# Patient Record
Sex: FEMALE | Race: WHITE | HISPANIC OR LATINO | Employment: OTHER | URBAN - METROPOLITAN AREA
[De-identification: names, ages, dates, MRNs, and addresses within clinical notes are randomized per-mention and may not be internally consistent; named-entity substitution may affect disease eponyms.]

---

## 2017-07-06 ENCOUNTER — ALLSCRIPTS OFFICE VISIT (OUTPATIENT)
Dept: OTHER | Facility: OTHER | Age: 57
End: 2017-07-06

## 2017-07-06 DIAGNOSIS — F41.9 ANXIETY DISORDER: ICD-10-CM

## 2017-07-06 DIAGNOSIS — J45.909 UNCOMPLICATED ASTHMA: ICD-10-CM

## 2017-07-06 DIAGNOSIS — R73.03 PREDIABETES: ICD-10-CM

## 2017-07-06 DIAGNOSIS — F32.9 MAJOR DEPRESSIVE DISORDER, SINGLE EPISODE: ICD-10-CM
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
## 2017-07-06 DIAGNOSIS — E55.9 VITAMIN D DEFICIENCY: ICD-10-CM

## 2017-07-06 DIAGNOSIS — I10 ESSENTIAL (PRIMARY) HYPERTENSION: ICD-10-CM

## 2017-07-31 ENCOUNTER — TRANSCRIBE ORDERS (OUTPATIENT)
Dept: LAB | Facility: CLINIC | Age: 57
End: 2017-07-31

## 2017-07-31 ENCOUNTER — APPOINTMENT (OUTPATIENT)
Dept: LAB | Facility: CLINIC | Age: 57
End: 2017-07-31
Payer: COMMERCIAL

## 2017-07-31 ENCOUNTER — GENERIC CONVERSION - ENCOUNTER (OUTPATIENT)
Dept: OTHER | Facility: OTHER | Age: 57
End: 2017-07-31

## 2017-07-31 DIAGNOSIS — J45.909 UNCOMPLICATED ASTHMA: ICD-10-CM

## 2017-07-31 DIAGNOSIS — F32.9 MAJOR DEPRESSIVE DISORDER, SINGLE EPISODE: ICD-10-CM

## 2017-07-31 DIAGNOSIS — I10 ESSENTIAL (PRIMARY) HYPERTENSION: ICD-10-CM

## 2017-07-31 DIAGNOSIS — R73.03 PREDIABETES: ICD-10-CM

## 2017-07-31 DIAGNOSIS — E55.9 VITAMIN D DEFICIENCY: ICD-10-CM

## 2017-07-31 DIAGNOSIS — F41.9 ANXIETY DISORDER: ICD-10-CM
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
## 2017-07-31 LAB
25(OH)D3 SERPL-MCNC: 34.1 NG/ML (ref 30–100)
ALBUMIN SERPL BCP-MCNC: 3.9 G/DL (ref 3.5–5)
ALP SERPL-CCNC: 84 U/L (ref 46–116)
ALT SERPL W P-5'-P-CCNC: 34 U/L (ref 12–78)
ANION GAP SERPL CALCULATED.3IONS-SCNC: 7 MMOL/L (ref 4–13)
AST SERPL W P-5'-P-CCNC: 27 U/L (ref 5–45)
BASOPHILS # BLD AUTO: 0.03 THOUSANDS/ΜL (ref 0–0.1)
BASOPHILS NFR BLD AUTO: 0 % (ref 0–1)
BILIRUB SERPL-MCNC: 0.71 MG/DL (ref 0.2–1)
BUN SERPL-MCNC: 16 MG/DL (ref 5–25)
CALCIUM SERPL-MCNC: 9.4 MG/DL (ref 8.3–10.1)
CHLORIDE SERPL-SCNC: 102 MMOL/L (ref 100–108)
CHOLEST SERPL-MCNC: 180 MG/DL (ref 50–200)
CO2 SERPL-SCNC: 31 MMOL/L (ref 21–32)
CREAT SERPL-MCNC: 0.77 MG/DL (ref 0.6–1.3)
EOSINOPHIL # BLD AUTO: 0.21 THOUSAND/ΜL (ref 0–0.61)
EOSINOPHIL NFR BLD AUTO: 3 % (ref 0–6)
ERYTHROCYTE [DISTWIDTH] IN BLOOD BY AUTOMATED COUNT: 14.1 % (ref 11.6–15.1)
EST. AVERAGE GLUCOSE BLD GHB EST-MCNC: 126 MG/DL
GFR SERPL CREATININE-BSD FRML MDRD: 85 ML/MIN/1.73SQ M
GLUCOSE P FAST SERPL-MCNC: 98 MG/DL (ref 65–99)
HBA1C MFR BLD: 6 % (ref 4.2–6.3)
HCT VFR BLD AUTO: 36.4 % (ref 34.8–46.1)
HDLC SERPL-MCNC: 56 MG/DL (ref 40–60)
HGB BLD-MCNC: 12 G/DL (ref 11.5–15.4)
LDLC SERPL CALC-MCNC: 94 MG/DL (ref 0–100)
LYMPHOCYTES # BLD AUTO: 2.75 THOUSANDS/ΜL (ref 0.6–4.47)
LYMPHOCYTES NFR BLD AUTO: 35 % (ref 14–44)
MCH RBC QN AUTO: 29.6 PG (ref 26.8–34.3)
MCHC RBC AUTO-ENTMCNC: 33 G/DL (ref 31.4–37.4)
MCV RBC AUTO: 90 FL (ref 82–98)
MONOCYTES # BLD AUTO: 0.37 THOUSAND/ΜL (ref 0.17–1.22)
MONOCYTES NFR BLD AUTO: 5 % (ref 4–12)
NEUTROPHILS # BLD AUTO: 4.5 THOUSANDS/ΜL (ref 1.85–7.62)
NEUTS SEG NFR BLD AUTO: 57 % (ref 43–75)
NRBC BLD AUTO-RTO: 0 /100 WBCS
PLATELET # BLD AUTO: 302 THOUSANDS/UL (ref 149–390)
PMV BLD AUTO: 10.9 FL (ref 8.9–12.7)
POTASSIUM SERPL-SCNC: 3.8 MMOL/L (ref 3.5–5.3)
PROT SERPL-MCNC: 8.3 G/DL (ref 6.4–8.2)
RBC # BLD AUTO: 4.05 MILLION/UL (ref 3.81–5.12)
SODIUM SERPL-SCNC: 140 MMOL/L (ref 136–145)
T4 FREE SERPL-MCNC: 1.2 NG/DL (ref 0.76–1.46)
TRIGL SERPL-MCNC: 149 MG/DL
TSH SERPL DL<=0.05 MIU/L-ACNC: 1.8 UIU/ML (ref 0.36–3.74)
WBC # BLD AUTO: 7.88 THOUSAND/UL (ref 4.31–10.16)

## 2017-07-31 PROCEDURE — 84443 ASSAY THYROID STIM HORMONE: CPT

## 2017-07-31 PROCEDURE — 86618 LYME DISEASE ANTIBODY: CPT

## 2017-07-31 PROCEDURE — 36415 COLL VENOUS BLD VENIPUNCTURE: CPT

## 2017-07-31 PROCEDURE — 83036 HEMOGLOBIN GLYCOSYLATED A1C: CPT

## 2017-07-31 PROCEDURE — 80053 COMPREHEN METABOLIC PANEL: CPT

## 2017-07-31 PROCEDURE — 84439 ASSAY OF FREE THYROXINE: CPT

## 2017-07-31 PROCEDURE — 85025 COMPLETE CBC W/AUTO DIFF WBC: CPT

## 2017-07-31 PROCEDURE — 80061 LIPID PANEL: CPT

## 2017-07-31 PROCEDURE — 82306 VITAMIN D 25 HYDROXY: CPT

## 2017-08-02 LAB
B BURGDOR IGG SER IA-ACNC: 0.16
B BURGDOR IGM SER IA-ACNC: 0.31

## 2017-08-11 ENCOUNTER — GENERIC CONVERSION - ENCOUNTER (OUTPATIENT)
Dept: OTHER | Facility: OTHER | Age: 57
End: 2017-08-11

## 2017-09-22 ENCOUNTER — GENERIC CONVERSION - ENCOUNTER (OUTPATIENT)
Dept: OTHER | Facility: OTHER | Age: 57
End: 2017-09-22

## 2017-10-04 ENCOUNTER — GENERIC CONVERSION - ENCOUNTER (OUTPATIENT)
Dept: OTHER | Facility: OTHER | Age: 57
End: 2017-10-04

## 2018-01-12 NOTE — RESULT NOTES
Verified Results  (1) LYME ANTIBODY PROFILE W/REFLEX TO WESTERN BLOT 28Orq3980 10:13[facility] Order Number: [id number]     Test Name Result Flag Reference   LYME IGG 0 16  0 00-0 79   NEGATIVE(0 00-0 79)-Absence of detectable Borrelia IgG Antibodies  A negative result does not exclude the possibility of Borrelia infection  If early Lyme disease is suspected,a second sample should be collected & tested 4 weeks after initial testing  LYME IGM 0 31  0 00-0 79   NEGATIVE (0 00-0 79)-Absence of detectable Borrelia IgM antibodies  A negative result does not exclude the possibility of Borrelia infection  If early lyme disease is suspected, a second sample should be collected & tested 4 weeks after initial testing

## 2018-01-12 NOTE — MISCELLANEOUS
Chief Complaint  Chief Complaint Free Text Note Form: JAMIE denied  Pt is following up with her surgeon and physical therapists  Active Problems    1  Acute upper respiratory infection (465 9) (J06 9)   2  Anemia (285 9) (D64 9)   3  Anxiety (300 00) (F41 9)   4  Asthma (493 90) (J45 909)   5  Benign essential hypertension (401 1) (I10)   6  Depression (311) (F32 9)   7  Esophageal reflux (530 81) (K21 9)   8  Knee pain, right (719 46) (M25 561)   9  Left ankle pain (719 47) (M25 572)   10  Need for prophylactic vaccination and inoculation against influenza (V04 81) (Z23)   11  Need for Tdap vaccination (V06 1) (Z23)   12  Osteoarthritis of left ankle (715 97) (M19 072)   13  Prediabetes (790 29) (R73 09)   14  Preoperative clearance (V72 84) (Z01 818)   15  Restless legs syndrome (333 94) (G25 81)   16  Thrombophlebitis of superficial veins of upper extremities (451 82) (I80 8)   17  Vitamin D deficiency (268 9) (E55 9)    Past Medical History    1  History of Encounter for screening mammogram for malignant neoplasm of breast   (V76 12) (Z12 31)   2  History of Eustachian tube dysfunction (381 81) (H69 80)   3  History of fatigue (V13 89) (Z87 898)   4  History of hemorrhoids (V13 89) (Z87 19)   5  History of Joint pain, knee (719 46) (M25 569)   6  History of Otitis media of left ear (382 9) (H66 92)   7  History of Pain in joint of right shoulder region (719 41) (M25 511)    Surgical History    1  History of Ankle Surgery    Family History    1  Family history of Diabetes Mellitus (V18 0)   2  Family history of Hypertension (V17 49)   3  Family history of Stroke Syndrome (V17 1)    4  Family history of Cirrhosis    5  Family history of Cirrhosis   6  Family history of Diabetes Mellitus (V18 0)   7  Family history of Hypertension (V17 49)   8  Family history of Stroke Syndrome (V17 1)    Social History    · Being A Social Drinker   · Denied: History of Drug Use   · Never A Smoker    Current Meds   1  Escitalopram Oxalate 20 MG Oral Tablet; Take 1 tablet daily; Therapy: 71YPE1597 to (Evaluate:17Mar2017)  Requested for: 22Mar2016; Last   Rx:22Mar2016 Ordered   2  Hydrochlorothiazide 25 MG Oral Tablet; Take 1 tablet daily; Therapy: 14GMW1130 to (Evaluate:17Mar2017)  Requested for: 22Mar2016; Last   Rx:22Mar2016 Ordered   3  Omeprazole 20 MG Oral Capsule Delayed Release; TAKE 1 CAPSULE DAILY; Therapy: 17VFN5646 to ([place])  Requested for: 08Apr2015; Last   Rx:08Apr2015 Ordered   4  Proventil  (90 Base) MCG/ACT Inhalation Aerosol Solution; INHALE 2 PUFFS   EVERY 4-6 HOURS AS NEEDED; Therapy: 31AYR2639 to (Evaluate:21Apr2016)  Requested for: 61IXA9929; Last   Rx:22Mar2016 Ordered   5  Venlafaxine HCl ER 37 5 MG Oral Capsule Extended Release 24 Hour; TAKE 1   CAPSULE ONCE DAILY WITH FOOD; Therapy: 61XSC5042 to (Last Rx:22Mar2016)  Requested for: 22Mar2016 Ordered   6  Venlafaxine HCl ER 75 MG Oral Capsule Extended Release 24 Hour; TAKE ONE (1)   CAPSULE DAILY; Therapy: 01FAR3915 to (Evaluate:18Sep2016)  Requested for: 22Mar2016; Last   Rx:22Mar2016 Ordered    Allergies    1  No Known Drug Allergies    Health Management  History of Encounter for screening mammogram for malignant neoplasm of breast   Digital Bilateral Screening Mammogram With CAD; every 1 year; Last 69HMP6901; Next Due:  08Nne7466; Overdue    Signatures   Electronically signed by : Arnulfo Stapleton DO;  Apr 5 2016  1:02PM EST                       (Author)

## 2018-01-12 NOTE — RESULT NOTES
Verified Results  (1) COMPREHENSIVE METABOLIC PANEL [id number] 15:99IR [facility]    Order Number: [id number]     Test Name Result Flag Reference   SODIUM 140 mmol/L  136-145   POTASSIUM 3 8 mmol/L  3 5-5 3   CHLORIDE 102 mmol/L  100-108   CARBON DIOXIDE 31 mmol/L  21-32   ANION GAP (CALC) 7 mmol/L  4-13   BLOOD UREA NITROGEN 16 mg/dL  5-25   CREATININE 0 77 mg/dL  0 60-1 30   Standardized to IDMS reference method   CALCIUM 9 4 mg/dL  8 3-10 1   BILI, TOTAL 0 71 mg/dL  0 20-1 00   ALK PHOSPHATAS 84 U/L     ALT (SGPT) 34 U/L  12-78   AST(SGOT) 27 U/L  5-45   ALBUMIN 3 9 g/dL  3 5-5 0   TOTAL PROTEIN 8 3 g/dL H 6 4-8 2   eGFR 85 ml/min/1 73sq m     National Kidney Disease Education Program recommendations are as follows:  GFR calculation is accurate only with a steady state creatinine  Chronic Kidney disease less than 60 ml/min/1 73 sq  meters  Kidney failure less than 15 ml/min/1 73 sq  meters  GLUCOSE FASTING 98 mg/dL  65-99     (1) CBC/PLT/DIFF [id number] 10:13AM [facility]    Order Number: [id number]     Test Name Result Flag Reference   WBC COUNT 7 88 Thousand/uL  4 31-10 16   RBC COUNT 4 05 Million/uL  3 81-5 12   HEMOGLOBIN 12 0 g/dL  11 5-15 4   HEMATOCRIT 36 4 %  34 8-46  1   MCV 90 fL  82-98   MCH 29 6 pg  26 8-34 3   MCHC 33 0 g/dL  31 4-37 4   RDW 14 1 %  11 6-15 1   MPV 10 9 fL  8 9-12 7   PLATELET COUNT 688 Thousands/uL  149-390   nRBC AUTOMATED 0 /100 WBCs     NEUTROPHILS RELATIVE PERCENT 57 %  43-75   LYMPHOCYTES RELATIVE PERCENT 35 %  14-44   MONOCYTES RELATIVE PERCENT 5 %  4-12   EOSINOPHILS RELATIVE PERCENT 3 %  0-6   BASOPHILS RELATIVE PERCENT 0 %  0-1   NEUTROPHILS ABSOLUTE COUNT 4 50 Thousands/? ??L  1 85-7 62   LYMPHOCYTES ABSOLUTE COUNT 2 75 Thousands/? ??L  0 60-4 47   MONOCYTES ABSOLUTE COUNT 0 37 Thousand/? ??L  0 17-1 22   EOSINOPHILS ABSOLUTE COUNT 0 21 Thousand/? ??L  0 00-0 61   BASOPHILS ABSOLUTE COUNT 0 03 Thousands/? ??L  0 00-0 10     (1) HEMOGLOBIN A1C 21Irc4471 10:13AM [facility] [place]   TW Order Number: [id number]     Test Name Result Flag Reference   HEMOGLOBIN A1C 6 0 %  4 2-6 3   EST  AVG  GLUCOSE 126 mg/dl       (1) LIPID PANEL FASTING W DIRECT LDL REFLEX [id number] 10:13[facility] Order Number: [id number]     Test Name Result Flag Reference   CHOLESTEROL 180 mg/dL     LDL CHOLESTEROL CALCULATED 94 mg/dL  0-100   Triglyceride:         Normal              <150 mg/dl       Borderline High    150-199 mg/dl       High               200-499 mg/dl       Very High          >499 mg/dl  Cholesterol:         Desirable        <200 mg/dl      Borderline High  200-239 mg/dl      High             >239 mg/dl  HDL Cholesterol:        High    >59 mg/dL      Low     <41 mg/dL  LDL Cholesterol:        Optimal          <100 mg/dl        Near Optimal     100-129 mg/dl        Above Optimal          Borderline High   130-159 mg/dl          High              160-189 mg/dl          Very High        >189 mg/dl  LDL CALCULATED:    This screening LDL is a calculated result  It does not have the accuracy of the Direct Measured LDL in the monitoring of patients with hyperlipidemia and/or statin therapy  Direct Measure LDL (SJA858) must be ordered separately in these patients  TRIGLYCERIDES 149 mg/dL  <=150   Specimen collection should occur prior to N-Acetylcysteine or Metamizole administration due to the potential for falsely depressed results  HDL,DIRECT 56 mg/dL  40-60   Specimen collection should occur prior to Metamizole administration due to the potential for falsely depressed results       (1) T4, FREE 35Eri3976 10:13AM [facility] [place]   TW Order Number: [id number]     Test Name Result Flag Reference   T4,FREE 1 20 ng/dL  0 76-1 46     (1) TSH 25GXY0752 10:13[facility] Order Number: [id number]     Test Name Result Flag Reference   TSH 1 800 uIU/mL  0 358-3 740   Patients undergoing fluorescein dye angiography may retain small amounts of fluorescein in the body for 48-72 hours post procedure  Samples containing fluorescein can produce falsely depressed TSH values  If the patient had this procedure,a specimen should be resubmitted post fluorescein clearance  The recommended reference ranges for TSH during pregnancy are as follows:  First trimester 0 1 to 2 5 uIU/mL  Second trimester  0 2 to 3 0 uIU/mL  Third trimester 0 3 to 3 0 uIU/m     (1) VITAMIN D 25-HYDROXY 14Kar8667 10:13AM [facility] Order Number: [id number]     Test Name Result Flag Reference   VIT D 25-HYDROX 34 1 ng/mL  30 0-100 0   This assay is a certified procedure of the CDC Vitamin D Standardization Certification Program (VDSCP)     Deficiency <20ng/ml   Insufficiency 20-30ng/ml   Sufficient  ng/ml     *Patients undergoing fluorescein dye angiography may retain small amounts of fluorescein in the body for 48-72 hours post procedure  Samples containing fluorescein can produce falsely elevated Vitamin D values  If the patient had this procedure, a specimen should be resubmitted post fluorescein clearance

## 2018-01-14 VITALS
SYSTOLIC BLOOD PRESSURE: 136 MMHG | DIASTOLIC BLOOD PRESSURE: 80 MMHG | HEIGHT: 59 IN | BODY MASS INDEX: 37.2 KG/M2 | WEIGHT: 184.5 LBS

## 2018-01-14 NOTE — PROGRESS NOTES
Assessment    1  Encounter for preventive health examination (V70 0) (Z00 00)   2  Anxiety (300 00) (F41 9)   3  Asthma (493 90) (J45 909)   4  Benign essential hypertension (401 1) (I10)   5  Vitamin D deficiency (268 9) (E55 9)   6  Pre-diabetes (790 29) (R73 03)   7  Tick bite (919 4,E906 4) (W57 XXXA)   8  Depression (311) (F32 9)    Plan  Anxiety    · Escitalopram Oxalate 20 MG Oral Tablet; Take 1 tablet daily  Anxiety, Asthma, Benign essential hypertension, Depression, Pre-diabetes, Tick bite,  Vitamin D deficiency    · (1) CBC/PLT/DIFF; Status:Active; Requested for:76Czt6385;    · (1) COMPREHENSIVE METABOLIC PANEL; Status:Active; Requested for:71Mdy0206;    · (1) HEMOGLOBIN A1C; Status:Active; Requested SFT:21TQU2192;    · (1) LIPID PANEL FASTING W DIRECT LDL REFLEX; Status:Active; Requested  for:45Ayq8384;    · (1) LYME ANTIBODY PROFILE W/REFLEX TO WESTERN BLOT; Status:Active; Requested for:63Nnu1324;    · (1) T4, FREE; Status:Active; Requested for:93Fpa1464;    · (1) TSH; Status:Active; Requested for:50Wpu6260;    · (1) VITAMIN D 25-HYDROXY; Status:Active; Requested PLN:76AUZ1531; Anxiety, Benign essential hypertension, Depression, Health Maintenance, Pre-diabetes,  Tick bite, Vitamin D deficiency    · Follow-up PRN Evaluation and Treatment  Follow-up  Status: Complete  Done:  59BQS0818 02:54PM  Benign essential hypertension    · HydroCHLOROthiazide 25 MG Oral Tablet; Take 1 tablet daily    Discussion/Summary  health maintenance visit Currently, she eats a poor diet and has an inadequate exercise regimen  the risks and benefits of cervical cancer screening were discussed Breast cancer screening: the risks and benefits of breast cancer screening were discussed  Colorectal cancer screening: the risks and benefits of colorectal cancer screening were discussed  Screening lab work includes hemoglobin, glucose, lipid profile, thyroid function testing and 25-hydroxyvitamin D   She was advised to be evaluated by an ophthalmologist and a dentist  Advice and education were given regarding nutrition, aerobic exercise, weight bearing exercise, weight loss, vitamin D supplements, reproductive health, cardiovascular risk reduction, sunscreen use, self skin examination and seat belt use  Patient discussion: discussed with the patient  Check labs and refilled meds for anxiety/depression and BP med  F-up with PCP in [facility]  F-up with GYN for mammogram and GYN routine care  Call if any problems  Sunscreen recommended  Check for lyme's disease after recent tick bites on arms b/l 1 month ago  Pt  is moving to [place] next month to retire  Rec  f-up with new PCP in [facility] for routine medical care and anxiety and depression and HTN f-up  Rec  also monitoring Vitamin D and also prediabetes and rec  she use sunscreen while in sun when living in [place] also  The patient was counseled regarding  Possible side effects of new medications were reviewed with the patient/guardian today  The treatment plan was reviewed with the patient/guardian  The patient/guardian understands and agrees with the treatment plan      Chief Complaint  Annual PE  ksd,cma      History of Present Illness  HPI: Here for General PE and sees GYN Dr Neo Villagomez  Takes Lexapro 20 mg generic and is doing ok with this  Takes HCTZ  Has been losing hair also and it has occurred over last 6 months  Asthma stable  Has not had to use inhaler  Trying to lose weight by walking dog  Moving to [facility]  Pt  states bit by tick in both arms  Pt  removed both ticks  Pt  would like to be checked for lyme's disease  No cp or sob, or ha  Review of Systems    Constitutional: No fever, no chills, feels well, no tiredness, no recent weight gain or weight loss  Eyes: No complaints of eye pain, no red eyes, no eyesight problems, no discharge, no dry eyes, no itching of eyes     ENT: no complaints of earache, no loss of hearing, no nose bleeds, no nasal discharge, no sore throat, no hoarseness  Cardiovascular: No complaints of slow heart rate, no fast heart rate, no chest pain, no palpitations, no leg claudication, no lower extremity edema  Respiratory: as noted in HPI  Gastrointestinal: No complaints of abdominal pain, no constipation, no nausea or vomiting, no diarrhea, no bloody stools  Genitourinary: No complaints of dysuria, no incontinence, no pelvic pain, no dysmenorrhea, no vaginal discharge or bleeding  Musculoskeletal: No complaints of arthralgias, no myalgias, no joint swelling or stiffness, no limb pain or swelling  Integumentary: as noted in HPI  Neurological: No complaints of headache, no confusion, no convulsions, no numbness, no dizziness or fainting, no tingling, no limb weakness, no difficulty walking  Psychiatric: as noted in HPI  Endocrine: No complaints of proptosis, no hot flashes, no muscle weakness, no deepening of the voice, no feelings of weakness  Hematologic/Lymphatic: No complaints of swollen glands, no swollen glands in the neck, does not bleed easily, does not bruise easily  Active Problems    1  Acute upper respiratory infection (465 9) (J06 9)   2  Anemia (285 9) (D64 9)   3  Anxiety (300 00) (F41 9)   4  Asthma (493 90) (J45 909)   5  Benign essential hypertension (401 1) (I10)   6  Cyst, arachnoid (348 0) (G93 0)   7  Depression (311) (F32 9)   8  Disruption of external operation (surgical) wound, not elsewhere classified, initial   encounter (998 32) (T81 31XA)   9  Esophageal reflux (530 81) (K21 9)   10  Headache, chronic daily (784 0) (R51)   11  Knee pain, right (719 46) (M25 561)   12  Left ankle pain (719 47) (M25 572)   13  Migraine (346 90) (G43 909)   14  Need for prophylactic vaccination and inoculation against influenza (V04 81) (Z23)   15  Need for Tdap vaccination (V06 1) (Z23)   16  Osteoarthritis of left ankle (715 97) (M19 072)   17  Pre-diabetes (790 29) (R73 03)   18   Preoperative clearance (V72 84) (Z01 818)   19  Restless legs syndrome (333 94) (G25 81)   20  Screening for colon cancer (V76 51) (Z12 11)   21  Thrombophlebitis of superficial veins of upper extremities (451 82) (I80 8)   22  Vitamin D deficiency (268 9) (E55 9)    Past Medical History    · History of Encounter for screening mammogram for malignant neoplasm of breast  (V76 12) (Z12 31)   · History of Eustachian tube dysfunction (381 81) (H69 80)   · History of fatigue (V13 89) (X52 660)   · History of hemorrhoids (V13 89) (Z87 19)   · History of Joint pain, knee (719 46) (M25 569)   · History of Otitis media of left ear (382 9) (H66 92)   · History of Pain in joint of right shoulder region (719 41) (M25 511)    Surgical History    · History of Ankle Surgery    Family History  Mother    · Family history of Diabetes Mellitus (V18 0)   · Family history of Hypertension (V17 49)   · Family history of Stroke Syndrome (V17 1)  Brother    · Family history of Cirrhosis  Family History    · Family history of Cirrhosis   · Family history of Diabetes Mellitus (V18 0)   · Family history of Hypertension (V17 49)   · Family history of Stroke Syndrome (V17 1)    Social History    · Being A Social Drinker   · Denied: History of Drug Use   · Never A Smoker    Current Meds   1  Aspirin 81 MG CAPS; take 1 capsule daily; Therapy: (Recorded:84Ezp4786) to Recorded   2  Escitalopram Oxalate 20 MG Oral Tablet; Take 1 tablet daily; Therapy: 50WPX0525 to (Evaluate:17Mar2017)  Requested for: 22Mar2016; Last   Rx:22Mar2016 Ordered   3  HydroCHLOROthiazide 25 MG Oral Tablet; Take 1 tablet daily; Therapy: 58KRV2753 to (Evaluate:17Mar2017)  Requested for: 94NPY2616; Last   Rx:22Mar2016 Ordered   4  Proventil  (90 Base) MCG/ACT Inhalation Aerosol Solution; INHALE 2 PUFFS   EVERY 4-6 HOURS AS NEEDED; Therapy: 57RFI7715 to (Evaluate:21Apr2016)  Requested for: 85HLT9137; Last   Rx:22Mar2016 Ordered   5   Tylenol 325 MG Oral Tablet; TAKE 1 TO 2 TABLETS EVERY 6 HOURS AS NEEDED; Therapy: (Recorded:63Wfj5428) to Recorded    Allergies    1  No Known Drug Allergies    Vitals   Recorded: 34WGS4565 99:71VW   Systolic 553   Diastolic 80   Height 4 ft 11 in   Weight 184 lb 8 oz   BMI Calculated 37 26   BSA Calculated 1 78     Physical Exam    Constitutional   General appearance: Abnormal   obesity  Eyes   Conjunctiva and lids: No swelling, erythema or discharge  Pupils and irises: Equal, round, reactive to light  Ophthalmoscopic examination: Normal fundi and optic discs  Ears, Nose, Mouth, and Throat   External inspection of ears and nose: Normal     Otoscopic examination: Tympanic membranes translucent with normal light reflex  Canals patent without erythema  Hearing: Normal     Nasal mucosa, septum, and turbinates: Normal without edema or erythema  Lips, teeth, and gums: Normal, good dentition  Oropharynx: Normal with no erythema, edema, exudate or lesions  Neck   Neck: Supple, symmetric, trachea midline, no masses  Thyroid: Normal, no thyromegaly  Pulmonary   Respiratory effort: No increased work of breathing or signs of respiratory distress  Percussion of chest: Normal     Palpation of chest: Normal     Auscultation of lungs: Clear to auscultation  Cardiovascular   Palpation of heart: Normal PMI, no thrills  Auscultation of heart: Normal rate and rhythm, normal S1 and S2, no murmurs  Carotid pulses: 2+ bilaterally  Abdominal aorta: Normal     Femoral pulses: 2+ bilaterally  Pedal pulses: 2+ bilaterally  Examination of extremities for edema and/or varicosities: Normal     Abdomen   Abdomen: Non-tender, no masses  Liver and spleen: No hepatomegaly or splenomegaly  Examination for hernias: No hernia appreciated  Lymphatic   Palpation of lymph nodes in neck: No lymphadenopathy  Palpation of lymph nodes in axillae: No lymphadenopathy  Palpation of lymph nodes in groin: No lymphadenopathy      Palpation of lymph nodes in other areas: No lymphadenopathy  Musculoskeletal   Gait and station: Normal     Digits and nails: Normal without clubbing or cyanosis  Joints, bones, and muscles: Normal     Range of motion: Normal     Stability: Normal     Muscle strength/tone: Normal     Skin   Skin and subcutaneous tissue: Normal without rashes or lesions  Palpation of skin and subcutaneous tissue: Normal turgor  Neurologic   Cranial nerves: Cranial nerves II-XII intact  Reflexes: 2+ and symmetric  Sensation: No sensory loss  Psychiatric   Judgment and insight: Normal     Orientation to person, place, and time: Normal     Recent and remote memory: Intact  Mood and affect: Normal        Health Management  History of Encounter for screening mammogram for malignant neoplasm of breast   Digital Bilateral Screening Mammogram With CAD; every 1 year; Last 52LHO1316; Next  Due: 08Blz4386;  Overdue    Signatures   Electronically signed by : Joseph Jaffe DO; Jul 6 2017  2:56PM EST                       (Author)

## 2018-01-23 NOTE — MISCELLANEOUS
Assessment   1  Migraine (346 90) (G43 909)1   2  Anxiety (300 00) (F41 9)1   3  Depression (311) (F32 9)1   4  Esophageal reflux (530 81) (K21 9)1   5  Asthma (493 90) (J45 909)1   6  Cyst, arachnoid (348 0) (G93 0)1      1 Amended By: Marilin Ma; Apr 28 2016 2:49 PM EST    Plan  Anxiety, Asthma, Cyst, arachnoid, Depression, Esophageal reflux, Migraine    · Follow-up PRN Evaluation and Treatment  Follow-up  Status: Complete  Done:  28Apr2016 02:49PM1   Ordered; For: Anxiety, Asthma, Cyst, arachnoid, Depression, Esophageal reflux, Migraine;    Ordered By: Marilin Ma  Performed:   Due: [id number]      1 Amended By: Marilin Ma; Apr 28 2016 2:49 PM EST    Discussion/Summary  Discussion Summary:   JAMIE today for migraine headache and and right frontal arachnoid cyst found on Brain MRI  Take meds as directed for anxiety and depression  Asthma and gerd stable  Rec  f-up with Podiatry for removal of cast in 2 weeks and also f-up with Neurology for migraine headache and right frontal arachnoid cyst  Take Fioricet prn headaches  Call if any problems  1    Counseling Documentation With Imm: The  patient1  was counseled regarding1   Medication SE Review and Pt Understands Tx: Possible side effects of new medications were reviewed with the patient/guardian today1  The treatment plan was reviewed with the patient/guardian  The patient/guardian understands and agrees with the treatment plan1        1 Amended By: Marilin Ma; Apr 28 2016 2:50 PM EST    Chief Complaint  Chief Complaint Free Text Note Form: PT is here today for a JAMIE  States she was in the hospital for margines and numbness in her hands  1  Has Fioricet for headaches  2    Chief Complaint Chronic Condition [facility]: Patient is here today for follow up of chronic conditions described in HPI  2        1 Amended By: Kahlil Caraballo; Apr 28 2016 2:17 PM EST   2 Amended By: Marilin Ma;  Apr 28 2016 2:31 PM EST    History of Present Illness  TCM Communication [facility]: The patient is being contacted for follow-up after hospitalization  She was hospitalized at [facility]  The dates of hospitalization: 04/24/2016, date of admission: 04/24/2016, date of discharge: 04/26/2016  Diagnosis: migraine  She was discharged to home  Symptoms: headache and nausea  Communication performed and completed by   HPI: Here for JAMIE for migraines and right sided paresthesias  Pt  was discharged  to home with directions to f-up in Neurology office Dr Zana Ellis  Pt  did call Neurology and is awaiting a phone call from them  Pt  had left foot surgery by Dr sIela Patterson 1 month ago and has 14 more days until she removes the left lower extremity cast  Hx of anxiety and depression which is stable and asthma which is also stable  Vernida Reason is stable also  Pt  Is here with her   It was found on the MRI brain that she has a right frontal arachnoid cyst  Pt  feels better but still complaints headaches  She was given Fioricet which she takes on occasion  1        1 Amended By: Ayala Persaud; Apr 28 2016 2:33 PM EST    Review of Systems  Complete-Female:   Constitutional:1  No fever, no chills, feels well, no tiredness, no recent weight gain or weight loss1   Eyes:1  No complaints of eye pain, no red eyes, no eyesight problems, no discharge, no dry eyes, no itching of eyes1   ENT:1  no complaints of earache, no loss of hearing, no nose bleeds, no nasal discharge, no sore throat, no hoarseness1   Cardiovascular:1  No complaints of slow heart rate, no fast heart rate, no chest pain, no palpitations, no leg claudication, no lower extremity edema1   Respiratory:1  No complaints of shortness of breath, no wheezing, no cough, no SOB on exertion, no orthopnea, no PND1   Gastrointestinal:1  No complaints of abdominal pain, no constipation, no nausea or vomiting, no diarrhea, no bloody stools1      Genitourinary:1  No complaints of dysuria, no incontinence, no pelvic pain, no dysmenorrhea, no vaginal discharge or bleeding1   Musculoskeletal:1  as noted in [address]   Integumentary:1  No complaints of skin rash or lesions, no itching, no skin wounds, no breast pain or lump1   Neurological:1  as noted in [address]   Psychiatric:1  Not suicidal, no sleep disturbance, no anxiety or depression, no change in personality, no emotional problems1   Endocrine:1  No complaints of proptosis, no hot flashes, no muscle weakness, no deepening of the voice, no feelings of weakness1   Hematologic/Lymphatic:1  No complaints of swollen glands, no swollen glands in the neck, does not bleed easily, does not bruise easily1         1 Amended By: Kulwant Saldivar; Apr 28 2016 2:35 PM EST    Active Problems   1  Acute upper respiratory infection (465 9) (J06 9)  2  Anemia (285 9) (D64 9)  3  Anxiety (300 00) (F41 9)  4  Asthma (493 90) (J45 909)  5  Benign essential hypertension (401 1) (I10)  6  Depression (311) (F32 9)  7  Esophageal reflux (530 81) (K21 9)  8  Knee pain, right (719 46) (M25 561)  9  Left ankle pain (719 47) (M25 572)  10  Need for prophylactic vaccination and inoculation against influenza (V04 81) (Z23)  11  Need for Tdap vaccination (V06 1) (Z23)  12  Osteoarthritis of left ankle (715 97) (M19 072)  13  Prediabetes (790 29) (R73 09)  14  Preoperative clearance (V72 84) (Z01 818)  15  Restless legs syndrome (333 94) (G25 81)  16  Thrombophlebitis of superficial veins of upper extremities (451 82) (I80 8)  17  Vitamin D deficiency (268 9) (E55 9)    Past Medical History   1  History of Encounter for screening mammogram for malignant neoplasm of breast   (V76 12) (Z12 31)  2  History of Eustachian tube dysfunction (381 81) (H69 80)  3  History of fatigue (V13 89) (Z87 898)  4  History of hemorrhoids (V13 89) (Z87 19)  5  History of Joint pain, knee (719 46) (M25 569)  6  History of Otitis media of left ear (382 9) (W31 83)  7   History of Pain in joint of right shoulder region (407 04) (V94 302)    Surgical History   1  History of Ankle Surgery    Family History  Mother   1  Family history of Diabetes Mellitus (V18 0)  2  Family history of Hypertension (V17 49)  3  Family history of Stroke Syndrome (V17 1)  Brother   4  Family history of Cirrhosis  Family History   5  Family history of Cirrhosis  6  Family history of Diabetes Mellitus (V18 0)  7  Family history of Hypertension (V17 49)  8  Family history of Stroke Syndrome (V17 1)    Social History    · Being A Social Drinker   · Denied: History of Drug Use   · Never A Smoker    Current Meds  1  Escitalopram Oxalate 20 MG Oral Tablet; Take 1 tablet daily; Therapy: 66GRK6070 to (Evaluate:17Mar2017)  Requested for: 22Mar2016; Last   Rx:22Mar2016 Ordered  2  Hydrochlorothiazide 25 MG Oral Tablet; Take 1 tablet daily; Therapy: 27QLQ6969 to (Evaluate:17Mar2017)  Requested for: 22Mar2016; Last   Rx:22Mar2016 Ordered  3  Omeprazole 20 MG Oral Capsule Delayed Release; TAKE 1 CAPSULE DAILY; Therapy: 45VQS7122 to (60 124 37 75)  Requested for: 08Apr2015; Last   Rx:08Apr2015 Ordered  4  Proventil  (90 Base) MCG/ACT Inhalation Aerosol Solution; INHALE 2 PUFFS   EVERY 4-6 HOURS AS NEEDED; Therapy: 96IRR1112 to (Evaluate:21Apr2016)  Requested for: 60FJV1390; Last   Rx:22Mar2016 Ordered  5  Venlafaxine HCl ER 37 5 MG Oral Capsule Extended Release 24 Hour; TAKE 1   CAPSULE ONCE DAILY WITH FOOD; Therapy: 14LIB7254 to (Last Rx:22Mar2016)  Requested for: 22Mar2016 Ordered  6  Venlafaxine HCl ER 75 MG Oral Capsule Extended Release 24 Hour; TAKE ONE (1)   CAPSULE DAILY; Therapy: 75LRS2382 to (Evaluate:18Srm5973)  Requested for: 22Mar2016; Last   Rx:22Mar2016 Ordered    Allergies   1  No Known Drug Allergies    Vitals  Signs [Data Includes: Current Encounter]   Recorded: 68TVH1895 78:07YW    Systolic: 425 1    Diastolic: 70 1    Weight: 175 lb  1      1 Amended By: Kulwant Saldivar;  Apr 28 2016 2:29 PM EST    Physical Exam    Constitutional1    General appearance: No acute distress, well appearing and well nourished  1    Eyes1    Conjunctiva and lids: No swelling, erythema or discharge  1    Pupils and irises: Equal, round and reactive to light  1    Ears, Nose, Mouth, and Throat1    External inspection of ears and nose: Normal 1    Otoscopic examination: Tympanic membranes translucent with normal light reflex  Canals patent without erythema  1    Nasal mucosa, septum, and turbinates: Normal without edema or erythema  1    Oropharynx: Normal with no erythema, edema, exudate or lesions  1    Pulmonary1    Respiratory effort: No increased work of breathing or signs of respiratory distress  1    Auscultation of lungs: Clear to auscultation  1    Cardiovascular1    Palpation of heart: Normal PMI, no thrills  1    Auscultation of heart: Normal rate and rhythm, normal S1 and S2, without murmurs  1    Examination of extremities for edema and/or varicosities: Normal 1    Carotid pulses: Normal 1    Abdomen1    Abdomen: Non-tender, no masses  1    Liver and spleen: No hepatomegaly or splenomegaly  1    Lymphatic1    Palpation of lymph nodes in neck: No lymphadenopathy  1    Musculoskeletal1    Gait and station: Normal 1    Digits and nails: Normal without clubbing or cyanosis  1    Inspection/palpation of joints, bones, and muscles: Abnormal  1  Left foot cast1   Skin1    Skin and subcutaneous tissue: Normal without rashes or lesions  1    Neurologic1    Cranial nerves: Cranial nerves 2-12 intact  1    Reflexes: 2+ and symmetric  1    Sensation: No sensory loss  1    Psychiatric1    Orientation to person, place, and time: Normal 1    Mood and affect: Normal 1          1 Amended By: Carlos Camejo; Apr 28 2016 2:48 PM EST    Health Management  History of Encounter for screening mammogram for malignant neoplasm of breast   Digital Bilateral Screening Mammogram With CAD; every 1 year; Last 70CVS7350; Next Due:  28Naw2038;  Overdue    Signatures Electronically signed by : Yamilka Garrison DO; Apr 27 2016 11:47AM EST                       (Author)    Electronically signed by : Yamilka Garrison DO; Apr 28 2016  2:51PM EST                       (Author)    Electronically signed by : Yamilka Garrison DO; Apr 28 2016  2:52PM EST                       (Author)    Electronically signed by : Yamilka Garrison DO;  Apr 28 2016  3:26PM EST                       (Author)

## 2018-01-30 DIAGNOSIS — R73.03 PREDIABETES: ICD-10-CM

## 2018-02-04 ENCOUNTER — TELEPHONE (OUTPATIENT)
Dept: FAMILY MEDICINE CLINIC | Facility: CLINIC | Age: 58
End: 2018-02-04

## 2018-06-14 DIAGNOSIS — F32.A ANXIETY AND DEPRESSION: ICD-10-CM

## 2018-06-14 DIAGNOSIS — F41.9 ANXIETY AND DEPRESSION: ICD-10-CM

## 2018-06-14 DIAGNOSIS — I10 ESSENTIAL HYPERTENSION: Primary | ICD-10-CM

## 2018-06-14 DIAGNOSIS — I10 ESSENTIAL HYPERTENSION: ICD-10-CM

## 2018-06-14 RX ORDER — ESCITALOPRAM OXALATE 20 MG/1
TABLET ORAL
Qty: 90 TABLET | Refills: 3 | Status: SHIPPED | OUTPATIENT
Start: 2018-06-14 | End: 2018-06-14 | Stop reason: SDUPTHER

## 2018-06-14 RX ORDER — HYDROCHLOROTHIAZIDE 25 MG/1
25 TABLET ORAL DAILY
Qty: 90 TABLET | Refills: 0 | Status: SHIPPED | OUTPATIENT
Start: 2018-06-14

## 2018-06-14 RX ORDER — ESCITALOPRAM OXALATE 20 MG/1
20 TABLET ORAL DAILY
Qty: 90 TABLET | Refills: 0 | Status: SHIPPED | OUTPATIENT
Start: 2018-06-14

## 2018-06-14 RX ORDER — HYDROCHLOROTHIAZIDE 25 MG/1
TABLET ORAL
Qty: 90 TABLET | Refills: 3 | Status: SHIPPED | OUTPATIENT
Start: 2018-06-14 | End: 2018-06-14 | Stop reason: SDUPTHER

## 2018-06-14 NOTE — TELEPHONE ENCOUNTER
Notify patient needs office visit for HTN and f-up to escitalopram med, not seen recently and needs office visit, just refilled medication

## 2018-06-14 NOTE — TELEPHONE ENCOUNTER
Pt states she lives in [place] now  she is seeing a her new PCP on 09/12/18, wants to know if you could still fill meds until then

## 2018-06-21 RX ORDER — OMEPRAZOLE 20 MG/1
CAPSULE, DELAYED RELEASE ORAL
Qty: 90 CAPSULE | Refills: 3 | OUTPATIENT
Start: 2018-06-21

## 2019-01-23 DIAGNOSIS — Z12.39 SCREENING FOR BREAST CANCER: Primary | ICD-10-CM

## 2022-02-11 LAB — NONINV COLON CA DNA+OCC BLD SCRN STL QL: NEGATIVE

## 2023-02-01 ENCOUNTER — TELEPHONE (OUTPATIENT)
Dept: PRIMARY CARE CLINIC | Facility: CLINIC | Age: 63
End: 2023-02-01
Payer: COMMERCIAL

## 2023-02-01 ENCOUNTER — OFFICE VISIT (OUTPATIENT)
Dept: PRIMARY CARE CLINIC | Facility: CLINIC | Age: 63
End: 2023-02-01
Payer: COMMERCIAL

## 2023-02-01 ENCOUNTER — PATIENT OUTREACH (OUTPATIENT)
Dept: ADMINISTRATIVE | Facility: HOSPITAL | Age: 63
End: 2023-02-01
Payer: COMMERCIAL

## 2023-02-01 VITALS
SYSTOLIC BLOOD PRESSURE: 132 MMHG | OXYGEN SATURATION: 96 % | HEART RATE: 86 BPM | WEIGHT: 190.94 LBS | DIASTOLIC BLOOD PRESSURE: 76 MMHG

## 2023-02-01 DIAGNOSIS — E78.5 HYPERLIPIDEMIA, UNSPECIFIED HYPERLIPIDEMIA TYPE: ICD-10-CM

## 2023-02-01 DIAGNOSIS — I10 BENIGN ESSENTIAL HYPERTENSION: ICD-10-CM

## 2023-02-01 DIAGNOSIS — Z78.0 MENOPAUSE: ICD-10-CM

## 2023-02-01 DIAGNOSIS — M06.9 RHEUMATOID ARTHRITIS, INVOLVING UNSPECIFIED SITE, UNSPECIFIED WHETHER RHEUMATOID FACTOR PRESENT: ICD-10-CM

## 2023-02-01 DIAGNOSIS — S62.102S CLOSED FRACTURE OF LEFT WRIST, SEQUELA: ICD-10-CM

## 2023-02-01 DIAGNOSIS — Z00.00 WELLNESS EXAMINATION: Primary | ICD-10-CM

## 2023-02-01 DIAGNOSIS — Z11.59 ENCOUNTER FOR HEPATITIS C SCREENING TEST FOR LOW RISK PATIENT: ICD-10-CM

## 2023-02-01 DIAGNOSIS — K21.9 GASTROESOPHAGEAL REFLUX DISEASE, UNSPECIFIED WHETHER ESOPHAGITIS PRESENT: ICD-10-CM

## 2023-02-01 DIAGNOSIS — Z12.31 ENCOUNTER FOR SCREENING MAMMOGRAM FOR MALIGNANT NEOPLASM OF BREAST: ICD-10-CM

## 2023-02-01 PROCEDURE — 99999 PR PBB SHADOW E&M-NEW PATIENT-LVL IV: CPT | Mod: PBBFAC,,, | Performed by: INTERNAL MEDICINE

## 2023-02-01 PROCEDURE — 3075F PR MOST RECENT SYSTOLIC BLOOD PRESS GE 130-139MM HG: ICD-10-PCS | Mod: CPTII,S$GLB,, | Performed by: INTERNAL MEDICINE

## 2023-02-01 PROCEDURE — 99386 PREV VISIT NEW AGE 40-64: CPT | Mod: S$GLB,,, | Performed by: INTERNAL MEDICINE

## 2023-02-01 PROCEDURE — 99999 PR PBB SHADOW E&M-NEW PATIENT-LVL IV: ICD-10-PCS | Mod: PBBFAC,,, | Performed by: INTERNAL MEDICINE

## 2023-02-01 PROCEDURE — 1159F MED LIST DOCD IN RCRD: CPT | Mod: CPTII,S$GLB,, | Performed by: INTERNAL MEDICINE

## 2023-02-01 PROCEDURE — 99386 PR PREVENTIVE VISIT,NEW,40-64: ICD-10-PCS | Mod: S$GLB,,, | Performed by: INTERNAL MEDICINE

## 2023-02-01 PROCEDURE — 1159F PR MEDICATION LIST DOCUMENTED IN MEDICAL RECORD: ICD-10-PCS | Mod: CPTII,S$GLB,, | Performed by: INTERNAL MEDICINE

## 2023-02-01 PROCEDURE — 3075F SYST BP GE 130 - 139MM HG: CPT | Mod: CPTII,S$GLB,, | Performed by: INTERNAL MEDICINE

## 2023-02-01 PROCEDURE — 3078F DIAST BP <80 MM HG: CPT | Mod: CPTII,S$GLB,, | Performed by: INTERNAL MEDICINE

## 2023-02-01 PROCEDURE — 3078F PR MOST RECENT DIASTOLIC BLOOD PRESSURE < 80 MM HG: ICD-10-PCS | Mod: CPTII,S$GLB,, | Performed by: INTERNAL MEDICINE

## 2023-02-01 RX ORDER — ACETAMINOPHEN 500 MG
5000 TABLET ORAL WEEKLY
COMMUNITY

## 2023-02-01 RX ORDER — HYDROCHLOROTHIAZIDE 25 MG/1
25 TABLET ORAL DAILY
COMMUNITY
End: 2023-04-24 | Stop reason: SDUPTHER

## 2023-02-01 RX ORDER — OMEPRAZOLE 20 MG/1
20 CAPSULE, DELAYED RELEASE ORAL DAILY
COMMUNITY
End: 2023-06-07

## 2023-02-01 RX ORDER — BISACODYL 5 MG/1
1 TABLET, COATED ORAL DAILY
COMMUNITY

## 2023-02-01 RX ORDER — METHOTREXATE 2.5 MG/1
20 TABLET ORAL
COMMUNITY
End: 2023-02-09

## 2023-02-01 RX ORDER — DICLOFENAC SODIUM 100 MG/1
TABLET, EXTENDED RELEASE ORAL
COMMUNITY

## 2023-02-01 RX ORDER — FOLIC ACID 1 MG/1
1 TABLET ORAL DAILY
COMMUNITY
Start: 2021-01-12 | End: 2023-02-09 | Stop reason: SDUPTHER

## 2023-02-01 RX ORDER — ATORVASTATIN CALCIUM 10 MG/1
10 TABLET, FILM COATED ORAL DAILY
COMMUNITY
Start: 2021-03-12 | End: 2023-04-24 | Stop reason: SDUPTHER

## 2023-02-01 RX ORDER — ESCITALOPRAM OXALATE 20 MG/1
20 TABLET ORAL DAILY
COMMUNITY
End: 2023-04-24 | Stop reason: SDUPTHER

## 2023-02-01 RX ORDER — PREDNISONE 5 MG/1
5 TABLET ORAL DAILY
COMMUNITY
End: 2023-02-09

## 2023-02-01 NOTE — PROGRESS NOTES
[facility] Note    Chief Complaint      Chief Complaint   Patient presents with    Establish Care     History of Present Illness      Aysha Randolph is a 62 y.o. female who presents today for chief complaint annual wellness and est care . Patient is new to me.    PCP: Rosa Jarrett MD  Patient comes to appointment alone .     HPI   She feels well today  RA dx 2020 was on orencia? Unknown? She is on mtx 20 mg once a week and prednisone 5 daily she thinks,Has est care appt with rheum next week   HLD on lipitor and fishoil   HTN on hctz 25, she salas snot check at home, at goal today   Mood on lexapro for CARMEN since 9.11, she is feeling ok   She takes a ppi, not seeing GI   She is orginially from [place] and recently moved from [place]  She is retired   Hx of HPV she has had abnl pap and colpo - Dr Katlyn [facility] in [facility] [phone]  S/p wrist fx repair l hand last year,   Monika Roldan PCP in [place] [phone]  Complains of neuropathy in her left index finger for the last 2-3 months, is worsening,seh did pt post op   Active Problem List with Overview Notes    Diagnosis Date Noted    Hyperlipidemia 02/01/2023    GERD (gastroesophageal reflux disease) 02/01/2023    Rheumatoid arthritis 02/01/2023    Benign essential hypertension 02/01/2023     Cologuard 1.22  Mmg due  Pap  as above will get records   Dexa: she is due ryley she says   Sister has RA,   Health Maintenance   Topic Date Due    Hepatitis C Screening  Never done    Lipid Panel  Never done    Mammogram  Never done    TETANUS VACCINE  10/02/2025       Past Medical History:   Diagnosis Date    Arthritis     HPV in female     Hypertension        History reviewed. No pertinent surgical history.    family history is not on file.    Social History     Tobacco Use    Smoking status: Never    Smokeless tobacco: Never       Review of Systems   Constitutional:  Negative for chills and fever.   HENT:  Negative for congestion and sore throat.     Eyes:  Negative for blurred vision and discharge.   Respiratory:  Negative for cough and shortness of breath.    Cardiovascular:  Negative for chest pain and palpitations.   Gastrointestinal:  Negative for constipation, diarrhea, nausea and vomiting.   Genitourinary:  Negative for dysuria and hematuria.   Musculoskeletal:  Positive for joint pain. Negative for falls and myalgias.   Skin:  Negative for itching and rash.   Neurological:  Positive for sensory change. Negative for dizziness and headaches.      Outpatient Encounter Medications as of 2/1/2023   Medication Sig Dispense Refill    atorvastatin (LIPITOR) 10 MG tablet Take 10 mg by mouth once daily.      cholecalciferol, vitamin D3, 125 mcg (5,000 unit) Tab Take 5,000 Units by mouth once a week.      diclofenac sodium 100 mg 24 hr tablet diclofenac  mg tablet,extended release 24 hr   TAKE 1 TABLET(S) EVERY DAY BY ORAL ROUTE WITH MEALS FOR 30 DAYS.      EScitalopram oxalate (LEXAPRO) 20 MG tablet Take 20 mg by mouth once daily.      folic acid (FOLVITE) 1 MG tablet Take 1 mg by mouth once daily.      hydroCHLOROthiazide (HYDRODIURIL) 25 MG tablet Take 25 mg by mouth once daily.      methotrexate 2.5 MG Tab Take 20 mg by mouth every 7 days.      multivitamin-minerals-lutein (MULTIVITAMIN 50 PLUS) Tab Take 1 tablet by mouth once daily.      omega 3-dha-epa-fish oil 120-180-500 mg Cap       omeprazole (PRILOSEC) 20 MG capsule Take 20 mg by mouth once daily.      predniSONE (DELTASONE) 5 MG tablet Take 5 mg by mouth once daily.      [DISCONTINUED] methotrexate 2.5 mg/mL Soln        No facility-administered encounter medications on file as of 2/1/2023.        Review of patient's allergies indicates:  No Known Allergies        Physical Exam      Vital Signs  Pulse: 86  SpO2: 96 %  BP: 132/76  BP Location: Left arm  Patient Position: Sitting  Height and Weight  Weight: 86.6 kg (190 lb 14.7 oz)]    Physical Exam  Vitals reviewed.   Constitutional:        Appearance: She is well-developed.   HENT:      Head: Normocephalic and atraumatic.      Right Ear: External ear normal.      Left Ear: External ear normal.   Eyes:      General:         Right eye: No discharge.         Left eye: No discharge.   Neck:      Thyroid: No thyromegaly.   Cardiovascular:      Rate and Rhythm: Normal rate and regular rhythm.      Heart sounds: Normal heart sounds. No murmur heard.  Pulmonary:      Effort: Pulmonary effort is normal. No respiratory distress.      Breath sounds: Normal breath sounds.   Abdominal:      General: Bowel sounds are normal. There is no distension.      Palpations: Abdomen is soft.      Tenderness: There is no abdominal tenderness.   Musculoskeletal:         General: No deformity. Normal range of motion.      Cervical back: Normal range of motion.   Skin:     General: Skin is warm and dry.      Findings: No rash.   Neurological:      Mental Status: She is alert and oriented to person, place, and time.   Psychiatric:         Behavior: Behavior normal.        Laboratory:  CBC:  No results for input(s): WBC, RBC, HGB, HCT, PLT, MCV, MCH, MCHC in the last 2160 hours.  CMP:  No results for input(s): GLU, CALCIUM, ALBUMIN, PROT, NA, K, CO2, CL, BUN, ALKPHOS, ALT, AST, BILITOT in the last 2160 hours.    Invalid input(s): CREATININ  URINALYSIS:  No results for input(s): COLORU, CLARITYU, SPECGRAV, PHUR, PROTEINUA, GLUCOSEU, BILIRUBINCON, BLOODU, WBCU, RBCU, BACTERIA, MUCUS, NITRITE, LEUKOCYTESUR, UROBILINOGEN, HYALINECASTS in the last 2160 hours.   LIPIDS:  No results for input(s): TSH, HDL, CHOL, TRIG, LDLCALC, CHOLHDL, NONHDLCHOL, TOTALCHOLEST in the last 2160 hours.  TSH:  No results for input(s): TSH in the last 2160 hours.  A1C:  No results for input(s): HGBA1C in the last 2160 hours.    Radiology:      Assessment/Plan     Aysha Randolph is a 62 y.o.female with:    1. Wellness examination    2. Hyperlipidemia, unspecified hyperlipidemia type  Assessment & Plan:  Due  for labs     Orders:  -     Lipid Panel; Future; Expected date: 02/01/2023    3. Gastroesophageal reflux disease, unspecified whether esophagitis present  Assessment & Plan:  Continue ppi       4. Rheumatoid arthritis, involving unspecified site, unspecified whether rheumatoid factor present  Assessment & Plan:  To rheum as shceduled       5. Benign essential hypertension  Assessment & Plan:  At goal continue to monitoring     Orders:  -     Comprehensive Metabolic Panel; Future; Expected date: 02/01/2023  -     CBC Without Differential; Future; Expected date: 02/01/2023    6. Encounter for hepatitis C screening test for low risk patient  -     Hepatitis C Antibody; Future; Expected date: 02/01/2023    7. Encounter for screening mammogram for malignant neoplasm of breast  -     Mammo Digital Screening Bilat w/ Joao; Future; Expected date: 02/01/2023    8. Menopause  -     DXA Bone Density Spine And Hip; Future; Expected date: 02/01/2023    9. Closed fracture of left wrist, sequela  -     Ambulatory referral/consult to Hand Surgery; Future; Expected date: 02/08/2023         Use of the [facility] Patient Portal discussed and encouraged during today's visit  -Continue current medications and maintain follow up with specialists.  Return to clinic in 6 months .  Future Appointments   Date Time Provider Department Center   2/9/2023  8:00 AM Brigido Ruggiero MD [facility] RHEUM [address]   2/10/2023  8:00 AM LAB[facility] [place]   2/15/2023 11:00 AM [facility] M[facility] [place]   2/15/2023 11:30 AM [facility] DEXA1 [facility] BONE [place]   2/20/2023  9:30 AM Micheline Kiran MD [facility] OBGYN [place]   2/22/2023  1:00 PM Meliza Oneal PA-C [facility] ORTHO [facility]       Rosa Jarrett MD  2/6/2023 2:51 PM    Primary Care Internal Medicine

## 2023-02-01 NOTE — TELEPHONE ENCOUNTER
----- Message from Jamel Arambula sent at 2/1/2023  2:02 PM CST -----  Contact: Pt [phone]  The patient was not aware of the new location but, will be there in about 10 mins.    Thank you

## 2023-02-01 NOTE — PROGRESS NOTES
Health Maintenance Due   Topic Date Due    Hepatitis C Screening  Never done    Cervical Cancer Screening  Never done    Lipid Panel  Never done    COVID-19 Vaccine (1) Never done    HIV Screening  Never done    Mammogram  Never done    Shingles Vaccine (1 of 2) Never done    Influenza Vaccine (1) 09/01/2022     Chart review done.  updated. Immunizations reviewed & updated. Care Everywhere updated.  Cologuard from 2/11/22 found in Care Everywhere, copied into .

## 2023-02-09 ENCOUNTER — HOSPITAL ENCOUNTER (OUTPATIENT)
Dept: RADIOLOGY | Facility: HOSPITAL | Age: 63
Discharge: HOME | End: 2023-02-09
Attending: STUDENT IN AN ORGANIZED HEALTH CARE EDUCATION/TRAINING PROGRAM
Payer: COMMERCIAL

## 2023-02-09 ENCOUNTER — OFFICE VISIT (OUTPATIENT)
Dept: RHEUMATOLOGY | Facility: CLINIC | Age: 63
End: 2023-02-09
Payer: COMMERCIAL

## 2023-02-09 VITALS — WEIGHT: 183.44 LBS | DIASTOLIC BLOOD PRESSURE: 91 MMHG | HEART RATE: 84 BPM | SYSTOLIC BLOOD PRESSURE: 163 MMHG

## 2023-02-09 DIAGNOSIS — M05.79 RHEUMATOID ARTHRITIS INVOLVING MULTIPLE SITES WITH POSITIVE RHEUMATOID FACTOR: ICD-10-CM

## 2023-02-09 DIAGNOSIS — Z79.52 LONG TERM SYSTEMIC STEROID USER: ICD-10-CM

## 2023-02-09 DIAGNOSIS — R20.2 PARESTHESIA: ICD-10-CM

## 2023-02-09 DIAGNOSIS — M05.79 RHEUMATOID ARTHRITIS INVOLVING MULTIPLE SITES WITH POSITIVE RHEUMATOID FACTOR: Primary | ICD-10-CM

## 2023-02-09 PROCEDURE — 3080F DIAST BP >= 90 MM HG: CPT | Mod: CPTII,S$GLB,, | Performed by: STUDENT IN AN ORGANIZED HEALTH CARE EDUCATION/TRAINING PROGRAM

## 2023-02-09 PROCEDURE — 71046 XR CHEST PA AND LATERAL: ICD-10-PCS | Mod: 26,,, | Performed by: RADIOLOGY

## 2023-02-09 PROCEDURE — 99999 PR PBB SHADOW E&M-EST. PATIENT-LVL V: CPT | Mod: PBBFAC,,, | Performed by: STUDENT IN AN ORGANIZED HEALTH CARE EDUCATION/TRAINING PROGRAM

## 2023-02-09 PROCEDURE — 71046 X-RAY EXAM CHEST 2 VIEWS: CPT | Mod: 26,,, | Performed by: RADIOLOGY

## 2023-02-09 PROCEDURE — 1160F PR REVIEW ALL MEDS BY PRESCRIBER/CLIN PHARMACIST DOCUMENTED: ICD-10-PCS | Mod: CPTII,S$GLB,, | Performed by: STUDENT IN AN ORGANIZED HEALTH CARE EDUCATION/TRAINING PROGRAM

## 2023-02-09 PROCEDURE — 73630 XR FOOT COMPLETE 3 VIEW BILATERAL: ICD-10-PCS | Mod: 26,,, | Performed by: RADIOLOGY

## 2023-02-09 PROCEDURE — 99204 PR OFFICE/OUTPT VISIT, NEW, LEVL IV, 45-59 MIN: ICD-10-PCS | Mod: S$GLB,,, | Performed by: STUDENT IN AN ORGANIZED HEALTH CARE EDUCATION/TRAINING PROGRAM

## 2023-02-09 PROCEDURE — 1159F MED LIST DOCD IN RCRD: CPT | Mod: CPTII,S$GLB,, | Performed by: STUDENT IN AN ORGANIZED HEALTH CARE EDUCATION/TRAINING PROGRAM

## 2023-02-09 PROCEDURE — 73630 X-RAY EXAM OF FOOT: CPT | Mod: 26,,, | Performed by: RADIOLOGY

## 2023-02-09 PROCEDURE — 73110 X-RAY EXAM OF WRIST: CPT | Mod: TC,50

## 2023-02-09 PROCEDURE — 73130 XR HAND COMPLETE 3 VIEWS BILATERAL: ICD-10-PCS | Mod: 26,,, | Performed by: RADIOLOGY

## 2023-02-09 PROCEDURE — 73110 X-RAY EXAM OF WRIST: CPT | Mod: 26,,, | Performed by: RADIOLOGY

## 2023-02-09 PROCEDURE — 3080F PR MOST RECENT DIASTOLIC BLOOD PRESSURE >= 90 MM HG: ICD-10-PCS | Mod: CPTII,S$GLB,, | Performed by: STUDENT IN AN ORGANIZED HEALTH CARE EDUCATION/TRAINING PROGRAM

## 2023-02-09 PROCEDURE — 73630 X-RAY EXAM OF FOOT: CPT | Mod: TC,50

## 2023-02-09 PROCEDURE — 3077F SYST BP >= 140 MM HG: CPT | Mod: CPTII,S$GLB,, | Performed by: STUDENT IN AN ORGANIZED HEALTH CARE EDUCATION/TRAINING PROGRAM

## 2023-02-09 PROCEDURE — 99999 PR PBB SHADOW E&M-EST. PATIENT-LVL V: ICD-10-PCS | Mod: PBBFAC,,, | Performed by: STUDENT IN AN ORGANIZED HEALTH CARE EDUCATION/TRAINING PROGRAM

## 2023-02-09 PROCEDURE — 1159F PR MEDICATION LIST DOCUMENTED IN MEDICAL RECORD: ICD-10-PCS | Mod: CPTII,S$GLB,, | Performed by: STUDENT IN AN ORGANIZED HEALTH CARE EDUCATION/TRAINING PROGRAM

## 2023-02-09 PROCEDURE — 73130 X-RAY EXAM OF HAND: CPT | Mod: TC,50

## 2023-02-09 PROCEDURE — 73110 XR WRIST COMPLETE 3 VIEWS BILATERAL: ICD-10-PCS | Mod: 26,,, | Performed by: RADIOLOGY

## 2023-02-09 PROCEDURE — 73130 X-RAY EXAM OF HAND: CPT | Mod: 26,,, | Performed by: RADIOLOGY

## 2023-02-09 PROCEDURE — 71046 X-RAY EXAM CHEST 2 VIEWS: CPT | Mod: TC

## 2023-02-09 PROCEDURE — 99204 OFFICE O/P NEW MOD 45 MIN: CPT | Mod: S$GLB,,, | Performed by: STUDENT IN AN ORGANIZED HEALTH CARE EDUCATION/TRAINING PROGRAM

## 2023-02-09 PROCEDURE — 1160F RVW MEDS BY RX/DR IN RCRD: CPT | Mod: CPTII,S$GLB,, | Performed by: STUDENT IN AN ORGANIZED HEALTH CARE EDUCATION/TRAINING PROGRAM

## 2023-02-09 PROCEDURE — 3077F PR MOST RECENT SYSTOLIC BLOOD PRESSURE >= 140 MM HG: ICD-10-PCS | Mod: CPTII,S$GLB,, | Performed by: STUDENT IN AN ORGANIZED HEALTH CARE EDUCATION/TRAINING PROGRAM

## 2023-02-09 RX ORDER — FOLIC ACID 1 MG/1
1 TABLET ORAL DAILY
Qty: 90 TABLET | Refills: 3 | Status: SHIPPED | OUTPATIENT
Start: 2023-02-09 | End: 2023-04-24 | Stop reason: SDUPTHER

## 2023-02-09 RX ORDER — ALBUTEROL SULFATE 1.25 MG/3ML
SOLUTION RESPIRATORY (INHALATION)
COMMUNITY
End: 2024-01-02 | Stop reason: SDUPTHER

## 2023-02-09 RX ORDER — ABATACEPT 125 MG/ML
INJECTION, SOLUTION SUBCUTANEOUS
COMMUNITY
Start: 2022-07-13 | End: 2023-02-09

## 2023-02-09 RX ORDER — ALBUTEROL SULFATE 90 UG/1
AEROSOL, METERED RESPIRATORY (INHALATION)
COMMUNITY

## 2023-02-09 RX ORDER — PREDNISONE 1 MG/1
TABLET ORAL
Qty: 300 TABLET | Refills: 0 | Status: SHIPPED | OUTPATIENT
Start: 2023-02-09 | End: 2023-06-01

## 2023-02-09 NOTE — PROGRESS NOTES
Subjective:       Patient ID: Aysha Randolph is a 62 y.o. female.    Chief Complaint: RA    HPI  Patient is a 63 yo F with HTN, HLD, GERD, anxiety, and RA, who presents to establish care for RA who has just moved from [place]. RA was diagnosed in 11/2020. She initially had pain and swelling in the hands, knees, shoulders, along with stiffness. She started with MTX 6 tabs weekly and prednisone 10 mg daily. Then Humira was added. However after 5 months the Humira wasn't helping. She started Orencia infusions April or May of 2022. She endorses some improvement with Orencia but was still having flare ups. Her last Orencia infusion was in October 2022 and then she moved from [place] to [place]. She is now on MTX 8 tabs weekly for several months and folic acid 1 mg daily. She has tapered prednisone to 5 mg daily and has been on prednisone in some dose since her diagnosis.     She broke her wrist 10/2021 and had surgery. She also broke her elbow but it did not require surgery. She had left ankle arthrocentesis in 1997 and ankle replacement in 2016 for OA.    Today she complains of pain and swelling in her hands. She gets morning stiffness for about 1 hour but also gets stiffness throughout the day. She has a burning sensation over the tops of her hands. She has pain in the knees, elbows, shoulders, clavicles, upper chest. For 4-5 months, she has had numbness of the plantar surface of the left 2nd digit.    She endorses dyspnea with exertion and with anxiety. She gets substernal chest pressure which she associates with anxiety and not exertion. Endorses occasional nausea; no vomiting, diarrhea, constipation, hematochezia, melena. No urinary symptoms.    Social Hx: never smoker, rare alcohol use, no drug use. 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    HENT:  Negative for sore throat.    Eyes:  Negative for photophobia and visual disturbance.   Respiratory:  Negative for cough, shortness of breath and wheezing.    Cardiovascular:  Negative for chest pain and leg swelling.   Gastrointestinal:  Negative for abdominal pain, diarrhea and vomiting.   Genitourinary:  Negative for dysuria and frequency.   Musculoskeletal:  Positive for arthralgias, joint swelling and neck stiffness. Negative for back pain.   Skin:  Negative for rash.   Neurological:  Negative for dizziness and headaches.   Psychiatric/Behavioral:  Negative for behavioral problems and hallucinations.        Objective:   BP (!) 163/91   Pulse 84   Wt 83.2 kg (183 lb 6.8 oz)      Physical Exam   Constitutional: She is oriented to person, place, and time. No distress.   HENT:   Head: Normocephalic and atraumatic.   Eyes: Pupils are equal, round, and reactive to light.   Cardiovascular: Normal rate and regular rhythm.   No murmur heard.  Pulmonary/Chest: Effort normal and breath sounds normal. No respiratory distress. She has no wheezes.   Abdominal: Soft. Bowel sounds are normal. There is no abdominal tenderness.   Musculoskeletal:         General: No deformity. Normal range of motion.      Cervical back: Normal range of motion.      Comments: +tender and swollen joints (see homunculus). Positive Phalen sign bilaterally.   Neurological: She is alert and oriented to person, place, and time.   Skin: Skin is warm and dry.   Psychiatric: Affect and judgment normal.           2/9/2023   Tender (HIGGINBOTHAM-28) 23 / 28    Swollen (HIGGINBOTHAM-28) 1 / 28    Provider Global 30 mm   Patient Global 55 mm   ESR --   CRP --   HIGGINBOTHAM-28 (ESR) --   HIGGINBOTHAM-28 (CRP) --   CDAI Score 32.5         Assessment:       1. Rheumatoid arthritis involving multiple sites with positive rheumatoid factor    2. Paresthesia    3. Long term systemic steroid user            Plan:       Problem List Items Addressed This Visit          Immunology/Multi System     Rheumatoid arthritis - Primary    Relevant Medications    methotrexate, PF, 25 mg/mL Syrg    folic acid (FOLVITE) 1 MG tablet    predniSONE (DELTASONE) 1 MG tablet    Other Relevant Orders    CBC Auto Differential    Comprehensive Metabolic Panel    C-Reactive Protein    Sedimentation rate    Cyclic Citrullinated Peptide Antibody, IgG    Rheumatoid Factor    HIV 1/2 Ag/Ab (4th Gen)    Hepatitis B surface antigen    HBcAB    Hepatitis B surface antibody    Hepatitis C antibody    Hepatitis A antibody, IgG    Strongyloides IgG Antibodies    Quantiferon Gold TB    RPR    Varicella zoster antibody, IgG    Ambulatory referral/consult to Infectious Disease    X-Ray Hand Complete Bilateral    X-Ray Foot Complete Bilateral    X-Ray Chest PA And Lateral    X-Ray Wrist Complete Bilateral     Other Visit Diagnoses       Paresthesia        Relevant Orders    EMG W/ ULTRASOUND AND NERVE CONDUCTION TEST 2 Extremities    Long term systemic steroid user              Patient is a 63 yo F with HTN, HLD, GERD, anxiety, and RA, who presents to establish care for RA who has just moved from [place]. RA diagnosed 11/2020.    Meds failed:  Humira - lack of efficacy  Orencia - lack of efficacy    11/2020 labs:  CCP >250  RF 86    ESR 65  Vit D 47  Hep B sAg, Hep B cAb, hep A, Hep C Ab neg    CDAI today: 32.5 (high activity)      Plan:  Labs today: CBC, CMP, ESR, CRP, pre-DMARD  XR hands, feet, chest  EMG bilateral upper extremities  Change to subcutaneous MTX 25 mg weekly  Continue folic acid  Taper prednisone: 4 mg daily for a month, then 3 mg daily for a month, then 2 mg daily for a month, then 1 mg daily for a month, then stop.  Will see how she does with this in 3 months and consider adding biologic at that time  RTC 3 months with standing labs      Brigido Ruggiero MD  Rheumatology Fellow  PGY-5

## 2023-02-09 NOTE — PROGRESS NOTES
62 year - HTN,HLD,GERD,anxiety and rheumatoid arthritis  Family h/o RA- sister and grand niece   Family h/o CAD    Nov 2020- RA- pain and swelling in the hands,knees,shoulders  RF 86  CCP> 250  ESR 65  Nml vit d  Neg hep panel    Initial treatment  :    Mtx 6 tabs weekly  Prednisone 10 mg daily  Humira -didn't respond    On orencia since April 2022- she did infusions till October 2022  Now she has moved from [place] to [facility]    Now on mtx 8 tabs weekly since sep 2022  Prednisone 5 mg daily  Folic acid 1 mg daily    Symptoms :    Pain,swelling,stiffness- hands,knees,elbows,shoulders  Widespread pain  Paresthesias -dorsal hands and ? Left 2nd finger    H/o trauma -left wrist and elbow-surgery  Left ankle arthrocentesis/replacement- OA     Anxiety  ISLAS  Chest pressure  Nausea    CDAI :   WSPI :  SSS :   B/l phalen's positive      A/p        CBC,CMP,ESR,CRP,Predmard panel  Arthritis survey  Hands and feet xrays    Change oral mtx to Injectable mtx 1 ml weekly,folic acid    CXR    Prednisone 4 mg daily for a month  3 mg daily for a month  2 mg daily for a month   1 mg daily for a month and stop    Dexa -scheduled  Vit d     B/l UE EMG-Eventually    Sleep study    Physical therapy    Weight loss    Continue lexapro  Anxiety-psychiatry    /91  The ASCVD Risk score (Scott JANG, et al., 2019) failed to calculate for the following reasons:    Cannot find a previous HDL lab    Cannot find a previous total cholesterol lab    Vaccinations  Fast track     Duplicate order

## 2023-02-09 NOTE — PATIENT INSTRUCTIONS
Get labs tomorrow  X-rays today: hands, feet, wrist, chest  EMG nerve conduction study bilateral upper extremities  Change to subcutaneous MTX 25 mg weekly  Continue folic acid 1 tablet daily  Taper prednisone: 4 mg daily for a month, then 3 mg daily for a month, then 2 mg daily for a month, then 1 mg daily for a month, then stop.  Get labs in 3 months  Follow up in 3 months

## 2023-02-10 ENCOUNTER — SPECIALTY PHARMACY (OUTPATIENT)
Dept: PHARMACY | Facility: CLINIC | Age: 63
End: 2023-02-10
Payer: COMMERCIAL

## 2023-02-10 ENCOUNTER — LAB VISIT (OUTPATIENT)
Dept: LAB | Facility: HOSPITAL | Age: 63
End: 2023-02-10
Attending: INTERNAL MEDICINE
Payer: COMMERCIAL

## 2023-02-10 DIAGNOSIS — E78.5 HYPERLIPIDEMIA, UNSPECIFIED HYPERLIPIDEMIA TYPE: ICD-10-CM

## 2023-02-10 DIAGNOSIS — M05.79 RHEUMATOID ARTHRITIS INVOLVING MULTIPLE SITES WITH POSITIVE RHEUMATOID FACTOR: ICD-10-CM

## 2023-02-10 DIAGNOSIS — M05.79 RHEUMATOID ARTHRITIS INVOLVING MULTIPLE SITES WITH POSITIVE RHEUMATOID FACTOR: Primary | ICD-10-CM

## 2023-02-10 DIAGNOSIS — I10 BENIGN ESSENTIAL HYPERTENSION: ICD-10-CM

## 2023-02-10 DIAGNOSIS — Z11.59 ENCOUNTER FOR HEPATITIS C SCREENING TEST FOR LOW RISK PATIENT: ICD-10-CM

## 2023-02-10 LAB
ALBUMIN SERPL BCP-MCNC: 3.7 G/DL (ref 3.5–5.2)
ALBUMIN SERPL BCP-MCNC: 3.7 G/DL (ref 3.5–5.2)
ALP SERPL-CCNC: 88 U/L (ref 55–135)
ALP SERPL-CCNC: 88 U/L (ref 55–135)
ALT SERPL W/O P-5'-P-CCNC: 28 U/L (ref 10–44)
ALT SERPL W/O P-5'-P-CCNC: 28 U/L (ref 10–44)
ANION GAP SERPL CALC-SCNC: 13 MMOL/L (ref 8–16)
ANION GAP SERPL CALC-SCNC: 13 MMOL/L (ref 8–16)
AST SERPL-CCNC: 25 U/L (ref 10–40)
AST SERPL-CCNC: 25 U/L (ref 10–40)
BASOPHILS # BLD AUTO: 0.05 K/UL (ref 0–0.2)
BASOPHILS NFR BLD: 0.5 % (ref 0–1.9)
BILIRUB SERPL-MCNC: 0.8 MG/DL (ref 0.1–1)
BILIRUB SERPL-MCNC: 0.8 MG/DL (ref 0.1–1)
BUN SERPL-MCNC: 15 MG/DL (ref 8–23)
BUN SERPL-MCNC: 15 MG/DL (ref 8–23)
CALCIUM SERPL-MCNC: 10.1 MG/DL (ref 8.7–10.5)
CALCIUM SERPL-MCNC: 10.1 MG/DL (ref 8.7–10.5)
CCP AB SER IA-ACNC: 73.6 U/ML
CHLORIDE SERPL-SCNC: 104 MMOL/L (ref 95–110)
CHLORIDE SERPL-SCNC: 104 MMOL/L (ref 95–110)
CHOLEST SERPL-MCNC: 165 MG/DL (ref 120–199)
CHOLEST/HDLC SERPL: 2.9 {RATIO} (ref 2–5)
CO2 SERPL-SCNC: 25 MMOL/L (ref 23–29)
CO2 SERPL-SCNC: 25 MMOL/L (ref 23–29)
CREAT SERPL-MCNC: 0.7 MG/DL (ref 0.5–1.4)
CREAT SERPL-MCNC: 0.7 MG/DL (ref 0.5–1.4)
CRP SERPL-MCNC: 21.8 MG/L (ref 0–8.2)
DIFFERENTIAL METHOD: ABNORMAL
EOSINOPHIL # BLD AUTO: 0.2 K/UL (ref 0–0.5)
EOSINOPHIL NFR BLD: 1.4 % (ref 0–8)
ERYTHROCYTE [DISTWIDTH] IN BLOOD BY AUTOMATED COUNT: 15.4 % (ref 11.5–14.5)
ERYTHROCYTE [DISTWIDTH] IN BLOOD BY AUTOMATED COUNT: 15.4 % (ref 11.5–14.5)
ERYTHROCYTE [SEDIMENTATION RATE] IN BLOOD BY PHOTOMETRIC METHOD: 52 MM/HR (ref 0–36)
EST. GFR  (NO RACE VARIABLE): >60 ML/MIN/1.73 M^2
EST. GFR  (NO RACE VARIABLE): >60 ML/MIN/1.73 M^2
GLUCOSE SERPL-MCNC: 101 MG/DL (ref 70–110)
GLUCOSE SERPL-MCNC: 101 MG/DL (ref 70–110)
HAV IGG SER QL IA: NORMAL
HBV CORE AB SERPL QL IA: NORMAL
HBV SURFACE AB SER-ACNC: <3 MIU/ML
HBV SURFACE AB SER-ACNC: NORMAL M[IU]/ML
HBV SURFACE AG SERPL QL IA: NORMAL
HCT VFR BLD AUTO: 36.8 % (ref 37–48.5)
HCT VFR BLD AUTO: 36.8 % (ref 37–48.5)
HCV AB SERPL QL IA: NORMAL
HCV AB SERPL QL IA: NORMAL
HDLC SERPL-MCNC: 56 MG/DL (ref 40–75)
HDLC SERPL: 33.9 % (ref 20–50)
HGB BLD-MCNC: 11.3 G/DL (ref 12–16)
HGB BLD-MCNC: 11.3 G/DL (ref 12–16)
HIV 1+2 AB+HIV1 P24 AG SERPL QL IA: NORMAL
IMM GRANULOCYTES # BLD AUTO: 0.04 K/UL (ref 0–0.04)
IMM GRANULOCYTES NFR BLD AUTO: 0.4 % (ref 0–0.5)
LDLC SERPL CALC-MCNC: 87.8 MG/DL (ref 63–159)
LYMPHOCYTES # BLD AUTO: 3 K/UL (ref 1–4.8)
LYMPHOCYTES NFR BLD: 29 % (ref 18–48)
MCH RBC QN AUTO: 30.2 PG (ref 27–31)
MCH RBC QN AUTO: 30.2 PG (ref 27–31)
MCHC RBC AUTO-ENTMCNC: 30.7 G/DL (ref 32–36)
MCHC RBC AUTO-ENTMCNC: 30.7 G/DL (ref 32–36)
MCV RBC AUTO: 98 FL (ref 82–98)
MCV RBC AUTO: 98 FL (ref 82–98)
MONOCYTES # BLD AUTO: 0.4 K/UL (ref 0.3–1)
MONOCYTES NFR BLD: 4.1 % (ref 4–15)
NEUTROPHILS # BLD AUTO: 6.7 K/UL (ref 1.8–7.7)
NEUTROPHILS NFR BLD: 64.6 % (ref 38–73)
NONHDLC SERPL-MCNC: 109 MG/DL
NRBC BLD-RTO: 0 /100 WBC
PLATELET # BLD AUTO: 315 K/UL (ref 150–450)
PLATELET # BLD AUTO: 315 K/UL (ref 150–450)
PMV BLD AUTO: 11.4 FL (ref 9.2–12.9)
PMV BLD AUTO: 11.4 FL (ref 9.2–12.9)
POTASSIUM SERPL-SCNC: 4 MMOL/L (ref 3.5–5.1)
POTASSIUM SERPL-SCNC: 4 MMOL/L (ref 3.5–5.1)
PROT SERPL-MCNC: 7.4 G/DL (ref 6–8.4)
PROT SERPL-MCNC: 7.4 G/DL (ref 6–8.4)
RBC # BLD AUTO: 3.74 M/UL (ref 4–5.4)
RBC # BLD AUTO: 3.74 M/UL (ref 4–5.4)
RHEUMATOID FACT SERPL-ACNC: 33 IU/ML (ref 0–15)
SODIUM SERPL-SCNC: 142 MMOL/L (ref 136–145)
SODIUM SERPL-SCNC: 142 MMOL/L (ref 136–145)
TRIGL SERPL-MCNC: 106 MG/DL (ref 30–150)
WBC # BLD AUTO: 10.41 K/UL (ref 3.9–12.7)
WBC # BLD AUTO: 10.41 K/UL (ref 3.9–12.7)

## 2023-02-10 PROCEDURE — 86787 VARICELLA-ZOSTER ANTIBODY: CPT | Performed by: STUDENT IN AN ORGANIZED HEALTH CARE EDUCATION/TRAINING PROGRAM

## 2023-02-10 PROCEDURE — 86140 C-REACTIVE PROTEIN: CPT | Performed by: STUDENT IN AN ORGANIZED HEALTH CARE EDUCATION/TRAINING PROGRAM

## 2023-02-10 PROCEDURE — 87340 HEPATITIS B SURFACE AG IA: CPT | Performed by: STUDENT IN AN ORGANIZED HEALTH CARE EDUCATION/TRAINING PROGRAM

## 2023-02-10 PROCEDURE — 86706 HEP B SURFACE ANTIBODY: CPT | Mod: 91 | Performed by: STUDENT IN AN ORGANIZED HEALTH CARE EDUCATION/TRAINING PROGRAM

## 2023-02-10 PROCEDURE — 87389 HIV-1 AG W/HIV-1&-2 AB AG IA: CPT | Performed by: STUDENT IN AN ORGANIZED HEALTH CARE EDUCATION/TRAINING PROGRAM

## 2023-02-10 PROCEDURE — 86592 SYPHILIS TEST NON-TREP QUAL: CPT | Performed by: STUDENT IN AN ORGANIZED HEALTH CARE EDUCATION/TRAINING PROGRAM

## 2023-02-10 PROCEDURE — 86431 RHEUMATOID FACTOR QUANT: CPT | Performed by: STUDENT IN AN ORGANIZED HEALTH CARE EDUCATION/TRAINING PROGRAM

## 2023-02-10 PROCEDURE — 80061 LIPID PANEL: CPT | Performed by: INTERNAL MEDICINE

## 2023-02-10 PROCEDURE — 86704 HEP B CORE ANTIBODY TOTAL: CPT | Performed by: STUDENT IN AN ORGANIZED HEALTH CARE EDUCATION/TRAINING PROGRAM

## 2023-02-10 PROCEDURE — 80053 COMPREHEN METABOLIC PANEL: CPT | Performed by: INTERNAL MEDICINE

## 2023-02-10 PROCEDURE — 86480 TB TEST CELL IMMUN MEASURE: CPT | Performed by: STUDENT IN AN ORGANIZED HEALTH CARE EDUCATION/TRAINING PROGRAM

## 2023-02-10 PROCEDURE — 86200 CCP ANTIBODY: CPT | Performed by: STUDENT IN AN ORGANIZED HEALTH CARE EDUCATION/TRAINING PROGRAM

## 2023-02-10 PROCEDURE — 86682 HELMINTH ANTIBODY: CPT | Performed by: STUDENT IN AN ORGANIZED HEALTH CARE EDUCATION/TRAINING PROGRAM

## 2023-02-10 PROCEDURE — 85652 RBC SED RATE AUTOMATED: CPT | Performed by: STUDENT IN AN ORGANIZED HEALTH CARE EDUCATION/TRAINING PROGRAM

## 2023-02-10 PROCEDURE — 85025 COMPLETE CBC W/AUTO DIFF WBC: CPT | Performed by: STUDENT IN AN ORGANIZED HEALTH CARE EDUCATION/TRAINING PROGRAM

## 2023-02-10 PROCEDURE — 86790 VIRUS ANTIBODY NOS: CPT | Performed by: STUDENT IN AN ORGANIZED HEALTH CARE EDUCATION/TRAINING PROGRAM

## 2023-02-10 PROCEDURE — 86803 HEPATITIS C AB TEST: CPT | Performed by: INTERNAL MEDICINE

## 2023-02-10 NOTE — TELEPHONE ENCOUNTER
Wilson, this is Kera Deluna with [facility].  We are working on your prescription that your doctor has sent us. We will be working with your insurance to get this approved for you. We will be calling you along the way with updates on your medication.  If you have any questions, you can reach us at [phone].    Welcome call outcome: Patient/caregiver reached    New order received for MTX 25 mg/mL syringes. [facility] cannot get in stock. Copay is $328.58. Pt said she is comfortable with the MTX vials and would be able to draw up. She currently fills all her prescriptions with [facility] and would like it sent there if they are able to fill it. Will request new rx for the vials, once receive a new rx, will confirm with [facility] that she can fill with them.

## 2023-02-11 LAB
GAMMA INTERFERON BACKGROUND BLD IA-ACNC: 0.18 IU/ML
M TB IFN-G CD4+ BCKGRND COR BLD-ACNC: -0.08 IU/ML
MITOGEN IGNF BCKGRD COR BLD-ACNC: 9.82 IU/ML
RPR SER QL: NORMAL
TB GOLD PLUS: NEGATIVE
TB2 - NIL: -0.06 IU/ML

## 2023-02-13 LAB
STRONGYLOIDES ANTIBODY IGG: NEGATIVE
VARICELLA INTERPRETATION: POSITIVE
VARICELLA ZOSTER IGG: 480.9 AU/ML

## 2023-02-14 ENCOUNTER — SPECIALTY PHARMACY (OUTPATIENT)
Dept: PHARMACY | Facility: CLINIC | Age: 63
End: 2023-02-14
Payer: COMMERCIAL

## 2023-02-14 DIAGNOSIS — M05.79 RHEUMATOID ARTHRITIS INVOLVING MULTIPLE SITES WITH POSITIVE RHEUMATOID FACTOR: Primary | ICD-10-CM

## 2023-02-14 RX ORDER — METHOTREXATE 25 MG/ML
INJECTION, SOLUTION INTRA-ARTERIAL; INTRAMUSCULAR; INTRAVENOUS
Qty: 5 ML | Refills: 11 | Status: SHIPPED | OUTPATIENT
Start: 2023-02-14 | End: 2023-06-01

## 2023-02-14 NOTE — TELEPHONE ENCOUNTER
Wilson, this is Kera Deluna with [facility].  We are working on your prescription that your doctor has sent us. We will be working with your insurance to get this approved for you. We will be calling you along the way with updates on your medication.  If you have any questions, you can reach us at [phone].     Welcome call outcome: Patient/caregiver reached     New order received for MTX 25 mg/mL syringes. [facility] cannot get in stock. Copay is $328.58. Pt said she is comfortable with the MTX vials and would be able to draw up. She currently fills all her prescriptions with [facility] and would like it sent there if they are able to fill it. Will request new rx for the vials, once receive a new rx, will confirm with [facility] that she can fill with them.

## 2023-02-14 NOTE — TELEPHONE ENCOUNTER
New rx received for MTX vials. [employer] card $17.28. Pt wants to fills with [facility]. Outgoing call to WILBER to confirm eligibility. Was able to get prescription information, Test claim pays $8.58 and the pt will be able to get from [facility].     Outgoing call to pt to inform her and routed rx to them. Pt verbalized understanding and had no questions. Will close out of [facility] services at this time.

## 2023-02-15 ENCOUNTER — HOSPITAL ENCOUNTER (OUTPATIENT)
Dept: RADIOLOGY | Facility: HOSPITAL | Age: 63
Discharge: HOME | End: 2023-02-15
Attending: INTERNAL MEDICINE
Payer: COMMERCIAL

## 2023-02-15 VITALS — WEIGHT: 183 LBS

## 2023-02-15 DIAGNOSIS — Z12.31 ENCOUNTER FOR SCREENING MAMMOGRAM FOR MALIGNANT NEOPLASM OF BREAST: ICD-10-CM

## 2023-02-15 DIAGNOSIS — Z78.0 MENOPAUSE: ICD-10-CM

## 2023-02-15 PROCEDURE — 77080 DXA BONE DENSITY AXIAL: CPT | Mod: 26,,, | Performed by: INTERNAL MEDICINE

## 2023-02-15 PROCEDURE — 77063 BREAST TOMOSYNTHESIS BI: CPT | Mod: 26,,, | Performed by: RADIOLOGY

## 2023-02-15 PROCEDURE — 77067 MAMMO DIGITAL SCREENING BILAT WITH TOMO: ICD-10-PCS | Mod: 26,,, | Performed by: RADIOLOGY

## 2023-02-15 PROCEDURE — 77067 SCR MAMMO BI INCL CAD: CPT | Mod: TC

## 2023-02-15 PROCEDURE — 77080 DXA BONE DENSITY AXIAL: CPT | Mod: TC

## 2023-02-15 PROCEDURE — 77067 SCR MAMMO BI INCL CAD: CPT | Mod: 26,,, | Performed by: RADIOLOGY

## 2023-02-15 PROCEDURE — 77063 MAMMO DIGITAL SCREENING BILAT WITH TOMO: ICD-10-PCS | Mod: 26,,, | Performed by: RADIOLOGY

## 2023-02-15 PROCEDURE — 77080 DEXA BONE DENSITY SPINE HIP: ICD-10-PCS | Mod: 26,,, | Performed by: INTERNAL MEDICINE

## 2023-02-20 ENCOUNTER — OFFICE VISIT (OUTPATIENT)
Dept: OBSTETRICS AND GYNECOLOGY | Facility: CLINIC | Age: 63
End: 2023-02-20
Payer: COMMERCIAL

## 2023-02-20 VITALS
DIASTOLIC BLOOD PRESSURE: 70 MMHG | HEIGHT: 59 IN | WEIGHT: 188.5 LBS | BODY MASS INDEX: 38 KG/M2 | SYSTOLIC BLOOD PRESSURE: 130 MMHG

## 2023-02-20 DIAGNOSIS — S62.102A CLOSED FRACTURE OF LEFT WRIST, INITIAL ENCOUNTER: Primary | ICD-10-CM

## 2023-02-20 DIAGNOSIS — Z01.419 WELL FEMALE EXAM WITH ROUTINE GYNECOLOGICAL EXAM: Primary | ICD-10-CM

## 2023-02-20 PROCEDURE — 99386 PR PREVENTIVE VISIT,NEW,40-64: ICD-10-PCS | Mod: S$GLB,,, | Performed by: OBSTETRICS & GYNECOLOGY

## 2023-02-20 PROCEDURE — 3078F DIAST BP <80 MM HG: CPT | Mod: CPTII,S$GLB,, | Performed by: OBSTETRICS & GYNECOLOGY

## 2023-02-20 PROCEDURE — 3075F PR MOST RECENT SYSTOLIC BLOOD PRESS GE 130-139MM HG: ICD-10-PCS | Mod: CPTII,S$GLB,, | Performed by: OBSTETRICS & GYNECOLOGY

## 2023-02-20 PROCEDURE — 3008F PR BODY MASS INDEX (BMI) DOCUMENTED: ICD-10-PCS | Mod: CPTII,S$GLB,, | Performed by: OBSTETRICS & GYNECOLOGY

## 2023-02-20 PROCEDURE — 99386 PREV VISIT NEW AGE 40-64: CPT | Mod: S$GLB,,, | Performed by: OBSTETRICS & GYNECOLOGY

## 2023-02-20 PROCEDURE — 1159F MED LIST DOCD IN RCRD: CPT | Mod: CPTII,S$GLB,, | Performed by: OBSTETRICS & GYNECOLOGY

## 2023-02-20 PROCEDURE — 99999 PR PBB SHADOW E&M-EST. PATIENT-LVL IV: ICD-10-PCS | Mod: PBBFAC,,, | Performed by: OBSTETRICS & GYNECOLOGY

## 2023-02-20 PROCEDURE — 3008F BODY MASS INDEX DOCD: CPT | Mod: CPTII,S$GLB,, | Performed by: OBSTETRICS & GYNECOLOGY

## 2023-02-20 PROCEDURE — 99999 PR PBB SHADOW E&M-EST. PATIENT-LVL IV: CPT | Mod: PBBFAC,,, | Performed by: OBSTETRICS & GYNECOLOGY

## 2023-02-20 PROCEDURE — 3075F SYST BP GE 130 - 139MM HG: CPT | Mod: CPTII,S$GLB,, | Performed by: OBSTETRICS & GYNECOLOGY

## 2023-02-20 PROCEDURE — 88175 CYTOPATH C/V AUTO FLUID REDO: CPT | Performed by: OBSTETRICS & GYNECOLOGY

## 2023-02-20 PROCEDURE — 1159F PR MEDICATION LIST DOCUMENTED IN MEDICAL RECORD: ICD-10-PCS | Mod: CPTII,S$GLB,, | Performed by: OBSTETRICS & GYNECOLOGY

## 2023-02-20 PROCEDURE — 1160F RVW MEDS BY RX/DR IN RCRD: CPT | Mod: CPTII,S$GLB,, | Performed by: OBSTETRICS & GYNECOLOGY

## 2023-02-20 PROCEDURE — 1160F PR REVIEW ALL MEDS BY PRESCRIBER/CLIN PHARMACIST DOCUMENTED: ICD-10-PCS | Mod: CPTII,S$GLB,, | Performed by: OBSTETRICS & GYNECOLOGY

## 2023-02-20 PROCEDURE — 3078F PR MOST RECENT DIASTOLIC BLOOD PRESSURE < 80 MM HG: ICD-10-PCS | Mod: CPTII,S$GLB,, | Performed by: OBSTETRICS & GYNECOLOGY

## 2023-02-20 NOTE — PROGRESS NOTES
SUBJECTIVE:   62 y.o. female   for routine gyn exam. No LMP recorded. Patient is postmenopausal..  She has no unusual complaints. No PMB. No HRT         Past Medical History:   Diagnosis Date    Abnormal Pap smear of cervix     Arthritis     Hypertension      Past Surgical History:   Procedure Laterality Date    ANKLE SURGERY Left     WRIST FRACTURE SURGERY Left      Social History     Socioeconomic History    Marital status:    Tobacco Use    Smoking status: Never    Smokeless tobacco: Never   Substance and Sexual Activity    Alcohol use: Yes     Comment: Rare    Drug use: Never    Sexual activity: Yes     Partners: Male     Birth control/protection: None     Family History   Problem Relation Age of Onset    Cervical cancer Maternal Cousin     Breast cancer Neg Hx     Colon cancer Neg Hx      OB History    Para Term  AB Living   1 1 1     1   SAB IAB Ectopic Multiple Live Births           1      # Outcome Date GA Lbr Gabo/2nd Weight Sex Delivery Anes PTL Lv   1 Term                  Current Outpatient Medications   Medication Sig Dispense Refill    albuterol (ACCUNEB) 1.25 mg/3 mL Nebu       albuterol (PROVENTIL/VENTOLIN HFA) 90 mcg/actuation inhaler Proventil HFA 90 mcg/actuation aerosol inhaler      aspirin (ASPIR-81 ORAL)       atorvastatin (LIPITOR) 10 MG tablet Take 10 mg by mouth once daily.      cholecalciferol, vitamin D3, 125 mcg (5,000 unit) Tab Take 5,000 Units by mouth once a week.      diclofenac sodium 100 mg 24 hr tablet diclofenac  mg tablet,extended release 24 hr   TAKE 1 TABLET(S) EVERY DAY BY ORAL ROUTE WITH MEALS FOR 30 DAYS.      EScitalopram oxalate (LEXAPRO) 20 MG tablet Take 20 mg by mouth once daily.      folic acid (FOLVITE) 1 MG tablet Take 1 tablet (1 mg total) by mouth once daily. 90 tablet 3    hydroCHLOROthiazide (HYDRODIURIL) 25 MG tablet Take 25 mg by mouth once daily.      methotrexate 25 mg/mL injection 25 mg/ml weekly 5 mL 11     "multivitamin-minerals-lutein (MULTIVITAMIN 50 PLUS) Tab Take 1 tablet by mouth once daily.      omega 3-dha-epa-fish oil 120-180-500 mg Cap       omeprazole (PRILOSEC) 20 MG capsule Take 20 mg by mouth once daily.      predniSONE (DELTASONE) 1 MG tablet Take 4 tablets (4 mg total) by mouth once daily for 30 days, THEN 3 tablets (3 mg total) once daily for 30 days, THEN 2 tablets (2 mg total) once daily for 30 days, THEN 1 tablet (1 mg total) once daily. 300 tablet 0     No current facility-administered medications for this visit.     Allergies: Diclofenac     ROS:  Constitutional: no weight loss, weight gain, fever, fatigue  Eyes:  No vision changes, glasses/contacts  ENT/Mouth: No ulcers, sinus problems, ears ringing, headache  Cardiovascular: No inability to lie flat, chest pain, exercise intolerance, swelling, heart palpitations  Respiratory: No wheezing, coughing blood, shortness of breath, or cough  Gastrointestinal: No diarrhea, bloody stool, nausea/vomiting, constipation, gas, hemorrhoids  Genitourinary: No blood in urine, painful urination, urgency of urination, frequency of urination, incomplete emptying, incontinence, abnormal bleeding, painful periods, heavy periods, vaginal discharge, vaginal odor, painful intercourse, sexual problems, bleeding after intercourse.  Musculoskeletal: No muscle weakness  Skin/Breast: No painful breasts, nipple discharge, masses, rash, ulcers  Neurological: No passing out, seizures, numbness, headache  Endocrine: No diabetes, hypothyroid, hyperthyroid, hot flashes, hair loss, abnormal hair growth, acne  Psychiatric: No depression, crying  Hematologic: No bruises, bleeding, swollen lymph nodes, anemia.      OBJECTIVE:   The patient appears well, alert, oriented x 3, in no distress.  /70 (BP Location: Left arm, Patient Position: Sitting, BP Method: Large (Manual))   Ht 4' 11" (1.499 m)   Wt 85.5 kg (188 lb 7.9 oz)   BMI 38.07 kg/m²   NECK: no thyromegaly, trachea " midline  SKIN: no acne, striae, hirsutism  CHEST: CTAB  CV: RRR  BREAST EXAM: breasts appear normal, no suspicious masses, no skin or nipple changes or axillary nodes  ABDOMEN: no hernias, masses, or hepatosplenomegaly  GENITALIA: normal external genitalia, no erythema, no discharge  URETHRA: normal urethra, normal urethral meatus  VAGINA: Normal  CERVIX: no lesions or cervical motion tenderness  UTERUS: normal size, contour, position, consistency, mobility, non-tender  ADNEXA: no mass, fullness, tenderness      ASSESSMENT:   1. Well female exam with routine gynecological exam  Liquid-Based Pap Smear, Screening          PLAN:   Orders Placed This Encounter    Liquid-Based Pap Smear, Screening     Discussed healthy lifestyle including regular exercise, healthy eating, etc.  Return to clinic in 1 year

## 2023-02-22 ENCOUNTER — OFFICE VISIT (OUTPATIENT)
Dept: ORTHOPEDICS | Facility: CLINIC | Age: 63
End: 2023-02-22
Payer: COMMERCIAL

## 2023-02-22 ENCOUNTER — HOSPITAL ENCOUNTER (OUTPATIENT)
Dept: RADIOLOGY | Facility: HOSPITAL | Age: 63
Discharge: HOME | End: 2023-02-22
Attending: PHYSICIAN ASSISTANT
Payer: COMMERCIAL

## 2023-02-22 VITALS — WEIGHT: 188 LBS | HEIGHT: 59 IN | BODY MASS INDEX: 37.9 KG/M2

## 2023-02-22 DIAGNOSIS — S62.102A CLOSED FRACTURE OF LEFT WRIST, INITIAL ENCOUNTER: ICD-10-CM

## 2023-02-22 DIAGNOSIS — S62.102S CLOSED FRACTURE OF LEFT WRIST, SEQUELA: ICD-10-CM

## 2023-02-22 DIAGNOSIS — G56.02 CARPAL TUNNEL SYNDROME ON LEFT: Primary | ICD-10-CM

## 2023-02-22 PROCEDURE — 3008F BODY MASS INDEX DOCD: CPT | Mod: CPTII,S$GLB,, | Performed by: PHYSICIAN ASSISTANT

## 2023-02-22 PROCEDURE — 1159F PR MEDICATION LIST DOCUMENTED IN MEDICAL RECORD: ICD-10-PCS | Mod: CPTII,S$GLB,, | Performed by: PHYSICIAN ASSISTANT

## 2023-02-22 PROCEDURE — 1160F RVW MEDS BY RX/DR IN RCRD: CPT | Mod: CPTII,S$GLB,, | Performed by: PHYSICIAN ASSISTANT

## 2023-02-22 PROCEDURE — 99999 PR PBB SHADOW E&M-EST. PATIENT-LVL IV: CPT | Mod: PBBFAC,,, | Performed by: PHYSICIAN ASSISTANT

## 2023-02-22 PROCEDURE — 73110 X-RAY EXAM OF WRIST: CPT | Mod: TC,PN,LT

## 2023-02-22 PROCEDURE — 1159F MED LIST DOCD IN RCRD: CPT | Mod: CPTII,S$GLB,, | Performed by: PHYSICIAN ASSISTANT

## 2023-02-22 PROCEDURE — 99203 OFFICE O/P NEW LOW 30 MIN: CPT | Mod: S$GLB,,, | Performed by: PHYSICIAN ASSISTANT

## 2023-02-22 PROCEDURE — 73110 XR WRIST COMPLETE 3 VIEWS LEFT: ICD-10-PCS | Mod: 26,LT,, | Performed by: RADIOLOGY

## 2023-02-22 PROCEDURE — 73110 X-RAY EXAM OF WRIST: CPT | Mod: 26,LT,, | Performed by: RADIOLOGY

## 2023-02-22 PROCEDURE — 3008F PR BODY MASS INDEX (BMI) DOCUMENTED: ICD-10-PCS | Mod: CPTII,S$GLB,, | Performed by: PHYSICIAN ASSISTANT

## 2023-02-22 PROCEDURE — 99203 PR OFFICE/OUTPT VISIT, NEW, LEVL III, 30-44 MIN: ICD-10-PCS | Mod: S$GLB,,, | Performed by: PHYSICIAN ASSISTANT

## 2023-02-22 PROCEDURE — 99999 PR PBB SHADOW E&M-EST. PATIENT-LVL IV: ICD-10-PCS | Mod: PBBFAC,,, | Performed by: PHYSICIAN ASSISTANT

## 2023-02-22 PROCEDURE — 1160F PR REVIEW ALL MEDS BY PRESCRIBER/CLIN PHARMACIST DOCUMENTED: ICD-10-PCS | Mod: CPTII,S$GLB,, | Performed by: PHYSICIAN ASSISTANT

## 2023-02-22 NOTE — PROGRESS NOTES
SUBJECTIVE:     Chief Complaint & History of Present Illness:  Aysha Randolph is a 62-year-old female with past medical history which includes rheumatoid arthritis and previous ORIF to L distal radius fracture. She presents today with complaints of constant in ongoing numbness to index digit and thumb of the left hand.  This began shortly after previous ORIF.  She denies any trauma since her previous procedure.  Patient denies pain.  She does endorse digit and hand weakness secondary to ongoing RA.  The numbness does not radiate proximally.  She otherwise denies additional concerns or complaints today.  Patient has not tried bracing, medications or other treatment.      Review of patient's allergies indicates:   Allergen Reactions    Diclofenac Nausea Only and Nausea And Vomiting         Current Outpatient Medications   Medication Sig Dispense Refill    albuterol (ACCUNEB) 1.25 mg/3 mL Nebu       albuterol (PROVENTIL/VENTOLIN HFA) 90 mcg/actuation inhaler Proventil HFA 90 mcg/actuation aerosol inhaler      aspirin (ASPIR-81 ORAL)       atorvastatin (LIPITOR) 10 MG tablet Take 10 mg by mouth once daily.      cholecalciferol, vitamin D3, 125 mcg (5,000 unit) Tab Take 5,000 Units by mouth once a week.      diclofenac sodium 100 mg 24 hr tablet diclofenac  mg tablet,extended release 24 hr   TAKE 1 TABLET(S) EVERY DAY BY ORAL ROUTE WITH MEALS FOR 30 DAYS.      EScitalopram oxalate (LEXAPRO) 20 MG tablet Take 20 mg by mouth once daily.      folic acid (FOLVITE) 1 MG tablet Take 1 tablet (1 mg total) by mouth once daily. 90 tablet 3    hydroCHLOROthiazide (HYDRODIURIL) 25 MG tablet Take 25 mg by mouth once daily.      methotrexate 25 mg/mL injection 25 mg/ml weekly 5 mL 11    multivitamin-minerals-lutein (MULTIVITAMIN 50 PLUS) Tab Take 1 tablet by mouth once daily.      omega 3-dha-epa-fish oil 120-180-500 mg Cap       omeprazole (PRILOSEC) 20 MG capsule Take 20 mg by mouth once daily.      predniSONE (DELTASONE)  "1 MG tablet Take 4 tablets (4 mg total) by mouth once daily for 30 days, THEN 3 tablets (3 mg total) once daily for 30 days, THEN 2 tablets (2 mg total) once daily for 30 days, THEN 1 tablet (1 mg total) once daily. 300 tablet 0     No current facility-administered medications for this visit.       Past Medical History:   Diagnosis Date    Abnormal Pap smear of cervix     Arthritis     Hypertension        Past Surgical History:   Procedure Laterality Date    ANKLE SURGERY Left     WRIST FRACTURE SURGERY Left        Vital Signs (Most Recent)  There were no vitals filed for this visit.    Review of Systems:  Review of Systems   Constitutional:  Negative for chills, fever and weight loss.   HENT:  Negative for congestion, ear discharge and ear pain.    Eyes:  Negative for blurred vision, double vision and pain.   Respiratory:  Negative for cough, sputum production and shortness of breath.    Cardiovascular:  Negative for chest pain, palpitations and leg swelling.   Gastrointestinal:  Negative for abdominal pain, nausea and vomiting.   Genitourinary:  Negative for dysuria.   Musculoskeletal:  Positive for joint pain and myalgias. Negative for falls.   Skin:  Negative for itching and rash.   Neurological:  Negative for dizziness, tingling, sensory change and weakness.   Endo/Heme/Allergies:  Does not bruise/bleed easily.        OBJECTIVE:     PHYSICAL EXAM:  Height: 4' 11" (149.9 cm) Weight: 85.3 kg (188 lb)  Ortho/SPM Exam  General:  Alert and oriented x3, no acute distress, sitting comfortably in the exam room  Abdomen soft and nondistended  Breathing unlabored  Mood and affect appropriate      LEFT UE: Skin intact with visible healed surgical scars to L forearm and wrist. Swelling negative. TTP negative to bony prominences and soft tissues throughout. ROM complete 2 flexion/extension/radial deviation/ulnar deviation.  Patient able to perform for . Sensation decreased to light touch to index and thumb pad and to " dorsal aspect of digits.  Otherwise SILT throughout. Tinels positive. Phalen's positive. Pulses palpable. Cap refill brisk.      RADIOGRAPHS:  3v XR L wrist obtained in the orthopedic clinic today, and independently reviewed, shows:  Diffuse osteopenia and degenerative changes noted.  No acute fracture or dislocation.     ASSESSMENT/PLAN:     1. Carpal tunnel syndrome on left  EMG W/ ULTRASOUND AND NERVE CONDUCTION TEST 1 Extremity      2. Closed fracture of left wrist, sequela  Ambulatory referral/consult to Hand Surgery      The patient's traumatic history to the wrist as well as physical exam findings are indicative of probable carpal tunnel/median nerve compression.   Discussion:  The anatomy and pathophysiology of carpal tunnel was discuss the bedside.  We additionally reviewed both conservative and surgical treatment options.  We will begin with nighttime bracing and diagnostic EMG study.  DME: Gel wrist brace provided today  Activity:  Instructed patient on nighttime wrist bracing  Imaging:  ordered EMG/NCV to LUE to evaluate for carpal tunnel   Follow Up: after EMG to discuss the results    Consider surgical carpal tunnel release should symptoms fail to improve with conservative treatment measures

## 2023-02-23 ENCOUNTER — TELEPHONE (OUTPATIENT)
Dept: PRIMARY CARE CLINIC | Facility: CLINIC | Age: 63
End: 2023-02-23
Payer: COMMERCIAL

## 2023-02-23 NOTE — TELEPHONE ENCOUNTER
----- Message from Haleigh Aguilera sent at 2/23/2023  4:33 PM CST -----  Contact: [phone] - pt  Requesting an RX refill or new RX. refill  Is this a refill or new RX: refill  RX name and strength (copy/paste from chart):  NEEDLE AND SYRINGE for methotrexate 25 mg/mL injection  Is this a 30 day or 90 day RX:  30  Pharmacy name and phone # (copy/paste from chart):    [facility] - [address] [address]  Phone: [phone] Fax: [phone]

## 2023-02-24 ENCOUNTER — PATIENT MESSAGE (OUTPATIENT)
Dept: OBSTETRICS AND GYNECOLOGY | Facility: CLINIC | Age: 63
End: 2023-02-24
Payer: COMMERCIAL

## 2023-02-24 ENCOUNTER — PATIENT MESSAGE (OUTPATIENT)
Dept: RHEUMATOLOGY | Facility: CLINIC | Age: 63
End: 2023-02-24
Payer: COMMERCIAL

## 2023-02-27 ENCOUNTER — TELEPHONE (OUTPATIENT)
Dept: RHEUMATOLOGY | Facility: CLINIC | Age: 63
End: 2023-02-27
Payer: COMMERCIAL

## 2023-02-27 ENCOUNTER — PATIENT MESSAGE (OUTPATIENT)
Dept: OBSTETRICS AND GYNECOLOGY | Facility: CLINIC | Age: 63
End: 2023-02-27
Payer: COMMERCIAL

## 2023-02-28 LAB
CLINICAL INFO: ABNORMAL
CYTO CVX: ABNORMAL
CYTOLOGIST CVX/VAG CYTO: ABNORMAL
CYTOLOGIST CVX/VAG CYTO: ABNORMAL
CYTOLOGY CMNT CVX/VAG CYTO-IMP: ABNORMAL
CYTOLOGY PAP THIN PREP EXPLANATION: ABNORMAL
DATE OF PREVIOUS PAP: ABNORMAL
DATE PREVIOUS BX: NO
GEN CATEG CVX/VAG CYTO-IMP: ABNORMAL
LMP START DATE: ABNORMAL
MICROORGANISM CVX/VAG CYTO: ABNORMAL
PATHOLOGIST CVX/VAG CYTO: ABNORMAL
SERVICE CMNT-IMP: ABNORMAL
SPECIMEN SOURCE CVX/VAG CYTO: ABNORMAL
STAT OF ADQ CVX/VAG CYTO-IMP: ABNORMAL

## 2023-03-01 ENCOUNTER — PATIENT MESSAGE (OUTPATIENT)
Dept: RHEUMATOLOGY | Facility: CLINIC | Age: 63
End: 2023-03-01
Payer: COMMERCIAL

## 2023-03-12 ENCOUNTER — PATIENT MESSAGE (OUTPATIENT)
Dept: OBSTETRICS AND GYNECOLOGY | Facility: CLINIC | Age: 63
End: 2023-03-12
Payer: COMMERCIAL

## 2023-04-03 ENCOUNTER — PROCEDURE VISIT (OUTPATIENT)
Dept: OBSTETRICS AND GYNECOLOGY | Facility: CLINIC | Age: 63
End: 2023-04-03
Payer: COMMERCIAL

## 2023-04-03 VITALS
SYSTOLIC BLOOD PRESSURE: 130 MMHG | WEIGHT: 208.75 LBS | BODY MASS INDEX: 42.08 KG/M2 | HEIGHT: 59 IN | DIASTOLIC BLOOD PRESSURE: 80 MMHG

## 2023-04-03 DIAGNOSIS — R87.612 LGSIL ON PAP SMEAR OF CERVIX: Primary | ICD-10-CM

## 2023-04-03 DIAGNOSIS — Z01.812 PRE-PROCEDURE LAB EXAM: ICD-10-CM

## 2023-04-03 PROCEDURE — 88342 IMHCHEM/IMCYTCHM 1ST ANTB: CPT | Performed by: PATHOLOGY

## 2023-04-03 PROCEDURE — 88305 TISSUE EXAM BY PATHOLOGIST: ICD-10-PCS | Mod: 26,,, | Performed by: PATHOLOGY

## 2023-04-03 PROCEDURE — 88305 TISSUE EXAM BY PATHOLOGIST: CPT | Mod: 59 | Performed by: PATHOLOGY

## 2023-04-03 PROCEDURE — 88342 IMHCHEM/IMCYTCHM 1ST ANTB: CPT | Mod: 26,,, | Performed by: PATHOLOGY

## 2023-04-03 PROCEDURE — 88305 TISSUE EXAM BY PATHOLOGIST: CPT | Mod: 26,,, | Performed by: PATHOLOGY

## 2023-04-03 PROCEDURE — 88342 CHG IMMUNOCYTOCHEMISTRY: ICD-10-PCS | Mod: 26,,, | Performed by: PATHOLOGY

## 2023-04-03 PROCEDURE — 57454 BX/CURETT OF CERVIX W/SCOPE: CPT | Mod: S$GLB,,, | Performed by: OBSTETRICS & GYNECOLOGY

## 2023-04-03 PROCEDURE — 57454 PR COLPOSC,CERVIX W/ADJ VAG,W/BX & CURRETAG: ICD-10-PCS | Mod: S$GLB,,, | Performed by: OBSTETRICS & GYNECOLOGY

## 2023-04-03 NOTE — PROCEDURES
Colposcopy    Date/Time: 4/3/2023 11:15 AM  Performed by: Micheline Kiran MD  Authorized by: Micheline Kiran MD     Assistants?: Yes      Colposcopy Site:  Cervix and Vagina  Position:  Supine  Acrowhite Lesion? Yes    Atypical Vessels: No    Transformation Zone Adequate?: Yes    Biopsy?: Yes         Location:  Vagina ((Right Vaginal Side Wall))  ECC Performed?: Yes    LEEP Performed?: No     Patient tolerated the procedure well with no immediate complications.   Post-operative instructions were provided for the patient.   Patient was discharged and will follow up if any complications occur

## 2023-04-05 ENCOUNTER — LAB VISIT (OUTPATIENT)
Dept: LAB | Facility: HOSPITAL | Age: 63
End: 2023-04-05
Attending: INTERNAL MEDICINE
Payer: COMMERCIAL

## 2023-04-05 ENCOUNTER — OFFICE VISIT (OUTPATIENT)
Dept: PRIMARY CARE CLINIC | Facility: CLINIC | Age: 63
End: 2023-04-05
Payer: COMMERCIAL

## 2023-04-05 VITALS
HEIGHT: 59 IN | BODY MASS INDEX: 37.56 KG/M2 | DIASTOLIC BLOOD PRESSURE: 76 MMHG | SYSTOLIC BLOOD PRESSURE: 126 MMHG | WEIGHT: 186.31 LBS | OXYGEN SATURATION: 96 % | HEART RATE: 105 BPM

## 2023-04-05 DIAGNOSIS — Z13.6 ENCOUNTER FOR SCREENING FOR CARDIOVASCULAR DISORDERS: ICD-10-CM

## 2023-04-05 DIAGNOSIS — R01.1 MURMUR: ICD-10-CM

## 2023-04-05 DIAGNOSIS — R10.13 EPIGASTRIC PAIN: ICD-10-CM

## 2023-04-05 DIAGNOSIS — R06.00 DYSPNEA, UNSPECIFIED TYPE: ICD-10-CM

## 2023-04-05 DIAGNOSIS — R07.89 CHEST PRESSURE: Primary | ICD-10-CM

## 2023-04-05 LAB
ALBUMIN SERPL BCP-MCNC: 3.9 G/DL (ref 3.5–5.2)
ALP SERPL-CCNC: 95 U/L (ref 55–135)
ALT SERPL W/O P-5'-P-CCNC: 42 U/L (ref 10–44)
ANION GAP SERPL CALC-SCNC: 13 MMOL/L (ref 8–16)
AST SERPL-CCNC: 43 U/L (ref 10–40)
BILIRUB SERPL-MCNC: 0.8 MG/DL (ref 0.1–1)
BUN SERPL-MCNC: 17 MG/DL (ref 8–23)
CALCIUM SERPL-MCNC: 10.8 MG/DL (ref 8.7–10.5)
CHLORIDE SERPL-SCNC: 98 MMOL/L (ref 95–110)
CO2 SERPL-SCNC: 31 MMOL/L (ref 23–29)
CREAT SERPL-MCNC: 0.9 MG/DL (ref 0.5–1.4)
EST. GFR  (NO RACE VARIABLE): >60 ML/MIN/1.73 M^2
GLUCOSE SERPL-MCNC: 116 MG/DL (ref 70–110)
LIPASE SERPL-CCNC: 26 U/L (ref 4–60)
POTASSIUM SERPL-SCNC: 3.8 MMOL/L (ref 3.5–5.1)
PROT SERPL-MCNC: 8.1 G/DL (ref 6–8.4)
SODIUM SERPL-SCNC: 142 MMOL/L (ref 136–145)

## 2023-04-05 PROCEDURE — 3074F SYST BP LT 130 MM HG: CPT | Mod: CPTII,S$GLB,, | Performed by: INTERNAL MEDICINE

## 2023-04-05 PROCEDURE — 80053 COMPREHEN METABOLIC PANEL: CPT | Performed by: INTERNAL MEDICINE

## 2023-04-05 PROCEDURE — 3078F PR MOST RECENT DIASTOLIC BLOOD PRESSURE < 80 MM HG: ICD-10-PCS | Mod: CPTII,S$GLB,, | Performed by: INTERNAL MEDICINE

## 2023-04-05 PROCEDURE — 3078F DIAST BP <80 MM HG: CPT | Mod: CPTII,S$GLB,, | Performed by: INTERNAL MEDICINE

## 2023-04-05 PROCEDURE — 93005 EKG 12-LEAD: ICD-10-PCS | Mod: S$GLB,,, | Performed by: INTERNAL MEDICINE

## 2023-04-05 PROCEDURE — 93010 EKG 12-LEAD: ICD-10-PCS | Mod: S$GLB,,, | Performed by: INTERNAL MEDICINE

## 2023-04-05 PROCEDURE — 99214 OFFICE O/P EST MOD 30 MIN: CPT | Mod: S$GLB,,, | Performed by: INTERNAL MEDICINE

## 2023-04-05 PROCEDURE — 3074F PR MOST RECENT SYSTOLIC BLOOD PRESSURE < 130 MM HG: ICD-10-PCS | Mod: CPTII,S$GLB,, | Performed by: INTERNAL MEDICINE

## 2023-04-05 PROCEDURE — 36415 COLL VENOUS BLD VENIPUNCTURE: CPT | Performed by: INTERNAL MEDICINE

## 2023-04-05 PROCEDURE — 3008F BODY MASS INDEX DOCD: CPT | Mod: CPTII,S$GLB,, | Performed by: INTERNAL MEDICINE

## 2023-04-05 PROCEDURE — 93010 ELECTROCARDIOGRAM REPORT: CPT | Mod: S$GLB,,, | Performed by: INTERNAL MEDICINE

## 2023-04-05 PROCEDURE — 3008F PR BODY MASS INDEX (BMI) DOCUMENTED: ICD-10-PCS | Mod: CPTII,S$GLB,, | Performed by: INTERNAL MEDICINE

## 2023-04-05 PROCEDURE — 99214 PR OFFICE/OUTPT VISIT, EST, LEVL IV, 30-39 MIN: ICD-10-PCS | Mod: S$GLB,,, | Performed by: INTERNAL MEDICINE

## 2023-04-05 PROCEDURE — 83690 ASSAY OF LIPASE: CPT | Performed by: INTERNAL MEDICINE

## 2023-04-05 PROCEDURE — 99999 PR PBB SHADOW E&M-EST. PATIENT-LVL V: CPT | Mod: PBBFAC,,, | Performed by: INTERNAL MEDICINE

## 2023-04-05 PROCEDURE — 93005 ELECTROCARDIOGRAM TRACING: CPT | Mod: S$GLB,,, | Performed by: INTERNAL MEDICINE

## 2023-04-05 PROCEDURE — 99999 PR PBB SHADOW E&M-EST. PATIENT-LVL V: ICD-10-PCS | Mod: PBBFAC,,, | Performed by: INTERNAL MEDICINE

## 2023-04-05 RX ORDER — PANTOPRAZOLE SODIUM 40 MG/1
40 TABLET, DELAYED RELEASE ORAL DAILY
Qty: 30 TABLET | Refills: 11 | Status: SHIPPED | OUTPATIENT
Start: 2023-04-05 | End: 2023-04-25 | Stop reason: SDUPTHER

## 2023-04-05 RX ORDER — DICYCLOMINE HYDROCHLORIDE 10 MG/1
10 CAPSULE ORAL
Qty: 120 CAPSULE | Refills: 0 | Status: SHIPPED | OUTPATIENT
Start: 2023-04-05 | End: 2023-05-05

## 2023-04-05 NOTE — PROGRESS NOTES
[facility] Note    Chief Complaint      Chief Complaint   Patient presents with    Abdominal Pain    Nausea    chest discomfort     History of Present Illness      Aysha Randolph is a 62 y.o. female who presents today for chief complaint chest pain .   HPI   Epigastric and umbilical pain and pressure, daily, nausea as well, no emesis, appetite is normal, having narrow BM, no change in color, no dysuria.   Also chest pressure, feels like she cant take a deep breath   She has RA  This has been intermittent for sveral months has become daily, this occurs at rest and is not worsened by activity   Labs with lipids in Feb unremarkable   Unsure if she has ever had a stress test     Discussed dexa and she is amenable to fosamax we will start when work up is complete   Active Problem List with Overview Notes    Diagnosis Date Noted    Cervical dysplasia 05/18/2023    Vaginal dysplasia 05/18/2023    Hyperlipidemia 02/01/2023    GERD (gastroesophageal reflux disease) 02/01/2023    Rheumatoid arthritis 02/01/2023    Benign essential hypertension 02/01/2023       Health Maintenance   Topic Date Due    Mammogram  02/15/2024    DEXA Scan  02/15/2025    TETANUS VACCINE  10/02/2025    Lipid Panel  02/10/2028    Hepatitis C Screening  Completed       Past Medical History:   Diagnosis Date    Abnormal Pap smear of cervix 2017    Arthritis     Hypertension     Mild intermittent asthma, uncomplicated     Rheumatoid arthritis, unspecified 12/17/2020       Past Surgical History:   Procedure Laterality Date    ANKLE SURGERY Left     COLONOSCOPY N/A 5/31/2023    Procedure: COLONOSCOPY;  Surgeon: Robbie Ace MD;  Location: [facility];  Service: Endoscopy;  Laterality: N/A;    ESOPHAGOGASTRODUODENOSCOPY N/A 5/31/2023    Procedure: EGD (ESOPHAGOGASTRODUODENOSCOPY);  Surgeon: Robbei Ace MD;  Location: [facility];  Service: Endoscopy;  Laterality: N/A;    WRIST FRACTURE SURGERY Left        family history includes  Cervical cancer in her maternal cousin.    Social History     Tobacco Use    Smoking status: Never    Smokeless tobacco: Never   Substance Use Topics    Alcohol use: Yes     Comment: Rare    Drug use: Never       Review of Systems   Constitutional:  Negative for chills, fever, malaise/fatigue and weight loss.   Respiratory:  Positive for shortness of breath. Negative for cough, sputum production and wheezing.    Cardiovascular:  Positive for chest pain. Negative for palpitations, orthopnea and leg swelling.   Gastrointestinal:  Negative for constipation, diarrhea, nausea and vomiting.   Genitourinary:  Negative for dysuria, frequency, hematuria and urgency.      Outpatient Encounter Medications as of 4/5/2023   Medication Sig Dispense Refill    albuterol (ACCUNEB) 1.25 mg/3 mL Nebu       albuterol (PROVENTIL/VENTOLIN HFA) 90 mcg/actuation inhaler Proventil HFA 90 mcg/actuation aerosol inhaler      aspirin (ASPIR-81 ORAL)       cholecalciferol, vitamin D3, 125 mcg (5,000 unit) Tab Take 5,000 Units by mouth once a week.      diclofenac sodium 100 mg 24 hr tablet diclofenac  mg tablet,extended release 24 hr   TAKE 1 TABLET(S) EVERY DAY BY ORAL ROUTE WITH MEALS FOR 30 DAYS.      multivitamin-minerals-lutein (MULTIVITAMIN 50 PLUS) Tab Take 1 tablet by mouth once daily.      omega 3-dha-epa-fish oil 120-180-500 mg Cap       omeprazole (PRILOSEC) 20 MG capsule Take 20 mg by mouth once daily.      [DISCONTINUED] atorvastatin (LIPITOR) 10 MG tablet Take 10 mg by mouth once daily.      [DISCONTINUED] EScitalopram oxalate (LEXAPRO) 20 MG tablet Take 20 mg by mouth once daily.      [DISCONTINUED] folic acid (FOLVITE) 1 MG tablet Take 1 tablet (1 mg total) by mouth once daily. 90 tablet 3    [DISCONTINUED] hydroCHLOROthiazide (HYDRODIURIL) 25 MG tablet Take 25 mg by mouth once daily.      [DISCONTINUED] methotrexate 25 mg/mL injection 25 mg/ml weekly 5 mL 11    [DISCONTINUED] predniSONE (DELTASONE) 1 MG tablet Take 4  "tablets (4 mg total) by mouth once daily for 30 days, THEN 3 tablets (3 mg total) once daily for 30 days, THEN 2 tablets (2 mg total) once daily for 30 days, THEN 1 tablet (1 mg total) once daily. 300 tablet 0    [] dicyclomine (BENTYL) 10 MG capsule Take 1 capsule (10 mg total) by mouth 4 (four) times daily before meals and nightly. 120 capsule 0    [DISCONTINUED] pantoprazole (PROTONIX) 40 MG tablet Take 1 tablet (40 mg total) by mouth once daily. 30 tablet 11     No facility-administered encounter medications on file as of 2023.        Review of patient's allergies indicates:  No Known Allergies          Physical Exam      Vital Signs  Pulse: 105  SpO2: 96 %  BP: 126/76  BP Location: Left arm  Patient Position: Sitting  Height and Weight  Height: 4' 11" (149.9 cm)  Weight: 84.5 kg (186 lb 4.6 oz)  BSA (Calculated - sq m): 1.88 sq meters  BMI (Calculated): 37.6  Weight in (lb) to have BMI = 25: 123.5]    Physical Exam  Vitals reviewed.   Constitutional:       General: She is not in acute distress.     Appearance: She is well-developed. She is not diaphoretic.   HENT:      Head: Normocephalic and atraumatic.      Right Ear: External ear normal.      Left Ear: External ear normal.      Nose: Nose normal.   Eyes:      General:         Right eye: No discharge.         Left eye: No discharge.      Conjunctiva/sclera: Conjunctivae normal.   Cardiovascular:      Rate and Rhythm: Normal rate and regular rhythm.      Heart sounds: Normal heart sounds.   Pulmonary:      Effort: Pulmonary effort is normal. No respiratory distress.      Breath sounds: Normal breath sounds.   Musculoskeletal:         General: Normal range of motion.      Cervical back: Normal range of motion.   Skin:     Coloration: Skin is not pale.      Findings: No rash.   Neurological:      Mental Status: She is alert and oriented to person, place, and time.   Psychiatric:         Behavior: Behavior normal.         Thought Content: Thought " content normal.        Laboratory:  CBC:  Recent Labs   Lab Result Units 05/25/23  0859   WBC K/uL 10.91   RBC M/uL 3.80*   Hemoglobin g/dL 11.3*   Hematocrit % 37.1   Platelets K/uL 358   MCV fL 98   MCH pg 29.7   MCHC g/dL 30.5*     CMP:  Recent Labs   Lab Result Units 04/05/23  1134 05/25/23  0859   Glucose mg/dL 116* 138*   Calcium mg/dL 10.8* 9.9   Albumin g/dL 3.9 3.9   Total Protein g/dL 8.1 7.9   Sodium mmol/L 142 141   Potassium mmol/L 3.8 3.7   CO2 mmol/L 31* 31*   Chloride mmol/L 98 100   BUN mg/dL 17 20   Alkaline Phosphatase U/L 95 93   ALT U/L 42 21   AST U/L 43* 19   Total Bilirubin mg/dL 0.8 0.5     URINALYSIS:  No results for input(s): COLORU, CLARITYU, SPECGRAV, PHUR, PROTEINUA, GLUCOSEU, BILIRUBINCON, BLOODU, WBCU, RBCU, BACTERIA, MUCUS, NITRITE, LEUKOCYTESUR, UROBILINOGEN, HYALINECASTS in the last 2160 hours.   LIPIDS:  No results for input(s): TSH, HDL, CHOL, TRIG, LDLCALC, CHOLHDL, NONHDLCHOL, TOTALCHOLEST in the last 2160 hours.    TSH:  No results for input(s): TSH in the last 2160 hours.  A1C:  No results for input(s): HGBA1C in the last 2160 hours.    Radiology:      Assessment/Plan     Aysha Randolph is a 62 y.o.female with:    1. Chest pressure  -     IN OFFICE EKG 12-LEAD (to [facility])    2. Epigastric pain  -     US Abdomen Complete; Future; Expected date: 04/05/2023  -     LIPASE; Future; Expected date: 04/05/2023  -     Comprehensive Metabolic Panel; Future; Expected date: 04/05/2023  -     Discontinue: pantoprazole (PROTONIX) 40 MG tablet; Take 1 tablet (40 mg total) by mouth once daily.  Dispense: 30 tablet; Refill: 11  -     dicyclomine (BENTYL) 10 MG capsule; Take 1 capsule (10 mg total) by mouth 4 (four) times daily before meals and nightly.  Dispense: 120 capsule; Refill: 0  -     Ambulatory referral/consult to Gastroenterology; Future; Expected date: 04/12/2023  -     H. pylori antigen, stool; Future; Expected date: 04/05/2023    3. Encounter for screening for cardiovascular  disorders  -     NM Myocardial Perfusion Spect Multi Pharmacologic; Future; Expected date: 04/05/2023    4. Dyspnea, unspecified type  -     Cancel: Nuclear Stress Test; Future  -     Nuclear Stress - Cardiology Interpreted; Future    5. Murmur  -     Echo; Future         Use of the [facility] Patient Portal discussed and encouraged during today's visit  -Continue current medications and maintain follow up with specialists.  Return to clinic in .  Future Appointments   Date Time Provider Department Center   6/7/2023 11:30 AM Rosa Jarrett MD [facility], [place]   9/1/2023 10:15 AM LAB, [facility] [place]       Rosa Jarrett MD  6/5/2023 10:20 AM    [facility] Internal [facility]

## 2023-04-06 ENCOUNTER — HOSPITAL ENCOUNTER (OUTPATIENT)
Dept: RADIOLOGY | Facility: HOSPITAL | Age: 63
Discharge: HOME | End: 2023-04-06
Attending: INTERNAL MEDICINE
Payer: COMMERCIAL

## 2023-04-06 ENCOUNTER — HOSPITAL ENCOUNTER (OUTPATIENT)
Dept: CARDIOLOGY | Facility: HOSPITAL | Age: 63
Discharge: HOME | End: 2023-04-06
Attending: INTERNAL MEDICINE
Payer: COMMERCIAL

## 2023-04-06 VITALS
DIASTOLIC BLOOD PRESSURE: 76 MMHG | BODY MASS INDEX: 37.5 KG/M2 | HEART RATE: 83 BPM | HEIGHT: 59 IN | SYSTOLIC BLOOD PRESSURE: 118 MMHG | WEIGHT: 186 LBS

## 2023-04-06 DIAGNOSIS — E78.00 PURE HYPERCHOLESTEROLEMIA: ICD-10-CM

## 2023-04-06 DIAGNOSIS — I10 BENIGN ESSENTIAL HYPERTENSION: ICD-10-CM

## 2023-04-06 DIAGNOSIS — R01.1 MURMUR: ICD-10-CM

## 2023-04-06 DIAGNOSIS — R10.13 EPIGASTRIC PAIN: ICD-10-CM

## 2023-04-06 DIAGNOSIS — R06.00 DYSPNEA, UNSPECIFIED TYPE: Primary | ICD-10-CM

## 2023-04-06 LAB
ASCENDING AORTA: 2.83 CM
AV INDEX (PROSTH): 0.52
AV MEAN GRADIENT: 14 MMHG
AV PEAK GRADIENT: 26 MMHG
AV VALVE AREA: 1.53 CM2
AV VELOCITY RATIO: 0.49
BSA FOR ECHO PROCEDURE: 1.87 M2
CV ECHO LV RWT: 0.31 CM
DOP CALC AO PEAK VEL: 2.53 M/S
DOP CALC AO VTI: 47.87 CM
DOP CALC LVOT AREA: 2.9 CM2
DOP CALC LVOT DIAMETER: 1.93 CM
DOP CALC LVOT PEAK VEL: 1.23 M/S
DOP CALC LVOT STROKE VOLUME: 73.04 CM3
DOP CALCLVOT PEAK VEL VTI: 24.98 CM
E WAVE DECELERATION TIME: 202.93 MSEC
E/A RATIO: 0.72
E/E' RATIO: 9.29 M/S
ECHO LV POSTERIOR WALL: 0.77 CM (ref 0.6–1.1)
EJECTION FRACTION: 65 %
FRACTIONAL SHORTENING: 41 % (ref 28–44)
INTERVENTRICULAR SEPTUM: 0.78 CM (ref 0.6–1.1)
LA MAJOR: 4.68 CM
LA MINOR: 3.84 CM
LA WIDTH: 3.31 CM
LEFT ATRIUM SIZE: 3.75 CM
LEFT ATRIUM VOLUME INDEX MOD: 22 ML/M2
LEFT ATRIUM VOLUME INDEX: 24.9 ML/M2
LEFT ATRIUM VOLUME MOD: 39.38 CM3
LEFT ATRIUM VOLUME: 44.51 CM3
LEFT INTERNAL DIMENSION IN SYSTOLE: 2.92 CM (ref 2.1–4)
LEFT VENTRICLE DIASTOLIC VOLUME INDEX: 63.26 ML/M2
LEFT VENTRICLE DIASTOLIC VOLUME: 113.23 ML
LEFT VENTRICLE MASS INDEX: 71 G/M2
LEFT VENTRICLE SYSTOLIC VOLUME INDEX: 18.4 ML/M2
LEFT VENTRICLE SYSTOLIC VOLUME: 32.89 ML
LEFT VENTRICULAR INTERNAL DIMENSION IN DIASTOLE: 4.91 CM (ref 3.5–6)
LEFT VENTRICULAR MASS: 126.41 G
LV LATERAL E/E' RATIO: 8.13 M/S
LV SEPTAL E/E' RATIO: 10.83 M/S
MV A" WAVE DURATION": 9.42 MSEC
MV PEAK A VEL: 0.9 M/S
MV PEAK E VEL: 0.65 M/S
PULM VEIN S/D RATIO: 1
PV PEAK D VEL: 0.49 M/S
PV PEAK S VEL: 0.49 M/S
RA MAJOR: 4.1 CM
RA PRESSURE: 3 MMHG
RA WIDTH: 2.85 CM
RIGHT VENTRICULAR END-DIASTOLIC DIMENSION: 2.54 CM
SINUS: 2.69 CM
STJ: 2.77 CM
TDI LATERAL: 0.08 M/S
TDI SEPTAL: 0.06 M/S
TDI: 0.07 M/S
TRICUSPID ANNULAR PLANE SYSTOLIC EXCURSION: 1.96 CM

## 2023-04-06 PROCEDURE — 76700 US EXAM ABDOM COMPLETE: CPT | Mod: 26,,, | Performed by: STUDENT IN AN ORGANIZED HEALTH CARE EDUCATION/TRAINING PROGRAM

## 2023-04-06 PROCEDURE — 76700 US EXAM ABDOM COMPLETE: CPT | Mod: TC

## 2023-04-06 PROCEDURE — 93306 TTE W/DOPPLER COMPLETE: CPT

## 2023-04-06 PROCEDURE — 93306 TTE W/DOPPLER COMPLETE: CPT | Mod: 26,,, | Performed by: INTERNAL MEDICINE

## 2023-04-06 PROCEDURE — 93306 ECHO (CUPID ONLY): ICD-10-PCS | Mod: 26,,, | Performed by: INTERNAL MEDICINE

## 2023-04-06 PROCEDURE — 76700 US ABDOMEN COMPLETE: ICD-10-PCS | Mod: 26,,, | Performed by: STUDENT IN AN ORGANIZED HEALTH CARE EDUCATION/TRAINING PROGRAM

## 2023-04-11 ENCOUNTER — PATIENT MESSAGE (OUTPATIENT)
Dept: PRIMARY CARE CLINIC | Facility: CLINIC | Age: 63
End: 2023-04-11
Payer: COMMERCIAL

## 2023-04-12 ENCOUNTER — OFFICE VISIT (OUTPATIENT)
Dept: GASTROENTEROLOGY | Facility: CLINIC | Age: 63
End: 2023-04-12
Payer: COMMERCIAL

## 2023-04-12 ENCOUNTER — OFFICE VISIT (OUTPATIENT)
Dept: ORTHOPEDICS | Facility: CLINIC | Age: 63
End: 2023-04-12
Payer: COMMERCIAL

## 2023-04-12 ENCOUNTER — PATIENT MESSAGE (OUTPATIENT)
Dept: PRIMARY CARE CLINIC | Facility: CLINIC | Age: 63
End: 2023-04-12
Payer: COMMERCIAL

## 2023-04-12 VITALS — BODY MASS INDEX: 37.91 KG/M2 | WEIGHT: 188.06 LBS | HEIGHT: 59 IN

## 2023-04-12 VITALS — WEIGHT: 188.06 LBS | BODY MASS INDEX: 37.91 KG/M2 | HEIGHT: 59 IN

## 2023-04-12 DIAGNOSIS — Z12.11 SCREENING FOR COLON CANCER: Primary | ICD-10-CM

## 2023-04-12 DIAGNOSIS — G56.02 CARPAL TUNNEL SYNDROME ON LEFT: Primary | ICD-10-CM

## 2023-04-12 DIAGNOSIS — R10.13 EPIGASTRIC PAIN: ICD-10-CM

## 2023-04-12 PROCEDURE — 99999 PR PBB SHADOW E&M-EST. PATIENT-LVL IV: ICD-10-PCS | Mod: PBBFAC,,, | Performed by: NURSE PRACTITIONER

## 2023-04-12 PROCEDURE — 20526 CARPAL TUNNEL: ICD-10-PCS | Mod: LT,S$GLB,, | Performed by: PHYSICIAN ASSISTANT

## 2023-04-12 PROCEDURE — 1159F MED LIST DOCD IN RCRD: CPT | Mod: CPTII,S$GLB,, | Performed by: PHYSICIAN ASSISTANT

## 2023-04-12 PROCEDURE — 99204 PR OFFICE/OUTPT VISIT, NEW, LEVL IV, 45-59 MIN: ICD-10-PCS | Mod: S$GLB,,, | Performed by: NURSE PRACTITIONER

## 2023-04-12 PROCEDURE — 99213 PR OFFICE/OUTPT VISIT, EST, LEVL III, 20-29 MIN: ICD-10-PCS | Mod: 25,S$GLB,, | Performed by: PHYSICIAN ASSISTANT

## 2023-04-12 PROCEDURE — 1160F PR REVIEW ALL MEDS BY PRESCRIBER/CLIN PHARMACIST DOCUMENTED: ICD-10-PCS | Mod: CPTII,S$GLB,, | Performed by: PHYSICIAN ASSISTANT

## 2023-04-12 PROCEDURE — 99999 PR PBB SHADOW E&M-EST. PATIENT-LVL IV: CPT | Mod: PBBFAC,,, | Performed by: PHYSICIAN ASSISTANT

## 2023-04-12 PROCEDURE — 3008F BODY MASS INDEX DOCD: CPT | Mod: CPTII,S$GLB,, | Performed by: NURSE PRACTITIONER

## 2023-04-12 PROCEDURE — 3008F PR BODY MASS INDEX (BMI) DOCUMENTED: ICD-10-PCS | Mod: CPTII,S$GLB,, | Performed by: NURSE PRACTITIONER

## 2023-04-12 PROCEDURE — 3008F BODY MASS INDEX DOCD: CPT | Mod: CPTII,S$GLB,, | Performed by: PHYSICIAN ASSISTANT

## 2023-04-12 PROCEDURE — 99999 PR PBB SHADOW E&M-EST. PATIENT-LVL IV: ICD-10-PCS | Mod: PBBFAC,,, | Performed by: PHYSICIAN ASSISTANT

## 2023-04-12 PROCEDURE — 1160F RVW MEDS BY RX/DR IN RCRD: CPT | Mod: CPTII,S$GLB,, | Performed by: PHYSICIAN ASSISTANT

## 2023-04-12 PROCEDURE — 1159F PR MEDICATION LIST DOCUMENTED IN MEDICAL RECORD: ICD-10-PCS | Mod: CPTII,S$GLB,, | Performed by: PHYSICIAN ASSISTANT

## 2023-04-12 PROCEDURE — 20526 THER INJECTION CARP TUNNEL: CPT | Mod: LT,S$GLB,, | Performed by: PHYSICIAN ASSISTANT

## 2023-04-12 PROCEDURE — 99204 OFFICE O/P NEW MOD 45 MIN: CPT | Mod: S$GLB,,, | Performed by: NURSE PRACTITIONER

## 2023-04-12 PROCEDURE — 99213 OFFICE O/P EST LOW 20 MIN: CPT | Mod: 25,S$GLB,, | Performed by: PHYSICIAN ASSISTANT

## 2023-04-12 PROCEDURE — 3008F PR BODY MASS INDEX (BMI) DOCUMENTED: ICD-10-PCS | Mod: CPTII,S$GLB,, | Performed by: PHYSICIAN ASSISTANT

## 2023-04-12 PROCEDURE — 99999 PR PBB SHADOW E&M-EST. PATIENT-LVL IV: CPT | Mod: PBBFAC,,, | Performed by: NURSE PRACTITIONER

## 2023-04-12 RX ORDER — TRIAMCINOLONE ACETONIDE 40 MG/ML
20 INJECTION, SUSPENSION INTRA-ARTICULAR; INTRAMUSCULAR
Status: DISCONTINUED | OUTPATIENT
Start: 2023-04-12 | End: 2023-04-12 | Stop reason: HOSPADM

## 2023-04-12 RX ORDER — SODIUM, POTASSIUM,MAG SULFATES 17.5-3.13G
1 SOLUTION, RECONSTITUTED, ORAL ORAL DAILY
Qty: 1 KIT | Refills: 0 | Status: SHIPPED | OUTPATIENT
Start: 2023-04-12 | End: 2023-06-07

## 2023-04-12 RX ADMIN — TRIAMCINOLONE ACETONIDE 20 MG: 40 INJECTION, SUSPENSION INTRA-ARTICULAR; INTRAMUSCULAR at 02:04

## 2023-04-12 NOTE — PROGRESS NOTES
SUBJECTIVE:     Chief Complaint & History of Present Illness:  Aysha Randolph is a 62-year-old female with past medical history which includes rheumatoid arthritis and previous ORIF to L distal radius fracture.     She returns today for re-evaluation of constant ongoing numbness to index digit and thumb of the left hand.  This began shortly after previous ORIF.  She notes a great deal of improvement following nighttime bracing.  Patient recently underwent EMG, which showed no evidence of nerve compression.    Review of patient's allergies indicates:  No Known Allergies        Current Outpatient Medications   Medication Sig Dispense Refill    aspirin (ASPIR-81 ORAL)       atorvastatin (LIPITOR) 10 MG tablet Take 10 mg by mouth once daily.      cholecalciferol, vitamin D3, 125 mcg (5,000 unit) Tab Take 5,000 Units by mouth once a week.      diclofenac sodium 100 mg 24 hr tablet diclofenac  mg tablet,extended release 24 hr   TAKE 1 TABLET(S) EVERY DAY BY ORAL ROUTE WITH MEALS FOR 30 DAYS.      dicyclomine (BENTYL) 10 MG capsule Take 1 capsule (10 mg total) by mouth 4 (four) times daily before meals and nightly. 120 capsule 0    EScitalopram oxalate (LEXAPRO) 20 MG tablet Take 20 mg by mouth once daily.      folic acid (FOLVITE) 1 MG tablet Take 1 tablet (1 mg total) by mouth once daily. 90 tablet 3    hydroCHLOROthiazide (HYDRODIURIL) 25 MG tablet Take 25 mg by mouth once daily.      methotrexate 25 mg/mL injection 25 mg/ml weekly 5 mL 11    multivitamin-minerals-lutein (MULTIVITAMIN 50 PLUS) Tab Take 1 tablet by mouth once daily.      omega 3-dha-epa-fish oil 120-180-500 mg Cap       omeprazole (PRILOSEC) 20 MG capsule Take 20 mg by mouth once daily.      pantoprazole (PROTONIX) 40 MG tablet Take 1 tablet (40 mg total) by mouth once daily. 30 tablet 11    predniSONE (DELTASONE) 1 MG tablet Take 4 tablets (4 mg total) by mouth once daily for 30 days, THEN 3 tablets (3 mg total) once daily for 30 days, THEN 2  "tablets (2 mg total) once daily for 30 days, THEN 1 tablet (1 mg total) once daily. 300 tablet 0    albuterol (ACCUNEB) 1.25 mg/3 mL Nebu       albuterol (PROVENTIL/VENTOLIN HFA) 90 mcg/actuation inhaler Proventil HFA 90 mcg/actuation aerosol inhaler      sodium,potassium,mag sulfates (SUPREP BOWEL PREP KIT) 17.5-3.13-1.6 gram SolR Take 177 mLs by mouth once daily. (Patient not taking: Reported on 4/12/2023) 1 kit 0     No current facility-administered medications for this visit.       Past Medical History:   Diagnosis Date    Abnormal Pap smear of cervix 2017    Arthritis     Hypertension     Mild intermittent asthma, uncomplicated     Rheumatoid arthritis, unspecified 12/17/2020       Past Surgical History:   Procedure Laterality Date    ANKLE SURGERY Left     WRIST FRACTURE SURGERY Left        Vital Signs (Most Recent)  There were no vitals filed for this visit.    Review of Systems:  Review of Systems   Constitutional:  Negative for chills, fever and weight loss.   HENT:  Negative for congestion, ear discharge and ear pain.    Eyes:  Negative for blurred vision, double vision and pain.   Respiratory:  Negative for cough, sputum production and shortness of breath.    Cardiovascular:  Negative for chest pain, palpitations and leg swelling.   Gastrointestinal:  Negative for abdominal pain, nausea and vomiting.   Genitourinary:  Negative for dysuria.   Musculoskeletal:  Positive for joint pain and myalgias. Negative for falls.   Skin:  Negative for itching and rash.   Neurological:  Negative for dizziness, tingling, sensory change and weakness.   Endo/Heme/Allergies:  Does not bruise/bleed easily.        OBJECTIVE:     PHYSICAL EXAM:  Height: 4' 11" (149.9 cm) Weight: 85.3 kg (188 lb 0.8 oz)  Ortho/SPM Exam  General:  Alert and oriented x3, no acute distress, sitting comfortably in the exam room  Abdomen soft and nondistended  Breathing unlabored  Mood and affect appropriate    LEFT UE: Skin intact with visible " healed surgical scars to L forearm and wrist. Swelling negative. TTP negative to bony prominences and soft tissues throughout. ROM complete 2 flexion/extension/radial deviation/ulnar deviation.  Patient able to perform for .  Sensation decreased to light touch to index and thumb pad and to dorsal aspect of digits.  Otherwise SILT throughout. Tinels positive. Phalen's positive. Pulses palpable. Cap refill brisk.      EMG/NCV 4-4-2023 negative for significant neuropathy, plexopathy, or cervical radiculopathy associated with neural changes at this time    ASSESSMENT/PLAN:     1. Carpal tunnel syndrome on left          Recent EMG was negative for obvious neuropathy.  However, the patient is seeing a good deal of relief from nighttime wrist bracing.    Consent provided for diagnostic carpal tunnel injection which was performed today    Continue nighttime bracing with gel wrist brace    Follow-up: w/ Dr. Salgado to discuss further treatment options

## 2023-04-12 NOTE — PROGRESS NOTES
GASTROENTEROLOGY CLINIC NOTE    Chief Complaint: The primary encounter diagnosis was Screening for colon cancer. A diagnosis of Epigastric pain was also pertinent to this visit.  Referring provider/PCP: Rosa Jarrett MD    Aysha Randolph is a 62 y.o. female who is a new patient to me with a PMH that's significant for GERD and rheumatoid arthritis. She is here today to establish care for epigastric pain and colon cancer screening.   Over last 6-8 weeks symptoms have been progressively worsening.   C/o Abdominal pain and pressure, increased flatulence , and decreased appetite  Experiencing early satiety; feels as if food is not digesting and sitting in lower chest  Recent abdominal ultrasound unrevealing    Abdominal Pain  How Lon-8 weeks progressively worsening  Frequency: daily   Location: Epigastric and LUQ  Quality: Stabbing/burning   Onset: sudden  Duration: minutes  Radiate: no  Aggravated or Improved with bowel movements/eating/movement: occasionally eating worsens pain; no change in pain with bowel movements or movement  Nausea/Vomiting/Weight Loss: nausea; no vomiting or unexplained weight loss  Heartburn/Regurgitation/Water Brash: indigestion; increased salivation  Bowel Movements: daily; softer stool recently alternating between [place] Type 4 and 5, no nocturnal symptoms  Melena/Blood:no    Treatments: dicyclomine (helping), Protonix 40mg     NSAIDs: ASA 81mg  Caffeine:coffee  ETOH Use: rare  GLP-1 No  Fiber Supplement: no  Anticoagulation or Antiplatelet: No    History of H.pylori: No  H.pylori Treatment:   Prior Upper Endoscopy: No  Prior Colonoscopy: 10 years no abnormal findings per patient report  Family h/o Colon Cancer: No  Family h/o Crohn's Disease or Ulcerative Colitis: No  Family h/o Celiac Sprue: No  Abdominal Surgeries: No    Review of Systems   Constitutional:  Negative for weight loss.   HENT:  Negative for sore throat.    Eyes:  Negative for blurred vision.   Respiratory:   Negative for cough.    Cardiovascular:  Negative for chest pain.   Gastrointestinal:  Positive for abdominal pain (mid to upper abdomen), constipation, diarrhea and nausea. Negative for blood in stool, heartburn, melena and vomiting.   Genitourinary:  Negative for dysuria.   Musculoskeletal:  Negative for myalgias.   Skin:  Negative for rash.   Neurological:  Negative for headaches.   Endo/Heme/Allergies:  Negative for environmental allergies.   Psychiatric/Behavioral:  Negative for suicidal ideas. The patient is not nervous/anxious.      Past Medical History: has a past medical history of Abnormal Pap smear of cervix, Arthritis, Hypertension, Mild intermittent asthma, uncomplicated, and Rheumatoid arthritis, unspecified.    Past Surgical History: has a past surgical history that includes Ankle surgery (Left) and Wrist fracture surgery (Left).    Family History:family history includes Cervical cancer in her maternal cousin.    Allergies: Review of patient's allergies indicates:  No Known Allergies    Social History: reports that she has never smoked. She has never used smokeless tobacco. She reports current alcohol use. She reports that she does not use drugs.    Home medications:   Current Outpatient Medications on File Prior to Visit   Medication Sig Dispense Refill    albuterol (ACCUNEB) 1.25 mg/3 mL Nebu       albuterol (PROVENTIL/VENTOLIN HFA) 90 mcg/actuation inhaler Proventil HFA 90 mcg/actuation aerosol inhaler      aspirin (ASPIR-81 ORAL)       atorvastatin (LIPITOR) 10 MG tablet Take 10 mg by mouth once daily.      cholecalciferol, vitamin D3, 125 mcg (5,000 unit) Tab Take 5,000 Units by mouth once a week.      diclofenac sodium 100 mg 24 hr tablet diclofenac  mg tablet,extended release 24 hr   TAKE 1 TABLET(S) EVERY DAY BY ORAL ROUTE WITH MEALS FOR 30 DAYS.      dicyclomine (BENTYL) 10 MG capsule Take 1 capsule (10 mg total) by mouth 4 (four) times daily before meals and nightly. 120 capsule 0     "EScitalopram oxalate (LEXAPRO) 20 MG tablet Take 20 mg by mouth once daily.      folic acid (FOLVITE) 1 MG tablet Take 1 tablet (1 mg total) by mouth once daily. 90 tablet 3    hydroCHLOROthiazide (HYDRODIURIL) 25 MG tablet Take 25 mg by mouth once daily.      methotrexate 25 mg/mL injection 25 mg/ml weekly 5 mL 11    multivitamin-minerals-lutein (MULTIVITAMIN 50 PLUS) Tab Take 1 tablet by mouth once daily.      omega 3-dha-epa-fish oil 120-180-500 mg Cap       omeprazole (PRILOSEC) 20 MG capsule Take 20 mg by mouth once daily.      pantoprazole (PROTONIX) 40 MG tablet Take 1 tablet (40 mg total) by mouth once daily. 30 tablet 11    predniSONE (DELTASONE) 1 MG tablet Take 4 tablets (4 mg total) by mouth once daily for 30 days, THEN 3 tablets (3 mg total) once daily for 30 days, THEN 2 tablets (2 mg total) once daily for 30 days, THEN 1 tablet (1 mg total) once daily. 300 tablet 0     No current facility-administered medications on file prior to visit.       Vital signs:  Ht 4' 11" (1.499 m)   Wt 85.3 kg (188 lb 0.8 oz)   BMI 37.98 kg/m²     Physical Exam  Vitals reviewed.   Constitutional:       General: She is not in acute distress.     Appearance: Normal appearance. She is not ill-appearing.   HENT:      Head: Normocephalic.   Cardiovascular:      Rate and Rhythm: Normal rate and regular rhythm.      Heart sounds: Normal heart sounds. No murmur heard.  Pulmonary:      Effort: Pulmonary effort is normal. No respiratory distress.      Breath sounds: Normal breath sounds.   Chest:      Chest wall: No tenderness.   Abdominal:      General: Bowel sounds are normal. There is no distension.      Palpations: Abdomen is soft.      Tenderness: There is generalized abdominal tenderness. Negative signs include Hughes's sign.      Hernia: No hernia is present.   Skin:     General: Skin is warm.   Neurological:      Mental Status: She is alert and oriented to person, place, and time.   Psychiatric:         Mood and Affect: " Mood normal.         Behavior: Behavior normal.       Routine labs:  Lab Results   Component Value Date    WBC 10.41 02/10/2023    WBC 10.41 02/10/2023    HGB 11.3 (L) 02/10/2023    HGB 11.3 (L) 02/10/2023    HCT 36.8 (L) 02/10/2023    HCT 36.8 (L) 02/10/2023    MCV 98 02/10/2023    MCV 98 02/10/2023     02/10/2023     02/10/2023     No results found for: INR  No results found for: IRON, FERRITIN, TIBC, FESATURATED  Lab Results   Component Value Date     04/05/2023    K 3.8 04/05/2023    CL 98 04/05/2023    CO2 31 (H) 04/05/2023    BUN 17 04/05/2023    CREATININE 0.9 04/05/2023     Lab Results   Component Value Date    ALBUMIN 3.9 04/05/2023    ALT 42 04/05/2023    AST 43 (H) 04/05/2023    ALKPHOS 95 04/05/2023    BILITOT 0.8 04/05/2023     No results found for: GLUCOSE  No results found for: TSH  Lab Results   Component Value Date    CALCIUM 10.8 (H) 04/05/2023       Imaging:  US Abdomen Complete  Narrative: EXAMINATION:  US ABDOMEN COMPLETE    CLINICAL HISTORY:  Epigastric pain    TECHNIQUE:  Complete abdominal ultrasound (including pancreas, aorta, liver, gallbladder, common bile duct, IVC, kidneys, and spleen) was performed.    COMPARISON:  None    FINDINGS:  Pancreas: The visualized portions of pancreas appear normal.    Aorta: No aneurysm.    Liver: 15.1 cm, normal in size. Diffusely increased parenchymal echogenicity suggestive of steatosis.  HRI measures 1.78.  No focal lesions.    Gallbladder: Contracted noting patient was not fasted for exam, limiting evaluation.  No definite calculi, wall thickening, or pericholecystic fluid.  Negative sonographic Hughes's sign.    Biliary system: 5 mm common bile duct.  No intrahepatic ductal dilatation.    Inferior vena cava: Normal in appearance.    Right kidney: 10.3 cm. No hydronephrosis.    Left kidney: 10.0 cm. No hydronephrosis.    Spleen: 8.0 x 2.4 cm.  Normal in size with homogeneous echotexture.    Miscellaneous: No ascites.  Impression:  Hepatic steatosis.    Electronically signed by resident: Gene Hennessy  Date:    04/06/2023  Time:    14:36    Electronically signed by: Francis Smith  Date:    04/06/2023  Time:    14:45       I have reviewed prior labs, imaging, and notes.      Assessment:  1. Screening for colon cancer    2. Epigastric pain        Plan:  Orders Placed This Encounter    sodium,potassium,mag sulfates (SUPREP BOWEL PREP KIT) 17.5-3.13-1.6 gram SolR    Case Request Endoscopy: COLONOSCOPY, EGD (ESOPHAGOGASTRODUODENOSCOPY)     EGD to further evaluate abdominal pain.   Colonoscopy for colon cancer screening. Suprep  Continue pantoprazole (Protonix) once a day. Take 30 minutes before first meal of the day.   Stop omeprazole (prilosec).   Continue dicyclomine as needed      Plan of care discussed with patient who is in agreement and verbalized understanding.     I have explained the planned procedures to the patient.The risks, benefits and alternatives of the procedure were also explained in detail. Patient verbalized understanding, all questions were answered. The patient agrees to proceed as planned    Follow Up: As Needed Pending Workup          Kera Ornelas, MARY,FNP-BC  [facility]    (Portions of this note were dictated using voice recognition software and may contain dictation related errors in spelling/grammar/syntax not found on text review)

## 2023-04-12 NOTE — PATIENT INSTRUCTIONS
Continue pantoprazole (Protonix) once a day. Take 30 minutes before first meal of the day.     Stop omeprazole (prilosec).     Continue dicyclomine as needed for pain/pressure.     Upper endoscopy and colonoscopy.       SUPREP Instructions    [facility] [address]  Clinic Office [phone]  Endoscopy Lab [phone]    You are scheduled for a Colonoscopy with Dr. Ace  on 5/31/23 at [facility] in [place].    Check in at the Hospital -1st floor, Information desk.   Call [phone] to reschedule.    An adult friend/family member must come with you to drive you home.  You cannot drive, take a taxi, Uber/Lyft or bus to leave the Endoscopy Center alone.  If you do not have someone to drive you home, your test will be cancelled.     Please follow the directions of your doctor if you take any pills that thin your blood. If you take these meds: Aggrenox, Brilinta, Effient, Eliquis, Lovenox, Plavix, Pletal, Pradaxa, Ticilid, Xarelto or Coumadin, let the doctor's office know.    DON'T: On the morning of the test do not take insulin or pills for diabetes.     DO: On the morning of the test, do take any pills for blood pressure, heart, anti-rejection and or seizures with a small sip of water. Bring any inhalers with you.    To have a good prep, you must follow these instructions - please do not use the directions from the pharmacy.    The doctor will send a prescription for the SUPREP.      The Day Before the test:    You can only drink CLEAR LIQUIDS the whole day before your test.  You can't eat any food for the whole day.    You CAN have:  Water, Coffee or decaf coffee (no milk or cream)  Tea  Soft drinks - regular and sugar free  Jello (green or yellow)  Apple Juice, white grape juice, white cranberry juice  Gatorade, Power Aid, Crystal Light, Jc Aid  Lemonade and Limeade  Bouillon, clear soup  Snowball, popsicles  YOU CAN'T DRINK ANYTHING RED, PURPLE ORANGE OR BLUE   YOU CAN'T  DRINK ALCOHOL  ONLY DRINK WHAT IS ON THE LIST      At 5 pm the night before your test:    Pour the 1st bottle of SUPREP into the cup provided in the box. Add water to the line on the cup and mix well.  Drink the whole cup and then drink 2 more full cups of water over 1 hour.  This can be easier to drink if it is cold. You can mix it 20 minutes ahead of time and place in the refrigerator before you drink it.  You must drink it within 30-45 minutes of mixing it.  Do NOT pour the drink over ice.  You can drink it with a straw.    The Day of the test - We will call you 2 days before your test to tell you what time to get there.    5 hours before you come to the hospital (this may be in the middle of the night)  Pour the 2nd bottle of SUPREP into the cup provided in the box. Add water to the line on the cup and mix well.  Drink the whole cup and then drink 2 more full cups of water over 1 hour.  It might be easier to drink if it is cold. You can mix it 20 minutes ahead of time and place in the refrigerator before you drink it.  You must drink it within 30-45 minutes of mixing it.  Do NOT pour the drink over ice.  You can drink it with a straw.    YOU CAN'T EAT OR DRINK ANYTHING ELSE ONCE YOU FINISH THE PREP    Leave all valuables and jewelry at home. You will be at the hospital for 2-4 hours.    Call the Endoscopy department at [phone] with any questions about your procedure.

## 2023-04-12 NOTE — PROCEDURES
Carpal Tunnel    Date/Time: 4/12/2023 2:15 PM  Performed by: Meliza Oneal PA-C  Authorized by: Meliza Oneal PA-C     Consent Done?:  Yes (Verbal)  Indications:  Diagnostic evaluation  Site marked: the procedure site was marked    Timeout: prior to procedure the correct patient, procedure, and site was verified    Prep: patient was prepped and draped in usual sterile fashion      Location:  Wrist  Site:  L carpal tunnel  Ultrasonic Guidance for Needle Placement?: No    Needle size:  25 G  Approach:  Volar  Medications:  20 mg triamcinolone acetonide 40 mg/mL  Patient tolerance:  Patient tolerated the procedure well with no immediate complications    PROCEDURE:  I have explained the risks, benefits, and alternatives of the procedure in detail.  The patient voices understanding and all questions have been answered.  The patient agrees to proceed as planned. So after I performed a sterile prep of the skin in the normal fashion the left carpal tunnel is injected from the volar approach using a 25 gauge needle with a combination of 0.5 cc 1% plain xylocaine and 20 mg of Kenolog. The patient is cautioned and immediate relief of pain is secondary to the local anesthetic and will be temporary.  After the anesthetic wears off there may be a increase in pain that may last for a few hours or a few days and they should use ice to help alleviate this flair up of pain. Patient tolerated the procedure well.

## 2023-04-13 ENCOUNTER — TELEPHONE (OUTPATIENT)
Dept: OBSTETRICS AND GYNECOLOGY | Facility: CLINIC | Age: 63
End: 2023-04-13
Payer: COMMERCIAL

## 2023-04-13 DIAGNOSIS — N87.0 CERVICAL DYSPLASIA, MILD: ICD-10-CM

## 2023-04-13 DIAGNOSIS — N89.0 VAIN I (VAGINAL INTRAEPITHELIAL NEOPLASIA GRADE I): Primary | ICD-10-CM

## 2023-04-13 LAB
FINAL PATHOLOGIC DIAGNOSIS: NORMAL
GROSS: NORMAL
Lab: NORMAL

## 2023-04-17 ENCOUNTER — TELEPHONE (OUTPATIENT)
Dept: GYNECOLOGIC ONCOLOGY | Facility: CLINIC | Age: 63
End: 2023-04-17
Payer: COMMERCIAL

## 2023-04-17 NOTE — TELEPHONE ENCOUNTER
Spoke with our patient about her insurance, schedule appointment she voiced understanding of the date, time and location. All questions answered appointment mail. Provider Scheduling Coord.  Gynecologic Oncology MA/PAR /Preceptor Gilbert Ngo

## 2023-04-20 ENCOUNTER — TELEPHONE (OUTPATIENT)
Dept: CARDIOLOGY | Facility: HOSPITAL | Age: 63
End: 2023-04-20
Payer: COMMERCIAL

## 2023-04-21 ENCOUNTER — PATIENT MESSAGE (OUTPATIENT)
Dept: PRIMARY CARE CLINIC | Facility: CLINIC | Age: 63
End: 2023-04-21
Payer: COMMERCIAL

## 2023-04-21 DIAGNOSIS — R76.8 HELICOBACTER PYLORI ANTIBODY POSITIVE: Primary | ICD-10-CM

## 2023-04-21 DIAGNOSIS — Z13.6 SCREENING FOR ISCHEMIC HEART DISEASE: Primary | ICD-10-CM

## 2023-04-21 RX ORDER — CLARITHROMYCIN 500 MG/1
500 TABLET, FILM COATED ORAL EVERY 12 HOURS
Qty: 28 TABLET | Refills: 0 | Status: SHIPPED | OUTPATIENT
Start: 2023-04-21 | End: 2023-04-24 | Stop reason: SDUPTHER

## 2023-04-21 RX ORDER — AMOXICILLIN 500 MG/1
1000 TABLET, FILM COATED ORAL EVERY 12 HOURS
Qty: 56 TABLET | Refills: 0 | Status: SHIPPED | OUTPATIENT
Start: 2023-04-21 | End: 2023-04-24 | Stop reason: SDUPTHER

## 2023-04-24 ENCOUNTER — PATIENT MESSAGE (OUTPATIENT)
Dept: PRIMARY CARE CLINIC | Facility: CLINIC | Age: 63
End: 2023-04-24
Payer: COMMERCIAL

## 2023-04-24 DIAGNOSIS — M05.79 RHEUMATOID ARTHRITIS INVOLVING MULTIPLE SITES WITH POSITIVE RHEUMATOID FACTOR: ICD-10-CM

## 2023-04-24 DIAGNOSIS — R10.13 EPIGASTRIC PAIN: ICD-10-CM

## 2023-04-24 RX ORDER — PREDNISONE 1 MG/1
TABLET ORAL
Qty: 300 TABLET | Refills: 0 | Status: CANCELLED | OUTPATIENT
Start: 2023-04-24 | End: 2023-08-22

## 2023-04-24 RX ORDER — AMOXICILLIN 500 MG/1
1000 TABLET, FILM COATED ORAL EVERY 12 HOURS
Qty: 56 TABLET | Refills: 0 | Status: SHIPPED | OUTPATIENT
Start: 2023-04-24 | End: 2023-05-08

## 2023-04-24 RX ORDER — CLARITHROMYCIN 500 MG/1
500 TABLET, FILM COATED ORAL EVERY 12 HOURS
Qty: 28 TABLET | Refills: 0 | Status: SHIPPED | OUTPATIENT
Start: 2023-04-24 | End: 2023-05-08

## 2023-04-25 ENCOUNTER — PATIENT MESSAGE (OUTPATIENT)
Dept: PRIMARY CARE CLINIC | Facility: CLINIC | Age: 63
End: 2023-04-25
Payer: COMMERCIAL

## 2023-04-25 DIAGNOSIS — M05.79 RHEUMATOID ARTHRITIS INVOLVING MULTIPLE SITES WITH POSITIVE RHEUMATOID FACTOR: ICD-10-CM

## 2023-04-25 RX ORDER — ATORVASTATIN CALCIUM 10 MG/1
10 TABLET, FILM COATED ORAL DAILY
Qty: 30 TABLET | Refills: 0 | Status: SHIPPED | OUTPATIENT
Start: 2023-04-25 | End: 2023-11-22 | Stop reason: SDUPTHER

## 2023-04-25 RX ORDER — PANTOPRAZOLE SODIUM 40 MG/1
40 TABLET, DELAYED RELEASE ORAL 2 TIMES DAILY
Qty: 60 TABLET | Refills: 0 | Status: SHIPPED | OUTPATIENT
Start: 2023-04-25 | End: 2023-06-07

## 2023-04-25 RX ORDER — HYDROCHLOROTHIAZIDE 25 MG/1
25 TABLET ORAL DAILY
Qty: 30 TABLET | Refills: 0 | Status: SHIPPED | OUTPATIENT
Start: 2023-04-25 | End: 2023-11-22 | Stop reason: SDUPTHER

## 2023-04-25 RX ORDER — FOLIC ACID 1 MG/1
1 TABLET ORAL DAILY
Qty: 30 TABLET | Refills: 0 | Status: SHIPPED | OUTPATIENT
Start: 2023-04-25 | End: 2023-09-20 | Stop reason: SDUPTHER

## 2023-04-25 RX ORDER — ESCITALOPRAM OXALATE 20 MG/1
20 TABLET ORAL DAILY
Qty: 30 TABLET | Refills: 0 | Status: SHIPPED | OUTPATIENT
Start: 2023-04-25 | End: 2023-11-22 | Stop reason: SDUPTHER

## 2023-04-26 RX ORDER — PREDNISONE 1 MG/1
3 TABLET ORAL DAILY
Qty: 180 TABLET | Refills: 0 | Status: SHIPPED | OUTPATIENT
Start: 2023-04-26 | End: 2023-06-01

## 2023-04-29 ENCOUNTER — PATIENT MESSAGE (OUTPATIENT)
Dept: PRIMARY CARE CLINIC | Facility: CLINIC | Age: 63
End: 2023-04-29
Payer: COMMERCIAL

## 2023-05-15 ENCOUNTER — TELEPHONE (OUTPATIENT)
Dept: CARDIOLOGY | Facility: HOSPITAL | Age: 63
End: 2023-05-15
Payer: COMMERCIAL

## 2023-05-17 ENCOUNTER — HOSPITAL ENCOUNTER (OUTPATIENT)
Dept: CARDIOLOGY | Facility: HOSPITAL | Age: 63
Discharge: HOME | End: 2023-05-17
Attending: INTERNAL MEDICINE
Payer: COMMERCIAL

## 2023-05-17 VITALS — SYSTOLIC BLOOD PRESSURE: 179 MMHG | DIASTOLIC BLOOD PRESSURE: 99 MMHG | HEART RATE: 68 BPM

## 2023-05-17 DIAGNOSIS — I10 BENIGN ESSENTIAL HYPERTENSION: ICD-10-CM

## 2023-05-17 DIAGNOSIS — R06.00 DYSPNEA, UNSPECIFIED TYPE: ICD-10-CM

## 2023-05-17 DIAGNOSIS — E78.00 PURE HYPERCHOLESTEROLEMIA: ICD-10-CM

## 2023-05-17 LAB
CV PHARM DOSE: 0.4 MG
CV STRESS BASE HR: 68 BPM
DIASTOLIC BLOOD PRESSURE: 99 MMHG
EJECTION FRACTION- HIGH: 59 %
END DIASTOLIC INDEX-HIGH: 155 ML/M2
END DIASTOLIC INDEX-LOW: 91 ML/M2
END SYSTOLIC INDEX-HIGH: 78 ML/M2
END SYSTOLIC INDEX-LOW: 40 ML/M2
NUC REST DIASTOLIC VOLUME INDEX: 72
NUC REST EJECTION FRACTION: 63
NUC REST SYSTOLIC VOLUME INDEX: 26
NUC STRESS DIASTOLIC VOLUME INDEX: 68
NUC STRESS EJECTION FRACTION: 66 %
NUC STRESS SYSTOLIC VOLUME INDEX: 23
OHS CV CPX 1 MINUTE RECOVERY HEART RATE: 102 BPM
OHS CV CPX 85 PERCENT MAX PREDICTED HEART RATE MALE: 127
OHS CV CPX MAX PREDICTED HEART RATE: 150
OHS CV CPX PATIENT IS FEMALE: 1
OHS CV CPX PATIENT IS MALE: 0
OHS CV CPX PEAK DIASTOLIC BLOOD PRESSURE: 76 MMHG
OHS CV CPX PEAK HEAR RATE: 85 BPM
OHS CV CPX PEAK RATE PRESSURE PRODUCT: NORMAL
OHS CV CPX PEAK SYSTOLIC BLOOD PRESSURE: 197 MMHG
OHS CV CPX PERCENT MAX PREDICTED HEART RATE ACHIEVED: 57
OHS CV CPX RATE PRESSURE PRODUCT PRESENTING: NORMAL
RETIRED EF AND QEF - SEE NOTES: 47 %
SYSTOLIC BLOOD PRESSURE: 179 MMHG

## 2023-05-17 PROCEDURE — A9502 TC99M TETROFOSMIN: HCPCS

## 2023-05-17 PROCEDURE — 93018 CV STRESS TEST I&R ONLY: CPT | Mod: ,,, | Performed by: INTERNAL MEDICINE

## 2023-05-17 PROCEDURE — 63600175 PHARM REV CODE 636 W HCPCS: Performed by: INTERNAL MEDICINE

## 2023-05-17 PROCEDURE — 93016 CV STRESS TEST SUPVJ ONLY: CPT | Mod: ,,, | Performed by: INTERNAL MEDICINE

## 2023-05-17 PROCEDURE — 93018 NUCLEAR STRESS - CARDIOLOGY INTERPRETED (CUPID ONLY): ICD-10-PCS | Mod: ,,, | Performed by: INTERNAL MEDICINE

## 2023-05-17 PROCEDURE — 78452 HT MUSCLE IMAGE SPECT MULT: CPT | Mod: 26,,, | Performed by: INTERNAL MEDICINE

## 2023-05-17 PROCEDURE — 93016 NUCLEAR STRESS - CARDIOLOGY INTERPRETED (CUPID ONLY): ICD-10-PCS | Mod: ,,, | Performed by: INTERNAL MEDICINE

## 2023-05-17 PROCEDURE — 78452 NUCLEAR STRESS - CARDIOLOGY INTERPRETED (CUPID ONLY): ICD-10-PCS | Mod: 26,,, | Performed by: INTERNAL MEDICINE

## 2023-05-17 RX ORDER — REGADENOSON 0.08 MG/ML
0.4 INJECTION, SOLUTION INTRAVENOUS
Status: COMPLETED | OUTPATIENT
Start: 2023-05-17 | End: 2023-05-17

## 2023-05-17 RX ADMIN — REGADENOSON 0.4 MG: 0.08 INJECTION, SOLUTION INTRAVENOUS at 08:05

## 2023-05-18 ENCOUNTER — OFFICE VISIT (OUTPATIENT)
Dept: GYNECOLOGIC ONCOLOGY | Facility: CLINIC | Age: 63
End: 2023-05-18
Payer: COMMERCIAL

## 2023-05-18 VITALS
SYSTOLIC BLOOD PRESSURE: 134 MMHG | BODY MASS INDEX: 37.16 KG/M2 | DIASTOLIC BLOOD PRESSURE: 70 MMHG | WEIGHT: 184.31 LBS | HEIGHT: 59 IN | HEART RATE: 75 BPM

## 2023-05-18 DIAGNOSIS — N89.0 VAIN I (VAGINAL INTRAEPITHELIAL NEOPLASIA GRADE I): ICD-10-CM

## 2023-05-18 DIAGNOSIS — N89.3 VAGINAL DYSPLASIA: ICD-10-CM

## 2023-05-18 DIAGNOSIS — N87.0 CERVICAL DYSPLASIA, MILD: ICD-10-CM

## 2023-05-18 DIAGNOSIS — N87.9 CERVICAL DYSPLASIA: Primary | ICD-10-CM

## 2023-05-18 PROCEDURE — 99999 PR PBB SHADOW E&M-EST. PATIENT-LVL IV: CPT | Mod: PBBFAC,,, | Performed by: STUDENT IN AN ORGANIZED HEALTH CARE EDUCATION/TRAINING PROGRAM

## 2023-05-18 PROCEDURE — 99204 PR OFFICE/OUTPT VISIT, NEW, LEVL IV, 45-59 MIN: ICD-10-PCS | Mod: S$GLB,,, | Performed by: STUDENT IN AN ORGANIZED HEALTH CARE EDUCATION/TRAINING PROGRAM

## 2023-05-18 PROCEDURE — 3008F PR BODY MASS INDEX (BMI) DOCUMENTED: ICD-10-PCS | Mod: CPTII,S$GLB,, | Performed by: STUDENT IN AN ORGANIZED HEALTH CARE EDUCATION/TRAINING PROGRAM

## 2023-05-18 PROCEDURE — 1159F MED LIST DOCD IN RCRD: CPT | Mod: CPTII,S$GLB,, | Performed by: STUDENT IN AN ORGANIZED HEALTH CARE EDUCATION/TRAINING PROGRAM

## 2023-05-18 PROCEDURE — 3008F BODY MASS INDEX DOCD: CPT | Mod: CPTII,S$GLB,, | Performed by: STUDENT IN AN ORGANIZED HEALTH CARE EDUCATION/TRAINING PROGRAM

## 2023-05-18 PROCEDURE — 99204 OFFICE O/P NEW MOD 45 MIN: CPT | Mod: S$GLB,,, | Performed by: STUDENT IN AN ORGANIZED HEALTH CARE EDUCATION/TRAINING PROGRAM

## 2023-05-18 PROCEDURE — 1159F PR MEDICATION LIST DOCUMENTED IN MEDICAL RECORD: ICD-10-PCS | Mod: CPTII,S$GLB,, | Performed by: STUDENT IN AN ORGANIZED HEALTH CARE EDUCATION/TRAINING PROGRAM

## 2023-05-18 PROCEDURE — 3075F SYST BP GE 130 - 139MM HG: CPT | Mod: CPTII,S$GLB,, | Performed by: STUDENT IN AN ORGANIZED HEALTH CARE EDUCATION/TRAINING PROGRAM

## 2023-05-18 PROCEDURE — 99999 PR PBB SHADOW E&M-EST. PATIENT-LVL IV: ICD-10-PCS | Mod: PBBFAC,,, | Performed by: STUDENT IN AN ORGANIZED HEALTH CARE EDUCATION/TRAINING PROGRAM

## 2023-05-18 PROCEDURE — 3078F DIAST BP <80 MM HG: CPT | Mod: CPTII,S$GLB,, | Performed by: STUDENT IN AN ORGANIZED HEALTH CARE EDUCATION/TRAINING PROGRAM

## 2023-05-18 PROCEDURE — 3075F PR MOST RECENT SYSTOLIC BLOOD PRESS GE 130-139MM HG: ICD-10-PCS | Mod: CPTII,S$GLB,, | Performed by: STUDENT IN AN ORGANIZED HEALTH CARE EDUCATION/TRAINING PROGRAM

## 2023-05-18 PROCEDURE — 3078F PR MOST RECENT DIASTOLIC BLOOD PRESSURE < 80 MM HG: ICD-10-PCS | Mod: CPTII,S$GLB,, | Performed by: STUDENT IN AN ORGANIZED HEALTH CARE EDUCATION/TRAINING PROGRAM

## 2023-05-18 NOTE — PROGRESS NOTES
Referring Provider:  Micheline Kiran MD  [address] [facility] [address]  [place] [address]   Subjective:      Patient ID: Aysha Randolph is a 62 y.o. female.    Chief Complaint: Advice Only (New patient )    Problem List Items Addressed This Visit          Renal/    Cervical dysplasia - Primary    Vaginal dysplasia     Other Visit Diagnoses       VAIN I (vaginal intraepithelial neoplasia grade I)        Cervical dysplasia, mild               HPI Reports recent abnormal pap test and colpo. Here to discuss next steps.  Review of Systems   Constitutional:  Negative for chills, fatigue and fever.   Respiratory:  Negative for cough and shortness of breath.    Cardiovascular:  Negative for chest pain.   Gastrointestinal:  Negative for abdominal distention, abdominal pain, constipation and diarrhea.   Genitourinary:  Negative for dysuria, pelvic pain and vaginal bleeding.   Musculoskeletal:  Negative for back pain.   Psychiatric/Behavioral:  Negative for dysphoric mood. The patient is not nervous/anxious.    Past Medical History:   Diagnosis Date    Abnormal Pap smear of cervix 2017    Arthritis     Hypertension     Mild intermittent asthma, uncomplicated     Rheumatoid arthritis, unspecified 12/17/2020      Past Surgical History:   Procedure Laterality Date    ANKLE SURGERY Left     WRIST FRACTURE SURGERY Left       Family History   Problem Relation Age of Onset    Cervical cancer Maternal Cousin     Breast cancer Neg Hx     Colon cancer Neg Hx       Social History     Socioeconomic History    Marital status:         Objective:      Vitals:    05/18/23 1304   BP: 134/70   Pulse: 75      Physical Exam  Constitutional:       General: She is not in acute distress.  HENT:      Head: Normocephalic.   Eyes:      Extraocular Movements: Extraocular movements intact.      Conjunctiva/sclera: Conjunctivae normal.   Cardiovascular:      Rate and Rhythm: Normal rate.      Pulses: Normal pulses.   Pulmonary:       Effort: Pulmonary effort is normal. No respiratory distress.      Breath sounds: No wheezing.   Abdominal:      General: There is no distension.      Tenderness: There is no abdominal tenderness. There is no guarding or rebound.   Genitourinary:     Comments: External genitalia normal. Vagina normal. Cervix with no visible lesions. Uterus mobile.   Musculoskeletal:         General: No deformity.   Neurological:      Mental Status: She is alert and oriented to person, place, and time.   Psychiatric:         Mood and Affect: Mood normal.         Behavior: Behavior normal.         Thought Content: Thought content normal.       Lab Results   Component Value Date    WBC 10.41 02/10/2023    WBC 10.41 02/10/2023    HGB 11.3 (L) 02/10/2023    HGB 11.3 (L) 02/10/2023    HCT 36.8 (L) 02/10/2023    HCT 36.8 (L) 02/10/2023    MCV 98 02/10/2023    MCV 98 02/10/2023     02/10/2023     02/10/2023        Assessment:       Cervical dysplasia    Vaginal dysplasia    VAIN I (vaginal intraepithelial neoplasia grade I)  -     Ambulatory referral/consult to Gynecologic Oncology    Cervical dysplasia, mild  -     Ambulatory referral/consult to Gynecologic Oncology         Plan:       CIN1: preceded by LSIL pap. Recommend cotesting in 1 year per ASCCP guidelines.     VAIN1: Recommend clinical exam and cotesting annually for 2 years. Most incidences of VAIN1 are a result of an infection with nononcogenic subtypes of HPV or in the setting of postmenopausal women, may associated with atrophic changes and respond to vaginal estrogen.    As part of the medical decision making process I reviewed the referring provides notes, relevant labs, imaging reports.         Aayush Pang MD

## 2023-05-18 NOTE — Clinical Note
Micheline, thanks for sending Aysha to see me. She is very sweet. Thankfully she just needs a repeat Pap and HPV testing in 1 year. And VAIN 1 has an extraordinarily low chance for progression to malignancy, so treatment isn't indicated.  Please feel free to reach out anytime with questions or referrals, and I will always work to make sure I get patients seen as quickly as possible.  Thanks, Aayush Pang Cell: [phone]

## 2023-05-25 ENCOUNTER — LAB VISIT (OUTPATIENT)
Dept: LAB | Facility: HOSPITAL | Age: 63
End: 2023-05-25
Attending: STUDENT IN AN ORGANIZED HEALTH CARE EDUCATION/TRAINING PROGRAM
Payer: COMMERCIAL

## 2023-05-25 DIAGNOSIS — M05.79 RHEUMATOID ARTHRITIS INVOLVING MULTIPLE SITES WITH POSITIVE RHEUMATOID FACTOR: ICD-10-CM

## 2023-05-25 LAB
ALBUMIN SERPL BCP-MCNC: 3.9 G/DL (ref 3.5–5.2)
ALP SERPL-CCNC: 93 U/L (ref 55–135)
ALT SERPL W/O P-5'-P-CCNC: 21 U/L (ref 10–44)
ANION GAP SERPL CALC-SCNC: 10 MMOL/L (ref 8–16)
AST SERPL-CCNC: 19 U/L (ref 10–40)
BASOPHILS # BLD AUTO: 0.06 K/UL (ref 0–0.2)
BASOPHILS NFR BLD: 0.5 % (ref 0–1.9)
BILIRUB SERPL-MCNC: 0.5 MG/DL (ref 0.1–1)
BUN SERPL-MCNC: 20 MG/DL (ref 8–23)
CALCIUM SERPL-MCNC: 9.9 MG/DL (ref 8.7–10.5)
CHLORIDE SERPL-SCNC: 100 MMOL/L (ref 95–110)
CO2 SERPL-SCNC: 31 MMOL/L (ref 23–29)
CREAT SERPL-MCNC: 1 MG/DL (ref 0.5–1.4)
CRP SERPL-MCNC: 16.6 MG/L (ref 0–8.2)
DIFFERENTIAL METHOD: ABNORMAL
EOSINOPHIL # BLD AUTO: 0.2 K/UL (ref 0–0.5)
EOSINOPHIL NFR BLD: 1.9 % (ref 0–8)
ERYTHROCYTE [DISTWIDTH] IN BLOOD BY AUTOMATED COUNT: 15.9 % (ref 11.5–14.5)
ERYTHROCYTE [SEDIMENTATION RATE] IN BLOOD BY PHOTOMETRIC METHOD: 74 MM/HR (ref 0–36)
EST. GFR  (NO RACE VARIABLE): >60 ML/MIN/1.73 M^2
GLUCOSE SERPL-MCNC: 138 MG/DL (ref 70–110)
HCT VFR BLD AUTO: 37.1 % (ref 37–48.5)
HGB BLD-MCNC: 11.3 G/DL (ref 12–16)
IMM GRANULOCYTES # BLD AUTO: 0.06 K/UL (ref 0–0.04)
IMM GRANULOCYTES NFR BLD AUTO: 0.5 % (ref 0–0.5)
LYMPHOCYTES # BLD AUTO: 3.4 K/UL (ref 1–4.8)
LYMPHOCYTES NFR BLD: 30.9 % (ref 18–48)
MCH RBC QN AUTO: 29.7 PG (ref 27–31)
MCHC RBC AUTO-ENTMCNC: 30.5 G/DL (ref 32–36)
MCV RBC AUTO: 98 FL (ref 82–98)
MONOCYTES # BLD AUTO: 0.6 K/UL (ref 0.3–1)
MONOCYTES NFR BLD: 5.5 % (ref 4–15)
NEUTROPHILS # BLD AUTO: 6.6 K/UL (ref 1.8–7.7)
NEUTROPHILS NFR BLD: 60.7 % (ref 38–73)
NRBC BLD-RTO: 0 /100 WBC
PLATELET # BLD AUTO: 358 K/UL (ref 150–450)
PMV BLD AUTO: 11.2 FL (ref 9.2–12.9)
POTASSIUM SERPL-SCNC: 3.7 MMOL/L (ref 3.5–5.1)
PROT SERPL-MCNC: 7.9 G/DL (ref 6–8.4)
RBC # BLD AUTO: 3.8 M/UL (ref 4–5.4)
SODIUM SERPL-SCNC: 141 MMOL/L (ref 136–145)
WBC # BLD AUTO: 10.91 K/UL (ref 3.9–12.7)

## 2023-05-25 PROCEDURE — 86140 C-REACTIVE PROTEIN: CPT | Performed by: STUDENT IN AN ORGANIZED HEALTH CARE EDUCATION/TRAINING PROGRAM

## 2023-05-25 PROCEDURE — 36415 COLL VENOUS BLD VENIPUNCTURE: CPT | Mod: PO | Performed by: STUDENT IN AN ORGANIZED HEALTH CARE EDUCATION/TRAINING PROGRAM

## 2023-05-25 PROCEDURE — 80053 COMPREHEN METABOLIC PANEL: CPT | Performed by: STUDENT IN AN ORGANIZED HEALTH CARE EDUCATION/TRAINING PROGRAM

## 2023-05-25 PROCEDURE — 85652 RBC SED RATE AUTOMATED: CPT | Performed by: STUDENT IN AN ORGANIZED HEALTH CARE EDUCATION/TRAINING PROGRAM

## 2023-05-25 PROCEDURE — 85025 COMPLETE CBC W/AUTO DIFF WBC: CPT | Performed by: STUDENT IN AN ORGANIZED HEALTH CARE EDUCATION/TRAINING PROGRAM

## 2023-05-26 ENCOUNTER — TELEPHONE (OUTPATIENT)
Dept: ENDOSCOPY | Facility: HOSPITAL | Age: 63
End: 2023-05-26
Payer: COMMERCIAL

## 2023-05-26 NOTE — TELEPHONE ENCOUNTER
Left message instructing patient to call dept @ [phone] between 8am-3pm.    Arrival time to be given @ 1130  EGD/Colon (Suprep)  (Message sent via [facility] portal)

## 2023-05-31 ENCOUNTER — ANESTHESIA (OUTPATIENT)
Dept: ENDOSCOPY | Facility: HOSPITAL | Age: 63
End: 2023-05-31
Payer: COMMERCIAL

## 2023-05-31 ENCOUNTER — HOSPITAL ENCOUNTER (OUTPATIENT)
Facility: HOSPITAL | Age: 63
Discharge: HOME | End: 2023-05-31
Attending: INTERNAL MEDICINE | Admitting: INTERNAL MEDICINE
Payer: COMMERCIAL

## 2023-05-31 ENCOUNTER — ANESTHESIA EVENT (OUTPATIENT)
Dept: ENDOSCOPY | Facility: HOSPITAL | Age: 63
End: 2023-05-31
Payer: COMMERCIAL

## 2023-05-31 VITALS
OXYGEN SATURATION: 99 % | HEIGHT: 59 IN | HEART RATE: 78 BPM | TEMPERATURE: 98 F | RESPIRATION RATE: 17 BRPM | WEIGHT: 182 LBS | DIASTOLIC BLOOD PRESSURE: 72 MMHG | BODY MASS INDEX: 36.69 KG/M2 | SYSTOLIC BLOOD PRESSURE: 136 MMHG

## 2023-05-31 DIAGNOSIS — K21.9 GERD (GASTROESOPHAGEAL REFLUX DISEASE): ICD-10-CM

## 2023-05-31 PROCEDURE — 88305 TISSUE EXAM BY PATHOLOGIST: ICD-10-PCS | Mod: 26,,, | Performed by: PATHOLOGY

## 2023-05-31 PROCEDURE — 43239 PR EGD, FLEX, W/BIOPSY, SGL/MULTI: ICD-10-PCS | Mod: 51,,, | Performed by: INTERNAL MEDICINE

## 2023-05-31 PROCEDURE — 37000008 HC ANESTHESIA 1ST 15 MINUTES: Performed by: INTERNAL MEDICINE

## 2023-05-31 PROCEDURE — D9220A PRA ANESTHESIA: ICD-10-PCS | Mod: 33,CRNA,, | Performed by: NURSE ANESTHETIST, CERTIFIED REGISTERED

## 2023-05-31 PROCEDURE — 88342 CHG IMMUNOCYTOCHEMISTRY: ICD-10-PCS | Mod: 26,,, | Performed by: PATHOLOGY

## 2023-05-31 PROCEDURE — 27201012 HC FORCEPS, HOT/COLD, DISP: Performed by: INTERNAL MEDICINE

## 2023-05-31 PROCEDURE — 43239 EGD BIOPSY SINGLE/MULTIPLE: CPT | Performed by: INTERNAL MEDICINE

## 2023-05-31 PROCEDURE — 37000009 HC ANESTHESIA EA ADD 15 MINS: Performed by: INTERNAL MEDICINE

## 2023-05-31 PROCEDURE — D9220A PRA ANESTHESIA: ICD-10-PCS | Mod: 33,ANES,, | Performed by: ANESTHESIOLOGY

## 2023-05-31 PROCEDURE — 88342 IMHCHEM/IMCYTCHM 1ST ANTB: CPT | Mod: 26,,, | Performed by: PATHOLOGY

## 2023-05-31 PROCEDURE — 25000003 PHARM REV CODE 250: Performed by: INTERNAL MEDICINE

## 2023-05-31 PROCEDURE — 45380 COLONOSCOPY AND BIOPSY: CPT | Mod: 33,,, | Performed by: INTERNAL MEDICINE

## 2023-05-31 PROCEDURE — 43239 EGD BIOPSY SINGLE/MULTIPLE: CPT | Mod: 51,,, | Performed by: INTERNAL MEDICINE

## 2023-05-31 PROCEDURE — 25000003 PHARM REV CODE 250: Performed by: NURSE ANESTHETIST, CERTIFIED REGISTERED

## 2023-05-31 PROCEDURE — D9220A PRA ANESTHESIA: Mod: 33,CRNA,, | Performed by: NURSE ANESTHETIST, CERTIFIED REGISTERED

## 2023-05-31 PROCEDURE — 88305 TISSUE EXAM BY PATHOLOGIST: CPT | Mod: 26,,, | Performed by: PATHOLOGY

## 2023-05-31 PROCEDURE — D9220A PRA ANESTHESIA: Mod: 33,ANES,, | Performed by: ANESTHESIOLOGY

## 2023-05-31 PROCEDURE — 88342 IMHCHEM/IMCYTCHM 1ST ANTB: CPT | Performed by: PATHOLOGY

## 2023-05-31 PROCEDURE — 88305 TISSUE EXAM BY PATHOLOGIST: CPT | Mod: 59 | Performed by: PATHOLOGY

## 2023-05-31 PROCEDURE — 63600175 PHARM REV CODE 636 W HCPCS: Performed by: NURSE ANESTHETIST, CERTIFIED REGISTERED

## 2023-05-31 PROCEDURE — 45380 PR COLONOSCOPY,BIOPSY: ICD-10-PCS | Mod: 33,,, | Performed by: INTERNAL MEDICINE

## 2023-05-31 PROCEDURE — 27201089 HC SNARE, DISP (ANY): Performed by: INTERNAL MEDICINE

## 2023-05-31 PROCEDURE — 45380 COLONOSCOPY AND BIOPSY: CPT | Mod: PT | Performed by: INTERNAL MEDICINE

## 2023-05-31 RX ORDER — SODIUM CHLORIDE 0.9 % (FLUSH) 0.9 %
10 SYRINGE (ML) INJECTION
Status: DISCONTINUED | OUTPATIENT
Start: 2023-05-31 | End: 2023-05-31 | Stop reason: HOSPADM

## 2023-05-31 RX ORDER — PROPOFOL 10 MG/ML
VIAL (ML) INTRAVENOUS CONTINUOUS PRN
Status: DISCONTINUED | OUTPATIENT
Start: 2023-05-31 | End: 2023-05-31

## 2023-05-31 RX ORDER — LIDOCAINE HYDROCHLORIDE 20 MG/ML
INJECTION, SOLUTION EPIDURAL; INFILTRATION; INTRACAUDAL; PERINEURAL
Status: DISCONTINUED | OUTPATIENT
Start: 2023-05-31 | End: 2023-05-31

## 2023-05-31 RX ORDER — SODIUM CHLORIDE 9 MG/ML
INJECTION, SOLUTION INTRAVENOUS CONTINUOUS
Status: DISCONTINUED | OUTPATIENT
Start: 2023-05-31 | End: 2023-05-31 | Stop reason: HOSPADM

## 2023-05-31 RX ORDER — PROPOFOL 10 MG/ML
VIAL (ML) INTRAVENOUS
Status: DISCONTINUED | OUTPATIENT
Start: 2023-05-31 | End: 2023-05-31

## 2023-05-31 RX ADMIN — PROPOFOL 100 MG: 10 INJECTION, EMULSION INTRAVENOUS at 01:05

## 2023-05-31 RX ADMIN — LIDOCAINE HYDROCHLORIDE 70 MG: 20 INJECTION, SOLUTION EPIDURAL; INFILTRATION; INTRACAUDAL; PERINEURAL at 01:05

## 2023-05-31 RX ADMIN — SODIUM CHLORIDE: 0.9 INJECTION, SOLUTION INTRAVENOUS at 12:05

## 2023-05-31 RX ADMIN — SODIUM CHLORIDE: 0.9 INJECTION, SOLUTION INTRAVENOUS at 01:05

## 2023-05-31 RX ADMIN — PROPOFOL 150 MCG/KG/MIN: 10 INJECTION, EMULSION INTRAVENOUS at 01:05

## 2023-05-31 NOTE — PROVATION PATIENT INSTRUCTIONS
Discharge Summary/Instructions after an Endoscopic Procedure  Patient Name: Aysha Randolph  Patient MRN: [id number]  Patient YOB: 1960  Wednesday, May 31, 2023  Robbie Ace MD  Dear patient,  As a result of recent federal legislation (The Federal Cures Act), you may   receive lab or pathology results from your procedure in your [facility]   account before your physician is able to contact you. Your physician or   their representative will relay the results to you with their   recommendations at their soonest availability.  Thank you,  Your health is very important to us during the Covid Crisis. Following your   procedure today, you will receive a daily text for 2 weeks asking about   signs or symptoms of Covid 19.  Please respond to this text when you   receive it so we can follow up and keep you as safe as possible.   RESTRICTIONS:  During your procedure today, you received medications for sedation.  These   medications may affect your judgment, balance and coordination.  Therefore,   for 24 hours, you have the following restrictions:   - DO NOT drive a car, operate machinery, make legal/financial decisions,   sign important papers or drink alcohol.    ACTIVITY:  Today: no heavy lifting, straining or running due to procedural   sedation/anesthesia.  The following day: return to full activity including work.  DIET:  Eat and drink normally unless instructed otherwise.     TREATMENT FOR COMMON SIDE EFFECTS:  - Mild abdominal pain, nausea, belching, bloating or excessive gas:  rest,   eat lightly and use a heating pad.  - Sore Throat: treat with throat lozenges and/or gargle with warm salt   water.  - Because air was used during the procedure, expelling large amounts of air   from your rectum or belching is normal.  - If a bowel prep was taken, you may not have a bowel movement for 1-3 days.    This is normal.  SYMPTOMS TO WATCH FOR AND REPORT TO YOUR PHYSICIAN:  1. Abdominal pain or bloating, other  than gas cramps.  2. Chest pain.  3. Back pain.  4. Signs of infection such as: chills or fever occurring within 24 hours   after the procedure.  5. Rectal bleeding, which would show as bright red, maroon, or black stools.   (A tablespoon of blood from the rectum is not serious, especially if   hemorrhoids are present.)  6. Vomiting.  7. Weakness or dizziness.  GO DIRECTLY TO THE NEAREST EMERGENCY ROOM IF YOU HAVE ANY OF THE FOLLOWING:      Difficulty breathing              Chills and/or fever over 101 F   Persistent vomiting and/or vomiting blood   Severe abdominal pain   Severe chest pain   Black, tarry stools   Bleeding- more than one tablespoon   Any other symptom or condition that you feel may need urgent attention  Your doctor recommends these additional instructions:  If any biopsies were taken, your doctors clinic will contact you in 1 to 2   weeks with any results.  - Discharge patient to home.   - Patient has a contact number available for emergencies.  The signs and   symptoms of potential delayed complications were discussed with the   patient.  Return to normal activities tomorrow.  Written discharge   instructions were provided to the patient.   - Resume previous diet.   - Continue present medications.   - Await pathology results.   - Perform a colonoscopy today.  For questions, problems or results please call your physician - Robbie Ace MD.  EMERGENCY PHONE NUMBER: [phone],  LAB RESULTS: [phone]  IF A COMPLICATION OR EMERGENCY SITUATION ARISES AND YOU ARE UNABLE TO REACH   YOUR PHYSICIAN - GO DIRECTLY TO THE EMERGENCY ROOM.  Robbie Ace MD  5/31/2023 1:54:13 PM  This report has been verified and signed electronically.  Dear patient,  As a result of recent federal legislation (The Federal Cures Act), you may   receive lab or pathology results from your procedure in your [facility]   account before your physician is able to contact you. Your physician or   their representative  will relay the results to you with their   recommendations at their soonest availability.  Thank you,  PROVATION

## 2023-05-31 NOTE — ANESTHESIA POSTPROCEDURE EVALUATION
Anesthesia Post Evaluation    Patient: Aysha Randolph    Procedure(s) Performed: Procedure(s) (LRB):  COLONOSCOPY (N/A)  EGD (ESOPHAGOGASTRODUODENOSCOPY) (N/A)    Final Anesthesia Type: general      Patient location during evaluation: GI PACU  Patient participation: Yes- Able to Participate  Level of consciousness: awake and alert  Post-procedure vital signs: reviewed and stable  Pain management: adequate  Airway patency: patent    PONV status at discharge: No PONV  Anesthetic complications: no      Cardiovascular status: blood pressure returned to baseline  Respiratory status: unassisted  Hydration status: euvolemic            Vitals Value Taken Time   /72 05/31/23 1436   Temp 36.7 05/31/23 1453   Pulse 78 05/31/23 1436   Resp 17 05/31/23 1436   SpO2 99 % 05/31/23 1436         Event Time   Out of Recovery 14:37:40         Pain/Mercedes Score: Mercedes Score: 10 (5/31/2023  2:37 PM)

## 2023-05-31 NOTE — H&P
Short Stay Endoscopy History and Physical    PCP - Rosa Jarrett MD    Procedure - Colonoscopy  + EGD  ASA - per anesthesia  Mallampati - per anesthesia  History of Anesthesia problems - no  Family history Anesthesia problems - no   Plan of anesthesia - General    HPI:  This is a 62 y.o. female here for evaluation of : asymptomatic screening exam  Egd for gerd , hx Hpylori      ROS:  Constitutional: No fevers, chills, No weight loss  CV: No chest pain  Pulm: No cough, No shortness of breath  GI: see HPI  Derm: No rash    Medical History:  has a past medical history of Abnormal Pap smear of cervix (2017), Arthritis, Hypertension, Mild intermittent asthma, uncomplicated, and Rheumatoid arthritis, unspecified (12/17/2020).    Surgical History:  has a past surgical history that includes Ankle surgery (Left) and Wrist fracture surgery (Left).    Family History: family history includes Cervical cancer in her maternal cousin.. Otherwise no colon cancer, inflammatory bowel disease, or GI malignancies.    Social History:  reports that she has never smoked. She has never used smokeless tobacco. She reports current alcohol use. She reports that she does not use drugs.    Review of patient's allergies indicates:  No Known Allergies    Medications:   Medications Prior to Admission   Medication Sig Dispense Refill Last Dose    aspirin (ASPIR-81 ORAL)    5/30/2023    atorvastatin (LIPITOR) 10 MG tablet Take 1 tablet (10 mg total) by mouth once daily. 30 tablet 0 5/30/2023    cholecalciferol, vitamin D3, 125 mcg (5,000 unit) Tab Take 5,000 Units by mouth once a week.   5/30/2023    EScitalopram oxalate (LEXAPRO) 20 MG tablet Take 1 tablet (20 mg total) by mouth once daily. 30 tablet 0 5/30/2023    folic acid (FOLVITE) 1 MG tablet Take 1 tablet (1 mg total) by mouth once daily. 30 tablet 0 5/30/2023    hydroCHLOROthiazide (HYDRODIURIL) 25 MG tablet Take 1 tablet (25 mg total) by mouth once daily. 30 tablet 0 5/30/2023     methotrexate 25 mg/mL injection 25 mg/ml weekly 5 mL 11 Past Week    multivitamin-minerals-lutein (MULTIVITAMIN 50 PLUS) Tab Take 1 tablet by mouth once daily.   5/30/2023    predniSONE (DELTASONE) 1 MG tablet Take 4 tablets (4 mg total) by mouth once daily for 30 days, THEN 3 tablets (3 mg total) once daily for 30 days, THEN 2 tablets (2 mg total) once daily for 30 days, THEN 1 tablet (1 mg total) once daily. 300 tablet 0 Past Week    albuterol (ACCUNEB) 1.25 mg/3 mL Nebu        albuterol (PROVENTIL/VENTOLIN HFA) 90 mcg/actuation inhaler Proventil HFA 90 mcg/actuation aerosol inhaler       diclofenac sodium 100 mg 24 hr tablet diclofenac  mg tablet,extended release 24 hr   TAKE 1 TABLET(S) EVERY DAY BY ORAL ROUTE WITH MEALS FOR 30 DAYS.   Unknown    omega 3-dha-epa-fish oil 120-180-500 mg Cap    More than a month    omeprazole (PRILOSEC) 20 MG capsule Take 20 mg by mouth once daily.   Unknown    pantoprazole (PROTONIX) 40 MG tablet Take 1 tablet (40 mg total) by mouth 2 (two) times daily. 60 tablet 0 Unknown    predniSONE (DELTASONE) 1 MG tablet Take 3 tablets (3 mg total) by mouth once daily. 180 tablet 0     sodium,potassium,mag sulfates (SUPREP BOWEL PREP KIT) 17.5-3.13-1.6 gram SolR Take 177 mLs by mouth once daily. 1 kit 0          Physical Exam:    Vital Signs:   Vitals:    05/31/23 1212   BP: 136/70   Pulse: 87   Resp: 18   Temp: 98.2 °F (36.8 °C)       General Appearance: Well appearing in no acute distress  Eyes:    No scleral icterus  ENT: Neck supple, Lips, mucosa, and tongue normal; teeth and gums normal  Abdomen: Soft, non tender, non distended with positive bowel sounds. No hepatosplenomegaly, ascites, or mass.  Extremities: 2+ pulses, no clubbing, cyanosis or edema  Skin: No rash      Labs:  Lab Results   Component Value Date    WBC 10.91 05/25/2023    HGB 11.3 (L) 05/25/2023    HCT 37.1 05/25/2023     05/25/2023    CHOL 165 02/10/2023    TRIG 106 02/10/2023    HDL 56 02/10/2023    ALT  21 05/25/2023    AST 19 05/25/2023     05/25/2023    K 3.7 05/25/2023     05/25/2023    CREATININE 1.0 05/25/2023    BUN 20 05/25/2023    CO2 31 (H) 05/25/2023       I have explained the risks and benefits of endoscopy procedures to the patient including but not limited to bleeding, perforation, infection, and death.  The patient was asked if they understand and allowed to ask any further questions to their satisfaction.    Robbie Ace MD

## 2023-05-31 NOTE — TRANSFER OF CARE
"Anesthesia Transfer of Care Note    Patient: Aysha Randolph    Procedure(s) Performed: Procedure(s) (LRB):  COLONOSCOPY (N/A)  EGD (ESOPHAGOGASTRODUODENOSCOPY) (N/A)    Patient location: GI    Anesthesia Type: general    Transport from OR: Transported from OR on room air with adequate spontaneous ventilation    Post pain: adequate analgesia    Post assessment: no apparent anesthetic complications    Post vital signs: stable    Level of consciousness: awake    Nausea/Vomiting: no nausea/vomiting    Complications: none    Transfer of care protocol was followed      Last vitals:   Visit Vitals  /70 (BP Location: Left arm, Patient Position: Lying)   Pulse 87   Temp 36.8 °C (98.2 °F) (Temporal)   Resp 18   Ht 4' 11" (1.499 m)   Wt 82.6 kg (182 lb)   SpO2 97%   Breastfeeding No   BMI 36.76 kg/m²     "

## 2023-05-31 NOTE — ANESTHESIA PREPROCEDURE EVALUATION
05/31/2023  Aysha Randolph is a 62 y.o., female.      Pre-op Assessment    I have reviewed the Patient Summary Reports.     I have reviewed the Nursing Notes.    I have reviewed the Medications.     Review of Systems  Anesthesia Hx:  Denies Family Hx of Anesthesia complications.   Denies Personal Hx of Anesthesia complications.        Patient Active Problem List   Diagnosis    Hyperlipidemia    GERD (gastroesophageal reflux disease)    Rheumatoid arthritis    Benign essential hypertension    Cervical dysplasia    Vaginal dysplasia       Past Medical History:   Diagnosis Date    Abnormal Pap smear of cervix 2017    Arthritis     Hypertension     Mild intermittent asthma, uncomplicated     Rheumatoid arthritis, unspecified 12/17/2020       ECHO: Results for orders placed during the hospital encounter of 04/06/23    Echo    Interpretation Summary  · The left ventricle is normal in size with normal systolic function.  · The estimated ejection fraction is 65%.  · Normal left ventricular diastolic function.  · Normal right ventricular size with normal right ventricular systolic function.  · There is mild aortic valve stenosis with a PV of 2.53 m/s.  · Normal central venous pressure (3 mmHg).      There is no height or weight on file to calculate BMI.    Tobacco Use: Low Risk     Smoking Tobacco Use: Never    Smokeless Tobacco Use: Never    Passive Exposure: Not on file       Social History     Substance and Sexual Activity   Drug Use Never        Alcohol Use: Not on file       Review of patient's allergies indicates:  No Known Allergies      Airway:  No value filed.     Physical Exam    Airway:  Mouth Opening: Normal  TM Distance: Normal          Anesthesia Plan  Type of Anesthesia, risks & benefits discussed:    Anesthesia Type: Gen Natural Airway  Intra-op Monitoring Plan: Standard ASA  I reviewed the H&P, I examined the patient, and there are no changes in the patient's condition. Patient confirms she is here today for coronary artery bypass grafting with Dr. Roy. Patient is here today with boyfriend, all questions answered to their satisfaction.  Patient notes last plavix was 5 days ago.     Physical Exam:  Resp: CTA b/l  Cv: Systolic murmur  Extremities: 2+ DP pulse right leg, 1+ left DP  Abdomen: Soft nontender to palpation   Monitors  Post Op Pain Control Plan: multimodal analgesia  Induction:  IV  Informed Consent: Informed consent signed with the Patient and all parties understand the risks and agree with anesthesia plan.  All questions answered.   ASA Score: 3  Day of Surgery Review of History & Physical: H&P Update referred to the surgeon/provider.    Ready For Surgery From Anesthesia Perspective.     .

## 2023-05-31 NOTE — PROVATION PATIENT INSTRUCTIONS
Discharge Summary/Instructions after an Endoscopic Procedure  Patient Name: Aysha Randolph  Patient MRN: [id number]  Patient YOB: 1960  Wednesday, May 31, 2023  Robbie Ace MD  Dear patient,  As a result of recent federal legislation (The Federal Cures Act), you may   receive lab or pathology results from your procedure in your [facility]   account before your physician is able to contact you. Your physician or   their representative will relay the results to you with their   recommendations at their soonest availability.  Thank you,  Your health is very important to us during the Covid Crisis. Following your   procedure today, you will receive a daily text for 2 weeks asking about   signs or symptoms of Covid 19.  Please respond to this text when you   receive it so we can follow up and keep you as safe as possible.   RESTRICTIONS:  During your procedure today, you received medications for sedation.  These   medications may affect your judgment, balance and coordination.  Therefore,   for 24 hours, you have the following restrictions:   - DO NOT drive a car, operate machinery, make legal/financial decisions,   sign important papers or drink alcohol.    ACTIVITY:  Today: no heavy lifting, straining or running due to procedural   sedation/anesthesia.  The following day: return to full activity including work.  DIET:  Eat and drink normally unless instructed otherwise.     TREATMENT FOR COMMON SIDE EFFECTS:  - Mild abdominal pain, nausea, belching, bloating or excessive gas:  rest,   eat lightly and use a heating pad.  - Sore Throat: treat with throat lozenges and/or gargle with warm salt   water.  - Because air was used during the procedure, expelling large amounts of air   from your rectum or belching is normal.  - If a bowel prep was taken, you may not have a bowel movement for 1-3 days.    This is normal.  SYMPTOMS TO WATCH FOR AND REPORT TO YOUR PHYSICIAN:  1. Abdominal pain or bloating, other  than gas cramps.  2. Chest pain.  3. Back pain.  4. Signs of infection such as: chills or fever occurring within 24 hours   after the procedure.  5. Rectal bleeding, which would show as bright red, maroon, or black stools.   (A tablespoon of blood from the rectum is not serious, especially if   hemorrhoids are present.)  6. Vomiting.  7. Weakness or dizziness.  GO DIRECTLY TO THE NEAREST EMERGENCY ROOM IF YOU HAVE ANY OF THE FOLLOWING:      Difficulty breathing              Chills and/or fever over 101 F   Persistent vomiting and/or vomiting blood   Severe abdominal pain   Severe chest pain   Black, tarry stools   Bleeding- more than one tablespoon   Any other symptom or condition that you feel may need urgent attention  Your doctor recommends these additional instructions:  If any biopsies were taken, your doctors clinic will contact you in 1 to 2   weeks with any results.  - Discharge patient to home.   - Patient has a contact number available for emergencies.  The signs and   symptoms of potential delayed complications were discussed with the   patient.  Return to normal activities tomorrow.  Written discharge   instructions were provided to the patient.   - Resume previous diet.   - Continue present medications.   - Await pathology results.   - Repeat colonoscopy in 7 years for surveillance.  For questions, problems or results please call your physician - Robbie Ace MD.  EMERGENCY PHONE NUMBER: [phone],  LAB RESULTS: [phone]  IF A COMPLICATION OR EMERGENCY SITUATION ARISES AND YOU ARE UNABLE TO REACH   YOUR PHYSICIAN - GO DIRECTLY TO THE EMERGENCY ROOM.  Robbie Ace MD  5/31/2023 2:06:26 PM  This report has been verified and signed electronically.  Dear patient,  As a result of recent federal legislation (The Federal Cures Act), you may   receive lab or pathology results from your procedure in your [facility]   account before your physician is able to contact you. Your physician or    their representative will relay the results to you with their   recommendations at their soonest availability.  Thank you,  PROVATION

## 2023-06-01 ENCOUNTER — OFFICE VISIT (OUTPATIENT)
Dept: RHEUMATOLOGY | Facility: CLINIC | Age: 63
End: 2023-06-01
Payer: COMMERCIAL

## 2023-06-01 VITALS
HEIGHT: 59 IN | DIASTOLIC BLOOD PRESSURE: 74 MMHG | HEART RATE: 92 BPM | WEIGHT: 187.38 LBS | SYSTOLIC BLOOD PRESSURE: 116 MMHG | BODY MASS INDEX: 37.77 KG/M2

## 2023-06-01 DIAGNOSIS — M05.79 RHEUMATOID ARTHRITIS INVOLVING MULTIPLE SITES WITH POSITIVE RHEUMATOID FACTOR: Primary | ICD-10-CM

## 2023-06-01 PROCEDURE — 3008F BODY MASS INDEX DOCD: CPT | Mod: CPTII,S$GLB,, | Performed by: STUDENT IN AN ORGANIZED HEALTH CARE EDUCATION/TRAINING PROGRAM

## 2023-06-01 PROCEDURE — 3078F PR MOST RECENT DIASTOLIC BLOOD PRESSURE < 80 MM HG: ICD-10-PCS | Mod: CPTII,S$GLB,, | Performed by: STUDENT IN AN ORGANIZED HEALTH CARE EDUCATION/TRAINING PROGRAM

## 2023-06-01 PROCEDURE — 1160F RVW MEDS BY RX/DR IN RCRD: CPT | Mod: CPTII,S$GLB,, | Performed by: STUDENT IN AN ORGANIZED HEALTH CARE EDUCATION/TRAINING PROGRAM

## 2023-06-01 PROCEDURE — 99999 PR PBB SHADOW E&M-EST. PATIENT-LVL V: ICD-10-PCS | Mod: PBBFAC,,, | Performed by: STUDENT IN AN ORGANIZED HEALTH CARE EDUCATION/TRAINING PROGRAM

## 2023-06-01 PROCEDURE — 99214 PR OFFICE/OUTPT VISIT, EST, LEVL IV, 30-39 MIN: ICD-10-PCS | Mod: S$GLB,,, | Performed by: STUDENT IN AN ORGANIZED HEALTH CARE EDUCATION/TRAINING PROGRAM

## 2023-06-01 PROCEDURE — 99999 PR PBB SHADOW E&M-EST. PATIENT-LVL V: CPT | Mod: PBBFAC,,, | Performed by: STUDENT IN AN ORGANIZED HEALTH CARE EDUCATION/TRAINING PROGRAM

## 2023-06-01 PROCEDURE — 3074F SYST BP LT 130 MM HG: CPT | Mod: CPTII,S$GLB,, | Performed by: STUDENT IN AN ORGANIZED HEALTH CARE EDUCATION/TRAINING PROGRAM

## 2023-06-01 PROCEDURE — 3008F PR BODY MASS INDEX (BMI) DOCUMENTED: ICD-10-PCS | Mod: CPTII,S$GLB,, | Performed by: STUDENT IN AN ORGANIZED HEALTH CARE EDUCATION/TRAINING PROGRAM

## 2023-06-01 PROCEDURE — 3074F PR MOST RECENT SYSTOLIC BLOOD PRESSURE < 130 MM HG: ICD-10-PCS | Mod: CPTII,S$GLB,, | Performed by: STUDENT IN AN ORGANIZED HEALTH CARE EDUCATION/TRAINING PROGRAM

## 2023-06-01 PROCEDURE — 1160F PR REVIEW ALL MEDS BY PRESCRIBER/CLIN PHARMACIST DOCUMENTED: ICD-10-PCS | Mod: CPTII,S$GLB,, | Performed by: STUDENT IN AN ORGANIZED HEALTH CARE EDUCATION/TRAINING PROGRAM

## 2023-06-01 PROCEDURE — 1159F MED LIST DOCD IN RCRD: CPT | Mod: CPTII,S$GLB,, | Performed by: STUDENT IN AN ORGANIZED HEALTH CARE EDUCATION/TRAINING PROGRAM

## 2023-06-01 PROCEDURE — 99214 OFFICE O/P EST MOD 30 MIN: CPT | Mod: S$GLB,,, | Performed by: STUDENT IN AN ORGANIZED HEALTH CARE EDUCATION/TRAINING PROGRAM

## 2023-06-01 PROCEDURE — 3078F DIAST BP <80 MM HG: CPT | Mod: CPTII,S$GLB,, | Performed by: STUDENT IN AN ORGANIZED HEALTH CARE EDUCATION/TRAINING PROGRAM

## 2023-06-01 PROCEDURE — 1159F PR MEDICATION LIST DOCUMENTED IN MEDICAL RECORD: ICD-10-PCS | Mod: CPTII,S$GLB,, | Performed by: STUDENT IN AN ORGANIZED HEALTH CARE EDUCATION/TRAINING PROGRAM

## 2023-06-01 RX ORDER — METHOTREXATE 2.5 MG/1
25 TABLET ORAL
Qty: 40 TABLET | Refills: 3 | Status: SHIPPED | OUTPATIENT
Start: 2023-06-01 | End: 2023-09-20 | Stop reason: SDUPTHER

## 2023-06-01 ASSESSMENT — ROUTINE ASSESSMENT OF PATIENT INDEX DATA (RAPID3)
PAIN SCORE: 5.5
MDHAQ FUNCTION SCORE: 0.8
PATIENT GLOBAL ASSESSMENT SCORE: 5.5
TOTAL RAPID3 SCORE: 4.55
AM STIFFNESS SCORE: 1, YES
FATIGUE SCORE: 4.5
PSYCHOLOGICAL DISTRESS SCORE: 1.1
WHEN YOU AWAKENED IN THE MORNING OVER THE LAST WEEK, PLEASE INDICATE THE AMOUNT OF TIME IT TAKES UNTIL YOU ARE AS LIMBER AS YOU WILL BE FOR THE DAY: 1

## 2023-06-01 NOTE — PATIENT INSTRUCTIONS
Start Actemra injections once a week. Medication was sent to [facility] who will contact you when it's ready.  Continue methotrexate 10 tablets (25 mg) weekly  Continue folic acid 1 tablet daily  Continue prednisone taper (1 mg daily for another 1-2 weeks as previously planned)  When RA is controlled, we will address the carpal tunnel if it is still present. For now, use bilateral carpal tunnel splints.  Follow up 3 months with standing labs

## 2023-06-01 NOTE — PROGRESS NOTES
Subjective:       Patient ID: Aysha Randolph is a 62 y.o. female.    Chief Complaint: RA    HPI  Interval Hx:  She took MTX 8 tablets since I last saw her in 2/2023 until she got MTX late March. She took MTX SC 25 mg weekly late March/early April and she took about 4-6 doses (1-1.5 months) until there was a MTX shortage. Then she had to take 10 tablets weekly. She continued folic acid 1 mg daily throughout. She was doing prednisone taper and she is on 1 mg daily and will finish in 1-2 weeks. She has had to take Tylenol a couple of times. Today she complains of ankle pain alternating left and right, sometimes so painful she can't stand on them. She gets knee pain and swelling R>L. She gets pain in the right or left hip alternating (lateral portion and groin). She gets shoulder pain, wrist pain, MCP pain. She had left carpal tunnel injection 4/12/23. She complains that the numbness in the left 2nd finger did not improve.    Since last visit, she was having epigastric pain and she had EGD and was diagnosed with H. Pylori - completed antibiotic treatment.        Initial Hx:  Patient is a 61 yo F with HTN, HLD, GERD, anxiety, and RA, who presents to [facility] care for RA who has just moved from [place]. RA was diagnosed in 11/2020. She initially had pain and swelling in the hands, knees, shoulders, along with stiffness. She started with MTX 6 tabs weekly and prednisone 10 mg daily. Then Humira was added. However after 5 months the Humira wasn't helping. She started Orencia infusions April or May of 2022. She endorses some improvement with Orencia but was still having flare ups. Her last Orencia infusion was in October 2022 and then she moved from [place] to [place]. She is now on MTX 8 tabs weekly for several months and folic acid 1 mg daily. She has tapered prednisone to 5 mg daily and has been on prednisone in some dose since her diagnosis.     She broke her wrist 10/2021 and had surgery. She also broke her elbow but it did  "not require surgery. She had left ankle arthrocentesis in 1997 and ankle replacement in 2016 for OA.    Today she complains of pain and swelling in her hands. She gets morning stiffness for about 1 hour but also gets stiffness throughout the day. She has a burning sensation over the tops of her hands. She has pain in the knees, elbows, shoulders, clavicles, upper chest. For 4-5 months, she has had numbness of the plantar surface of the left 2nd digit.    She endorses dyspnea with exertion and with anxiety. She gets substernal chest pressure which she associates with anxiety and not exertion. Endorses occasional nausea; no vomiting, diarrhea, constipation, hematochezia, melena. No urinary symptoms.    Social Hx: never smoker, rare alcohol use, no drug use. Retired from [employer] ([employer] job)  Family Hx: sister has RA, another sister's granddaughter has RA    Review of Systems   Constitutional:  Negative for fever and unexpected weight change.   HENT:  Negative for mouth sores and trouble swallowing.    Eyes:  Negative for redness.   Respiratory:  Negative for cough and shortness of breath.    Cardiovascular:  Negative for chest pain.   Gastrointestinal:  Negative for constipation and diarrhea.   Genitourinary:  Negative for dysuria and genital sores.   Skin:  Negative for rash.   Neurological:  Negative for headaches.   Hematological:  Does not bruise/bleed easily.       Objective:   /74   Pulse 92   Ht 4' 11" (1.499 m)   Wt 85 kg (187 lb 6.3 oz)   BMI 37.85 kg/m²      Physical Exam   Constitutional: She is oriented to person, place, and time. No distress.   HENT:   Head: Normocephalic and atraumatic.   Eyes: Pupils are equal, round, and reactive to light.   Cardiovascular: Normal rate and regular rhythm.   No murmur heard.  Pulmonary/Chest: Effort normal and breath sounds normal. No respiratory distress. She has no wheezes.   Abdominal: Soft. Bowel sounds are normal. There is no " abdominal tenderness.   Musculoskeletal:         General: No deformity. Normal range of motion.      Cervical back: Normal range of motion.      Comments: +tender and swollen joints (see homunculus).   Neurological: She is alert and oriented to person, place, and time.   Skin: Skin is warm and dry.   Psychiatric: Affect and judgment normal.           2/9/2023 6/1/2023   Tender (HIGGINBOTHAM-28) 23 / 28 27 / 28    Swollen (HIGGINBOTHAM-28) 1 / 28 5 / 28    Provider Global 30 mm 60 mm   Patient Global 55 mm 55 mm   ESR 52 mm/hr 74 mm/hr   CRP 21.8 mg/L 16.6 mg/L   HIGGINBOTHAM-28 (ESR) 6.5 (High disease activity) 7.32 (High disease activity)   HIGGINBOTHAM-28 (CRP) 5.82 (High disease activity) 6.3 (High disease activity)   CDAI Score 32.5  43.5         Assessment:       1. Rheumatoid arthritis involving multiple sites with positive rheumatoid factor              Plan:       Problem List Items Addressed This Visit          Immunology/Multi System    Rheumatoid arthritis - Primary    Relevant Medications    methotrexate 2.5 MG Tab    tocilizumab 162 mg/0.9 mL PnIj       Patient is a 63 yo F with HTN, HLD, GERD, anxiety, and RA, who presents for follow up for RA. She had previously moved from [place]. RA diagnosed 11/2020.    Meds failed:  Humira - lack of efficacy  Orencia infusions - lack of efficacy    11/2020 labs:  CCP >250  RF 86    ESR 65  Vit D 47  Hep B sAg, Hep B cAb, hep A, Hep C Ab neg      PAP smear 4/2023:  1. ENDOCERVICAL CURETTAGE SHOWS SMALL DETACHED FRAGMENTS OF SQUAMOUS   EPITHELIUM WITH FOCAL MILD DYSPLASIA AND KOILOCYTOTIC ATYPIA (CIN1).   2. RIGHT VAGINAL WALL BIOPSY SHOWS A FRAGMENT OF SQUAMOUS EPITHELIUM WITH   MILD DYSPLASIA AND KOILOCYTOTIC ATYPIA (VAIN 1) . IMMUNOSTAIN FOR P16 SHOWS   PATCHY POSITIVITY BUT NO FULL-THICKNESS EPITHELIAL STAINING.  THE CONTROL   STAINS APPROPRIATELY.     EGD 5/2023  Normal    +H. Pylori stool Ag    Colonoscopy 5/2023  2 polyps - pending pathology    DXA 2/2023: osteoporosis  Will follow up with PCP to  start treatment.    The 10-year ASCVD risk score (Scott JANG, et al., 2019) is: 4.9%    Values used to calculate the score:      Age: 62 years      Sex: Female      Is Non- : No      Diabetic: No      Tobacco smoker: No      Systolic Blood Pressure: 116 mmHg      Is BP treated: Yes      HDL Cholesterol: 56 mg/dL      Total Cholesterol: 165 mg/dL      Today ESR and CRP are increased from before.  CDAI today: 43.5 (high activity)  No hx of diverticulitis. No hx of cancer. Avoid steroids because of osteoporosis.      Plan:  Start Actemra. Discussed mechanism of action and side effects. ACR patient information for Actemra provided in office today.  Continue MTX 10 tablets (25 mg) weekly  Continue folic acid 1 mg daily  Continue prednisone taper (1 mg daily for another 1-2 weeks as previously planned)  When RA is controlled, will address the carpal tunnel if it is still present. For now, use bilateral carpal tunnel splints.  RTC 3 months with standing labs      Brigido Ruggiero MD  Rheumatology Fellow  PGY-5

## 2023-06-01 NOTE — PROGRESS NOTES
Rapid3 Question Responses and Scores 6/1/2023   MDHAQ Score 0.8   Psychologic Score 1.1   Pain Score 5.5   When you awakened in the morning OVER THE LAST WEEK, did you feel stiff? Yes   If Yes, please indicate the number of hours until you are as limber as you will be for the day 1   Fatigue Score 4.5   Global Health Score 5.5   RAPID3 Score 4.56      Answers submitted by the patient for this visit:  Rheumatology Questionnaire (Submitted on 6/1/2023)  fever: No  eye redness: No  mouth sores: No  headaches: No  shortness of breath: No  chest pain: No  trouble swallowing: No  diarrhea: No  constipation: No  unexpected weight change: No  genital sore: No  dysuria: No  During the last 3 days, have you had a skin rash?: No  Bruises or bleeds easily: No  cough: No

## 2023-06-01 NOTE — PROGRESS NOTES
62 year - HTN,HLD,GERD,anxiety and rheumatoid arthritismorbid   Family h/o RA- sister and grand niece   Family h/o CAD    Nov 2020- RA- pain and swelling in the hands,knees,shoulders  RF 86  CCP> 250  ESR 65  Nml vit d  Neg hep panel    Initial treatment  :    Mtx 6 tabs weekly  Prednisone 10 mg daily  Humira -didn't respond    On orencia since April 2022- she did infusions till October 2022  Now she has moved from [place] to [facility]    Came on mtx 8 tabs weekly since sep 2022  Prednisone 5 mg daily  Folic acid 1 mg daily    Symptoms :    Pain,swelling,stiffness- hands,knees,elbows,shoulders  Widespread pain  Paresthesias -dorsal hands and ? Left 2nd finger    H/o trauma -left wrist and elbow-surgery  Left ankle arthrocentesis/replacement- OA     Anxiety  ISLAS  Chest pressure  Nausea    B/l phalen's positive  EMG- CTS  Left CT injection which didn't help  Will treat synovitis first and then plan surgery if CTS persist    CBC,CMP,ESR,CRP,Predmard panel  Arthritis survey  Hands and feet xrays    Changed oral mtx to Injectable mtx 1 ml weekly,folic acid  Unfortunately due to shortage of mtx vials she takes mtx oral tablets    We tapered off  Prednisone 4 mg daily for a month  3 mg daily for a month  2 mg daily for a month   1 mg daily for a month and stop    ESR 74  CRP 16  CMP nml  CBC - h/h 11.3/37.1  Nml white count and plt count  CDAI 43.5-Very high disease activity      B/l UE EMG-Eventually    Sleep study    Physical therapy    Weight loss    Continue lexapro  Anxiety-psychiatry    H pylori +  Antibiotics completed  2 polyps on colonoscopy    Osteoporosis +      Plan-  Initiate actemra  She will start fosamax  Mtx 10 tabs weekly,folic acid  3 months rtc with labs      The 10-year ASCVD risk score (Scott JANG, et al., 2019) is: 4.9%    Values used to calculate the score:      Age: 62 years      Sex: Female      Is Non- : No      Diabetic: No      Tobacco smoker: No      Systolic Blood Pressure:  116 mmHg      Is BP treated: Yes      HDL Cholesterol: 56 mg/dL      Total Cholesterol: 165 mg/dL    Vaccinations  Fast track    Answers submitted by the patient for this visit:  Rheumatology Questionnaire (Submitted on 6/1/2023)  fever: No  eye redness: No  mouth sores: No  headaches: No  shortness of breath: No  chest pain: No  trouble swallowing: No  diarrhea: No  constipation: No  unexpected weight change: No  genital sore: No  dysuria: No  During the last 3 days, have you had a skin rash?: No  Bruises or bleeds easily: No  cough: No

## 2023-06-06 ENCOUNTER — TELEPHONE (OUTPATIENT)
Dept: PHARMACY | Facility: CLINIC | Age: 63
End: 2023-06-06
Payer: COMMERCIAL

## 2023-06-06 NOTE — TELEPHONE ENCOUNTER
Alejandro Rachel, this is Tami Arce, clinical pharmacist with [facility] that is part of your care team.  We have begun working on your prescription that your doctor has sent us. Our next steps include:     Working with your insurance company to obtain approval for your medication  Working with you to ensure your medication is affordable     We will be calling you along the way with updates on your medication but if you have any concerns or receive information that you would like to discuss please reach us at [phone].    Welcome call outcome: Left voicemail

## 2023-06-07 ENCOUNTER — OFFICE VISIT (OUTPATIENT)
Dept: PRIMARY CARE CLINIC | Facility: CLINIC | Age: 63
End: 2023-06-07
Payer: COMMERCIAL

## 2023-06-07 VITALS
WEIGHT: 185.63 LBS | HEART RATE: 94 BPM | SYSTOLIC BLOOD PRESSURE: 120 MMHG | BODY MASS INDEX: 37.42 KG/M2 | DIASTOLIC BLOOD PRESSURE: 80 MMHG | OXYGEN SATURATION: 97 % | HEIGHT: 59 IN

## 2023-06-07 DIAGNOSIS — R10.13 EPIGASTRIC PAIN: ICD-10-CM

## 2023-06-07 DIAGNOSIS — M05.79 RHEUMATOID ARTHRITIS INVOLVING MULTIPLE SITES WITH POSITIVE RHEUMATOID FACTOR: ICD-10-CM

## 2023-06-07 DIAGNOSIS — A04.8 H. PYLORI INFECTION: ICD-10-CM

## 2023-06-07 DIAGNOSIS — K21.9 GASTROESOPHAGEAL REFLUX DISEASE, UNSPECIFIED WHETHER ESOPHAGITIS PRESENT: ICD-10-CM

## 2023-06-07 DIAGNOSIS — N87.9 CERVICAL DYSPLASIA: ICD-10-CM

## 2023-06-07 PROCEDURE — 3079F PR MOST RECENT DIASTOLIC BLOOD PRESSURE 80-89 MM HG: ICD-10-PCS | Mod: CPTII,S$GLB,, | Performed by: INTERNAL MEDICINE

## 2023-06-07 PROCEDURE — 99213 OFFICE O/P EST LOW 20 MIN: CPT | Mod: S$GLB,,, | Performed by: INTERNAL MEDICINE

## 2023-06-07 PROCEDURE — 1160F RVW MEDS BY RX/DR IN RCRD: CPT | Mod: CPTII,S$GLB,, | Performed by: INTERNAL MEDICINE

## 2023-06-07 PROCEDURE — 1159F PR MEDICATION LIST DOCUMENTED IN MEDICAL RECORD: ICD-10-PCS | Mod: CPTII,S$GLB,, | Performed by: INTERNAL MEDICINE

## 2023-06-07 PROCEDURE — 3008F BODY MASS INDEX DOCD: CPT | Mod: CPTII,S$GLB,, | Performed by: INTERNAL MEDICINE

## 2023-06-07 PROCEDURE — 1159F MED LIST DOCD IN RCRD: CPT | Mod: CPTII,S$GLB,, | Performed by: INTERNAL MEDICINE

## 2023-06-07 PROCEDURE — 99999 PR PBB SHADOW E&M-EST. PATIENT-LVL III: ICD-10-PCS | Mod: PBBFAC,,, | Performed by: INTERNAL MEDICINE

## 2023-06-07 PROCEDURE — 3079F DIAST BP 80-89 MM HG: CPT | Mod: CPTII,S$GLB,, | Performed by: INTERNAL MEDICINE

## 2023-06-07 PROCEDURE — 99999 PR PBB SHADOW E&M-EST. PATIENT-LVL III: CPT | Mod: PBBFAC,,, | Performed by: INTERNAL MEDICINE

## 2023-06-07 PROCEDURE — 3074F SYST BP LT 130 MM HG: CPT | Mod: CPTII,S$GLB,, | Performed by: INTERNAL MEDICINE

## 2023-06-07 PROCEDURE — 1160F PR REVIEW ALL MEDS BY PRESCRIBER/CLIN PHARMACIST DOCUMENTED: ICD-10-PCS | Mod: CPTII,S$GLB,, | Performed by: INTERNAL MEDICINE

## 2023-06-07 PROCEDURE — 3008F PR BODY MASS INDEX (BMI) DOCUMENTED: ICD-10-PCS | Mod: CPTII,S$GLB,, | Performed by: INTERNAL MEDICINE

## 2023-06-07 PROCEDURE — 99213 PR OFFICE/OUTPT VISIT, EST, LEVL III, 20-29 MIN: ICD-10-PCS | Mod: S$GLB,,, | Performed by: INTERNAL MEDICINE

## 2023-06-07 PROCEDURE — 3074F PR MOST RECENT SYSTOLIC BLOOD PRESSURE < 130 MM HG: ICD-10-PCS | Mod: CPTII,S$GLB,, | Performed by: INTERNAL MEDICINE

## 2023-06-07 RX ORDER — PANTOPRAZOLE SODIUM 40 MG/1
40 TABLET, DELAYED RELEASE ORAL DAILY
Qty: 90 TABLET | Refills: 3 | Status: SHIPPED | OUTPATIENT
Start: 2023-06-07 | End: 2024-03-19 | Stop reason: SDUPTHER

## 2023-06-07 NOTE — PROGRESS NOTES
[facility] Note    Chief Complaint      Chief Complaint   Patient presents with    Follow-up     History of Present Illness      Aysha Randolph is a 62 y.o. female who presents today for chief complaint follow up HPylori.     HPI   Last seen 6.23 for abdominal and chest discomfort  We did an extensive work up incluidng normal stress echo, she tested pos for HPylori and completed triple tx. She also saw GI and had EGD with bx on 5.31, pathpending   She is feeling better but not back to baseline . She is still on PPI. She is also on ASA  EGD was neg for PUD. She also had open lower esophageal sphincter was found  Csc showed polyops x2 pathpending     She has RA sees RheumKeshavamurthy started on acetmra she is also on mtx and folate, she is on chronic prednisone but is tapering off and nearly done 1 mg dose. She has not started new tx yet, she has a lot of joint pain particularly inher R ankle and knee. Had been on humira and orencia in the past. She was also started on fosamax. 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 Saw Gyn Onc Bird in 5.23. Per his nots plan is : for LANDEN Recommend cotesting in 1 year. For VAIN1: Recommend clinical exam and cotesting annually for 2 years. Most incidences of VAIN1 are a result of an infection with nononcogenic subtypes of HPV or in the setting of postmenopausal women, may associated with atrophic changes and respond to vaginal estrogen.    Had covid vaccines in [place] she will send photos to up date      Active Problem List with Overview Notes    Diagnosis Date Noted    H. pylori infection 06/07/2023    Cervical dysplasia 05/18/2023    Vaginal dysplasia 05/18/2023    Hyperlipidemia 02/01/2023    GERD (gastroesophageal reflux disease) 02/01/2023    Rheumatoid arthritis 02/01/2023    Benign essential hypertension 02/01/2023       Health Maintenance   Topic Date Due    Mammogram   02/15/2024    DEXA Scan  02/15/2025    TETANUS VACCINE  10/02/2025    Lipid Panel  02/10/2028    Hepatitis C Screening  Completed       Past Medical History:   Diagnosis Date    Abnormal Pap smear of cervix 2017    Arthritis     Hypertension     Mild intermittent asthma, uncomplicated     Rheumatoid arthritis, unspecified 12/17/2020       Past Surgical History:   Procedure Laterality Date    ANKLE SURGERY Left     COLONOSCOPY N/A 05/31/2023    Procedure: COLONOSCOPY;  Surgeon: Robbie Ace MD;  Location: [facility];  Service: Endoscopy;  Laterality: N/A;    ESOPHAGOGASTRODUODENOSCOPY N/A 05/31/2023    Procedure: EGD (ESOPHAGOGASTRODUODENOSCOPY);  Surgeon: Robbie Ace MD;  Location: [facility];  Service: Endoscopy;  Laterality: N/A;    FRACTURE SURGERY      JOINT REPLACEMENT  April 2016    Full Left ankle replacement    WRIST FRACTURE SURGERY Left        family history includes Arthritis in her mother and sister; Asthma in her sister and sister; Cervical cancer in her maternal cousin; Diabetes in her sister; Early death in her sister; Heart disease in her mother, sister, and sister; Hypertension in her mother; Stroke in her mother, sister, and sister.    Social History     Tobacco Use    Smoking status: Never    Smokeless tobacco: Never   Substance Use Topics    Alcohol use: Not Currently     Comment: Very rarely    Drug use: Never       Review of Systems   Constitutional:  Negative for chills, fever, malaise/fatigue and weight loss.   Respiratory:  Negative for cough, sputum production, shortness of breath and wheezing.    Cardiovascular:  Negative for chest pain, palpitations, orthopnea and leg swelling.   Gastrointestinal:  Negative for constipation, diarrhea, nausea and vomiting.   Genitourinary:  Negative for dysuria, frequency, hematuria and urgency.      Outpatient Encounter Medications as of 6/7/2023   Medication Sig Dispense Refill    albuterol (ACCUNEB) 1.25 mg/3 mL Nebu       albuterol  (PROVENTIL/VENTOLIN HFA) 90 mcg/actuation inhaler Proventil HFA 90 mcg/actuation aerosol inhaler      aspirin (ASPIR-81 ORAL)       atorvastatin (LIPITOR) 10 MG tablet Take 1 tablet (10 mg total) by mouth once daily. 30 tablet 0    cholecalciferol, vitamin D3, 125 mcg (5,000 unit) Tab Take 5,000 Units by mouth once a week.      diclofenac sodium 100 mg 24 hr tablet diclofenac  mg tablet,extended release 24 hr   TAKE 1 TABLET(S) EVERY DAY BY ORAL ROUTE WITH MEALS FOR 30 DAYS.      EScitalopram oxalate (LEXAPRO) 20 MG tablet Take 1 tablet (20 mg total) by mouth once daily. 30 tablet 0    folic acid (FOLVITE) 1 MG tablet Take 1 tablet (1 mg total) by mouth once daily. 30 tablet 0    hydroCHLOROthiazide (HYDRODIURIL) 25 MG tablet Take 1 tablet (25 mg total) by mouth once daily. 30 tablet 0    methotrexate 2.5 MG Tab Take 10 tablets (25 mg total) by mouth every 7 days. 40 tablet 3    multivitamin-minerals-lutein (MULTIVITAMIN 50 PLUS) Tab Take 1 tablet by mouth once daily.      omega 3-dha-epa-fish oil 120-180-500 mg Cap       tocilizumab 162 mg/0.9 mL PnIj Inject 162 mg into the skin every 7 days. 4 each 3    [DISCONTINUED] omeprazole (PRILOSEC) 20 MG capsule Take 20 mg by mouth once daily.      [DISCONTINUED] pantoprazole (PROTONIX) 40 MG tablet Take 1 tablet (40 mg total) by mouth 2 (two) times daily. 60 tablet 0    [DISCONTINUED] sodium,potassium,mag sulfates (SUPREP BOWEL PREP KIT) 17.5-3.13-1.6 gram SolR Take 177 mLs by mouth once daily. 1 kit 0    pantoprazole (PROTONIX) 40 MG tablet Take 1 tablet (40 mg total) by mouth once daily. 90 tablet 3    [DISCONTINUED] methotrexate 25 mg/mL injection 25 mg/ml weekly 5 mL 11    [DISCONTINUED] predniSONE (DELTASONE) 1 MG tablet Take 4 tablets (4 mg total) by mouth once daily for 30 days, THEN 3 tablets (3 mg total) once daily for 30 days, THEN 2 tablets (2 mg total) once daily for 30 days, THEN 1 tablet (1 mg total) once daily. 300 tablet 0    [DISCONTINUED]  "predniSONE (DELTASONE) 1 MG tablet Take 3 tablets (3 mg total) by mouth once daily. 180 tablet 0     No facility-administered encounter medications on file as of 6/7/2023.        Review of patient's allergies indicates:  No Known Allergies        Physical Exam      Vital Signs  Pulse: 94  SpO2: 97 %  BP: 120/80  BP Location: Right arm  Patient Position: Sitting  Height and Weight  Height: 4' 11" (149.9 cm)  Weight: 84.2 kg (185 lb 10 oz)  BSA (Calculated - sq m): 1.87 sq meters  BMI (Calculated): 37.5  Weight in (lb) to have BMI = 25: 123.5]    Physical Exam  Vitals reviewed.   Constitutional:       General: She is not in acute distress.     Appearance: She is well-developed. She is not diaphoretic.   HENT:      Head: Normocephalic and atraumatic.      Right Ear: External ear normal.      Left Ear: External ear normal.      Nose: Nose normal.   Eyes:      General:         Right eye: No discharge.         Left eye: No discharge.      Conjunctiva/sclera: Conjunctivae normal.   Pulmonary:      Effort: Pulmonary effort is normal. No respiratory distress.   Musculoskeletal:         General: Normal range of motion.      Cervical back: Normal range of motion.   Skin:     Coloration: Skin is not pale.      Findings: No rash.   Neurological:      Mental Status: She is alert and oriented to person, place, and time.   Psychiatric:         Behavior: Behavior normal.         Thought Content: Thought content normal.        Laboratory:  CBC:  Recent Labs   Lab Result Units 05/25/23  0859   WBC K/uL 10.91   RBC M/uL 3.80*   Hemoglobin g/dL 11.3*   Hematocrit % 37.1   Platelets K/uL 358   MCV fL 98   MCH pg 29.7   MCHC g/dL 30.5*     CMP:  Recent Labs   Lab Result Units 04/05/23  1134 05/25/23  0859   Glucose mg/dL 116* 138*   Calcium mg/dL 10.8* 9.9   Albumin g/dL 3.9 3.9   Total Protein g/dL 8.1 7.9   Sodium mmol/L 142 141   Potassium mmol/L 3.8 3.7   CO2 mmol/L 31* 31*   Chloride mmol/L 98 100   BUN mg/dL 17 20   Alkaline " Phosphatase U/L 95 93   ALT U/L 42 21   AST U/L 43* 19   Total Bilirubin mg/dL 0.8 0.5     URINALYSIS:  No results for input(s): COLORU, CLARITYU, SPECGRAV, PHUR, PROTEINUA, GLUCOSEU, BILIRUBINCON, BLOODU, WBCU, RBCU, BACTERIA, MUCUS, NITRITE, LEUKOCYTESUR, UROBILINOGEN, HYALINECASTS in the last 2160 hours.   LIPIDS:  No results for input(s): TSH, HDL, CHOL, TRIG, LDLCALC, CHOLHDL, NONHDLCHOL, TOTALCHOLEST in the last 2160 hours.  TSH:  No results for input(s): TSH in the last 2160 hours.  A1C:  No results for input(s): HGBA1C in the last 2160 hours.    Radiology:      Assessment/Plan     Aysha Randolph is a 62 y.o.female with:    1. H. pylori infection  Assessment & Plan:  Hold ppi 2 weeks and plan to repeat antigen testing   Path pending on egd       2. Epigastric pain  -     pantoprazole (PROTONIX) 40 MG tablet; Take 1 tablet (40 mg total) by mouth once daily.  Dispense: 90 tablet; Refill: 3    3. Cervical dysplasia  Assessment & Plan:  Per gyn/gyn onc       4. Rheumatoid arthritis involving multiple sites with positive rheumatoid factor  Assessment & Plan:  Per Rheum, message sent to check on infusion       5. Gastroesophageal reflux disease, unspecified whether esophagitis present  Assessment & Plan:  Ct daily ppi            Use of the [employer] Patient Portal discussed and encouraged during today's visit  -Continue current medications and maintain follow up with specialists.  Return to clinic in .  Future Appointments   Date Time Provider Department Center   9/1/2023 10:15 AM LAB, [facility]   10/9/2023  9:20 AM Rosa Jarrett MD [facility]       Rosa Jarrett MD  6/7/2023 11:32 AM    Primary Care Internal Medicine

## 2023-06-07 NOTE — ASSESSMENT & PLAN NOTE
Hold ppi 2 weeks and plan to repeat antigen testing   Path pending on egd    Pneumonia of right lower lobe due to infectious organism

## 2023-06-07 NOTE — Clinical Note
Saw this pt today on follow up She had egd last week after completing tx for HPylori, if bx comes back negative does that count as irradication tetsing or do we need to do testing off ppi   thanks

## 2023-06-08 ENCOUNTER — PATIENT MESSAGE (OUTPATIENT)
Dept: PHARMACY | Facility: CLINIC | Age: 63
End: 2023-06-08
Payer: COMMERCIAL

## 2023-06-08 ENCOUNTER — SPECIALTY PHARMACY (OUTPATIENT)
Dept: PHARMACY | Facility: CLINIC | Age: 63
End: 2023-06-08
Payer: COMMERCIAL

## 2023-06-08 LAB
COMMENT: NORMAL
FINAL PATHOLOGIC DIAGNOSIS: NORMAL
GROSS: NORMAL
Lab: NORMAL
MICROSCOPIC EXAM: NORMAL

## 2023-06-08 NOTE — TELEPHONE ENCOUNTER
Alejandro WOLF approved   CaseId:[id number]   Status:Approved   05/07/2023-12/03/2023     Pt is locked into [facility].  Called pt to update her-unable to Lvm.  Detailed Angella Joyt message sent.    Rx routed to [facility]. Referral complete.    Impression: Type 2 diabetes mellitus without complications Plan: No diabetic retinopathy. Recommend yearly diabetic eye exam. Discussed with patient importance of good blood sugar control.

## 2023-06-09 ENCOUNTER — TELEPHONE (OUTPATIENT)
Dept: PRIMARY CARE CLINIC | Facility: CLINIC | Age: 63
End: 2023-06-09
Payer: COMMERCIAL

## 2023-06-09 NOTE — TELEPHONE ENCOUNTER
----- Message from Yaneth Michael sent at 6/9/2023 10:38 AM CDT -----  Contact: Aysha howard [phone]  1MEDICALADVICE     Patient is calling for Medical Advice regarding:    How long has patient had these symptoms:    Pharmacy name and phone#:    Would like response via truedashhart: call back    Comments: Pt is requesting a call back from the nurse regarding a medication that the doctor wants her to take for her osteoporosis

## 2023-06-12 ENCOUNTER — PATIENT MESSAGE (OUTPATIENT)
Dept: PRIMARY CARE CLINIC | Facility: CLINIC | Age: 63
End: 2023-06-12
Payer: COMMERCIAL

## 2023-09-01 ENCOUNTER — LAB VISIT (OUTPATIENT)
Dept: LAB | Facility: HOSPITAL | Age: 63
End: 2023-09-01
Attending: STUDENT IN AN ORGANIZED HEALTH CARE EDUCATION/TRAINING PROGRAM
Payer: COMMERCIAL

## 2023-09-01 DIAGNOSIS — M05.79 RHEUMATOID ARTHRITIS INVOLVING MULTIPLE SITES WITH POSITIVE RHEUMATOID FACTOR: ICD-10-CM

## 2023-09-01 LAB
ALBUMIN SERPL BCP-MCNC: 4.2 G/DL (ref 3.5–5.2)
ALP SERPL-CCNC: 61 U/L (ref 55–135)
ALT SERPL W/O P-5'-P-CCNC: 43 U/L (ref 10–44)
ANION GAP SERPL CALC-SCNC: 12 MMOL/L (ref 8–16)
AST SERPL-CCNC: 34 U/L (ref 10–40)
BASOPHILS # BLD AUTO: 0.09 K/UL (ref 0–0.2)
BASOPHILS NFR BLD: 1.1 % (ref 0–1.9)
BILIRUB SERPL-MCNC: 2 MG/DL (ref 0.1–1)
BUN SERPL-MCNC: 19 MG/DL (ref 8–23)
CALCIUM SERPL-MCNC: 9.9 MG/DL (ref 8.7–10.5)
CHLORIDE SERPL-SCNC: 101 MMOL/L (ref 95–110)
CO2 SERPL-SCNC: 28 MMOL/L (ref 23–29)
CREAT SERPL-MCNC: 0.8 MG/DL (ref 0.5–1.4)
CRP SERPL-MCNC: 0.7 MG/L (ref 0–8.2)
DIFFERENTIAL METHOD: ABNORMAL
EOSINOPHIL # BLD AUTO: 0.2 K/UL (ref 0–0.5)
EOSINOPHIL NFR BLD: 2.5 % (ref 0–8)
ERYTHROCYTE [DISTWIDTH] IN BLOOD BY AUTOMATED COUNT: 15.3 % (ref 11.5–14.5)
ERYTHROCYTE [SEDIMENTATION RATE] IN BLOOD BY PHOTOMETRIC METHOD: 3 MM/HR (ref 0–36)
EST. GFR  (NO RACE VARIABLE): >60 ML/MIN/1.73 M^2
GLUCOSE SERPL-MCNC: 103 MG/DL (ref 70–110)
HCT VFR BLD AUTO: 38.5 % (ref 37–48.5)
HGB BLD-MCNC: 12.4 G/DL (ref 12–16)
IMM GRANULOCYTES # BLD AUTO: 0.02 K/UL (ref 0–0.04)
IMM GRANULOCYTES NFR BLD AUTO: 0.2 % (ref 0–0.5)
LYMPHOCYTES # BLD AUTO: 3.3 K/UL (ref 1–4.8)
LYMPHOCYTES NFR BLD: 40.6 % (ref 18–48)
MCH RBC QN AUTO: 30.8 PG (ref 27–31)
MCHC RBC AUTO-ENTMCNC: 32.2 G/DL (ref 32–36)
MCV RBC AUTO: 96 FL (ref 82–98)
MONOCYTES # BLD AUTO: 0.4 K/UL (ref 0.3–1)
MONOCYTES NFR BLD: 4.9 % (ref 4–15)
NEUTROPHILS # BLD AUTO: 4.1 K/UL (ref 1.8–7.7)
NEUTROPHILS NFR BLD: 50.7 % (ref 38–73)
NRBC BLD-RTO: 0 /100 WBC
PLATELET # BLD AUTO: 235 K/UL (ref 150–450)
PMV BLD AUTO: 11.1 FL (ref 9.2–12.9)
POTASSIUM SERPL-SCNC: 3.7 MMOL/L (ref 3.5–5.1)
PROT SERPL-MCNC: 7 G/DL (ref 6–8.4)
RBC # BLD AUTO: 4.02 M/UL (ref 4–5.4)
SODIUM SERPL-SCNC: 141 MMOL/L (ref 136–145)
WBC # BLD AUTO: 8.13 K/UL (ref 3.9–12.7)

## 2023-09-01 PROCEDURE — 80053 COMPREHEN METABOLIC PANEL: CPT | Performed by: STUDENT IN AN ORGANIZED HEALTH CARE EDUCATION/TRAINING PROGRAM

## 2023-09-01 PROCEDURE — 86140 C-REACTIVE PROTEIN: CPT | Performed by: STUDENT IN AN ORGANIZED HEALTH CARE EDUCATION/TRAINING PROGRAM

## 2023-09-01 PROCEDURE — 36415 COLL VENOUS BLD VENIPUNCTURE: CPT | Mod: PO | Performed by: STUDENT IN AN ORGANIZED HEALTH CARE EDUCATION/TRAINING PROGRAM

## 2023-09-01 PROCEDURE — 85025 COMPLETE CBC W/AUTO DIFF WBC: CPT | Performed by: STUDENT IN AN ORGANIZED HEALTH CARE EDUCATION/TRAINING PROGRAM

## 2023-09-01 PROCEDURE — 85652 RBC SED RATE AUTOMATED: CPT | Performed by: STUDENT IN AN ORGANIZED HEALTH CARE EDUCATION/TRAINING PROGRAM

## 2023-09-12 ENCOUNTER — TELEPHONE (OUTPATIENT)
Dept: PRIMARY CARE CLINIC | Facility: CLINIC | Age: 63
End: 2023-09-12
Payer: COMMERCIAL

## 2023-09-12 NOTE — TELEPHONE ENCOUNTER
----- Message from Chanell Lenz sent at 9/12/2023  9:15 AM CDT -----  Contact: Self/[phone].  Patient called, stated that she is scheduled to be seen tomorrow 09/13/23, but her   Derick Garzon mr # [id number] has not been feeling well, he has been seen twice before, and would like her appointment to give it to him instead, and for her would like a script to have her stool check - about the bacteria that she have in her stomach H pylori. Please call and advise. Thank you

## 2023-09-12 NOTE — TELEPHONE ENCOUNTER
Spoke with pt and swapped appointments. Will address her orders when she comes in with  tomorrow

## 2023-09-19 NOTE — PROGRESS NOTES
"Subjective:      Patient ID: Aysha Randolph is a 62 y.o. female.    Chief Complaint: Disease Management    Initial Presentation  Aysha Randolph is a 62 y.o. F with a past medical history below who presents today for follow-up for seropositive rheumatoid arthritis.     The patient establish with [facility] in February 2023 after moving from [place]. She was diagnosed with RA in December 2020. She initially had pain and swelling in the hands, knees, shoulders, along with stiffness. She started with MTX 6 tabs weekly and prednisone 10 mg daily. Then Humira was added, however after 5 months the Humira wasn't helping. She started Orencia infusions May of 2022. She endorsed some improvement with Orencia but was still having flare ups. She was switched to Actemra in June 2023 and maintained on MTX 25 mg, 10 tablets weekly.     Interval History  Today, she says that Actemra seems to be working for her shoulder because the pain has improved some. She still has a significant amount of pain in her hips. She feels her hands and her toes are about the same as before. She describes a sensations of her fingers and toes "curling in on each other". She feels as though her hands are swollen.     Rheumatology ROS  (-) fevers, chills (-) weight loss, (+) fatigue, (+) morning stiffness (takes a couple of hours to get moving),  (+) arthralgias, (+) arthritis, (+) headaches, (-)  vision changes or loss of vision, (-) hx of red eyes including uveitis, iritis, scleritis or episcleritis, (-)  photophobia, (-) dry eyes, (-) dry mouth, (-) rash, (-) photosensitivity, (-) alopecia, (-) mucosal ulcers, (-) Raynaud's  phenomenon, (-) SQ nodules, (-) sense of skin tightening in hands, face or torso, (-)  hx pleurisy, (-) sharp chest pains that increase with deep breath, (-) lung fibrosis, (-) hemoptysis, (-) hx of DVT or PE (-) chest pains, (+) shortness of breath, (-) hx pericarditis (-) abdominal pain, (-) nausea, (-) vomiting, (-) " diarrhea, (-) constipation, (-) melena,  (-) bloody diarrhea, (-) UC/Crohns, (-) dysphagia, (-) GERD/Reflux, (-) hematuria, proteinuria, (-) renal failure, (-) focal weakness, (-) trouble combing hair or (-) getting out of chairs,  (-) hx of low WBC, low platelets, anemia, (-) hx of pregnancy losses/pre term deliveries/pregnancy complications, (-) genital ulcers    Review of Systems   Constitutional:  Negative for fever and unexpected weight change.   HENT:  Negative for mouth sores and trouble swallowing.    Eyes:  Negative for redness.   Respiratory:  Negative for cough and shortness of breath.    Cardiovascular:  Negative for chest pain.   Gastrointestinal:  Negative for constipation and diarrhea.   Genitourinary:  Negative for dysuria and genital sores.   Skin:  Negative for rash.   Neurological:  Positive for headaches.   Hematological:  Bruises/bleeds easily.      History  Medical:  Active Problem List with Overview Notes    Diagnosis Date Noted    H. pylori infection 06/07/2023    Cervical dysplasia 05/18/2023    Vaginal dysplasia 05/18/2023    Hyperlipidemia 02/01/2023    GERD (gastroesophageal reflux disease) 02/01/2023    Rheumatoid arthritis 02/01/2023    Benign essential hypertension 02/01/2023     Surgical:  Past Surgical History:   Procedure Laterality Date    ANKLE SURGERY Left     COLONOSCOPY N/A 05/31/2023    Procedure: COLONOSCOPY;  Surgeon: Robbie Ace MD;  Location: [facility];  Service: Endoscopy;  Laterality: N/A;    ESOPHAGOGASTRODUODENOSCOPY N/A 05/31/2023    Procedure: EGD (ESOPHAGOGASTRODUODENOSCOPY);  Surgeon: Robbie Ace MD;  Location: [facility];  Service: Endoscopy;  Laterality: N/A;    FRACTURE SURGERY      JOINT REPLACEMENT  April 2016    Full Left ankle replacement    WRIST FRACTURE SURGERY Left    Medication:  Current Outpatient Medications   Medication Sig Dispense Refill    albuterol (ACCUNEB) 1.25 mg/3 mL Nebu       albuterol (PROVENTIL/VENTOLIN HFA) 90 mcg/actuation  inhaler Proventil HFA 90 mcg/actuation aerosol inhaler      aspirin (ASPIR-81 ORAL)       atorvastatin (LIPITOR) 10 MG tablet Take 1 tablet (10 mg total) by mouth once daily. 30 tablet 0    cholecalciferol, vitamin D3, 125 mcg (5,000 unit) Tab Take 5,000 Units by mouth once a week.      diclofenac sodium 100 mg 24 hr tablet diclofenac  mg tablet,extended release 24 hr   TAKE 1 TABLET(S) EVERY DAY BY ORAL ROUTE WITH MEALS FOR 30 DAYS.      EScitalopram oxalate (LEXAPRO) 20 MG tablet Take 1 tablet (20 mg total) by mouth once daily. 30 tablet 0    hydroCHLOROthiazide (HYDRODIURIL) 25 MG tablet Take 1 tablet (25 mg total) by mouth once daily. 30 tablet 0    multivitamin-minerals-lutein (MULTIVITAMIN 50 PLUS) Tab Take 1 tablet by mouth once daily.      omega 3-dha-epa-fish oil 120-180-500 mg Cap       pantoprazole (PROTONIX) 40 MG tablet Take 1 tablet (40 mg total) by mouth once daily. 90 tablet 3    tocilizumab 162 mg/0.9 mL PnIj Inject 162 mg into the skin every 7 days. 4 each 3    folic acid (FOLVITE) 1 MG tablet Take 1 tablet (1 mg total) by mouth once daily. 90 tablet 3    methotrexate 2.5 MG Tab Take 10 tablets (25 mg total) by mouth every 7 days. 40 tablet 3    upadacitinib (RINVOQ) 15 mg 24 hr tablet Take 1 tablet (15 mg total) by mouth once daily. 30 tablet 2     No current facility-administered medications for this visit.     Rheumatologic labs  Component      Latest Ref Rng 9/1/2023   Sodium      136 - 145 mmol/L 141    Potassium      3.5 - 5.1 mmol/L 3.7    Chloride      95 - 110 mmol/L 101    CO2      23 - 29 mmol/L 28    Glucose      70 - 110 mg/dL 103    BUN      8 - 23 mg/dL 19    Creatinine      0.5 - 1.4 mg/dL 0.8    Calcium      8.7 - 10.5 mg/dL 9.9    PROTEIN TOTAL      6.0 - 8.4 g/dL 7.0    Albumin      3.5 - 5.2 g/dL 4.2    BILIRUBIN TOTAL      0.1 - 1.0 mg/dL 2.0 (H)    Alkaline Phosphatase      55 - 135 U/L 61    AST      10 - 40 U/L 34    ALT      10 - 44 U/L 43    eGFR      >60 mL/min/1.73  "m^2 >60.0    Anion Gap      8 - 16 mmol/L 12       Component      Latest Ref Rng 9/1/2023   WBC      3.90 - 12.70 K/uL 8.13    RBC      4.00 - 5.40 M/uL 4.02    Hemoglobin      12.0 - 16.0 g/dL 12.4    Hematocrit      37.0 - 48.5 % 38.5    MCV      82 - 98 fL 96    MCH      27.0 - 31.0 pg 30.8    MCHC      32.0 - 36.0 g/dL 32.2    RDW      11.5 - 14.5 % 15.3 (H)    Platelets      150 - 450 K/uL 235      Component      Latest Ref Rng 9/1/2023   CRP      0.0 - 8.2 mg/L 0.7    Sed Rate      0 - 36 mm/Hr 3      Rheumatologic medications history  MTX 10 tablets (11/2020-02/2023; attempted to switch to SQ in February 2023 but there was a shortage so she was unable to)  Prednisone (last used in June 2023, )  Humira (03/2021-12/2021; discontinued due to lack of efficacy, used genetic testing to see that she was not a responder)    Orencia (05/2022-10/2022; discontinued due to lack of efficacy)  Actemra (06/2023-present)      Objective:   /79   Pulse 80   Ht 4' 11" (1.499 m)   Wt 86.2 kg (190 lb 0.6 oz)   BMI 38.38 kg/m²   Physical Exam   Constitutional: No distress.   HENT:   Mouth/Throat: Mucous membranes are moist.   Eyes: Conjunctivae are normal.   Cardiovascular: Normal rate, regular rhythm, normal heart sounds and normal pulses.   Pulmonary/Chest: Effort normal and breath sounds normal.   Abdominal: Soft. Bowel sounds are normal.   Musculoskeletal:         General: Swelling (swelling of L hand) present. Normal range of motion.      Cervical back: Normal range of motion. No rigidity or tenderness.   Neurological: She is alert. She displays no weakness. Gait normal.   Skin: Skin is warm.      2/9/2023 6/1/2023 9/20/2023   Tender (HIGGINBOTHAM-28) 23 / 28 27 / 28 25 / 28    Swollen (HIGGINBOTHAM-28) 1 / 28 5 / 28 4 / 28    Provider Global 30 mm 60 mm 30 mm   Patient Global 55 mm 55 mm 50 mm   ESR 52 mm/hr 74 mm/hr 3 mm/hr   CRP 21.8 mg/L 16.6 mg/L 0.7 mg/L   HIGGINBOTHAM-28 (ESR) 6.5 (High disease activity) 7.32 (High disease " activity) 4.83 (Moderate disease activity)   HIGGINBOTHAM-28 (CRP) 5.82 (High disease activity) 6.3 (High disease activity) 5.21 (High disease activity)   CDAI Score 32.5  43.5  37      Widespread pain index  Note the areas which the patient has had pain over the last week:                 Shoulder-girdle, left               Shoulder-girdle, right                         Upper arm left                       Upper arm right                         Lower arm left                       Lower arm right    Hip (buttock, trochanter) left  Hip (buttock, trochanter) right                           Upper leg, left                         Upper leg, right                           Lower leg, left                         Lower leg, right                                     Jaw, left                                   Jaw, right                                        Chest                                  Abdomen                               Upper back                              Lower back                                        Neck  Score will be from 0-19: score of 13    Symptom severity score:  For each of the 3 symptoms, indicate the level of severity over the past week using the Scale.  The symptom severity score is the sum of the severity of the 3 symptoms (fatigue, waking unrefreshed, and cognitive symptoms) plus the number of the following symptoms occurring during the previous 6 months:  0 = no problem, 1=slight or mild problem 2= moderate; considerable problems often present and/or at a moderate level, 3 = severe, pervasive, continuous, life disturbing problem  Fatigue: 3  Waking Unrefreshed: 3  Cognitive Symptoms: 2  Yes=1; No=0  Headaches: 1  Pain or cramps in the lower abdomen: 1  Depression: 0  The final score is between 0 and 12: score of 10    Criteria:  Patient has fibromyalgia if the following 3 conditions are met:  1.  Widespread pain index greater than or equal to 7 and symptom severity score greater than or equal  to 5 or widespread pain index between 3- 6, and symptom severity score greater than or equal to 9.    2.  Symptoms have been present in a similar level for at least 3 months  3.  The patient does not have a disorder that would otherwise sufficiently explain the pain    This patient does meet criteria for Fibromyalgia.    Assessment:     1. Rheumatoid arthritis involving multiple sites with positive rheumatoid factor      Plan:     Problem List Items Addressed This Visit          Immunology/Multi System    Rheumatoid arthritis - Primary    Relevant Medications    upadacitinib (RINVOQ) 15 mg 24 hr tablet    methotrexate 2.5 MG Tab    folic acid (FOLVITE) 1 MG tablet    Other Relevant Orders    C-Reactive Protein    Sedimentation rate    CBC Auto Differential    Comprehensive Metabolic Panel    LIPID PANEL     Aysha Randolph is a 62 y.o. F with a past medical history below who presents today for follow-up for seropositive rheumatoid arthritis.     Seropositive rheumatoid arthritis- currently uncontrolled; has failed Humira, Orencia, and now Actemra; CDAI of 37 today.    Fibromyalgia- the patient has symptoms of fibromyalgia due to uncontrolled RA; the patient has widespread pain, on both sides of the body, above and below the waist as well as multiple associated symptoms with fibromyalgia that include fatigue, brain fog, waking up tired, irritable bowel syndrome, depression, insomnia, anxiety, headaches.     Plan:   - Switch to Rinvoq   - Patient counseled on potential risk of infection, headaches, rash, gastrointestinal symptoms, cytopenias, increase liver enzymes, increased cholesterol, allergic reaction, cancers (controversial); discussed increased risk of major adverse cardiovascular events including thrombosis, heart attack or stroke with a drug in the same class of medications.   - Monitor CBC, CMP, ESR, CRP every 3 months   - Monitor lipid panel every 6 months   - Continue Actemra and MTX at this time with the  intention of stopping Actemra when Rinvoq has been approved and potentially stopping MTX after stabilizing on Rinvoq   - Recommended lifestyle modifications including anti-inflammatory diet (ie Mediterranean diet), improved sleep, aerobic and anaerobic exercise (including yoga, Carlos chi), mindfulness to be practiced daily     This patient was examined with Dr. Lloyd. Plan discussed with the patient. Return to clinic in 3 months.    Kayla Cobb MD  Rheumatology Fellow, PGY4

## 2023-09-20 ENCOUNTER — OFFICE VISIT (OUTPATIENT)
Dept: RHEUMATOLOGY | Facility: CLINIC | Age: 63
End: 2023-09-20
Payer: COMMERCIAL

## 2023-09-20 VITALS
HEIGHT: 59 IN | SYSTOLIC BLOOD PRESSURE: 139 MMHG | BODY MASS INDEX: 38.32 KG/M2 | DIASTOLIC BLOOD PRESSURE: 79 MMHG | HEART RATE: 80 BPM | WEIGHT: 190.06 LBS

## 2023-09-20 DIAGNOSIS — M05.79 RHEUMATOID ARTHRITIS INVOLVING MULTIPLE SITES WITH POSITIVE RHEUMATOID FACTOR: Primary | ICD-10-CM

## 2023-09-20 PROCEDURE — 1159F MED LIST DOCD IN RCRD: CPT | Mod: CPTII,S$GLB,, | Performed by: STUDENT IN AN ORGANIZED HEALTH CARE EDUCATION/TRAINING PROGRAM

## 2023-09-20 PROCEDURE — 99999 PR PBB SHADOW E&M-EST. PATIENT-LVL V: ICD-10-PCS | Mod: PBBFAC,,, | Performed by: STUDENT IN AN ORGANIZED HEALTH CARE EDUCATION/TRAINING PROGRAM

## 2023-09-20 PROCEDURE — 99999 PR PBB SHADOW E&M-EST. PATIENT-LVL V: CPT | Mod: PBBFAC,,, | Performed by: STUDENT IN AN ORGANIZED HEALTH CARE EDUCATION/TRAINING PROGRAM

## 2023-09-20 PROCEDURE — 1160F PR REVIEW ALL MEDS BY PRESCRIBER/CLIN PHARMACIST DOCUMENTED: ICD-10-PCS | Mod: CPTII,S$GLB,, | Performed by: STUDENT IN AN ORGANIZED HEALTH CARE EDUCATION/TRAINING PROGRAM

## 2023-09-20 PROCEDURE — 1160F RVW MEDS BY RX/DR IN RCRD: CPT | Mod: CPTII,S$GLB,, | Performed by: STUDENT IN AN ORGANIZED HEALTH CARE EDUCATION/TRAINING PROGRAM

## 2023-09-20 PROCEDURE — 3075F PR MOST RECENT SYSTOLIC BLOOD PRESS GE 130-139MM HG: ICD-10-PCS | Mod: CPTII,S$GLB,, | Performed by: STUDENT IN AN ORGANIZED HEALTH CARE EDUCATION/TRAINING PROGRAM

## 2023-09-20 PROCEDURE — 99214 OFFICE O/P EST MOD 30 MIN: CPT | Mod: S$GLB,,, | Performed by: STUDENT IN AN ORGANIZED HEALTH CARE EDUCATION/TRAINING PROGRAM

## 2023-09-20 PROCEDURE — 3075F SYST BP GE 130 - 139MM HG: CPT | Mod: CPTII,S$GLB,, | Performed by: STUDENT IN AN ORGANIZED HEALTH CARE EDUCATION/TRAINING PROGRAM

## 2023-09-20 PROCEDURE — 3008F PR BODY MASS INDEX (BMI) DOCUMENTED: ICD-10-PCS | Mod: CPTII,S$GLB,, | Performed by: STUDENT IN AN ORGANIZED HEALTH CARE EDUCATION/TRAINING PROGRAM

## 2023-09-20 PROCEDURE — 1159F PR MEDICATION LIST DOCUMENTED IN MEDICAL RECORD: ICD-10-PCS | Mod: CPTII,S$GLB,, | Performed by: STUDENT IN AN ORGANIZED HEALTH CARE EDUCATION/TRAINING PROGRAM

## 2023-09-20 PROCEDURE — 3078F PR MOST RECENT DIASTOLIC BLOOD PRESSURE < 80 MM HG: ICD-10-PCS | Mod: CPTII,S$GLB,, | Performed by: STUDENT IN AN ORGANIZED HEALTH CARE EDUCATION/TRAINING PROGRAM

## 2023-09-20 PROCEDURE — 3008F BODY MASS INDEX DOCD: CPT | Mod: CPTII,S$GLB,, | Performed by: STUDENT IN AN ORGANIZED HEALTH CARE EDUCATION/TRAINING PROGRAM

## 2023-09-20 PROCEDURE — 3078F DIAST BP <80 MM HG: CPT | Mod: CPTII,S$GLB,, | Performed by: STUDENT IN AN ORGANIZED HEALTH CARE EDUCATION/TRAINING PROGRAM

## 2023-09-20 PROCEDURE — 99214 PR OFFICE/OUTPT VISIT, EST, LEVL IV, 30-39 MIN: ICD-10-PCS | Mod: S$GLB,,, | Performed by: STUDENT IN AN ORGANIZED HEALTH CARE EDUCATION/TRAINING PROGRAM

## 2023-09-20 RX ORDER — UPADACITINIB 15 MG/1
15 TABLET, EXTENDED RELEASE ORAL DAILY
Qty: 30 TABLET | Refills: 2 | Status: ACTIVE | OUTPATIENT
Start: 2023-09-20 | End: 2023-12-14

## 2023-09-20 RX ORDER — FOLIC ACID 1 MG/1
1 TABLET ORAL DAILY
Qty: 90 TABLET | Refills: 3 | Status: SHIPPED | OUTPATIENT
Start: 2023-09-20

## 2023-09-20 RX ORDER — METHOTREXATE 2.5 MG/1
25 TABLET ORAL
Qty: 40 TABLET | Refills: 3 | Status: SHIPPED | OUTPATIENT
Start: 2023-09-20 | End: 2023-12-14 | Stop reason: SDUPTHER

## 2023-09-20 ASSESSMENT — ROUTINE ASSESSMENT OF PATIENT INDEX DATA (RAPID3)
PAIN SCORE: 5
WHEN YOU AWAKENED IN THE MORNING OVER THE LAST WEEK, PLEASE INDICATE THE AMOUNT OF TIME IT TAKES UNTIL YOU ARE AS LIMBER AS YOU WILL BE FOR THE DAY: 2
FATIGUE SCORE: 4
PATIENT GLOBAL ASSESSMENT SCORE: 5
PSYCHOLOGICAL DISTRESS SCORE: 1.1
AM STIFFNESS SCORE: 1, YES
TOTAL RAPID3 SCORE: 4
MDHAQ FUNCTION SCORE: 0.6

## 2023-09-20 NOTE — PROGRESS NOTES
62 year - HTN,HLD,GERD,anxiety and rheumatoid arthritismorbid   Family h/o RA- sister and grand niece   Family h/o CAD    Nov 2020- RA- pain and swelling in the hands,knees,shoulders  RF 86  CCP> 250    ESR 65  Nml vit d  Neg hep panel    Initial treatment  :    Mtx 6 tabs weekly  Prednisone 10 mg daily  Humira -didn't respond    On orencia since April 2022- she did infusions till October 2022  Now she has moved from [place] to [facility]    Came on mtx 8 tabs weekly since sep 2022  Prednisone 5 mg daily  Folic acid 1 mg daily    Symptoms :    Pain,swelling,stiffness- hands,knees,elbows,shoulders  Widespread pain  Paresthesias -dorsal hands and ? Left 2nd finger    H/o trauma -left wrist and elbow-surgery  Left ankle arthrocentesis/replacement- OA     Anxiety  ISLAS  Chest pressure  Nausea    B/l phalen's positive  EMG- CTS  Left CT injection which didn't help  Will treat synovitis first and then plan surgery if CTS persist    CBC,CMP,ESR,CRP,Predmard panel  Arthritis survey  Hands and feet xrays performed     Changed oral mtx to Injectable mtx 1 ml weekly,folic acid  Unfortunately due to shortage of mtx vials she takes mtx oral tablets    We tapered off  Prednisone 4 mg daily for a month  3 mg daily for a month  2 mg daily for a month   1 mg daily for a month and stop    ESR 74  CRP 16    CMP nml  CBC - h/h 11.3/37.1  Nml white count and plt count    CDAI 43.5-Very high disease activity      B/l UE EMG-Eventually    Sleep study    Physical therapy    Weight loss    Continue lexapro  Anxiety-psychiatry    H pylori +  Antibiotics completed  2 polyps on colonoscopy    Osteoporosis +    We started actemra    Pain in the hips,hands and toes unfortunately persist  Fatigue  Morning stiffness    CDAI 30    Takes fosamax    On Mtx 10 tabs weekly,folic acid  CONTINUE MTX for now    D/c actemra  Offer rinvoq    Secondary fibro due to RA  Will treat RA    3 months rtc with labs      The 10-year ASCVD risk score (Scott JANG, et al.,  2019) is: 7%    Values used to calculate the score:      Age: 62 years      Sex: Female      Is Non- : No      Diabetic: No      Tobacco smoker: No      Systolic Blood Pressure: 139 mmHg      Is BP treated: Yes      HDL Cholesterol: 56 mg/dL      Total Cholesterol: 165 mg/dL    Vaccinations  Fast track        Answers submitted by the patient for this visit:  Rheumatology Questionnaire (Submitted on 9/19/2023)  fever: No  eye redness: No  mouth sores: No  headaches: Yes  shortness of breath: No  chest pain: No  trouble swallowing: No  diarrhea: No  constipation: No  unexpected weight change: No  genital sore: No  dysuria: No  During the last 3 days, have you had a skin rash?: No  Bruises or bleeds easily: Yes  cough: No

## 2023-09-20 NOTE — PATIENT INSTRUCTIONS
Please get your Shingles vaccine within the next 2 weeks.     We will work on obtaining Rinvoq for you. Please continue taking Methotrexate and Acemtra while we get Rinvoq approved.

## 2023-09-20 NOTE — PROGRESS NOTES
9/19/2023    10:13 AM   Rapid3 Question Responses and Scores   MDHAQ Score 0.6   Psychologic Score 1.1   Pain Score 5   When you awakened in the morning OVER THE LAST WEEK, did you feel stiff? Yes   If Yes, please indicate the number of hours until you are as limber as you will be for the day 2   Fatigue Score 4   Global Health Score 5   RAPID3 Score 4

## 2023-11-22 RX ORDER — HYDROCHLOROTHIAZIDE 25 MG/1
25 TABLET ORAL DAILY
Qty: 90 TABLET | Refills: 0 | Status: SHIPPED | OUTPATIENT
Start: 2023-11-22 | End: 2024-02-19

## 2023-11-22 RX ORDER — ATORVASTATIN CALCIUM 10 MG/1
10 TABLET, FILM COATED ORAL DAILY
Qty: 90 TABLET | Refills: 0 | Status: SHIPPED | OUTPATIENT
Start: 2023-11-22 | End: 2024-02-02

## 2023-11-22 RX ORDER — ESCITALOPRAM OXALATE 20 MG/1
20 TABLET ORAL DAILY
Qty: 90 TABLET | Refills: 0 | Status: SHIPPED | OUTPATIENT
Start: 2023-11-22 | End: 2024-02-19

## 2023-11-22 NOTE — TELEPHONE ENCOUNTER
Care Due:                  Date            Visit Type   Department     Provider  --------------------------------------------------------------------------------                                EP -                              PRIMARY      [facility] PRIMARY  Last Visit: 06-      CARE ([facility])   LUCRECIA Jarrett                              EP -                              PRIMARY      [facility] PRIMARY  Next Visit: 11-      CARE ([facility])   LUCRECIA Jarrett                                                            Last  Test          Frequency    Reason                     Performed    Due Date  --------------------------------------------------------------------------------    Lipid Panel.  12 months..  atorvastatin.............  02-   02-    [facility] Embedded Care Due Messages. Reference number: [id number].   11/22/2023 1:12:00 PM CST

## 2023-11-22 NOTE — TELEPHONE ENCOUNTER
----- Message from Mirtha Lockett sent at 11/22/2023 12:36 PM CST -----  Contact: [phone]  Requesting an RX refill or new RX.  Is this a refill or new RX: new  RX name and strength   atorvastatin (LIPITOR) 10 MG tablet 90 tablet  Is this a 30 day or 90 day RX: 90  Pharmacy name and phone # :  [facility] - [address]   Phone: [phone]  Fax: [phone]  The doctors have asked that we provide their patients with the following 2 reminders -- prescription refills can take up to 72 hours, and a friendly reminder that in the future you can use your [facility] account to request refills: yes    Requesting an RX refill or new RX.  Is this a refill or new RX: new  RX name and strength   EScitalopram oxalate (LEXAPRO) 20 MG tablet 90 tablet  Is this a 30 day or 90 day RX:90   Pharmacy name and phone # :  [facility] [address]   Phone: [phone]  Fax: [phone]    The doctors have asked that we provide their patients with the following 2 reminders -- prescription refills can take up to 72 hours, and a friendly reminder that in the future you can use your [email or web address] account to request refills: yes      Requesting an RX refill or new RX.  Is this a refill or new RX: new  RX name and strength   hydroCHLOROthiazide (HYDRODIURIL) 25 MG tablet 90 tablet  Is this a 30 day or 90 day RX: 90  Pharmacy name and phone # [facility] - [address]   Phone: [phone]  Fax: [phone]     The doctors have asked that we provide their patients with the following 2 reminders -- prescription refills can take up to 72 hours, and a friendly reminder that in the future you can use your [facility] account to request refills: yes

## 2023-11-28 ENCOUNTER — OFFICE VISIT (OUTPATIENT)
Dept: PRIMARY CARE CLINIC | Facility: CLINIC | Age: 63
End: 2023-11-28
Payer: COMMERCIAL

## 2023-11-28 VITALS
HEIGHT: 59 IN | HEART RATE: 86 BPM | WEIGHT: 190.06 LBS | OXYGEN SATURATION: 96 % | BODY MASS INDEX: 38.32 KG/M2 | SYSTOLIC BLOOD PRESSURE: 137 MMHG | DIASTOLIC BLOOD PRESSURE: 88 MMHG

## 2023-11-28 DIAGNOSIS — Z12.31 ENCOUNTER FOR SCREENING MAMMOGRAM FOR MALIGNANT NEOPLASM OF BREAST: ICD-10-CM

## 2023-11-28 DIAGNOSIS — I10 BENIGN ESSENTIAL HYPERTENSION: ICD-10-CM

## 2023-11-28 DIAGNOSIS — M85.80 OSTEOPENIA, UNSPECIFIED LOCATION: ICD-10-CM

## 2023-11-28 DIAGNOSIS — R17 ELEVATED BILIRUBIN: ICD-10-CM

## 2023-11-28 DIAGNOSIS — E78.5 HYPERLIPIDEMIA, UNSPECIFIED HYPERLIPIDEMIA TYPE: ICD-10-CM

## 2023-11-28 DIAGNOSIS — Z23 FLU VACCINE NEED: Primary | ICD-10-CM

## 2023-11-28 PROCEDURE — 3075F PR MOST RECENT SYSTOLIC BLOOD PRESS GE 130-139MM HG: ICD-10-PCS | Mod: CPTII,S$GLB,, | Performed by: INTERNAL MEDICINE

## 2023-11-28 PROCEDURE — 3008F PR BODY MASS INDEX (BMI) DOCUMENTED: ICD-10-PCS | Mod: CPTII,S$GLB,, | Performed by: INTERNAL MEDICINE

## 2023-11-28 PROCEDURE — 3079F DIAST BP 80-89 MM HG: CPT | Mod: CPTII,S$GLB,, | Performed by: INTERNAL MEDICINE

## 2023-11-28 PROCEDURE — 99214 PR OFFICE/OUTPT VISIT, EST, LEVL IV, 30-39 MIN: ICD-10-PCS | Mod: 25,S$GLB,, | Performed by: INTERNAL MEDICINE

## 2023-11-28 PROCEDURE — 1159F PR MEDICATION LIST DOCUMENTED IN MEDICAL RECORD: ICD-10-PCS | Mod: CPTII,S$GLB,, | Performed by: INTERNAL MEDICINE

## 2023-11-28 PROCEDURE — 3008F BODY MASS INDEX DOCD: CPT | Mod: CPTII,S$GLB,, | Performed by: INTERNAL MEDICINE

## 2023-11-28 PROCEDURE — 90686 FLU VACCINE (QUAD) GREATER THAN OR EQUAL TO 3YO PRESERVATIVE FREE IM: ICD-10-PCS | Mod: S$GLB,,, | Performed by: INTERNAL MEDICINE

## 2023-11-28 PROCEDURE — 99999 PR PBB SHADOW E&M-EST. PATIENT-LVL IV: CPT | Mod: PBBFAC,,, | Performed by: INTERNAL MEDICINE

## 2023-11-28 PROCEDURE — 90471 FLU VACCINE (QUAD) GREATER THAN OR EQUAL TO 3YO PRESERVATIVE FREE IM: ICD-10-PCS | Mod: S$GLB,,, | Performed by: INTERNAL MEDICINE

## 2023-11-28 PROCEDURE — 3075F SYST BP GE 130 - 139MM HG: CPT | Mod: CPTII,S$GLB,, | Performed by: INTERNAL MEDICINE

## 2023-11-28 PROCEDURE — 99214 OFFICE O/P EST MOD 30 MIN: CPT | Mod: 25,S$GLB,, | Performed by: INTERNAL MEDICINE

## 2023-11-28 PROCEDURE — 90686 IIV4 VACC NO PRSV 0.5 ML IM: CPT | Mod: S$GLB,,, | Performed by: INTERNAL MEDICINE

## 2023-11-28 PROCEDURE — 90471 IMMUNIZATION ADMIN: CPT | Mod: S$GLB,,, | Performed by: INTERNAL MEDICINE

## 2023-11-28 PROCEDURE — 99999 PR PBB SHADOW E&M-EST. PATIENT-LVL IV: ICD-10-PCS | Mod: PBBFAC,,, | Performed by: INTERNAL MEDICINE

## 2023-11-28 PROCEDURE — 1159F MED LIST DOCD IN RCRD: CPT | Mod: CPTII,S$GLB,, | Performed by: INTERNAL MEDICINE

## 2023-11-28 PROCEDURE — 3079F PR MOST RECENT DIASTOLIC BLOOD PRESSURE 80-89 MM HG: ICD-10-PCS | Mod: CPTII,S$GLB,, | Performed by: INTERNAL MEDICINE

## 2023-11-28 RX ORDER — ALENDRONATE SODIUM 70 MG/1
70 TABLET ORAL
Qty: 12 TABLET | Refills: 3 | Status: SHIPPED | OUTPATIENT
Start: 2023-11-28 | End: 2024-11-27

## 2023-11-28 NOTE — PROGRESS NOTES
[facility] Note    Chief Complaint      Chief Complaint   Patient presents with    Follow-up     4 mth     History of Present Illness      Aysha Randolph is a 62 y.o. female who presents today for chief complaint follow up .     HPI   Last seen 6.23, annual 2.23  No new labs mmg 2.23    HLD on lipitor and fishoil LDL 87  HTN on hctz 25, she salas snot check at home, at goal today   Mood on lexapro for CARMEN since 9.11, she is feeling ok     Hx of HPylori and completed triple tx. She also saw GI and had EGD with bx on 5.31, pathpending   She is feeling better but not back to baseline . She is still on PPI. She is also on ASA  EGD was neg for PUD. She also had open lower esophageal sphincter was found  Csc showed polyops x2 pathpending    she I irradicate dbased on bx per GI     She has seropos  RA and fibro sees RheumKeshavamurthy now on rinvoq, she is doing ok she is also on mtx and folate, now off prednisone. She is ok from disease mgmt, has ups and downs   Had been on humira and orencia, actemra in the past.  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 Saw Gyn Suri Pang in 5.23. Per his nots plan is : for LANDEN Recommend cotesting in 1 year. For VAIN1: Recommend clinical exam and cotesting annually for 2 years. Most incidences of VAIN1 are a result of an infection with nononcogenic subtypes of HPV or in the setting of postmenopausal women, may associated with atrophic changes and respond to vaginal estrogen.    She is orginially from [place] and recently moved from [place]- Monika Roldan PCP in [place] [phone] Dr Katlyn [facility] in [facility] [id number]   She is retired     Active Problem List with Overview Notes    Diagnosis Date Noted    H. pylori infection 06/07/2023    Cervical dysplasia 05/18/2023    Vaginal dysplasia 05/18/2023    Hyperlipidemia 02/01/2023     GERD (gastroesophageal reflux disease) 02/01/2023    Rheumatoid arthritis 02/01/2023    Benign essential hypertension 02/01/2023       Health Maintenance   Topic Date Due    Shingles Vaccine (1 of 2) Never done    Mammogram  02/15/2024    DEXA Scan  02/15/2025    TETANUS VACCINE  10/02/2025    Lipid Panel  02/10/2028    Colorectal Cancer Screening  05/31/2030    Hepatitis C Screening  Completed       Past Medical History:   Diagnosis Date    Abnormal Pap smear of cervix 2017    Arthritis     Hypertension     Mild intermittent asthma, uncomplicated     Rheumatoid arthritis, unspecified 12/17/2020       Past Surgical History:   Procedure Laterality Date    ANKLE SURGERY Left     COLONOSCOPY N/A 05/31/2023    Procedure: COLONOSCOPY;  Surgeon: Robbie Ace MD;  Location: [facility];  Service: Endoscopy;  Laterality: N/A;    ESOPHAGOGASTRODUODENOSCOPY N/A 05/31/2023    Procedure: EGD (ESOPHAGOGASTRODUODENOSCOPY);  Surgeon: Robbie Ace MD;  Location: [facility] ENDO;  Service: Endoscopy;  Laterality: N/A;    FRACTURE SURGERY      JOINT REPLACEMENT  April 2016    Full Left ankle replacement    WRIST FRACTURE SURGERY Left        family history includes Arthritis in her mother and sister; Asthma in her sister and sister; Cervical cancer in her maternal cousin; Diabetes in her sister; Early death in her sister; Heart disease in her mother, sister, and sister; Hypertension in her mother; Stroke in her mother, sister, and sister.    Social History     Tobacco Use    Smoking status: Never    Smokeless tobacco: Never   Substance Use Topics    Alcohol use: Not Currently     Comment: Very rarely    Drug use: Never       Review of Systems   Constitutional:  Negative for chills, fever, malaise/fatigue and weight loss.   Respiratory:  Negative for cough, sputum production, shortness of breath and wheezing.    Cardiovascular:  Negative for chest pain, palpitations, orthopnea and leg swelling.   Gastrointestinal:  Negative for  constipation, diarrhea, nausea and vomiting.   Genitourinary:  Negative for dysuria, frequency, hematuria and urgency.        Outpatient Encounter Medications as of 11/28/2023   Medication Sig Dispense Refill    albuterol (ACCUNEB) 1.25 mg/3 mL Nebu       albuterol (PROVENTIL/VENTOLIN HFA) 90 mcg/actuation inhaler Proventil HFA 90 mcg/actuation aerosol inhaler      aspirin (ASPIR-81 ORAL)       atorvastatin (LIPITOR) 10 MG tablet Take 1 tablet (10 mg total) by mouth once daily. 90 tablet 0    cholecalciferol, vitamin D3, 125 mcg (5,000 unit) Tab Take 5,000 Units by mouth once a week.      diclofenac sodium 100 mg 24 hr tablet diclofenac  mg tablet,extended release 24 hr   TAKE 1 TABLET(S) EVERY DAY BY ORAL ROUTE WITH MEALS FOR 30 DAYS.      EScitalopram oxalate (LEXAPRO) 20 MG tablet Take 1 tablet (20 mg total) by mouth once daily. 90 tablet 0    folic acid (FOLVITE) 1 MG tablet Take 1 tablet (1 mg total) by mouth once daily. 90 tablet 3    hydroCHLOROthiazide (HYDRODIURIL) 25 MG tablet Take 1 tablet (25 mg total) by mouth once daily. 90 tablet 0    methotrexate 2.5 MG Tab Take 10 tablets (25 mg total) by mouth every 7 days. 40 tablet 3    multivitamin-minerals-lutein (MULTIVITAMIN 50 PLUS) Tab Take 1 tablet by mouth once daily.      omega 3-dha-epa-fish oil 120-180-500 mg Cap       pantoprazole (PROTONIX) 40 MG tablet Take 1 tablet (40 mg total) by mouth once daily. 90 tablet 3    tocilizumab 162 mg/0.9 mL PnIj Inject 162 mg into the skin every 7 days. 4 each 3    upadacitinib (RINVOQ) 15 mg 24 hr tablet Take 1 tablet (15 mg total) by mouth once daily. 30 tablet 2    alendronate (FOSAMAX) 70 MG tablet Take 1 tablet (70 mg total) by mouth every 7 days. 12 tablet 3    [DISCONTINUED] atorvastatin (LIPITOR) 10 MG tablet Take 1 tablet (10 mg total) by mouth once daily. 30 tablet 0    [DISCONTINUED] EScitalopram oxalate (LEXAPRO) 20 MG tablet Take 1 tablet (20 mg total) by mouth once daily. 30 tablet 0     "[DISCONTINUED] hydroCHLOROthiazide (HYDRODIURIL) 25 MG tablet Take 1 tablet (25 mg total) by mouth once daily. 30 tablet 0     No facility-administered encounter medications on file as of 11/28/2023.        Review of patient's allergies indicates:  No Known Allergies        Physical Exam      Vital Signs  Pulse: 86  SpO2: 96 %  BP: 137/88  Height and Weight  Height: 4' 11" (149.9 cm)  Weight: 86.2 kg (190 lb 0.6 oz)  BSA (Calculated - sq m): 1.89 sq meters  BMI (Calculated): 38.4  Weight in (lb) to have BMI = 25: 123.5]    Physical Exam  Vitals reviewed.   Constitutional:       General: She is not in acute distress.     Appearance: She is well-developed. She is not diaphoretic.   HENT:      Head: Normocephalic and atraumatic.      Right Ear: External ear normal.      Left Ear: External ear normal.      Nose: Nose normal.   Eyes:      General:         Right eye: No discharge.         Left eye: No discharge.      Conjunctiva/sclera: Conjunctivae normal.   Cardiovascular:      Rate and Rhythm: Normal rate and regular rhythm.      Heart sounds: Normal heart sounds.   Pulmonary:      Effort: Pulmonary effort is normal. No respiratory distress.   Musculoskeletal:         General: Normal range of motion.      Cervical back: Normal range of motion.   Skin:     Coloration: Skin is not pale.      Findings: No rash.   Neurological:      Mental Status: She is alert and oriented to person, place, and time.   Psychiatric:         Behavior: Behavior normal.         Thought Content: Thought content normal.          Laboratory:  CBC:  Recent Labs   Lab Result Units 09/01/23  1019   WBC K/uL 8.13   RBC M/uL 4.02   Hemoglobin g/dL 12.4   Hematocrit % 38.5   Platelets K/uL 235   MCV fL 96   MCH pg 30.8   MCHC g/dL 32.2     CMP:  Recent Labs   Lab Result Units 09/01/23  1019   Glucose mg/dL 103   Calcium mg/dL 9.9   Albumin g/dL 4.2   Total Protein g/dL 7.0   Sodium mmol/L 141   Potassium mmol/L 3.7   CO2 mmol/L 28   Chloride mmol/L 101 " "  BUN mg/dL 19   Alkaline Phosphatase U/L 61   ALT U/L 43   AST U/L 34   Total Bilirubin mg/dL 2.0*     URINALYSIS:  No results for input(s): "COLORU", "CLARITYU", "SPECGRAV", "PHUR", "PROTEINUA", "GLUCOSEU", "BILIRUBINCON", "BLOODU", "WBCU", "RBCU", "BACTERIA", "MUCUS", "NITRITE", "LEUKOCYTESUR", "UROBILINOGEN", "HYALINECASTS" in the last 2160 hours.   LIPIDS:  No results for input(s): "TSH", "HDL", "CHOL", "TRIG", "LDLCALC", "CHOLHDL", "NONHDLCHOL", "TOTALCHOLEST" in the last 2160 hours.  TSH:  No results for input(s): "TSH" in the last 2160 hours.  A1C:  No results for input(s): "HGBA1C" in the last 2160 hours.        Assessment/Plan     Aysha Randolph is a 62 y.o.female with:    1. Benign essential hypertension  -     Comprehensive Metabolic Panel; Future; Expected date: 11/28/2023  -     CBC Without Differential; Future; Expected date: 11/28/2023  -     alendronate (FOSAMAX) 70 MG tablet; Take 1 tablet (70 mg total) by mouth every 7 days.  Dispense: 12 tablet; Refill: 3    2. Encounter for screening mammogram for malignant neoplasm of breast  -     Mammo Digital Screening Bilat w/ Joao; Future; Expected date: 02/15/2024  -     alendronate (FOSAMAX) 70 MG tablet; Take 1 tablet (70 mg total) by mouth every 7 days.  Dispense: 12 tablet; Refill: 3    3. Elevated bilirubin  -     BILIRUBIN, DIRECT; Future; Expected date: 11/28/2023  -     alendronate (FOSAMAX) 70 MG tablet; Take 1 tablet (70 mg total) by mouth every 7 days.  Dispense: 12 tablet; Refill: 3    4. Hyperlipidemia, unspecified hyperlipidemia type  -     Lipid Panel; Future; Expected date: 11/28/2023  -     alendronate (FOSAMAX) 70 MG tablet; Take 1 tablet (70 mg total) by mouth every 7 days.  Dispense: 12 tablet; Refill: 3    5. Osteopenia, unspecified location  -     alendronate (FOSAMAX) 70 MG tablet; Take 1 tablet (70 mg total) by mouth every 7 days.  Dispense: 12 tablet; Refill: 3         Use of the [employer] Patient Portal discussed and encouraged " during today's visit  -Continue current medications and maintain follow up with specialists.  Return to clinic in 6 mos annual labs prior.  Future Appointments   Date Time Provider Department Center   12/14/2023 11:00 AM Kayla Cobb MD [facility] Jer Jarrett MD  11/28/2023 8:38 AM    Primary Care Internal Medicine

## 2023-11-30 DIAGNOSIS — M05.79 RHEUMATOID ARTHRITIS INVOLVING MULTIPLE SITES WITH POSITIVE RHEUMATOID FACTOR: Primary | ICD-10-CM

## 2023-12-05 ENCOUNTER — TELEPHONE (OUTPATIENT)
Dept: RHEUMATOLOGY | Facility: CLINIC | Age: 63
End: 2023-12-05
Payer: COMMERCIAL

## 2023-12-11 ENCOUNTER — LAB VISIT (OUTPATIENT)
Dept: LAB | Facility: HOSPITAL | Age: 63
End: 2023-12-11
Attending: STUDENT IN AN ORGANIZED HEALTH CARE EDUCATION/TRAINING PROGRAM
Payer: MEDICARE

## 2023-12-11 DIAGNOSIS — M05.79 RHEUMATOID ARTHRITIS INVOLVING MULTIPLE SITES WITH POSITIVE RHEUMATOID FACTOR: ICD-10-CM

## 2023-12-11 LAB
ALBUMIN SERPL BCP-MCNC: 3.9 G/DL (ref 3.5–5.2)
ALP SERPL-CCNC: 71 U/L (ref 55–135)
ALT SERPL W/O P-5'-P-CCNC: 40 U/L (ref 10–44)
ANION GAP SERPL CALC-SCNC: 12 MMOL/L (ref 8–16)
AST SERPL-CCNC: 39 U/L (ref 10–40)
BASOPHILS # BLD AUTO: 0.06 K/UL (ref 0–0.2)
BASOPHILS NFR BLD: 0.6 % (ref 0–1.9)
BILIRUB SERPL-MCNC: 1.3 MG/DL (ref 0.1–1)
BUN SERPL-MCNC: 15 MG/DL (ref 8–23)
CALCIUM SERPL-MCNC: 10 MG/DL (ref 8.7–10.5)
CHLORIDE SERPL-SCNC: 99 MMOL/L (ref 95–110)
CHOLEST SERPL-MCNC: 152 MG/DL (ref 120–199)
CHOLEST/HDLC SERPL: 2.2 {RATIO} (ref 2–5)
CO2 SERPL-SCNC: 28 MMOL/L (ref 23–29)
CREAT SERPL-MCNC: 0.8 MG/DL (ref 0.5–1.4)
CRP SERPL-MCNC: 8.2 MG/L (ref 0–8.2)
DIFFERENTIAL METHOD: ABNORMAL
EOSINOPHIL # BLD AUTO: 0.3 K/UL (ref 0–0.5)
EOSINOPHIL NFR BLD: 2.7 % (ref 0–8)
ERYTHROCYTE [DISTWIDTH] IN BLOOD BY AUTOMATED COUNT: 14.6 % (ref 11.5–14.5)
ERYTHROCYTE [SEDIMENTATION RATE] IN BLOOD BY PHOTOMETRIC METHOD: 12 MM/HR (ref 0–36)
EST. GFR  (NO RACE VARIABLE): >60 ML/MIN/1.73 M^2
GLUCOSE SERPL-MCNC: 112 MG/DL (ref 70–110)
HCT VFR BLD AUTO: 34.1 % (ref 37–48.5)
HDLC SERPL-MCNC: 70 MG/DL (ref 40–75)
HDLC SERPL: 46.1 % (ref 20–50)
HGB BLD-MCNC: 11.5 G/DL (ref 12–16)
IMM GRANULOCYTES # BLD AUTO: 0.04 K/UL (ref 0–0.04)
IMM GRANULOCYTES NFR BLD AUTO: 0.4 % (ref 0–0.5)
LDLC SERPL CALC-MCNC: 58.6 MG/DL (ref 63–159)
LYMPHOCYTES # BLD AUTO: 2.1 K/UL (ref 1–4.8)
LYMPHOCYTES NFR BLD: 21.8 % (ref 18–48)
MCH RBC QN AUTO: 33.7 PG (ref 27–31)
MCHC RBC AUTO-ENTMCNC: 33.7 G/DL (ref 32–36)
MCV RBC AUTO: 100 FL (ref 82–98)
MONOCYTES # BLD AUTO: 0.7 K/UL (ref 0.3–1)
MONOCYTES NFR BLD: 7.5 % (ref 4–15)
NEUTROPHILS # BLD AUTO: 6.3 K/UL (ref 1.8–7.7)
NEUTROPHILS NFR BLD: 67 % (ref 38–73)
NONHDLC SERPL-MCNC: 82 MG/DL
NRBC BLD-RTO: 0 /100 WBC
PLATELET # BLD AUTO: 305 K/UL (ref 150–450)
PMV BLD AUTO: 11.2 FL (ref 9.2–12.9)
POTASSIUM SERPL-SCNC: 4.2 MMOL/L (ref 3.5–5.1)
PROT SERPL-MCNC: 7.1 G/DL (ref 6–8.4)
RBC # BLD AUTO: 3.41 M/UL (ref 4–5.4)
SODIUM SERPL-SCNC: 139 MMOL/L (ref 136–145)
TRIGL SERPL-MCNC: 117 MG/DL (ref 30–150)
WBC # BLD AUTO: 9.42 K/UL (ref 3.9–12.7)

## 2023-12-11 PROCEDURE — 86140 C-REACTIVE PROTEIN: CPT | Performed by: STUDENT IN AN ORGANIZED HEALTH CARE EDUCATION/TRAINING PROGRAM

## 2023-12-11 PROCEDURE — 80061 LIPID PANEL: CPT | Performed by: STUDENT IN AN ORGANIZED HEALTH CARE EDUCATION/TRAINING PROGRAM

## 2023-12-11 PROCEDURE — 36415 COLL VENOUS BLD VENIPUNCTURE: CPT | Mod: PO | Performed by: STUDENT IN AN ORGANIZED HEALTH CARE EDUCATION/TRAINING PROGRAM

## 2023-12-11 PROCEDURE — 85652 RBC SED RATE AUTOMATED: CPT | Performed by: STUDENT IN AN ORGANIZED HEALTH CARE EDUCATION/TRAINING PROGRAM

## 2023-12-11 PROCEDURE — 85025 COMPLETE CBC W/AUTO DIFF WBC: CPT | Performed by: STUDENT IN AN ORGANIZED HEALTH CARE EDUCATION/TRAINING PROGRAM

## 2023-12-11 PROCEDURE — 80053 COMPREHEN METABOLIC PANEL: CPT | Performed by: STUDENT IN AN ORGANIZED HEALTH CARE EDUCATION/TRAINING PROGRAM

## 2023-12-14 ENCOUNTER — OFFICE VISIT (OUTPATIENT)
Dept: RHEUMATOLOGY | Facility: CLINIC | Age: 63
End: 2023-12-14
Payer: MEDICARE

## 2023-12-14 VITALS
BODY MASS INDEX: 38 KG/M2 | DIASTOLIC BLOOD PRESSURE: 81 MMHG | HEIGHT: 59 IN | HEART RATE: 102 BPM | SYSTOLIC BLOOD PRESSURE: 151 MMHG | WEIGHT: 188.5 LBS

## 2023-12-14 DIAGNOSIS — M81.8 OTHER OSTEOPOROSIS WITHOUT CURRENT PATHOLOGICAL FRACTURE: ICD-10-CM

## 2023-12-14 DIAGNOSIS — M79.7 FIBROMYALGIA: ICD-10-CM

## 2023-12-14 DIAGNOSIS — M05.79 RHEUMATOID ARTHRITIS INVOLVING MULTIPLE SITES WITH POSITIVE RHEUMATOID FACTOR: Primary | ICD-10-CM

## 2023-12-14 PROCEDURE — 99215 OFFICE O/P EST HI 40 MIN: CPT | Mod: PBBFAC | Performed by: STUDENT IN AN ORGANIZED HEALTH CARE EDUCATION/TRAINING PROGRAM

## 2023-12-14 PROCEDURE — 99214 PR OFFICE/OUTPT VISIT, EST, LEVL IV, 30-39 MIN: ICD-10-PCS | Mod: S$PBB,GC,, | Performed by: STUDENT IN AN ORGANIZED HEALTH CARE EDUCATION/TRAINING PROGRAM

## 2023-12-14 PROCEDURE — 99999 PR PBB SHADOW E&M-EST. PATIENT-LVL V: CPT | Mod: PBBFAC,GC,, | Performed by: STUDENT IN AN ORGANIZED HEALTH CARE EDUCATION/TRAINING PROGRAM

## 2023-12-14 PROCEDURE — 99999 PR PBB SHADOW E&M-EST. PATIENT-LVL V: ICD-10-PCS | Mod: PBBFAC,GC,, | Performed by: STUDENT IN AN ORGANIZED HEALTH CARE EDUCATION/TRAINING PROGRAM

## 2023-12-14 PROCEDURE — 99214 OFFICE O/P EST MOD 30 MIN: CPT | Mod: S$PBB,GC,, | Performed by: STUDENT IN AN ORGANIZED HEALTH CARE EDUCATION/TRAINING PROGRAM

## 2023-12-14 RX ORDER — PREGABALIN 50 MG/1
50 CAPSULE ORAL 3 TIMES DAILY
Qty: 90 CAPSULE | Refills: 6 | Status: SHIPPED | OUTPATIENT
Start: 2023-12-14

## 2023-12-14 RX ORDER — METHOTREXATE 2.5 MG/1
25 TABLET ORAL
Qty: 40 TABLET | Refills: 3 | Status: ACTIVE | OUTPATIENT
Start: 2023-12-14

## 2023-12-14 RX ORDER — TOFACITINIB 5 MG/1
5 TABLET, FILM COATED ORAL 2 TIMES DAILY
Qty: 60 TABLET | Refills: 11 | Status: ACTIVE | OUTPATIENT
Start: 2023-12-14 | End: 2024-03-20

## 2023-12-14 NOTE — PROGRESS NOTES
62 year - HTN,HLD,GERD,anxiety and rheumatoid arthritismorbid   Family h/o RA- sister and grand niece   Family h/o CAD    Nov 2020- RA- pain and swelling in the hands,knees,shoulders  RF 86  CCP> 250    ESR 65  Nml vit d  Neg hep panel    Initial treatment  :    Mtx 6 tabs weekly  Prednisone 10 mg daily  Humira -didn't respond    On orencia since April 2022- she did infusions till October 2022  Now she has moved from [place] to [facility]    Came on mtx 8 tabs weekly since sep 2022  Prednisone 5 mg daily  Folic acid 1 mg daily    Symptoms :    Pain,swelling,stiffness- hands,knees,elbows,shoulders  Widespread pain  Paresthesias -dorsal hands and ? Left 2nd finger    H/o trauma -left wrist and elbow-surgery  Left ankle arthrocentesis/replacement- OA     Anxiety  ISLAS  Chest pressure  Nausea    B/l phalen's positive  EMG- CTS  Left CT injection which didn't help  Will treat synovitis first and then plan surgery if CTS persist    CBC,CMP,ESR,CRP,Predmard panel  Arthritis survey  Hands and feet xrays performed     Changed oral mtx to Injectable mtx 1 ml weekly,folic acid  Unfortunately due to shortage of mtx vials she takes mtx oral tablets    We tapered off  Prednisone 4 mg daily for a month  3 mg daily for a month  2 mg daily for a month   1 mg daily for a month and stop    ESR 74  CRP 16    CMP nml  CBC - h/h 11.3/37.1  Nml white count and plt count    CDAI 43.5-Very high disease activity      B/l UE EMG-Eventually    Sleep study    Physical therapy    Weight loss    Continue lexapro  Anxiety-psychiatry    H pylori +  Antibiotics completed  2 polyps on colonoscopy    Osteoporosis +    We started actemra    Pain in the hips,hands and toes unfortunately persist  Fatigue  Morning stiffness    CDAI 30    Takes fosamax    On Mtx 10 tabs weekly,folic acid  CONTINUE MTX for now    D/marino actemra  Offered rinvoq    Since starting Rinvoq, she reported an improvement in her joint pains initially within the first 3-4 days of taking it.  But after about 2 weeks of taking it consistently she noticed her joint pain returned. She also has noticed increased headaches and tenderness in the temples bilaterally. This has been occurring daily for the last few months since starting the Rinvoq. She has also noticed that her vision is a little more blurry than usual.        CDAI 33.5  PRISM RA- not responsive to TNFs      Switch to xeljanz 5 mg bid  CBT for fibro  Lyrica 50 mg tid  Lexparo  Sleep study  Continue mtx 10 - 5 and 5 tabs weekly ,folic acid      The 10-year ASCVD risk score (Scott JANG, et al., 2019) is: 7.1%    Values used to calculate the score:      Age: 62 years      Sex: Female      Is Non- : No      Diabetic: No      Tobacco smoker: No      Systolic Blood Pressure: 151 mmHg      Is BP treated: Yes      HDL Cholesterol: 70 mg/dL      Total Cholesterol: 152 mg/dL    Vaccinations  Fast track        Answers submitted by the patient for this visit:  Rheumatology Questionnaire (Submitted on 12/12/2023)  fever: No  eye redness: No  mouth sores: No  headaches: Yes  shortness of breath: Yes  chest pain: Yes  trouble swallowing: No  diarrhea: No  constipation: No  unexpected weight change: No  genital sore: No  dysuria: No  Bruises or bleeds easily: Yes  cough: No

## 2023-12-14 NOTE — PROGRESS NOTES
Subjective:      Patient ID: Aysha Randolph is a 62 y.o. female.    Chief Complaint: Seropositive RA    Initial Presentation  Aysha Randolph is a 62 y.o. F with a past medical history below who presents today for follow-up for seropositive rheumatoid arthritis.      - The patient establish with [facility] in February 2023 after moving from [place].   - She was diagnosed with RA in December 2020, initially had pain and swelling in the hands, knees, shoulders, along with stiffness  - She has difficult to control RA and has been on various regimens; has failed MTX, Humira, Orencia infusions and Actemra  - [facility] RA showed she was a high non-responder to TNFs  - She was started on Rinvoq in September 2023  - She also has fibromyalgia     Interval History  Since starting Rinvoq, she reported an improvement in her joint pains initially within the first 3-4 days of taking it. But after about 2 weeks of taking it consistently she noticed her joint pain returned. She also has noticed increased headaches and tenderness in the temples bilaterally. This has been occurring daily for the last few months since starting the Rinvoq. She has also noticed that her vision is a little more blurry than usual.     Rheumatology ROS  (-) fevers, chills (-) weight loss, (+) fatigue, (+) morning stiffness,  (+) arthralgias, (+) arthritis, (+) headaches, (+)  vision changes or loss of vision, (-) hx of red eyes including uveitis, iritis, scleritis or episcleritis, (-) photophobia, (-) dry eyes, (-) dry mouth, (-) rash, (-) photosensitivity, (-) alopecia, (-) mucosal ulcers, (-) Raynaud's  phenomenon, (-) SQ nodules, (-) sense of skin tightening in hands, face or torso, (-)  hx pleurisy, (-) sharp chest pains that increase with deep breath, (-) lung fibrosis, (-) hemoptysis, (-) hx of DVT or PE (-) chest pains, (-) shortness of breath, (-) hx pericarditis (-) abdominal pain, (-) nausea, (-) vomiting, (-) diarrhea, (-) constipation, (-)  melena,  (-) bloody diarrhea, (-) UC/Crohns, (-) dysphagia, (-) GERD/Reflux, (-) hematuria, proteinuria, (-) renal failure, (-) focal weakness, (-) trouble combing hair or (-) getting out of chairs,  (-) hx of low WBC, low platelets, anemia, (-) hx of pregnancy losses/pre term deliveries/pregnancy complications, (-) genital ulcers    Answers submitted by the patient for this visit:  Rheumatology Questionnaire (Submitted on 12/12/2023)  fever: No  eye redness: No  mouth sores: No  headaches: Yes  shortness of breath: Yes  chest pain: Yes  trouble swallowing: No  diarrhea: No  constipation: No  unexpected weight change: No  genital sore: No  dysuria: No  Bruises or bleeds easily: Yes  cough: No    History  Medical:  Active Problem List with Overview Notes    Diagnosis Date Noted    Osteoporosis 12/14/2023    H. pylori infection 06/07/2023    Cervical dysplasia 05/18/2023    Vaginal dysplasia 05/18/2023    Hyperlipidemia 02/01/2023    GERD (gastroesophageal reflux disease) 02/01/2023    Rheumatoid arthritis 02/01/2023    Benign essential hypertension 02/01/2023     Surgical:  Past Surgical History:   Procedure Laterality Date    ANKLE SURGERY Left     COLONOSCOPY N/A 05/31/2023    Procedure: COLONOSCOPY;  Surgeon: Robbie Ace MD;  Location: [facility];  Service: Endoscopy;  Laterality: N/A;    ESOPHAGOGASTRODUODENOSCOPY N/A 05/31/2023    Procedure: EGD (ESOPHAGOGASTRODUODENOSCOPY);  Surgeon: Robbie Ace MD;  Location: [facility];  Service: Endoscopy;  Laterality: N/A;    FRACTURE SURGERY      JOINT REPLACEMENT  April 2016    Full Left ankle replacement    WRIST FRACTURE SURGERY Left      Medication:  Current Outpatient Medications   Medication Sig Dispense Refill    albuterol (ACCUNEB) 1.25 mg/3 mL Nebu       albuterol (PROVENTIL/VENTOLIN HFA) 90 mcg/actuation inhaler Proventil HFA 90 mcg/actuation aerosol inhaler      alendronate (FOSAMAX) 70 MG tablet Take 1 tablet (70 mg total) by mouth every 7 days. 12  tablet 3    aspirin (ASPIR-81 ORAL)       atorvastatin (LIPITOR) 10 MG tablet Take 1 tablet (10 mg total) by mouth once daily. 90 tablet 0    cholecalciferol, vitamin D3, 125 mcg (5,000 unit) Tab Take 5,000 Units by mouth once a week.      diclofenac sodium 100 mg 24 hr tablet diclofenac  mg tablet,extended release 24 hr   TAKE 1 TABLET(S) EVERY DAY BY ORAL ROUTE WITH MEALS FOR 30 DAYS.      EScitalopram oxalate (LEXAPRO) 20 MG tablet Take 1 tablet (20 mg total) by mouth once daily. 90 tablet 0    folic acid (FOLVITE) 1 MG tablet Take 1 tablet (1 mg total) by mouth once daily. 90 tablet 3    hydroCHLOROthiazide (HYDRODIURIL) 25 MG tablet Take 1 tablet (25 mg total) by mouth once daily. 90 tablet 0    methotrexate 2.5 MG Tab Take 10 tablets (25 mg total) by mouth every 7 days. 40 tablet 3    multivitamin-minerals-lutein (MULTIVITAMIN 50 PLUS) Tab Take 1 tablet by mouth once daily.      omega 3-dha-epa-fish oil 120-180-500 mg Cap       pantoprazole (PROTONIX) 40 MG tablet Take 1 tablet (40 mg total) by mouth once daily. 90 tablet 3    tocilizumab 162 mg/0.9 mL PnIj Inject 162 mg into the skin every 7 days. 4 each 3    upadacitinib (RINVOQ) 15 mg 24 hr tablet Take 1 tablet (15 mg total) by mouth once daily. 30 tablet 2     No current facility-administered medications for this visit.       Imaging/Procedures  XR Hands Bilaterally (2/9/23):  Left hand:     There is osteopenia.  There are degenerative changes of the hand and wrist.  There is calcification in the region of the TFCC.  There is edema about the digits and dorsal hand.  No acute displaced fracture or dislocation of the left hand.     Right hand:     There are degenerative changes of the right hand including the 1st carpal metacarpal joint.  No acute displaced fracture or dislocation of the hand.  No radiopaque foreign body.  There is edema about the digits and dorsal aspect of the hand.  The wrist appears intact.     Impression:     1. No acute displaced  fracture or dislocation of either hand noting degenerative changes, edema, and additional findings above.        Electronically signed by: Akshat Ruiz MD  Date:                                            02/09/2023  Time:                                           11:04     DEXA (2/15/23):  Lumbar spine (L1-L4):               BMD is 0.940 g/cm2, T-score is -1.0, and Z-score is 0.7.     Total hip:                                BMD is 0.881 g/cm2, T-score is -0.5, and Z-score is 0.7.     Femoral neck:                          BMD is 0.645 g/cm2, T-score is -1.8, and Z-score is -0.4.     Distal 1/3 radius:                      Not applicable     FRAX:     27% risk of a major osteoporotic fracture in the next 10 years.     5% risk of hip fracture in the next 10 years.     Impression:     *Osteoporosis based on T-score between -1.0 and -2.5 and elevated risk based on FRAX  *Fracture risk is very high due to calculated 10 year risk of hip fracture >4.5% (FRAX)     RECOMMENDATIONS:  *Daily calcium intake 2403-3700 mg, dietary sources preferred; Vitamin D 1600-8323 IU daily.  *Weight bearing exercise and fall precautions.  *Recommend medical therapy for osteoporosis with high risk for fracture. Consider bisphosphonates (alendronate, risedronate, zoledronic acid), or denosumab.  *Repeat BMD in 2 years    Rheumatologic labs  Component      Latest Ref Rng 12/11/2023   WBC      3.90 - 12.70 K/uL 9.42    RBC      4.00 - 5.40 M/uL 3.41 (L)    Hemoglobin      12.0 - 16.0 g/dL 11.5 (L)    Hematocrit      37.0 - 48.5 % 34.1 (L)    MCV      82 - 98 fL 100 (H)    MCH      27.0 - 31.0 pg 33.7 (H)    MCHC      32.0 - 36.0 g/dL 33.7    RDW      11.5 - 14.5 % 14.6 (H)    Platelet Count      150 - 450 K/uL 305       Component      Latest Ref Rng 12/11/2023   Sodium      136 - 145 mmol/L 139    Potassium      3.5 - 5.1 mmol/L 4.2    Chloride      95 - 110 mmol/L 99    CO2      23 - 29 mmol/L 28    Glucose      70 - 110 mg/dL 112 (H)   "  BUN      8 - 23 mg/dL 15    Creatinine      0.5 - 1.4 mg/dL 0.8    Calcium      8.7 - 10.5 mg/dL 10.0    PROTEIN TOTAL      6.0 - 8.4 g/dL 7.1    Albumin      3.5 - 5.2 g/dL 3.9    BILIRUBIN TOTAL      0.1 - 1.0 mg/dL 1.3 (H)    ALP      55 - 135 U/L 71    AST      10 - 40 U/L 39    ALT      10 - 44 U/L 40    eGFR      >60 mL/min/1.73 m^2 >60.0    Anion Gap      8 - 16 mmol/L 12       Component      Latest Ref Rng 12/11/2023   CRP      0.0 - 8.2 mg/L 8.2    Sed Rate      0 - 36 mm/Hr 12      Component      Latest Ref Rn 12/11/2023   Cholesterol Total      120 - 199 mg/dL 152    Triglycerides      30 - 150 mg/dL 117    HDL      40 - 75 mg/dL 70    LDL Cholesterol      63.0 - 159.0 mg/dL 58.6 (L)    HDL/Cholesterol Ratio      20.0 - 50.0 % 46.1    Total Cholesterol/HDL Ratio      2.0 - 5.0  2.2    Non-HDL Cholesterol      mg/dL 82       Rheumatologic medications history  MTX 10 tablets (11/2020-present)  Prednisone (last used in June 2023)  Humira (03/2021-12/2021; discontinued due to lack of efficacy, used genetic testing to see that she was not a responder)    Orencia (05/2022-10/2022; discontinued due to lack of efficacy)  Actemra (06/2023-09/2023)  Rinvoq (09/2023-12/2023; discontinued due to headaches)    Objective:   BP (!) 151/81   Pulse 102   Ht 4' 11" (1.499 m)   Wt 85.5 kg (188 lb 7.9 oz)   BMI 38.07 kg/m²   Physical Exam   Constitutional: No distress.   HENT:   Mouth/Throat: Mucous membranes are moist.   Eyes: Conjunctivae are normal.   Cardiovascular: Normal rate, regular rhythm, normal heart sounds and normal pulses.   Pulmonary/Chest: Effort normal and breath sounds normal.   Abdominal: Soft. Bowel sounds are normal.   Musculoskeletal:         General: Swelling (soft tissue swelling of L hand) present. Normal range of motion.      Cervical back: Normal range of motion. No rigidity or tenderness.   Neurological: She is alert. She displays no weakness. Gait normal.   Skin: Skin is warm.      " 2/9/2023 6/1/2023 9/20/2023 12/14/2023   Tender (HIGGINBOTHAM-28) 23 / 28 27 / 28 25 / 28 22 / 28    Swollen (HIGGINBOTHAM-28) 1 / 28 5 / 28 4 / 28 0 / 28    Provider Global 30 mm 60 mm 30 mm 50 mm   Patient Global 55 mm 55 mm 50 mm 65 mm   ESR 52 mm/hr 74 mm/hr 3 mm/hr 12 mm/hr   CRP 21.8 mg/L 16.6 mg/L 0.7 mg/L 8.2 mg/L   HIGGINBOTHAM-28 (ESR) 6.5 (High disease activity) 7.32 (High disease activity) 4.83 (Moderate disease activity) 5.28 (High disease activity)   HIGGINBOTHAM-28 (CRP) 5.82 (High disease activity) 6.3 (High disease activity) 5.21 (High disease activity) 5.3 (High disease activity)   CDAI Score 32.5  43.5  37  33.5      Assessment:     1. Rheumatoid arthritis involving multiple sites with positive rheumatoid factor    2. Osteoporosis without current pathological fracture    3. Fibromyalgia      Plan:     Problem List Items Addressed This Visit          Immunology/Multi System    Rheumatoid arthritis - Primary       Endocrine    Osteoporosis     Other Visit Diagnoses       Fibromyalgia              Aysha Randolph is a 62 y.o. F with a past medical history below who presents today for follow-up for seropositive rheumatoid arthritis.     Seropositive rheumatoid arthritis- currently uncontrolled; has failed MTX, Humira, Orencia, Actemra and now Rinvoq. No synovitis on exam. Recent inflammatory markers normal. CDAI of 33.5 today. Fibromyalgia may be playing a role in the patients pain today however given her medication side effects (headaches), we will need to switch her RA treatment.     Fibromyalgia- The patient has widespread pain, on both sides of the body, above and below the waist. The patient has multiple associated symptoms with fibromyalgia that include fatigue, brain fog, waking up tired, irritable bowel syndrome, depression, insomnia, anxiety, headaches.   I spent time counseling the patient on fibromyalgia. I explained to the patient that fibromyalgia is a disorder characterized by widespread musculoskeletal pain,  accompanied by fatigue, sleep, memory and mood issues. In general, it is thought that fibromyalgia symptoms are secondary to overactive nerves, which amplify painful sensations by the way the brain processes the pain signals.     Plan:  - Monitor CBC, CMP, ESR, CRP every 3 months   - Stop Rinvoq   - Start Xeljanx   - Patient counseled on potential risk of infection, headaches, rash, gastrointestinal symptoms, cytopenias, increase liver enzymes, increased cholesterol, allergic reaction, cancers (controversial); discussed increased risk of major adverse cardiovascular events including thrombosis, heart attack or stroke with a drug in the same class of medications.    - Continue MTX 25 mg weekly and folic acid daily; recommended that the patient split the dose (5 tablets in AM, 5 tablets in PM) for better absorption   - Start Lyrica 50 mg TID for fibromyalgia   - Referral to sleep for sleep study   - Referral to [facility] for Fibromyalgia     This patient was examined with Dr. Lloyd. Plan discussed with the patient. Return to clinic in 3 months.    Kayla Cobb MD  Rheumatology Fellow, PGY4

## 2024-01-02 ENCOUNTER — HOSPITAL ENCOUNTER (OUTPATIENT)
Dept: RADIOLOGY | Facility: HOSPITAL | Age: 64
Discharge: HOME | End: 2024-01-02
Attending: NURSE PRACTITIONER
Payer: MEDICARE

## 2024-01-02 ENCOUNTER — OFFICE VISIT (OUTPATIENT)
Dept: PRIMARY CARE CLINIC | Facility: CLINIC | Age: 64
End: 2024-01-02
Payer: MEDICARE

## 2024-01-02 VITALS
OXYGEN SATURATION: 97 % | DIASTOLIC BLOOD PRESSURE: 78 MMHG | WEIGHT: 190.69 LBS | BODY MASS INDEX: 38.52 KG/M2 | SYSTOLIC BLOOD PRESSURE: 150 MMHG | HEART RATE: 96 BPM

## 2024-01-02 DIAGNOSIS — J45.901 MILD ASTHMA EXACERBATION: Primary | ICD-10-CM

## 2024-01-02 DIAGNOSIS — I10 BENIGN ESSENTIAL HYPERTENSION: ICD-10-CM

## 2024-01-02 DIAGNOSIS — J45.901 MILD ASTHMA EXACERBATION: ICD-10-CM

## 2024-01-02 PROCEDURE — 99213 OFFICE O/P EST LOW 20 MIN: CPT | Mod: PBBFAC,25 | Performed by: NURSE PRACTITIONER

## 2024-01-02 PROCEDURE — 99999 PR PBB SHADOW E&M-EST. PATIENT-LVL III: CPT | Mod: PBBFAC,,, | Performed by: NURSE PRACTITIONER

## 2024-01-02 PROCEDURE — 71046 X-RAY EXAM CHEST 2 VIEWS: CPT | Mod: 26,,, | Performed by: RADIOLOGY

## 2024-01-02 PROCEDURE — 71046 X-RAY EXAM CHEST 2 VIEWS: CPT | Mod: TC

## 2024-01-02 PROCEDURE — 99214 OFFICE O/P EST MOD 30 MIN: CPT | Mod: S$PBB,,, | Performed by: NURSE PRACTITIONER

## 2024-01-02 RX ORDER — PREDNISONE 20 MG/1
TABLET ORAL
Qty: 10 TABLET | Refills: 0 | Status: SHIPPED | OUTPATIENT
Start: 2024-01-02

## 2024-01-02 RX ORDER — ALBUTEROL SULFATE 90 UG/1
2 AEROSOL, METERED RESPIRATORY (INHALATION) EVERY 4 HOURS PRN
Qty: 6.7 G | Refills: 1 | Status: SHIPPED | OUTPATIENT
Start: 2024-01-02

## 2024-01-02 RX ORDER — AZITHROMYCIN 250 MG/1
TABLET, FILM COATED ORAL
Qty: 6 TABLET | Refills: 0 | Status: SHIPPED | OUTPATIENT
Start: 2024-01-02 | End: 2024-01-07

## 2024-01-02 RX ORDER — ALBUTEROL SULFATE 1.25 MG/3ML
1.25 SOLUTION RESPIRATORY (INHALATION) EVERY 6 HOURS PRN
Qty: 75 ML | Refills: 1 | Status: SHIPPED | OUTPATIENT
Start: 2024-01-02

## 2024-01-02 NOTE — PROGRESS NOTES
[facility] Note    Chief Complaint      Chief Complaint   Patient presents with    Cough    Asthma       History of Present Illness      Aysha Randolph is a 63 y.o. female with chronic conditions of arthritis, asthma, hypertension, RA who presents today for: intermittent cough x 1 month. Does have asthma, has been using inhalers with minimal relief. Has been using inhaler 3-4x day and alternates with nebulizer.  Cough worse at night. Pt states cough is productive thick white. Denies fever or chills. Covid negative at home.    Past Medical History:  Past Medical History:   Diagnosis Date    Abnormal Pap smear of cervix 2017    Arthritis     Hypertension     Mild intermittent asthma, uncomplicated     Rheumatoid arthritis, unspecified 12/17/2020       Past Surgical History:   has a past surgical history that includes Ankle surgery (Left); Wrist fracture surgery (Left); Colonoscopy (N/A, 05/31/2023); Esophagogastroduodenoscopy (N/A, 05/31/2023); Fracture surgery; and Joint replacement (April 2016).    Family History:  family history includes Arthritis in her mother and sister; Asthma in her sister and sister; Cervical cancer in her maternal cousin; Diabetes in her sister; Early death in her sister; Heart disease in her mother, sister, and sister; Hypertension in her mother; Stroke in her mother, sister, and sister.     Social History:  Social History     Tobacco Use    Smoking status: Never    Smokeless tobacco: Never   Substance Use Topics    Alcohol use: Not Currently     Comment: Very rarely    Drug use: Never       Review of Systems   Constitutional:  Negative for chills and fever.   Respiratory:  Positive for cough, sputum production and wheezing. Negative for shortness of breath.    Cardiovascular:  Negative for chest pain and palpitations.   Gastrointestinal:  Negative for constipation, diarrhea, nausea and vomiting.   Genitourinary:  Negative for dysuria and hematuria.   Musculoskeletal:   Negative for falls.   Neurological:  Negative for headaches.        Medications:  Outpatient Encounter Medications as of 1/2/2024   Medication Sig Dispense Refill    albuterol (ACCUNEB) 1.25 mg/3 mL Nebu       albuterol (PROVENTIL/VENTOLIN HFA) 90 mcg/actuation inhaler Proventil HFA 90 mcg/actuation aerosol inhaler      alendronate (FOSAMAX) 70 MG tablet Take 1 tablet (70 mg total) by mouth every 7 days. 12 tablet 3    aspirin (ASPIR-81 ORAL)       atorvastatin (LIPITOR) 10 MG tablet Take 1 tablet (10 mg total) by mouth once daily. 90 tablet 0    cholecalciferol, vitamin D3, 125 mcg (5,000 unit) Tab Take 5,000 Units by mouth once a week.      diclofenac sodium 100 mg 24 hr tablet diclofenac  mg tablet,extended release 24 hr   TAKE 1 TABLET(S) EVERY DAY BY ORAL ROUTE WITH MEALS FOR 30 DAYS.      EScitalopram oxalate (LEXAPRO) 20 MG tablet Take 1 tablet (20 mg total) by mouth once daily. 90 tablet 0    folic acid (FOLVITE) 1 MG tablet Take 1 tablet (1 mg total) by mouth once daily. 90 tablet 3    hydroCHLOROthiazide (HYDRODIURIL) 25 MG tablet Take 1 tablet (25 mg total) by mouth once daily. 90 tablet 0    methotrexate 2.5 MG Tab Take 10 tablets (25 mg total) by mouth every 7 days. 40 tablet 3    multivitamin-minerals-lutein (MULTIVITAMIN 50 PLUS) Tab Take 1 tablet by mouth once daily.      omega 3-dha-epa-fish oil 120-180-500 mg Cap       pantoprazole (PROTONIX) 40 MG tablet Take 1 tablet (40 mg total) by mouth once daily. 90 tablet 3    pregabalin (LYRICA) 50 MG capsule Take 1 capsule (50 mg total) by mouth 3 (three) times daily. 90 capsule 6    tofacitinib (XELJANZ) 5 mg Tab Take 5 mg by mouth 2 (two) times daily. 60 tablet 11     No facility-administered encounter medications on file as of 1/2/2024.       Allergies:  Review of patient's allergies indicates:  No Known Allergies    Health Maintenance:  Immunization History   Administered Date(s) Administered    Influenza 10/02/2015    Influenza - Quadrivalent -  PF *Preferred* (6 months and older) 11/28/2023    Tdap 10/02/2015      Health Maintenance   Topic Date Due    Shingles Vaccine (2 of 2) 11/27/2023    Mammogram  02/15/2024    DEXA Scan  02/15/2025    TETANUS VACCINE  10/02/2025    Lipid Panel  12/11/2028    Colorectal Cancer Screening  05/31/2030    Hepatitis C Screening  Completed        Physical Exam      Vital Signs  Pulse: 96  SpO2: 97 %  BP: (!) 150/78  BP Location: Right arm  Pain Score:   3  Height and Weight  Weight: 86.5 kg (190 lb 11.2 oz)]    Physical Exam  Constitutional:       General: She is not in acute distress.     Appearance: She is well-developed.   HENT:      Head: Normocephalic and atraumatic.      Right Ear: Tympanic membrane normal.      Left Ear: Tympanic membrane normal.      Nose: Nose normal.      Mouth/Throat:      Mouth: Mucous membranes are moist.      Pharynx: No oropharyngeal exudate or posterior oropharyngeal erythema.   Eyes:      Pupils: Pupils are equal, round, and reactive to light.   Cardiovascular:      Rate and Rhythm: Normal rate and regular rhythm.      Heart sounds: Normal heart sounds. No murmur heard.  Pulmonary:      Effort: Pulmonary effort is normal. No respiratory distress.      Breath sounds: Wheezing present.   Abdominal:      General: There is no distension.      Palpations: Abdomen is soft.      Tenderness: There is no abdominal tenderness. There is no guarding.   Musculoskeletal:      Cervical back: Normal range of motion.   Skin:     General: Skin is warm and dry.   Neurological:      Mental Status: She is alert. Mental status is at baseline.   Psychiatric:         Behavior: Behavior normal.          Laboratory:  CBC:  Recent Labs   Lab 05/25/23  0859 09/01/23  1019 12/11/23  0725   WBC 10.91 8.13 9.42   RBC 3.80 L 4.02 3.41 L   Hemoglobin 11.3 L 12.4 11.5 L   Hematocrit 37.1 38.5 34.1 L   Platelets 358 235 305   MCV 98 96 100 H   MCH 29.7 30.8 33.7 H   MCHC 30.5 L 32.2 33.7     CMP:  Recent Labs   Lab  23  0859 23  1019 23  0725   Glucose 138 H 103 112 H   Calcium 9.9 9.9 10.0   Albumin 3.9 4.2 3.9   Total Protein 7.9 7.0 7.1   Sodium 141 141 139   Potassium 3.7 3.7 4.2   CO2 31 H 28 28   Chloride 100 101 99   BUN 20 19 15   Alkaline Phosphatase 93 61 71   ALT 21 43 40   AST 19 34 39   Total Bilirubin 0.5 2.0 H 1.3 H     URINALYSIS:       LIPIDS:  Recent Labs   Lab 02/10/23  0805 23  0725   HDL 56 70   Cholesterol 165 152   Triglycerides 106 117   LDL Cholesterol 87.8 58.6 L   HDL/Cholesterol Ratio 33.9 46.1   Non-HDL Cholesterol 109 82   Total Cholesterol/HDL Ratio 2.9 2.2     TSH:      A1C:        Assessment/Plan     Aysha Randolph is a 63 y.o.female with:    1. Mild asthma exacerbation  - sent in new inhaler rx, hers was . Explained to her the nebulizer and inhaler are the same medication, it just depends on what route she prefers. Pt aware.   - sent in steroids to help   - sent in zpak to cover for concurrent URI  - advise to get Pulse ox at home  - chest x-ray, Will follow up with results.   - advised to go to ER for worsening SOB, cough    2. Benign essential hypertension   Bp slightly elevated today. Will return to clinic for nurse visit bp check.   Currently on hctz.     Chronic conditions status updated as per HPI.  Other than changes above, cont current medications and maintain follow up with specialists.  No follow-ups on file.    Future Appointments   Date Time Provider Department Center   2024 10:00 AM Stefany Crooks NP [facility] FR REST [facility]   2024  9:15 AM [facility]  MAMMO1 [facility] M[facility] [place]   3/14/2024 10:10 AM Fariba Gaytan MD [facility] DERM [place]   2024  8:30 AM LAB, [facility] LAB[facility] [place]   2024  9:20 AM Rosa Jarrett MD [facility] PRICRE [place]       [facility]

## 2024-01-11 ENCOUNTER — OFFICE VISIT (OUTPATIENT)
Dept: PAIN MEDICINE | Facility: OTHER | Age: 64
End: 2024-01-11
Payer: MEDICARE

## 2024-01-11 DIAGNOSIS — R68.89 DECREASED MOTOR ACTIVITY: ICD-10-CM

## 2024-01-11 DIAGNOSIS — M05.79 RHEUMATOID ARTHRITIS INVOLVING MULTIPLE SITES WITH POSITIVE RHEUMATOID FACTOR: Primary | ICD-10-CM

## 2024-01-11 DIAGNOSIS — R26.89 IMBALANCE: ICD-10-CM

## 2024-01-11 DIAGNOSIS — M79.7 FIBROMYALGIA: ICD-10-CM

## 2024-01-11 PROCEDURE — 99205 OFFICE O/P NEW HI 60 MIN: CPT | Mod: S$PBB,,, | Performed by: NURSE PRACTITIONER

## 2024-01-11 NOTE — PROGRESS NOTES
[facility]    Initial Medical Screening Visit Note    Subjective:       Chief Complaint Requiring Rehabilitation: Chronic Pain    Consulted by: Kayla Cobb MD      HPI:     Aysha Randolph is a 63 y.o. female who presents today for the [facility] Medical Screening Visit. Aysha Randolph was referred by Kayla Cobb MD with associated diagnosis of chronic pain.     She was diagnosed with Rheumatoid Arthritis in December 2020. Initially presented with pain/stiffness/swelling into the joints of the hands, knees, hips, shoulders. She has tried multiple medications with limited relief and side effects. Per Rheum notes, tried MTX, Humira, Orencia infusions and Actemra. She was most recently on Rinvoq. She stopped taking this due to side effect of headache. At her last visit, her numbers were normal. She also notes improvement in pain over the last few weeks. She does have a significant family history of autoimmune disease. Her sister and niece had RA. She also notes fibromyalgia runs in her family. She has always believed she had fibromyalgia, but did not wish to take medications for this. She has had pain throughout her life, worsened in her 30s. She will have pain with light touch. She does have a history of left ankle replacement. She will have intermittent pain in that joint. History obtained via interview and chart review. See below for additional details.         Chronic Pain History:      Ambulatory status:  Fully ambulatory       Balance problems?  Intermittent  No recent falls       Fainting/Syncope/POTS?  Does feel like headed with moving from sitting to standing       Physical Therapy?  PT for wrist- 2.5 years ago   Ankle- 2016      Exercise Habits?  No       Alternative/Complementary Therapies (massage, yoga, amita chi, acupuncture, guided imagery, chiropractic care, hypnosis, biofeedback, herbs, supplements, dietary approaches)?  No       Current pain  medications:  Lyrica      Pain management injections:  No       Relevant surgeries:  Right rotator cuff repair several years ago      Any upcoming surgeries or procedures? No       Working/Employed?  Retired          Sleep: difficulty staying asleep, wakes to urinate      FAITH? Upcoming      Sleep Aids? No       Mental Health Hx/Tx  Anxiety since 9/11; was in [facility] as her father was dying, was stuck there for a while     Stress/Stress Mgmt comments: prayer     Inpatient Psychiatric Tx? No     SI? No       Social Hx/Connections:    (27 years)  2 children  2 grandchildren       Health Habits:      Smoking Status: No       Alcohol use: No      Illegal/illicit drug use: No      Substance abuse hx?: No           Past Medical History:   Diagnosis Date    Abnormal Pap smear of cervix 2017    Arthritis     Hypertension     Mild intermittent asthma, uncomplicated     Rheumatoid arthritis, unspecified 12/17/2020       Past Surgical History:   Procedure Laterality Date    ANKLE SURGERY Left     COLONOSCOPY N/A 05/31/2023    Procedure: COLONOSCOPY;  Surgeon: Robbie Ace MD;  Location: [facility];  Service: Endoscopy;  Laterality: N/A;    ESOPHAGOGASTRODUODENOSCOPY N/A 05/31/2023    Procedure: EGD (ESOPHAGOGASTRODUODENOSCOPY);  Surgeon: Robbie Ace MD;  Location: [facility];  Service: Endoscopy;  Laterality: N/A;    FRACTURE SURGERY      JOINT REPLACEMENT  April 2016    Full Left ankle replacement    WRIST FRACTURE SURGERY Left        Review of patient's allergies indicates:  No Known Allergies    Current Outpatient Medications   Medication Sig Dispense Refill    albuterol (ACCUNEB) 1.25 mg/3 mL Nebu Take 3 mLs (1.25 mg total) by nebulization every 6 (six) hours as needed (shortness of breath, wheezing). 75 mL 1    albuterol (PROVENTIL/VENTOLIN HFA) 90 mcg/actuation inhaler Proventil HFA 90 mcg/actuation aerosol inhaler      albuterol (VENTOLIN HFA) 90 mcg/actuation inhaler Inhale 2 puffs into the  lungs every 4 (four) hours as needed for Wheezing. Rescue 6.7 g 1    alendronate (FOSAMAX) 70 MG tablet Take 1 tablet (70 mg total) by mouth every 7 days. 12 tablet 3    aspirin (ASPIR-81 ORAL)       atorvastatin (LIPITOR) 10 MG tablet Take 1 tablet (10 mg total) by mouth once daily. 90 tablet 0    cholecalciferol, vitamin D3, 125 mcg (5,000 unit) Tab Take 5,000 Units by mouth once a week.      diclofenac sodium 100 mg 24 hr tablet diclofenac  mg tablet,extended release 24 hr   TAKE 1 TABLET(S) EVERY DAY BY ORAL ROUTE WITH MEALS FOR 30 DAYS.      EScitalopram oxalate (LEXAPRO) 20 MG tablet Take 1 tablet (20 mg total) by mouth once daily. 90 tablet 0    folic acid (FOLVITE) 1 MG tablet Take 1 tablet (1 mg total) by mouth once daily. 90 tablet 3    hydroCHLOROthiazide (HYDRODIURIL) 25 MG tablet Take 1 tablet (25 mg total) by mouth once daily. 90 tablet 0    methotrexate 2.5 MG Tab Take 10 tablets (25 mg total) by mouth every 7 days. 40 tablet 3    multivitamin-minerals-lutein (MULTIVITAMIN 50 PLUS) Tab Take 1 tablet by mouth once daily.      omega 3-dha-epa-fish oil 120-180-500 mg Cap       pantoprazole (PROTONIX) 40 MG tablet Take 1 tablet (40 mg total) by mouth once daily. 90 tablet 3    predniSONE (DELTASONE) 20 MG tablet Take 2 tablets by mouth daily X 5 days 10 tablet 0    pregabalin (LYRICA) 50 MG capsule Take 1 capsule (50 mg total) by mouth 3 (three) times daily. 90 capsule 6    tofacitinib (XELJANZ) 5 mg Tab Take 5 mg by mouth 2 (two) times daily. 60 tablet 11     No current facility-administered medications for this visit.       Family History   Problem Relation Age of Onset    Arthritis Mother     Heart disease Mother     Hypertension Mother     Stroke Mother         Suffered a stroke and her left side became paralyzed    Cervical cancer Maternal Cousin     Arthritis Sister     Asthma Sister     Diabetes Sister         Diagnosed at the age of 12    Heart disease Sister     Stroke Sister         Has  suffered several mini strokes    Asthma Sister          of an asthma attack at the age of 46    Early death Sister          at the age of 46    Heart disease Sister     Stroke Sister     Breast cancer Neg Hx     Colon cancer Neg Hx        Social History     Socioeconomic History    Marital status:    Tobacco Use    Smoking status: Never    Smokeless tobacco: Never   Substance and Sexual Activity    Alcohol use: Not Currently     Comment: Very rarely    Drug use: Never    Sexual activity: Yes     Partners: Male     Birth control/protection: None     Comment: Birth control pills              Objective:        GEN: Well developed, well nourished. No acute distress. Fully alert, oriented, and appropriate. Speech normal marilee.   PSYCH: Normal affect. Thought content appropriate.  CHEST: Breathing symmetric. No audible wheezing.  SKIN: Warm, dry. No rash or discoloration on exposed areas.   NEURO/MUSCULOSKELETAL:  Cervical: ROM full and painless; Mild pain with palpation over neck and shoulder musculature; 5/5 UE strength; gross sensation and reflexes intact bilaterally; Negative Machuca's bilaterally.  Lumbar: ROM limited and painful. TTP lumbar musculature; 5/5 LE strength bilaterally except 4/5 left ankle plantar and dorsiflexion; gross sensation and reflexes intact bilaterally. Decreased ROM to left ankle.   SLR negative bilaterally (sitting)  TRESSA negative bilaterally (sitting)           Imaging:      Xray Wrist 2023:  FINDINGS:  Patchy osteopenia.  Remote healed fracture of the distal radius.  No acute displaced fractures.  Postoperative lucent tracks project over the radial and 5th metacarpal shafts.  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  No suspicious lytic or blastic lesions.  Punctate calcification within the expected location of the triangular fibrocartilage.  No significant soft tissue swelling.     Impression:     1. No acute radiographic abnormalities of the right wrist.  See  above.    Assessment:     Encounter Diagnoses   Name Primary?    Rheumatoid arthritis involving multiple sites with positive rheumatoid factor Yes    Fibromyalgia     Decreased motor activity     Imbalance        Plan:     Diagnoses and all orders for this visit:    Rheumatoid arthritis involving multiple sites with positive rheumatoid factor  -     Ambulatory referral/consult to Physical/Occupational Therapy; Future  -     Ambulatory referral/consult to Physical/Occupational Therapy; Future  -     Ambulatory referral/consult to Psychiatry; Future    Fibromyalgia  -     Ambulatory referral[facility]ult to [facility]  -     Ambulatory referral/consult to Psychiatry; Future    Decreased motor activity  -     Ambulatory referral/consult to Physical/Occupational Therapy; Future  -     Ambulatory referral/consult to Physical/Occupational Therapy; Future    Imbalance  -     Ambulatory referral/consult to Physical/Occupational Therapy; Future  -     Ambulatory referral/consult to Physical/Occupational Therapy; Future         Aysha Randolph is a 63 y.o. female with the above diagnoses.      Education about pain and the chronic pain cycle was provided today. Discussed the importance of multimodal and multidisciplinary management of chronic pain with a focus on both pain relief and function. Discussed how our team provides education and training aimed at improving physical function, emotional health, stress and pain coping skills.Treatment is designed to build confidence in physical activity and ADLs and in your ability to control and manage your pain.     Recommend proceeding with PT/OT screening visit for further evaluation of personal goals, functional status and limitations prior to enrollment in the program.     I spent a total of 60 minutes with the patient, and greater than 50% of the time was spent in counseling and education.     The above plan and management options were discussed at length with  patient. Patient is in agreement with the above and verbalized understanding. It will be communicated with the referring physician via electronic record, fax, or mail.    Stefany Crooks NP  01/11/2024

## 2024-01-11 NOTE — PROGRESS NOTES
Aysha participated in Occupational Therapy evaluation; please see plan of care for details.     LINA Carvalho, OTR/L, CEAS-I  1/26/2024

## 2024-01-24 ENCOUNTER — CLINICAL SUPPORT (OUTPATIENT)
Dept: REHABILITATION | Facility: OTHER | Age: 64
End: 2024-01-24
Payer: MEDICARE

## 2024-01-24 DIAGNOSIS — R26.89 IMBALANCE: ICD-10-CM

## 2024-01-24 DIAGNOSIS — M05.79 RHEUMATOID ARTHRITIS INVOLVING MULTIPLE SITES WITH POSITIVE RHEUMATOID FACTOR: ICD-10-CM

## 2024-01-24 DIAGNOSIS — M25.69 DECREASED RANGE OF MOTION OF TRUNK AND BACK: ICD-10-CM

## 2024-01-24 DIAGNOSIS — R68.89 DECREASED MOTOR ACTIVITY: ICD-10-CM

## 2024-01-24 DIAGNOSIS — F40.298 FEAR OF PAIN: Primary | ICD-10-CM

## 2024-01-24 DIAGNOSIS — R52 PAIN AGGRAVATED BY ACTIVITIES OF DAILY LIVING: Primary | ICD-10-CM

## 2024-01-24 PROCEDURE — 97110 THERAPEUTIC EXERCISES: CPT

## 2024-01-24 PROCEDURE — 97530 THERAPEUTIC ACTIVITIES: CPT

## 2024-01-24 PROCEDURE — 97165 OT EVAL LOW COMPLEX 30 MIN: CPT

## 2024-01-24 PROCEDURE — 97163 PT EVAL HIGH COMPLEX 45 MIN: CPT

## 2024-01-24 NOTE — PROGRESS NOTES
"  NOTE: This is a duplicate copy utilized for reassessment purposes & continuity of care.  See pt's plan of care for co-signed copy.    [facility]  Functional Restoration Program  Physical Therapy Initial Evaluation    Date: 1/24/2024   Name: Aysha Randolph  Clinic Number: [id number]    Therapy Diagnosis:   Encounter Diagnoses   Name Primary?    Rheumatoid arthritis involving multiple sites with positive rheumatoid factor     Decreased motor activity     Imbalance     Fear of pain Yes    Decreased range of motion of trunk and back        Physician: Stefany Crooks, NP    Physician Orders: PT Eval and Treat   Medical Diagnosis from Referral:    M05.79 (ICD-10-CM) - Rheumatoid arthritis involving multiple sites with positive rheumatoid factor   R68.89 (ICD-10-CM) - Decreased motor activity   R26.89 (ICD-10-CM) - Imbalance     Evaluation Date: 1/24/2024  Authorization Period Expiration: 10/10/25  Plan of Care Expiration: 04/03/24  Visit # / Visits authorized: 01/ 01  FOTO: 01/ 03     Precautions: Standard and Fall  L ankle replacement 2016, L wrist fx and surgery 2022, RA      Time In: 1:30 pm   Time Out: 2:45 pm  Total Appointment Time (timed & untimed codes): 75 minutes        Subjective     Date of onset: 3 yrs after a move to [place] to be closer to family, exacerbated /c move to [facility] 1 yr ago      Patient reported history of current condition - Aysha report varied areas of pain throughout her body. "Each day it is something different"  Patient report today sx most intense in her B hands.  Other areas of discomfort include B ankle, B knees, B hips, LBP, B shoulders.  Patient report AM sx presentation typical to hip stiffness.  Patient note inc LBP /c extended walking.  Patient report at times waking in middle of night due to discomfort   Patient report when B ankle pain occur her toes can curl up leading to dec balance.  Patient note numerous near falls in last few months and fwd fall " "last [employer] [place].    Patient report sx begin approximately 3 yrs ago /c move to [place] to be closer to daughter and family and continue /c move last year to [facility] to be near son and family.   Patient report needing to take breaks during household chores like laundry folding.  Patient report ascend/descend stair /c step to pattern from to fear of falling due to perceived LLE weakness.  Patient report concern for wrist strength noting sig difficulty opening cans.  Patient note hip discomfort make getting in/out of car painful. Patient also note difficulty playing /c grandchildren especially /c floor to stand transfers   Patient report not liking to take pain meds only taking OTC meds /c inc sx intensity but notes "only takes the edges".   However, patient report improved sx over the last month and attributes this to medication change.    Patient report prior PT for L hand and ankle include strengthening /c mod relief.           Per MD report    Aysha Randolph is a 63 y.o. female who presents today for the Functional Restoration Program Medical Screening Visit. Aysha Randolph was referred by Kayla Cobb MD with associated diagnosis of chronic pain.   She was diagnosed with Rheumatoid Arthritis in December 2020. Initially presented with pain/stiffness/swelling into the joints of the hands, knees, hips, shoulders. She has tried multiple medications with limited relief and side effects. Per Rheum notes, tried MTX, Humira, Orencia infusions and Actemra. She was most recently on Rinvoq. She stopped taking this due to side effect of headache. At her last visit, her numbers were normal. She also notes improvement in pain over the last few weeks. She does have a significant family history of autoimmune disease. Her sister and niece had RA. She also notes fibromyalgia runs in her family. She has always believed she had fibromyalgia, but did not wish to take medications for this. She has had pain throughout her life, " worsened in her 30s. She will have pain with light touch. She does have a history of left ankle replacement. She will have intermittent pain in that joint. History obtained via interview and chart review.         Imaging:  Per MD note:   Xray Wrist 2/22/2023:  FINDINGS:  Patchy osteopenia.  Remote healed fracture of the distal radius.  No acute displaced fractures.  Postoperative lucent tracks project over the radial and 5th metacarpal shafts.  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  No suspicious lytic or blastic lesions.  Punctate calcification within the expected location of the triangular fibrocartilage.  No significant soft tissue swelling.     Impression:     1. No acute radiographic abnormalities of the right wrist.  See above.    Prior Therapy: PT for wrist- 2.5 years ago Ankle- 2016  Prior Treatment: R RTC years ago   Social History: lives [facility] [place] to enter   [employer] /c  (27 years) 2 children (son in [facility]) 2 grandchildren (1 in [facility])   Occupation:  retired -    Prior Level of Function: Ind /c ADLs   Current Level of Function: Ind /c ADLs difficulty household chores, playing with grandchildren    Pain:      Current 4/10 B wrist, worst 4/10 today wrist, best 4/10   Location: bilateral wrist, B hips, B shoulders, B knees, B ankles  Description:  sharp, varied intensity  Aggravating Factors: Standing, Walking, Lifting, and Getting out of bed/chair  Easing Factors: ice on wrists     Patient's FRP goals: improved floor to stand movement to play with grandchildren, return to grocery visits with       Medical History:   Past Medical History:   Diagnosis Date    Abnormal Pap smear of cervix 2017    Arthritis     Hypertension     Mild intermittent asthma, uncomplicated     Rheumatoid arthritis, unspecified 12/17/2020       Surgical History:   Aysha Randolph  has a past surgical history that includes Ankle surgery (Left); Wrist fracture surgery (Left); Colonoscopy (N/A, 05/31/2023);  Esophagogastroduodenoscopy (N/A, 05/31/2023); Fracture surgery; and Joint replacement (April 2016).    Medications:   Aysha has a current medication list which includes the following prescription(s): albuterol, albuterol, albuterol, alendronate, aspirin, atorvastatin, cholecalciferol (vitamin d3), diclofenac sodium, escitalopram oxalate, folic acid, hydrochlorothiazide, methotrexate, multivitamin 50 plus, omega 3-dha-epa-fish oil, pantoprazole, prednisone, pregabalin, and xeljanz.    Allergies:   Review of patient's allergies indicates:  No Known Allergies     Objective     Postural examination:  Seated: slouched  Standing: NBOS, mild fwd head    Palpation:    TTP B paraspinals in standing    Range of Motion - Cervical   AROM AROM AROM    1/24/2024 Reassessment  Reassessment   Flexion WFL ° ° °   Extension Min loss ° ° °   Side bending Right Min loss ° ° °   Side bending Left Min loss ° ° °   Rotation Right WFL ° ° °   Rotation Left WFL ° ° °     Range of Motion - Lumbar   1/24/2024      ROM Loss ROM Loss ROM Loss   Flexion minimal loss     Extension moderate loss     Side bending Right moderate loss     Side bending Left moderate loss     Rotation Right minimal loss     Rotation Left moderate loss       Strength Testing   1/24/2024 Reassessment Reassessment   Motion Tested         Lower Extremity R L R L R L   Hip Flexion 4+/5 4+/5       Hip Extension 4/5 4/5       Hip Abduction 4-/5 4-/5       Hip IR 4/5 4/5       Hip ER 4/5 4/5       Knee Extension 5/5 5/5       Knee Flexion 5/5 5/5         Functional Testing     1/24/2024 Reassessment  Reassessment   Timed Up and Go 9.53 sec sec sec   Single Limb Stance R LE 4.65 sec sec sec   Single Limb Stance L LE < 2  sec sec sec   2 Minute Walk Test 111 meters meters meters   6 Minute Walk Test 325.3 meters* meters meters   Self Selected Walking Speed 0.90 m/sec m/sec m/sec   *inc discomfort B hip, LBP    GAIT:  Assistive Device used: none  Level of Assistance:  independent  Patient displays the following gait deviations:  dec arms swing, stiff torso, dec step length, dec pelvic rotation.     Minimum standards/norms:    TUG:  < 13.5 seconds/ Males 7.3 sec, Females 8.1 sec  SLS:  26-29 sec  Ages 20-69  Self Selected Walking Speed:  .4-.8m/sec  Limited community ambulator                                                   .8- 1.2  Community ambulator                                                    1.2 m/sec and above  Able to safely cross streets        Limitation/Restriction for FOTO LUMBAR Survey    Therapist reviewed FOTO scores for Aysha Randolph on 1/24/2024.   FOTO documents entered into [id number] - see Media section.    Limitation Score 1/24/2024: 51%    Predicted Score: 61%         TREATMENT     Total Treatment time (time-based codes) separate from Evaluation: 25 minutes     Aysha received the treatments listed below:        Aysha received therapeutic activities to improve functional performance for 15 minutes, including:  PT education:  Physical & psychological viscous pain cycles (see patient instructions 1/24/2024)  Role of consistent activity (graded exposure) to break the physical cycle  Importance of education & behavioral changes that promote intrinsic patient motivation to help break the psychological cycle  Impact on pt's functional goals when breaking each one of these cycles.    Impact central sensitization has on the sensory system in the chronic pain population      Aysha received therapeutic exercises to develop strength, endurance, ROM, flexibility, posture, and core stabilization for 10 minutes including:    HEP demonstrate include:   LTR x 10 B   Open Books x 10 B   Clamshells x 10 B       PATIENT EDUCATION AND HOME EXERCISES     Education provided:   - Pain cycles (see patient instructions)     Written Home Exercises Provided: yes. Exercises were reviewed and Aysha was able to demonstrate them prior to the end of the session.  Aysha demonstrated  fair  understanding of the education provided. See EMR under Patient Instructions for information provided during therapy sessions.    ASSESSMENT   Aysha is a 63 y.o. female referred to outpatient Physical Therapy with a medical diagnosis of M05.79 (ICD-10-CM) - Rheumatoid arthritis involving multiple sites with positive rheumatoid factor, R68.89 (ICD-10-CM) - Decreased motor activity, R26.89 (ICD-10-CM) - Imbalance.   Pt presents with pain, weakness, impaired mobility, decreased AROM, impaired balance, fall risk, gait deviations, decreased endurance, fear of pain w/ central sensitization, increased psychosocial concerns, & inability to perform ADL's as before due to current functional status. Decreased efficiency /c amb noted as patient demonstrate stiff torso likely from guarding.  Therefore, HEP issued today /c spinal mobility focus on maintaining B hip motor control for future tx progressions.  POC to include balance/dynamic stability challenge to address patient's dec balance confidence.  Patient also need ergonomic changes during visit for improved postural support due to short stature and will benefit from continue postural education. Pt would benefit from LE and trunk mobility training, stability training,  improved cardiovascular and muscular endurance, neuromuscular re-education for posture, coordination, and muscular recruitment and education on positional offloading techniques to decrease the intensity and frequency of flare-ups.  Pt's increased psychosocial concerns w/ central sensitization present an unstable clinical presentation which may limit progress, but the intrinsic motivation to improve will assist.        Based on the above history and physical examination an active physical therapy program within a multi-disciplinary setting is recommended.  Pt will benefit from skilled outpatient physical therapy to address the deficits listed below in the chart, provide pt/family education and to maximize  pt's level of independence in the home and community environment.     Plan of care discussed with patient: Yes  Pt's spiritual, cultural and educational needs considered and patient is agreeable to the plan of care and goals as stated below:     Pt prognosis is Good.   Anticipated Barriers for therapy: chronic nature of issues, psychosocial factors,     Medical Necessity is demonstrated by the following  History  Co-morbidities and personal factors that may impact the plan of care Co-morbidities:   anxiety, coping style/mechanism, difficulty sleeping, high BMI, and level of undertstanding of current condition    Personal Factors:   coping style  lifestyle  attitudes     high   Examination  Body Structures and Functions, activity limitations and participation restrictions that may impact the plan of care Body Regions:   neck  back  lower extremities  upper extremities  trunk    Body Systems:    gross symmetry  ROM  strength  gross coordinated movement  balance  gait  transfers  transitions  motor control    Participation Restrictions:   none    Activity limitations:   Learning and applying knowledge  no deficits    General Tasks and Commands  no deficits    Communication  no deficits    Mobility  lifting and carrying objects  walking    Self care  no deficits    Domestic Life  shopping  cooking  doing house work (cleaning house, washing dishes, laundry)    Interactions/Relationships  no deficits    Life Areas  no deficits    Community and Social Life  community life  recreation and leisure         high   Clinical Presentation unstable clinical presentation with unpredictable characteristics high   Decision Making/ Complexity Score: high       GOALS: Pt is in agreement with the following goals:  Goal Progress Towards Goal   SHORT Term: In 3 weeks, pt will:    Verbalize & demonstrate good understanding of resisted training with 3-1-3 second rep for mpdmpdregj-bjhsnccen-hlvdaknox contraction to encourage full muscle  "recruitment for strength & endurance. Initiated 1/24/2024   Verbalize & demonstrate good understanding of home stretch routine to improve AROM, help decrease pain & improve mobility. Initiated 1/24/2024   Demonstrate proper supine or seated diaphragmatic breathing with decreased chest excursion & emphasis on full abdominal excursion & increased expiration time to promote muscle relaxation & increased parasympathetic activity to improve patient tolerance to activities. Initiated 1/24/2024   Verbalize good understanding of importance of proper posture in resisted exercise, functional activities & ADL's in order to help reduce postural strain, promote proper breathing. Initiated 1/24/2024   Demonstrate good understanding of full body resisted exercises w/ use of pictures &/or verbal cues to promote independence w/ exercise at the end of the program. Initiated 1/24/2024   Verbalize plan for continuing resisted exercises w/ specific location (i.e. commercial gym, home, community fitness center)  to incorporate all major muscle groups to maintain & progress therapeutic functional improvements. Initiated 1/24/2024   Verbalize difference between  "hurt vs harm"  to demonstrate understanding of fear avoidance in pain cycle.  Initiated 1/24/2024       Midterm: In 8 weeks, pt will:    Verbalize good understanding of activities planning/scheduling (stretching, mindfulness, resisted training, & cardio) prescribed by PT/OT following the end of the program to sustain & progress functional improvements. Initiated 1/24/2024   Perform selected resisted training w/ minimal to no cuing in therapy session to be independent w/ resisted strengthening. Initiated 1/24/2024   Demonstrate improved symptom self-management w/ posture/positioning and mechanical movements and/or implementation of appropriate modalities throughout a typical day.  Initiated 1/24/2024   Demonstrate independence w/ home stretch routine to improve AROM, help decrease " pain & improve mobility. Initiated 1/24/2024       Long Term: In 12 weeks, pt will:    Perform selected cardio & resisted training independently to continue safe regular performance of daily exercise. Initiated 1/24/2024   Improve 2 Minute Walk Test (MWT) to 140 meters and 6 MWT to 410 meters or greater to improve gait speed and mobility. Initiated 1/24/2024   Perform single leg stance 10 seconds or greater bilaterally to improve safety and balance in ADLs. Initiated 1/24/2024   Demonstrate Timed Up & Go (with appropriate assistive device, if necessary) of 8 seconds or less to decrease fall risk and improve functional mobility. Initiated 1/24/2024   Score 61 % or more on LUMBAR FOTO to indicate significant functional improvements in impaired functions secondary to pain. Initiated 1/24/2024   Perform stand <> floor transfer x 2 in < 1 min /s inc LBP for inc tolerance to recreational activities /c grandchildren Initiated 1/24/2024         PLAN   Plan of care Certification: 1/24/2024 to 04/03/24.    Outpatient Physical Therapy 3 times weekly for 12 weeks to include the following interventions: Gait Training, Manual Therapy, Moist Heat/ Ice, Neuromuscular Re-ed, Patient Education, Therapeutic Activities, and Therapeutic Exercise.     Marc Barber, PT

## 2024-01-24 NOTE — PATIENT INSTRUCTIONS
The physical cycle is very intuitive for people! As pain increases & you avoid activities, you get weaker. This leads to more pain, which leads to more activity avoidance & the cycle fuels on leading to maladaptive behaviors. You start avoiding activities you love to do & start having pain with normal & safe activities.    With chronic conditions, it is important to consider all of the negative psychological factors associated with pain. How we experience pain is based on the context of our lives. You are limited & thus getting less douglas out of life, but also have all of the past experiences with pain looming over you. This negative context is scientifically proven to cause the brain to become more sensitive & perceive more pain. Pain may change or spread, & you may to start feeling pain from non-painful stimuli, such as movement & everyday life. This does not mean your pain is not real, it just means the brain is producing more than it should & your mind needs to be retrained along with your body.

## 2024-01-24 NOTE — PATIENT INSTRUCTIONS
Copyright © I. All rights reserved.               DIAPHRAGMATIC BREATHING     The diaphragm is a dome shaped muscle that forms the floor of the rib cage. It is the most efficient muscle for breathing and relaxation, although most people are not used to using the diaphragm. Diaphragmatic or belly breathing is an important technique to learn because it helps settle down or relax the autonomic nervous system. The correct use of diaphragmatic breathing can help to quiet brain activity resulting in the relaxation of all the muscles and organs of the body. This is accomplished by slow rhythmic breathing concentrated in the diaphragm muscle rather than the chest.    How to do proper relaxation breathing:    Start by lying on your back or reclining in a chair in a relaxed position. Place one hand on your chest and the other on your abdomen.  Relax your jaw by placing your tongue on the roof of your mouth and keeping your teeth slightly apart.   Take a deep breath in, letting the abdomen expand and rise while you keep your upper chest, neck and shoulders relaxed.   As you breathe out, allow your abdomen and chest to fall. Exhale completely.  It doesn't matter if you breathe in/out through your nose and/or mouth. Do whichever feels comfortable.  Remember to breathe slowly.  Do not force your breathing. Do not hold your breath.  Repeat for 5-10 reps, and as needed for pain and stress management throughout the day (10 reps 5x a day)                Carrying: Facing Away        Grasp child securely around waist. Keep baby's back in contact with your body. Head, back and shoulders should not arch back or flop forward.  May keep one leg bent or lean child's back against you and grasp both knees to prevent legs from stiffening or crossing.    Copyright © [employer]. All rights reserved.     Carrying: Supine        Keep baby cradled in your arms with shoulder forward and chin on chest. Do not allow head or back to arch.  Keep hips and  knees folded toward stomach to prevent legs from stiffening or crossing.    Copyright © I. All rights reserved.     Childcare - Picking Up from Floor        Squat down to  baby, and bring close before standing up. Use knees and keep back straight.      Copyright © I. All rights reserved.     Transfer: Bath Tub        Squat or kneel down close to edge of tub to lower child into tub or to lift out. Be sure there is a safety mat inside.  CAUTION: Never leave child in tub unattended. Check water temperature before placing child in tub.    Copyright © I. All rights reserved.     Transfer: Car        Stand close and keep back straight. Bend knees to put baby in or take baby out of car seat.      Copyright © I. All rights reserved.     Transfer:  from Floor        Have child place arm around your shoulders.  Place one of your legs forward.  Keep back straight. Stand up by pushing up with legs.    Copyright © I. All rights reserved.          Arthritis and joint protection    Paraffin treatment is a form of deep heat therapy. Liquified paraffin wax is very efficient at absorbing and retaining heat. The heat increases circulation and relieves pain and stiffness.    - you can purchase paraffin units ( with wax) on amazon, sometimes bed bath or beyond, or you can Google paraffin unit with wax.                     2. Brace to be worn with strenuous task and to sleep with if you pain wakes you up in the middle of the night  (or you wake up with pain)    - can be bought online ( called a CMC brace ) or over the counter at [employer], [facility], [facility]      3. Prevention is KEY:  - Unfortunately , Arthritis is progressive and there is NO cure. However, we can preserve what little cartilage you may left  for a little longer with these small tips.    -Protect your small joints and use   - avoid sustained pinching and gripping,  - avoid sustain pinching with pulling/pushing  - avoid sleeping on your hands  -  pain-free exercises and pain-free motions  - avoid repetitive motion    Joint Protection (Wringing)    Avoid wringing towels by twisting.  Solution: Loop towel around sink faucet as if braiding and pull gently, or let drip-dry.    Joint Protection (Use Large Joints)    Avoid placing pressure on fingertips.  Solution: Transfer work to other parts of body which are not affected or which have greater strength. Using body weight to push heavy doors open is an example.    Joint Protection (Ulnar Deviation)    Avoid positions that cause fingers to lean sideways toward little finger.  Solution: Use devices like jar-openers to assist in activities.    Joint Protection (Pinch)    Avoid tight pinch, such as when holding a pen.  Solution: Use a thick pen with a felt tip to reduce pressure on fingers.    Joint Protection (Lifting)    Avoid picking up heavy items with one hand.  Solution: Use both hands, and slide item whenever possible.     Joint Protection ()    Avoid: grasping thin utensils for prolonged periods.  Solution: Hold thick-handled tools in dagger fashion when-ever possible for performing tasks such as stirring or scrub-maricruz. Relax fingers every 10 minutes during activity.    Joint Protection (Carrying)    Avoid carrying items with weight on fingers.  Solution: Use a shoulder bag or a back pack.  Copyright © I. All rights reserved.

## 2024-01-25 PROBLEM — R52 PAIN AGGRAVATED BY ACTIVITIES OF DAILY LIVING: Status: ACTIVE | Noted: 2024-01-25

## 2024-01-25 NOTE — PLAN OF CARE
"  NOTE: This is a duplicate copy utilized for reassessment purposes & continuity of care.  See pt's plan of care for co-signed copy.    [facility]  Functional Restoration Program  Physical Therapy Initial Evaluation    Date: 1/24/2024   Name: Aysha Randolph  Clinic Number: [id number]    Therapy Diagnosis:   Encounter Diagnoses   Name Primary?    Rheumatoid arthritis involving multiple sites with positive rheumatoid factor     Decreased motor activity     Imbalance     Fear of pain Yes    Decreased range of motion of trunk and back        Physician: Stefany Crooks, NP    Physician Orders: PT Eval and Treat   Medical Diagnosis from Referral:    M05.79 (ICD-10-CM) - Rheumatoid arthritis involving multiple sites with positive rheumatoid factor   R68.89 (ICD-10-CM) - Decreased motor activity   R26.89 (ICD-10-CM) - Imbalance     Evaluation Date: 1/24/2024  Authorization Period Expiration: 10/10/25  Plan of Care Expiration: 04/03/24  Visit # / Visits authorized: 01/ 01  FOTO: 01/ 03     Precautions: Standard and Fall  L ankle replacement 2016, L wrist fx and surgery 2022, RA      Time In: 1:30 pm   Time Out: 2:45 pm  Total Appointment Time (timed & untimed codes): 75 minutes        Subjective     Date of onset: 3 yrs after a move to [place] to be closer to family, exacerbated /c move to [facility] 1 yr ago      Patient reported history of current condition - Aysha report varied areas of pain throughout her body. "Each day it is something different"  Patient report today sx most intense in her B hands.  Other areas of discomfort include B ankle, B knees, B hips, LBP, B shoulders.  Patient report AM sx presentation typical to hip stiffness.  Patient note inc LBP /c extended walking.  Patient report at times waking in middle of night due to discomfort   Patient report when B ankle pain occur her toes can curl up leading to dec balance.  Patient note numerous near falls in last few months and fwd fall " "last [place].    Patient report sx begin approximately 3 yrs ago /c move to [place] to be closer to daughter and family and continue /c move last year to [facility] to be near son and family.   Patient report needing to take breaks during household chores like laundry folding.  Patient report ascend/descend stair /c step to pattern from to fear of falling due to perceived LLE weakness.  Patient report concern for wrist strength noting sig difficulty opening cans.  Patient note hip discomfort make getting in/out of car painful. Patient also note difficulty playing /c grandchildren especially /c floor to stand transfers   Patient report not liking to take pain meds only taking OTC meds /c inc sx intensity but notes "only takes the edges".   However, patient report improved sx over the last month and attributes this to medication change.    Patient report prior PT for L hand and ankle include strengthening /c mod relief.           Per MD report    Aysha Randolph is a 63 y.o. female who presents today for the Functional Restoration Program Medical Screening Visit. Aysha Randolph was referred by Kayla Cobb MD with associated diagnosis of chronic pain.   She was diagnosed with Rheumatoid Arthritis in December 2020. Initially presented with pain/stiffness/swelling into the joints of the hands, knees, hips, shoulders. She has tried multiple medications with limited relief and side effects. Per Rheum notes, tried MTX, Humira, Orencia infusions and Actemra. She was most recently on Rinvoq. She stopped taking this due to side effect of headache. At her last visit, her numbers were normal. She also notes improvement in pain over the last few weeks. She does have a significant family history of autoimmune disease. Her sister and niece had RA. She also notes fibromyalgia runs in her family. She has always believed she had fibromyalgia, but did not wish to take medications for this. She has had pain throughout her life, " worsened in her 30s. She will have pain with light touch. She does have a history of left ankle replacement. She will have intermittent pain in that joint. History obtained via interview and chart review.         Imaging:  Per MD note:   Xray Wrist 2/22/2023:  FINDINGS:  Patchy osteopenia.  Remote healed fracture of the distal radius.  No acute displaced fractures.  Postoperative lucent tracks project over the radial and 5th metacarpal shafts.  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  No suspicious lytic or blastic lesions.  Punctate calcification within the expected location of the triangular fibrocartilage.  No significant soft tissue swelling.     Impression:     1. No acute radiographic abnormalities of the right wrist.  See above.    Prior Therapy: PT for wrist- 2.5 years ago Ankle- 2016  Prior Treatment: R RTC years ago   Social History: lives [facility] threshold to enter   [employer] /c  (27 years) 2 children (son in [facility]) 2 grandchildren (1 in [facility])   Occupation:  retired -    Prior Level of Function: Ind /c ADLs   Current Level of Function: Ind /c ADLs difficulty household chores, playing with grandchildren    Pain:      Current 4/10 B wrist, worst 4/10 today wrist, best 4/10   Location: bilateral wrist, B hips, B shoulders, B knees, B ankles  Description:  sharp, varied intensity  Aggravating Factors: Standing, Walking, Lifting, and Getting out of bed/chair  Easing Factors: ice on wrists     Patient's FRP goals: improved floor to stand movement to play with grandchildren, return to grocery visits with       Medical History:   Past Medical History:   Diagnosis Date    Abnormal Pap smear of cervix 2017    Arthritis     Hypertension     Mild intermittent asthma, uncomplicated     Rheumatoid arthritis, unspecified 12/17/2020       Surgical History:   Aysha Randolph  has a past surgical history that includes Ankle surgery (Left); Wrist fracture surgery (Left); Colonoscopy (N/A, 05/31/2023);  Esophagogastroduodenoscopy (N/A, 05/31/2023); Fracture surgery; and Joint replacement (April 2016).    Medications:   Aysha has a current medication list which includes the following prescription(s): albuterol, albuterol, albuterol, alendronate, aspirin, atorvastatin, cholecalciferol (vitamin d3), diclofenac sodium, escitalopram oxalate, folic acid, hydrochlorothiazide, methotrexate, multivitamin 50 plus, omega 3-dha-epa-fish oil, pantoprazole, prednisone, pregabalin, and xeljanz.    Allergies:   Review of patient's allergies indicates:  No Known Allergies     Objective     Postural examination:  Seated: slouched  Standing: NBOS, mild fwd head    Palpation:    TTP B paraspinals in standing    Range of Motion - Cervical   AROM AROM AROM    1/24/2024 Reassessment  Reassessment   Flexion WFL ° ° °   Extension Min loss ° ° °   Side bending Right Min loss ° ° °   Side bending Left Min loss ° ° °   Rotation Right WFL ° ° °   Rotation Left WFL ° ° °     Range of Motion - Lumbar   1/24/2024      ROM Loss ROM Loss ROM Loss   Flexion minimal loss     Extension moderate loss     Side bending Right moderate loss     Side bending Left moderate loss     Rotation Right minimal loss     Rotation Left moderate loss       Strength Testing   1/24/2024 Reassessment Reassessment   Motion Tested         Lower Extremity R L R L R L   Hip Flexion 4+/5 4+/5       Hip Extension 4/5 4/5       Hip Abduction 4-/5 4-/5       Hip IR 4/5 4/5       Hip ER 4/5 4/5       Knee Extension 5/5 5/5       Knee Flexion 5/5 5/5         Functional Testing     1/24/2024 Reassessment  Reassessment   Timed Up and Go 9.53 sec sec sec   Single Limb Stance R LE 4.65 sec sec sec   Single Limb Stance L LE < 2  sec sec sec   2 Minute Walk Test 111 meters meters meters   6 Minute Walk Test 325.3 meters* meters meters   Self Selected Walking Speed 0.90 m/sec m/sec m/sec   *inc discomfort B hip, LBP    GAIT:  Assistive Device used: none  Level of Assistance:  independent  Patient displays the following gait deviations:  dec arms swing, stiff torso, dec step length, dec pelvic rotation.     Minimum standards/norms:    TUG:  < 13.5 seconds/ Males 7.3 sec, Females 8.1 sec  SLS:  26-29 sec  Ages 20-69  Self Selected Walking Speed:  .4-.8m/sec  Limited community ambulator                                                   .8- 1.2  Community ambulator                                                    1.2 m/sec and above  Able to safely cross streets        Limitation/Restriction for FOTO LUMBAR Survey    Therapist reviewed FOTO scores for Aysha Randolph on 1/24/2024.   FOTO documents entered into [employer] - see Media section.    Limitation Score 1/24/2024: 51%    Predicted Score: 61%         TREATMENT     Total Treatment time (time-based codes) separate from Evaluation: 25 minutes     Aysha received the treatments listed below:        Aysha received therapeutic activities to improve functional performance for 15 minutes, including:  PT education:  Physical & psychological viscous pain cycles (see patient instructions 1/24/2024)  Role of consistent activity (graded exposure) to break the physical cycle  Importance of education & behavioral changes that promote intrinsic patient motivation to help break the psychological cycle  Impact on pt's functional goals when breaking each one of these cycles.    Impact central sensitization has on the sensory system in the chronic pain population      Aysha received therapeutic exercises to develop strength, endurance, ROM, flexibility, posture, and core stabilization for 10 minutes including:    HEP demonstrate include:   LTR x 10 B   Open Books x 10 B   Clamshells x 10 B       PATIENT EDUCATION AND HOME EXERCISES     Education provided:   - Pain cycles (see patient instructions)     Written Home Exercises Provided: yes. Exercises were reviewed and Aysha was able to demonstrate them prior to the end of the session.  Aysha demonstrated  fair  understanding of the education provided. See EMR under Patient Instructions for information provided during therapy sessions.    ASSESSMENT   Aysha is a 63 y.o. female referred to outpatient Physical Therapy with a medical diagnosis of M05.79 (ICD-10-CM) - Rheumatoid arthritis involving multiple sites with positive rheumatoid factor, R68.89 (ICD-10-CM) - Decreased motor activity, R26.89 (ICD-10-CM) - Imbalance.   Pt presents with pain, weakness, impaired mobility, decreased AROM, impaired balance, fall risk, gait deviations, decreased endurance, fear of pain w/ central sensitization, increased psychosocial concerns, & inability to perform ADL's as before due to current functional status. Decreased efficiency /c amb noted as patient demonstrate stiff torso likely from guarding.  Therefore, HEP issued today /c spinal mobility focus on maintaining B hip motor control for future tx progressions.  POC to include balance/dynamic stability challenge to address patient's dec balance confidence.  Patient also need ergonomic changes during visit for improved postural support due to short stature and will benefit from continue postural education. Pt would benefit from LE and trunk mobility training, stability training,  improved cardiovascular and muscular endurance, neuromuscular re-education for posture, coordination, and muscular recruitment and education on positional offloading techniques to decrease the intensity and frequency of flare-ups.  Pt's increased psychosocial concerns w/ central sensitization present an unstable clinical presentation which may limit progress, but the intrinsic motivation to improve will assist.        Based on the above history and physical examination an active physical therapy program within a multi-disciplinary setting is recommended.  Pt will benefit from skilled outpatient physical therapy to address the deficits listed below in the chart, provide pt/family education and to maximize  pt's level of independence in the home and community environment.     Plan of care discussed with patient: Yes  Pt's spiritual, cultural and educational needs considered and patient is agreeable to the plan of care and goals as stated below:     Pt prognosis is Good.   Anticipated Barriers for therapy: chronic nature of issues, psychosocial factors,     Medical Necessity is demonstrated by the following  History  Co-morbidities and personal factors that may impact the plan of care Co-morbidities:   anxiety, coping style/mechanism, difficulty sleeping, high BMI, and level of undertstanding of current condition    Personal Factors:   coping style  lifestyle  attitudes     high   Examination  Body Structures and Functions, activity limitations and participation restrictions that may impact the plan of care Body Regions:   neck  back  lower extremities  upper extremities  trunk    Body Systems:    gross symmetry  ROM  strength  gross coordinated movement  balance  gait  transfers  transitions  motor control    Participation Restrictions:   none    Activity limitations:   Learning and applying knowledge  no deficits    General Tasks and Commands  no deficits    Communication  no deficits    Mobility  lifting and carrying objects  walking    Self care  no deficits    Domestic Life  shopping  cooking  doing house work (cleaning house, washing dishes, laundry)    Interactions/Relationships  no deficits    Life Areas  no deficits    Community and Social Life  community life  recreation and leisure         high   Clinical Presentation unstable clinical presentation with unpredictable characteristics high   Decision Making/ Complexity Score: high       GOALS: Pt is in agreement with the following goals:  Goal Progress Towards Goal   SHORT Term: In 3 weeks, pt will:    Verbalize & demonstrate good understanding of resisted training with 3-1-3 second rep for [id number] contraction to encourage full muscle  "recruitment for strength & endurance. Initiated 1/24/2024   Verbalize & demonstrate good understanding of home stretch routine to improve AROM, help decrease pain & improve mobility. Initiated 1/24/2024   Demonstrate proper supine or seated diaphragmatic breathing with decreased chest excursion & emphasis on full abdominal excursion & increased expiration time to promote muscle relaxation & increased parasympathetic activity to improve patient tolerance to activities. Initiated 1/24/2024   Verbalize good understanding of importance of proper posture in resisted exercise, functional activities & ADL's in order to help reduce postural strain, promote proper breathing. Initiated 1/24/2024   Demonstrate good understanding of full body resisted exercises w/ use of pictures &/or verbal cues to promote independence w/ exercise at the end of the program. Initiated 1/24/2024   Verbalize plan for continuing resisted exercises w/ specific location (i.e. commercial gym, home, community fitness center)  to incorporate all major muscle groups to maintain & progress therapeutic functional improvements. Initiated 1/24/2024   Verbalize difference between  "hurt vs harm"  to demonstrate understanding of fear avoidance in pain cycle.  Initiated 1/24/2024       Midterm: In 8 weeks, pt will:    Verbalize good understanding of activities planning/scheduling (stretching, mindfulness, resisted training, & cardio) prescribed by PT/OT following the end of the program to sustain & progress functional improvements. Initiated 1/24/2024   Perform selected resisted training w/ minimal to no cuing in therapy session to be independent w/ resisted strengthening. Initiated 1/24/2024   Demonstrate improved symptom self-management w/ posture/positioning and mechanical movements and/or implementation of appropriate modalities throughout a typical day.  Initiated 1/24/2024   Demonstrate independence w/ home stretch routine to improve AROM, help decrease " pain & improve mobility. Initiated 1/24/2024       Long Term: In 12 weeks, pt will:    Perform selected cardio & resisted training independently to continue safe regular performance of daily exercise. Initiated 1/24/2024   Improve 2 Minute Walk Test (MWT) to 140 meters and 6 MWT to 410 meters or greater to improve gait speed and mobility. Initiated 1/24/2024   Perform single leg stance 10 seconds or greater bilaterally to improve safety and balance in ADLs. Initiated 1/24/2024   Demonstrate Timed Up & Go (with appropriate assistive device, if necessary) of 8 seconds or less to decrease fall risk and improve functional mobility. Initiated 1/24/2024   Score 61 % or more on LUMBAR FOTO to indicate significant functional improvements in impaired functions secondary to pain. Initiated 1/24/2024   Perform stand <> floor transfer x 2 in < 1 min /s inc LBP for inc tolerance to recreational activities /c grandchildren Initiated 1/24/2024         PLAN   Plan of care Certification: 1/24/2024 to 04/03/24.    Outpatient Physical Therapy 3 times weekly for 12 weeks to include the following interventions: Gait Training, Manual Therapy, Moist Heat/ Ice, Neuromuscular Re-ed, Patient Education, Therapeutic Activities, and Therapeutic Exercise.     Marc Barber, PT

## 2024-01-26 NOTE — PLAN OF CARE
[facility]  [facility]   Occupational Therapy Initial Evaluation    Encounter Date: 1/24/2024  Name: Aysha Randolph  Clinic Number: [id number]    Therapy Diagnosis:   Encounter Diagnoses   Name Primary?    Rheumatoid arthritis involving multiple sites with positive rheumatoid factor     Decreased motor activity     Imbalance      Referring Provider: Stefany Crooks NP    Physician Orders: eval and treat; FR  Medical Diagnosis:   M05.79 (ICD-10-CM) - Rheumatoid arthritis involving multiple sites with positive rheumatoid factor   R68.89 (ICD-10-CM) - Decreased motor activity  R26.89 (ICD-10-CM) - Imbalance  Evaluation Date: 1/24/2024  Insurance Authorization Period Expiration: 12/31/2024  Plan of Care Certification Period: 4/3/2024  Visit # / Visits authorized: 1/1  FOTO completed: 1/3    Precautions:  Standard    Time In:15:00  Time Out: 16:05  Total Appointment Time (timed & untimed codes): 65 minutes    Subjective   Date of onset: chronic, but worsening ~ 3 years ago.  History of current condition, based on chart review and Aysha's report: She was diagnosed with Rheumatoid Arthritis in December 2020. Initially presented with pain/stiffness/swelling into the joints of the hands, knees, hips, shoulders. She has tried multiple medications with limited relief and side effects. She was most recently on Rinvoq. She stopped taking this due to side effect of headache. She does have a significant family history of autoimmune disease. Her sister and niece had RA. She also notes fibromyalgia runs in her family. She has always believed she had fibromyalgia, but did not wish to take medications for this. She has had pain throughout her life, worsened in her 30s. She will have pain with light touch. She does have a history of left ankle replacement. She will have intermittent pain in that joint.     Aysha report today sx most intense in her B hands, with edema present, as  "boom/busting after laundry task and extensive folding, and that she has numbness in the hand. Shares that AM symptom presentation typical to hip stiffness, and increased lower back pain with extended walking. She might have decreased balance as her toes can curl up with ankle pain.  She has had numerous near falls in last few months, and h/o a fall when mopping with fractures (L)UE. She has arranged for help with house hold chores.      About 3 years ago she moved to [place] from [place], to be closer to family, and who has now moved to [place] for whom they followed last year. Her daughter still lives in [place], and she travels frequently to be with her daughter who has an infant.     Aysha Statement: "got help in the house hold". "I was folding and washing a lot yesterday; my hands are now painful and swollen. My hands are killing me". "Not so much inmmediate family, but I feel anxious when (seeing) others have issues."    Pain:      Pain Related Behaviors Observed: yes, antalgic gait; currently rating pain as 4/10.  Functional Pain Scale Rating 0-10: within the last 24 hours  Date 1/24/2024   Average 4/10   Worst 6/10   Best 4/10   Composite score 4.67/10   [KATELIN  is 1 point or 15-20% change in interference composite score (Edmundorkin et al. 2008)]    Location: bilateral wrist, B hips, B shoulders, B knees, B ankles  Description:  sharp, varied intensity  Aggravating Factors: Standing, Walking, Lifting, and Getting out of bed/chair  Easing Factors: ice on wrists     Occupational Profile  Social Hx:  lives with their spouse  Home access: single story home; threshold step to enter.  Family dynamic:   (27 years), 2 children, 2 grandchildren  (8 years, 9 months ([place]))  Driving status:  (spouse does most of the driving; they have one car and do a lot of things together.   Occupations/hobbies/homemaking: retired; volunteering, Jew, family time, bible study.  Working presently: Retired [employer]; she is " active in her [employer].    Prior Level of Function: Activities patient can no longer perform/has great difficulty with include: gardening, playing with grand kids, opening cans, standing tolerance, getting in/out of the car.  Prior Therapy: PT for wrist- 2.5 years ago, Ankle-   Current Exercise Routine: Used to go to the gym; had to take care of family and stopped going.   Current Self-care Routine: bible study, reading, gardening, board games  Mental health: Anxiety since ; was in [facility] as her father was dying, was stuck there for a while. Use of prayer to manage.  Disturbed sleep: Yes. Difficulty staying asleep, wakes to urinate. Patient report at times waking in middle of night due to discomfort.      Patient Specific Activities based on Personal goals:  Activity  (To be rated first session after eval)    Performance (P) Satisfaction (S)   On/off the floor to play with grand kids     2.  gardening     3.  Standing tolerance for volunteering/[employer]     4.  Opening cans/managing supplies for chores     5.  Getting in and out of the car.           Total Score     Ratin-10; unable/unsatisfied - Able/Satisfied    Medical History:   Past Medical History:   Diagnosis Date    Abnormal Pap smear of cervix 2017    Arthritis     Hypertension     Mild intermittent asthma, uncomplicated     Rheumatoid arthritis, unspecified 2020        Surgical History:   Aysha  has a past surgical history that includes Ankle surgery (Left); Wrist fracture surgery (Left); Colonoscopy (N/A, 2023); Esophagogastroduodenoscopy (N/A, 2023); Fracture surgery; and Joint replacement (2016).    Medications:   Aysha has a current medication list which includes the following prescription(s): albuterol, albuterol, albuterol, alendronate, aspirin, atorvastatin, cholecalciferol (vitamin d3), diclofenac sodium, escitalopram oxalate, folic acid, hydrochlorothiazide, methotrexate, multivitamin 50 plus, omega  "3-dha-epa-fish oil, pantoprazole, prednisone, pregabalin, and xeljanz.    Allergies:   Review of patient's allergies indicates:  No Known Allergies    Objective     COGNITIVE Exam  Behaviors: normal, pleasant; reported anxiety seeing other clients in clinic with their issues.  Memory: No Deficits noted; not formally tested during eval; able to follow multi step commands.  Safety awareness/insight to disability: impaired judgment and impaired insight; reports h/o boom/bust cycling.  Coping skills/emotional control: during session Appropriate to situation.    Dominant hand: right    Active Range of Motion  Upper Extremity 1/24/2024    ANGELI ODONNELL Comments   Hands Minimally Limited Minimally Limited Decreased ROM CMC, edema present. Numb/tingling 2nd digit (L).   Wrist Minimally Limited Minimally Limited S/p surgery (L); pain with movement; worsening when "arthritis kicks in"   Elbows WFL Minimally Limited Stiffness in (L)elbow.   Shoulders WFL WFL Right rotator cuff repair several years ago      Upper Extremity Strength - Manual Muscle Testing  Motion Tested 1/24/2024    Right Left    Shoulder Flexion 4/5 4-/5   Shoulder Extension 4+/5 4/5   Shoulder Abduction 4/5 4-/5   Shoulder IR 4+/5 4/5   Shoulder ER 4/5 4-/5   Elbow Flexion 4+/5 4/5   Elbow Extension 4+/5 4/5      Strength (in pounds, rung II, average of three trials)   1/24/2024   Right hand  25.67 lbs   Left hand  31.67 lbs   [norms for women aged 65-69: R=49.6 +/-9.7; L=41.0 +/-8.2 (Ren et al, 1985)]     Functional Strength  Pile Testing: Lifting   1/24/2024 (lbs/HR/JAMES)    Repetitive Floor to Waist      8/103/4   Repetitive Waist to Shoulder    8/92/3   1- Time Maximum     10/97/3   Carry-2 Handed (40ft)     10/103/3   Work demand Level   Sedentary (#10 max, 1.5 METS)    Reason for stop point Increased time required for completing repetitions, Inability to maintain proper body mechanics.   Comment: The work demand level listed above is based on the " physical performance screening test performed. More comprehensive physical testing integrated with clinical findings would be required to derived an accurate work capacity.     Balance  5 times sit-stand (adults 18-65 y/o) 1/24/2024   >12 sec= fall risk for general elderly  >16 sec= fall risk for Parkinson's disease  >10 sec= balance/vestibular dysfunction (<59 y/o)  >14.2 sec= balance/vestibular dysfunction (>59 y/o)  >12 sec= fall risk for CVA 12.70 seconds    (without UE used to push up from chair)     Endurance Testing: Modified Ramp Treadmill Test   1/24/2024   Minutes Completed  48 seconds   % Grades  0 %   Speed (mph)  1 mph   METS <1   HR 98 bpm   Og Rating Perceived Exertion Scale   3   Reason for stop point Decline in maintaining pace safely    Comments Inconsistent pace and step length.     During physical testing, participation level was consistent.     No environmental, cultural, spiritual, developmental or education needs expressed or noted.    Limitation/Restriction for FOTO NOC Survey    Therapist reviewed FOTO scores for Aysha Randolph on 1/24/2024.   FOTO documents entered into [employer] - see Media section.    Limitation Score: 36%           Treatment   In addition to eval, Ms. Randolph received the following treatments:    Dynamic functional therapeutic activities to improve functional performance for 10 minutes, including:  Education provided:   - proper posture and body mechanics.  - anticipated muscular soreness following lift testing and strategies for management including stretching, using ice, scheduled rest.  - Functional pain scale  - OG rate of perceived exertion scale.   - pain cycle  - pursed lip and diaphragmatic breathing techniques  - details of the [facility].   - parafin bath.   - principles of joint protection  - lifting kids    Written Home Exercises Provided: yes.   Aysha demonstrated fair  understanding of the education provided.     Educational materials  can be found under patient instructions in the EMR.     Assessment     Aysha appears to be a good candidate for the [facility]. She experiences decreased independence with activities of roles of life, and appears open to education of how goal of chronic pain education program is to provide tools, education and training aimed at improving physical functioning, emotional health, stress and pain coping skills, to build confidence in physical activity and improve independence with ADLs and IADLs, and in the ability to control and manage symptoms. Aysha did voice understanding that behavioral changes are needed to reach goals. Aysha with mood congruent affect and pleasant and appropriate mood, reporting some increased anxiety during session when observing other clients present in the clinic during the evaluation. She was able to participate in all aspects of assessment, although exhibited pain avoidance behavior. She was open to education regarding use of JAMES scale to guide pacing. Due to the chronic nature of her issues and various co morbidities, she presents with an unstable clinical presentation which may limit her progress, but with good motivation to make behavioral changes.     Aysha's prognosis is Good.    Aysha is unable to fully participate in her work, recreational, and home-based activities because of decreased functional strength, decreased  AROM, decreased endurance, limited positional tolerance, fear of re-injury, and fear of increased pain. She will benefit from skilled outpatient Occupational Therapy to address the limitations and goals stated above and in the chart below, provide patient/family education, and to maximize patient's level of functional independence and meet functional goals.     Plan of care discussed with patient: Yes  Pt's spiritual, cultural and educational needs considered and patient is agreeable to the plan of care and goals as stated below:     Medical  necessity is demonstrated by the following problem list:  Profile and History Assessment of Occupational Performance Level of Clinical Decision Making Complexity Score   Occupational Profile:   Aysha Randolph is a 63 y.o. female who lives with their spouse and is retired.Aysha Randolph has difficulty with  ADLs and IADLs as listed previously, which  affecting her daily functional abilities. Her main goal for therapy is to improve ability to get on/off the floor, improve standing tolerance for [employer] and volunteer activities, improve opening and managing container and supplies.    Comorbidities:    has a past medical history of Abnormal Pap smear of cervix, Arthritis, Hypertension, Mild intermittent asthma, uncomplicated, and Rheumatoid arthritis, unspecified.    Medical and Therapy History Review:   Expanded Performance Deficits    Physical:  Joint Mobility  Joint Stability  Muscle Power/Strength  Muscle Endurance  Edema   Strength  Proprioception Functions  Postural Control  Pain    Cognitive:  Safety Awareness/Insight to Disability    Psychosocial:   Pain avoidance, social isolation   Habits  Routines Clinical Decision Making:  low    Assessment Process:  Problem-Focused Assessments    Modification/Need for Assistance:  Not Necessary    Intervention Selection:  Several Treatment Options       low  Based on PMHX, co morbidities , data from assessments and functional level of assistance required with task and clinical presentation directly impacting function.           Goals:    SHORT Term goals: In 3 weeks, patient will:  Status of goal:   Implements correct use of breathing techniques during functional lifting tasks, with minimal cues needed. Initiated   Utilizes JAMES rate of exertion scale to pace performance with functional lifting tasks, and implements self-care strategies during activity participation to manage pain. Initiated   Verbalize understanding of energy conservation and pacing strategies during  daily habits, roles, and routines in order to improve tolerance for work and home demands.  Initiated   Completes 5x sit to stand test in 12 seconds or less to improve ability to participate in community mobility.   Initiated   Demonstrate increased positional tolerance to the level needed for work, home, and community activities (standing in cue at social event, managing supplies for outing, streneous IADL), as evidenced by having a METS level of 3. Initiated   Demonstrate proper body mechanics with functional lifting tasks (floor to waist, waist to shoulder, maximum lift, and box carry), via teach back method with minimal cues needed. Initiated   Demonstrate increased functional strength by lifting 13 lbs waist to shoulder for management of (I)ADL, including dressing and grooming, placing items in kitchen cabinets, folding towels, and/or managing suitcase when traveling.   Initiated   Demonstrate increased functional strength by lifting 18 lbs floor to waist to meet demand of work/home routine, including lifting groceries, managing laundry, and/or managing suitcase when traveling.   Initiated   Tolerate Home Activity Plan (HAP)/ Home Exercise Program (HEP) to promote safety and independence with ADL, with report of completion of at least 2 out of 4 days outside of program participation.  Initiated   Show improved perception of own abilities as evidenced by increase of FOTO scores to established MDC value and perception of performance ratings regarding personal long term goals.  Initiated       Long Term goals: In 9 weeks, patient will: Status of goal:   Report implementation of application of mindfulness, stretching, and other coping strategies for improved participation in daily routine. Initiated   Verbalize functional application of lifting techniques in daily life (golfers lift, floor to waist, waist to shoulder). Initiated   Completes 5x sit to stand test in 11 seconds or less to improve ability to  participate in community mobility.  Initiated   Applies functional application of lifting techniques (golfer's lift, floor to waist, waist to shoulder) in activities of daily life, per patient report.  Initiated   Demonstrate physical capacities consistent with a Light (#20 max, frequent lifting/carrying #10, 2.5 METS  demand level, as needed to perform activities to be successful in roles of life, regarding Household Management, Volunteer Work, and Community Participation. Initiated   Have an improvement of 3 points in 2/5 personal goals in rating of  performance.  Initiated   Show improved perception of own abilities as evidenced by increase of FOTO scores to established MC II value and perception of performance ratings regarding personal long term goals.  Initiated     Patient Specific Goals; to be met after 1 month of (I) application of tools/techniques learned.  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   Total Score = sum of the activity performance scores / number of activities  Minimum detectable change (90%CI) for average score = 2 points  Minimum detectable change (90%CI) for single activity score  = 3 points     Plan   Plan of care certification: 1/24/2024 to 4/3/2024, with anticipated cohort start in February.    Outpatient occupational therapy, 3 times a week for 5 weeks to include the following interventions: Patient Education, Self- Care/Home-management strategies, Therapeutic Activities, Therapeutic Exercise and Therapeutic interventions that focus on cognitive function and compensatory strategies to manage the performance activities, followed by independent application of FRP tools and techniques learned, to work towards  improved performance regarding personal goals with return 1 month post cohort completion for taking of measures and review of plan of care.. If enrolled, Aysha would prefer cohort with therapy in the AM.    LINA Carvalho OTR/L, CEAS-I  1/25/2024

## 2024-02-19 ENCOUNTER — HOSPITAL ENCOUNTER (OUTPATIENT)
Dept: RADIOLOGY | Facility: HOSPITAL | Age: 64
Discharge: HOME | End: 2024-02-19
Attending: INTERNAL MEDICINE
Payer: MEDICARE

## 2024-02-19 VITALS — BODY MASS INDEX: 38.3 KG/M2 | HEIGHT: 59 IN | WEIGHT: 190 LBS

## 2024-02-19 DIAGNOSIS — Z12.31 ENCOUNTER FOR SCREENING MAMMOGRAM FOR MALIGNANT NEOPLASM OF BREAST: ICD-10-CM

## 2024-02-19 PROCEDURE — 77067 SCR MAMMO BI INCL CAD: CPT | Mod: 26,,, | Performed by: INTERNAL MEDICINE

## 2024-02-19 PROCEDURE — 77063 BREAST TOMOSYNTHESIS BI: CPT | Mod: 26,,, | Performed by: INTERNAL MEDICINE

## 2024-02-19 PROCEDURE — 77067 SCR MAMMO BI INCL CAD: CPT | Mod: TC

## 2024-02-19 RX ORDER — ESCITALOPRAM OXALATE 20 MG/1
20 TABLET ORAL
Qty: 90 TABLET | Refills: 3 | Status: SHIPPED | OUTPATIENT
Start: 2024-02-19

## 2024-02-19 RX ORDER — HYDROCHLOROTHIAZIDE 25 MG/1
25 TABLET ORAL
Qty: 90 TABLET | Refills: 3 | Status: SHIPPED | OUTPATIENT
Start: 2024-02-19

## 2024-02-19 NOTE — TELEPHONE ENCOUNTER
Refill Routing Note   Medication(s) are not appropriate for processing by [facility] for the following reason(s):        Required vitals abnormal    [facility] action(s):  Defer  Approve             Appointments  past 12m or future 3m with PCP    Date Provider   Last Visit   11/28/2023 Rosa Jarrett MD   Next Visit   5/28/2024 Rosa Jarrett MD   ED visits in past 90 days: 0        Note composed:3:39 AM 02/19/2024

## 2024-02-19 NOTE — TELEPHONE ENCOUNTER
No care due was identified.  [facility] Embedded Care Due Messages. Reference number: [id number].   2/18/2024 11:14:37 PM CST

## 2024-03-06 ENCOUNTER — OFFICE VISIT (OUTPATIENT)
Dept: OBSTETRICS AND GYNECOLOGY | Facility: CLINIC | Age: 64
End: 2024-03-06
Attending: OBSTETRICS & GYNECOLOGY
Payer: MEDICARE

## 2024-03-06 VITALS
SYSTOLIC BLOOD PRESSURE: 110 MMHG | BODY MASS INDEX: 37.47 KG/M2 | DIASTOLIC BLOOD PRESSURE: 70 MMHG | HEIGHT: 59 IN | WEIGHT: 185.88 LBS

## 2024-03-06 DIAGNOSIS — Z87.411 HISTORY OF VAGINAL DYSPLASIA: ICD-10-CM

## 2024-03-06 DIAGNOSIS — M81.8 OTHER OSTEOPOROSIS WITHOUT CURRENT PATHOLOGICAL FRACTURE: ICD-10-CM

## 2024-03-06 DIAGNOSIS — Z87.42 HISTORY OF ABNORMAL CERVICAL PAP SMEAR: ICD-10-CM

## 2024-03-06 DIAGNOSIS — Z01.419 WELL FEMALE EXAM WITH ROUTINE GYNECOLOGICAL EXAM: Primary | ICD-10-CM

## 2024-03-06 PROCEDURE — 99214 OFFICE O/P EST MOD 30 MIN: CPT | Mod: PBBFAC,25 | Performed by: OBSTETRICS & GYNECOLOGY

## 2024-03-06 PROCEDURE — 88141 CYTOPATH C/V INTERPRET: CPT | Mod: ,,, | Performed by: STUDENT IN AN ORGANIZED HEALTH CARE EDUCATION/TRAINING PROGRAM

## 2024-03-06 PROCEDURE — 88175 CYTOPATH C/V AUTO FLUID REDO: CPT | Performed by: STUDENT IN AN ORGANIZED HEALTH CARE EDUCATION/TRAINING PROGRAM

## 2024-03-06 PROCEDURE — G0101 CA SCREEN;PELVIC/BREAST EXAM: HCPCS | Mod: PBBFAC | Performed by: OBSTETRICS & GYNECOLOGY

## 2024-03-06 PROCEDURE — 99999 PR PBB SHADOW E&M-EST. PATIENT-LVL IV: CPT | Mod: PBBFAC,,, | Performed by: OBSTETRICS & GYNECOLOGY

## 2024-03-06 PROCEDURE — G0101 CA SCREEN;PELVIC/BREAST EXAM: HCPCS | Mod: S$PBB,,, | Performed by: OBSTETRICS & GYNECOLOGY

## 2024-03-06 NOTE — PROGRESS NOTES
SUBJECTIVE:   63 y.o. female   for routine gyn exam. No LMP recorded. Patient is postmenopausal..  She denies PMB. No HRT. H/o vaginal and cervical dysplasia         Past Medical History:   Diagnosis Date    Abnormal Pap smear of cervix     Arthritis     Hypertension     Mild intermittent asthma, uncomplicated     Rheumatoid arthritis, unspecified 2020     Past Surgical History:   Procedure Laterality Date    ANKLE SURGERY Left     COLONOSCOPY N/A 2023    Procedure: COLONOSCOPY;  Surgeon: Robbie Ace MD;  Location: [facility];  Service: Endoscopy;  Laterality: N/A;    ESOPHAGOGASTRODUODENOSCOPY N/A 2023    Procedure: EGD (ESOPHAGOGASTRODUODENOSCOPY);  Surgeon: Robbie Ace MD;  Location: [facility];  Service: Endoscopy;  Laterality: N/A;    FRACTURE SURGERY      JOINT REPLACEMENT  2016    Full Left ankle replacement    WRIST FRACTURE SURGERY Left      Social History     Socioeconomic History    Marital status:    Tobacco Use    Smoking status: Never    Smokeless tobacco: Never   Substance and Sexual Activity    Alcohol use: Not Currently     Comment: Very rarely    Drug use: Never    Sexual activity: Yes     Partners: Male     Birth control/protection: None     Family History   Problem Relation Age of Onset    Arthritis Mother     Heart disease Mother     Hypertension Mother     Stroke Mother         Suffered a stroke and her left side became paralyzed    Cervical cancer Maternal Cousin     Arthritis Sister     Asthma Sister     Diabetes Sister         Diagnosed at the age of 12    Heart disease Sister     Stroke Sister         Has suffered several mini strokes    Asthma Sister          of an asthma attack at the age of 46    Early death Sister          at the age of 46    Heart disease Sister     Stroke Sister     Breast cancer Neg Hx     Colon cancer Neg Hx      OB History    Para Term  AB Living   1      1   SAB IAB Ectopic Multiple Live  Births           1      # Outcome Date GA Lbr Gabo/2nd Weight Sex Delivery Anes PTL Lv   1 Term                  Current Outpatient Medications   Medication Sig Dispense Refill    albuterol (ACCUNEB) 1.25 mg/3 mL Nebu Take 3 mLs (1.25 mg total) by nebulization every 6 (six) hours as needed (shortness of breath, wheezing). 75 mL 1    albuterol (PROVENTIL/VENTOLIN HFA) 90 mcg/actuation inhaler Proventil HFA 90 mcg/actuation aerosol inhaler      albuterol (VENTOLIN HFA) 90 mcg/actuation inhaler Inhale 2 puffs into the lungs every 4 (four) hours as needed for Wheezing. Rescue 6.7 g 1    alendronate (FOSAMAX) 70 MG tablet Take 1 tablet (70 mg total) by mouth every 7 days. 12 tablet 3    aspirin (ASPIR-81 ORAL)       atorvastatin (LIPITOR) 10 MG tablet TAKE 1 TABLET DAILY 90 tablet 3    cholecalciferol, vitamin D3, 125 mcg (5,000 unit) Tab Take 5,000 Units by mouth once a week.      diclofenac sodium 100 mg 24 hr tablet diclofenac  mg tablet,extended release 24 hr   TAKE 1 TABLET(S) EVERY DAY BY ORAL ROUTE WITH MEALS FOR 30 DAYS.      EScitalopram oxalate (LEXAPRO) 20 MG tablet TAKE 1 TABLET DAILY 90 tablet 3    folic acid (FOLVITE) 1 MG tablet Take 1 tablet (1 mg total) by mouth once daily. 90 tablet 3    hydroCHLOROthiazide (HYDRODIURIL) 25 MG tablet TAKE 1 TABLET DAILY 90 tablet 3    methotrexate 2.5 MG Tab Take 10 tablets (25 mg total) by mouth every 7 days. 40 tablet 3    multivitamin-minerals-lutein (MULTIVITAMIN 50 PLUS) Tab Take 1 tablet by mouth once daily.      omega 3-dha-epa-fish oil 120-180-500 mg Cap       pantoprazole (PROTONIX) 40 MG tablet Take 1 tablet (40 mg total) by mouth once daily. 90 tablet 3    pregabalin (LYRICA) 50 MG capsule Take 1 capsule (50 mg total) by mouth 3 (three) times daily. 90 capsule 6    tofacitinib (XELJANZ) 5 mg Tab Take 5 mg by mouth 2 (two) times daily. 60 tablet 11     No current facility-administered medications for this visit.     Allergies: Patient has no known  "allergies.     ROS:  Constitutional: no weight loss, weight gain, fever, fatigue  Eyes:  No vision changes, glasses/contacts  ENT/Mouth: No ulcers, sinus problems, ears ringing, headache  Cardiovascular: No inability to lie flat, chest pain, exercise intolerance, swelling, heart palpitations  Respiratory: No wheezing, coughing blood, shortness of breath, or cough  Gastrointestinal: No diarrhea, bloody stool, nausea/vomiting, constipation, gas, hemorrhoids  Genitourinary: No blood in urine, painful urination, urgency of urination, frequency of urination, incomplete emptying, incontinence, abnormal bleeding, painful periods, heavy periods, vaginal discharge, vaginal odor, painful intercourse, sexual problems, bleeding after intercourse.  Musculoskeletal: No muscle weakness  Skin/Breast: No painful breasts, nipple discharge, masses, rash, ulcers  Neurological: No passing out, seizures, numbness, headache  Endocrine: No diabetes, hypothyroid, hyperthyroid, hot flashes, hair loss, abnormal hair growth, acne  Psychiatric: No depression, crying  Hematologic: No bruises, bleeding, swollen lymph nodes, anemia.      OBJECTIVE:   The patient appears well, alert, oriented x 3, in no distress.  /70 (BP Location: Left arm, Patient Position: Sitting, BP Method: Large (Manual))   Ht 4' 11" (1.499 m)   Wt 84.3 kg (185 lb 13.6 oz)   BMI 37.54 kg/m²   NECK: no thyromegaly, trachea midline  SKIN: no acne, striae, hirsutism  CHEST: CTAB  CV: RRR  BREAST EXAM: breasts appear normal, no suspicious masses, no skin or nipple changes or axillary nodes  ABDOMEN: no hernias, masses, or hepatosplenomegaly  GENITALIA: normal external genitalia, no erythema, no discharge  URETHRA: normal urethra, normal urethral meatus  VAGINA: Normal  CERVIX: no lesions or cervical motion tenderness  UTERUS: normal size, contour, position, consistency, mobility, non-tender  ADNEXA: no mass, fullness, tenderness      ASSESSMENT:   1. Well female exam " with routine gynecological exam  Liquid-Based Pap Smear, Screening      2. Osteoporosis        3. History of abnormal cervical Pap smear  Liquid-Based Pap Smear, Screening      4. History of vaginal dysplasia  Liquid-Based Pap Smear, Screening          PLAN:   Orders Placed This Encounter    Liquid-Based Pap Smear, Screening     Discussed healthy lifestyle including regular exercise, healthy eating, etc.  Return to clinic in 1 year

## 2024-03-13 ENCOUNTER — PATIENT MESSAGE (OUTPATIENT)
Dept: RHEUMATOLOGY | Facility: HOSPITAL | Age: 64
End: 2024-03-13
Payer: MEDICARE

## 2024-03-14 ENCOUNTER — OFFICE VISIT (OUTPATIENT)
Dept: DERMATOLOGY | Facility: CLINIC | Age: 64
End: 2024-03-14
Payer: MEDICARE

## 2024-03-14 DIAGNOSIS — L81.4 LENTIGO: ICD-10-CM

## 2024-03-14 DIAGNOSIS — D22.9 NEVUS: Primary | ICD-10-CM

## 2024-03-14 DIAGNOSIS — L82.1 SK (SEBORRHEIC KERATOSIS): ICD-10-CM

## 2024-03-14 DIAGNOSIS — D18.01 CHERRY ANGIOMA: ICD-10-CM

## 2024-03-14 DIAGNOSIS — L91.8 SKIN TAG: ICD-10-CM

## 2024-03-14 LAB
FINAL PATHOLOGIC DIAGNOSIS: ABNORMAL
Lab: ABNORMAL

## 2024-03-14 PROCEDURE — 99213 OFFICE O/P EST LOW 20 MIN: CPT | Mod: PBBFAC,PO | Performed by: DERMATOLOGY

## 2024-03-14 PROCEDURE — 99999 PR PBB SHADOW E&M-EST. PATIENT-LVL III: CPT | Mod: PBBFAC,,, | Performed by: DERMATOLOGY

## 2024-03-14 PROCEDURE — 99203 OFFICE O/P NEW LOW 30 MIN: CPT | Mod: S$PBB,,, | Performed by: DERMATOLOGY

## 2024-03-14 NOTE — PROGRESS NOTES
Subjective:      Patient ID:  Aysha Randolph is a 63 y.o. female who presents for   Chief Complaint   Patient presents with    Skin Check     TBSE      Patient is here today for a skin check.   Pt has a history of  moderate sun exposure in the past.   Pt recalls several blistering sunburns in the past- yes  Pt has history of tanning bed use- yes  Pt has  had moles removed in the past- no  Pt has history of melanoma in first degree relatives-  no     Pt states spot on L ear of concerns         Review of Systems   Skin:  Positive for dry skin. Negative for itching, rash, daily sunscreen use, activity-related sunscreen use, recent sunburn and wears hat.   Hematologic/Lymphatic: Bruises/bleeds easily.       Objective:   Physical Exam   Constitutional: She appears well-developed and well-nourished. No distress.   Neurological: She is alert and oriented to person, place, and time. She is not disoriented.   Psychiatric: She has a normal mood and affect.   Skin:   Areas Examined (abnormalities noted in diagram):   Scalp / Hair Palpated and Inspected  Head / Face Inspection Performed  Neck Inspection Performed  Chest / Axilla Inspection Performed  Abdomen Inspection Performed  Genitals / Buttocks / Groin Inspection Performed  Back Inspection Performed  RUE Inspected  LUE Inspection Performed  RLE Inspected  LLE Inspection Performed  Nails and Digits Inspection Performed                     Diagram Legend     Erythematous scaling macule/papule c/w actinic keratosis       Vascular papule c/w angioma      Pigmented verrucoid papule/plaque c/w seborrheic keratosis      Yellow umbilicated papule c/w sebaceous hyperplasia      Irregularly shaped tan macule c/w lentigo     1-2 mm smooth white papules consistent with Milia      Movable subcutaneous cyst with punctum c/w epidermal inclusion cyst      Subcutaneous movable cyst c/w pilar cyst      Firm pink to brown papule c/w dermatofibroma      Pedunculated fleshy papule(s) c/w  skin tag(s)      Evenly pigmented macule c/w junctional nevus     Mildly variegated pigmented, slightly irregular-bordered macule c/w mildly atypical nevus      Flesh colored to evenly pigmented papule c/w intradermal nevus       Pink pearly papule/plaque c/w basal cell carcinoma      Erythematous hyperkeratotic cursted plaque c/w SCC      Surgical scar with no sign of skin cancer recurrence      Open and closed comedones      Inflammatory papules and pustules      Verrucoid papule consistent consistent with wart     Erythematous eczematous patches and plaques     Dystrophic onycholytic nail with subungual debris c/w onychomycosis     Umbilicated papule    Erythematous-base heme-crusted tan verrucoid plaque consistent with inflamed seborrheic keratosis     Erythematous Silvery Scaling Plaque c/w Psoriasis     See annotation      Assessment / Plan:        Nevus  Discussed ABCDE's of nevi.  Monitor for new mole or moles that are becoming bigger, darker, irritated, or developing irregular borders. Brochure provided. Instructed patient to observe lesion(s) for changes and follow up in clinic if changes are noted. Patient to monitor skin at home for new or changing lesions.     Skin tag  Reassurance given to patient. No treatment is necessary.   Treatment of benign, asymptomatic lesions may be considered cosmetic.    Cherry angioma  These are benign vascular lesions that are inherited.  Treatment is not necessary.      Lentigo  This is a benign hyperpigmented sun induced lesion. Recommend daily sun protection/avoidance and use of at least SPF 30, broad spectrum sunscreen (OTC drug) will reduce the number of new lesions. Treatment of these benign lesions are considered cosmetic.  The nature of sun-induced photo-aging and skin cancers is discussed.  Sun avoidance, protective clothing, and the use of 30-SPF sunscreens is advised. Observe closely for skin damage/changes, and call if such occurs.  A tint is recommended too-  such as makeup, tinted sunscreen, BB/CC creams. The iron oxide in the tint of products protects against agents other than UV light that damage our skin such as blue light from screens, infrared heat, visible light, and other other environmental pollutants.  These other factors can contribute significantly to irregular pigment, especially in skin of color and tint helps protect from these factors.     SEBORRHEIC KERATOSIS  These are benign inherited growths without a malignant potential. Reassurance given to patient. No treatment is necessary.     Total body skin examination performed today including at least 12 points as noted in physical examination. No lesions suspicious for malignancy noted.    Recommend daily sun protection/avoidance, use of at least SPF 30, broad spectrum sunscreen (OTC drug), skin self examinations, and routine physician surveillance to optimize early detection               Follow up in about 2 years (around 3/14/2026) for TBSE.

## 2024-03-18 ENCOUNTER — PATIENT MESSAGE (OUTPATIENT)
Dept: PRIMARY CARE CLINIC | Facility: CLINIC | Age: 64
End: 2024-03-18
Payer: MEDICARE

## 2024-03-19 DIAGNOSIS — R10.13 EPIGASTRIC PAIN: ICD-10-CM

## 2024-03-19 NOTE — TELEPHONE ENCOUNTER
No care due was identified.  [facility] Embedded Care Due Messages. Reference number: [id number].   3/19/2024 11:54:02 AM CDT

## 2024-03-20 DIAGNOSIS — M05.79 RHEUMATOID ARTHRITIS INVOLVING MULTIPLE SITES WITH POSITIVE RHEUMATOID FACTOR: Primary | ICD-10-CM

## 2024-03-20 RX ORDER — PANTOPRAZOLE SODIUM 40 MG/1
40 TABLET, DELAYED RELEASE ORAL DAILY
Qty: 90 TABLET | Refills: 3 | Status: SHIPPED | OUTPATIENT
Start: 2024-03-20 | End: 2024-03-20 | Stop reason: SDUPTHER

## 2024-03-20 RX ORDER — PANTOPRAZOLE SODIUM 40 MG/1
40 TABLET, DELAYED RELEASE ORAL DAILY
Qty: 90 TABLET | Refills: 3 | Status: SHIPPED | OUTPATIENT
Start: 2024-03-20 | End: 2025-03-20

## 2024-03-20 RX ORDER — SARILUMAB 200 MG/1.14ML
200 INJECTION, SOLUTION SUBCUTANEOUS
Qty: 2 PEN | Refills: 11 | Status: ACTIVE | COMMUNITY
Start: 2024-03-20

## 2024-04-12 DIAGNOSIS — M05.79 RHEUMATOID ARTHRITIS INVOLVING MULTIPLE SITES WITH POSITIVE RHEUMATOID FACTOR: ICD-10-CM

## 2024-04-12 RX ORDER — METHOTREXATE 2.5 MG/1
25 TABLET ORAL
Qty: 40 TABLET | Refills: 3 | Status: CANCELLED | OUTPATIENT
Start: 2024-04-12

## 2024-04-15 ENCOUNTER — PATIENT MESSAGE (OUTPATIENT)
Dept: RHEUMATOLOGY | Facility: CLINIC | Age: 64
End: 2024-04-15
Payer: MEDICARE

## 2024-04-15 RX ORDER — METHOTREXATE 2.5 MG/1
25 TABLET ORAL
Qty: 40 TABLET | Refills: 3 | Status: ACTIVE | OUTPATIENT
Start: 2024-04-15 | End: 2024-05-03 | Stop reason: SDUPTHER

## 2024-04-17 ENCOUNTER — LAB VISIT (OUTPATIENT)
Dept: LAB | Facility: HOSPITAL | Age: 64
End: 2024-04-17
Attending: INTERNAL MEDICINE
Payer: MEDICARE

## 2024-04-17 DIAGNOSIS — M05.79 RHEUMATOID ARTHRITIS INVOLVING MULTIPLE SITES WITH POSITIVE RHEUMATOID FACTOR: ICD-10-CM

## 2024-04-17 LAB
ALBUMIN SERPL BCP-MCNC: 3.8 G/DL (ref 3.5–5.2)
ALP SERPL-CCNC: 77 U/L (ref 55–135)
ALT SERPL W/O P-5'-P-CCNC: 30 U/L (ref 10–44)
ANION GAP SERPL CALC-SCNC: 9 MMOL/L (ref 8–16)
AST SERPL-CCNC: 25 U/L (ref 10–40)
BASOPHILS # BLD AUTO: 0.05 K/UL (ref 0–0.2)
BASOPHILS NFR BLD: 0.6 % (ref 0–1.9)
BILIRUB SERPL-MCNC: 0.8 MG/DL (ref 0.1–1)
BUN SERPL-MCNC: 15 MG/DL (ref 8–23)
CALCIUM SERPL-MCNC: 9.9 MG/DL (ref 8.7–10.5)
CHLORIDE SERPL-SCNC: 103 MMOL/L (ref 95–110)
CHOLEST SERPL-MCNC: 156 MG/DL (ref 120–199)
CHOLEST/HDLC SERPL: 2.7 {RATIO} (ref 2–5)
CO2 SERPL-SCNC: 30 MMOL/L (ref 23–29)
CREAT SERPL-MCNC: 0.7 MG/DL (ref 0.5–1.4)
CRP SERPL-MCNC: 13.2 MG/L (ref 0–8.2)
DIFFERENTIAL METHOD BLD: ABNORMAL
EOSINOPHIL # BLD AUTO: 0.2 K/UL (ref 0–0.5)
EOSINOPHIL NFR BLD: 2.7 % (ref 0–8)
ERYTHROCYTE [DISTWIDTH] IN BLOOD BY AUTOMATED COUNT: 15.7 % (ref 11.5–14.5)
ERYTHROCYTE [SEDIMENTATION RATE] IN BLOOD BY PHOTOMETRIC METHOD: 39 MM/HR (ref 0–36)
EST. GFR  (NO RACE VARIABLE): >60 ML/MIN/1.73 M^2
GLUCOSE SERPL-MCNC: 138 MG/DL (ref 70–110)
HCT VFR BLD AUTO: 37.1 % (ref 37–48.5)
HDLC SERPL-MCNC: 58 MG/DL (ref 40–75)
HDLC SERPL: 37.2 % (ref 20–50)
HGB BLD-MCNC: 11.7 G/DL (ref 12–16)
IMM GRANULOCYTES # BLD AUTO: 0.02 K/UL (ref 0–0.04)
IMM GRANULOCYTES NFR BLD AUTO: 0.2 % (ref 0–0.5)
LDLC SERPL CALC-MCNC: 77 MG/DL (ref 63–159)
LYMPHOCYTES # BLD AUTO: 2.5 K/UL (ref 1–4.8)
LYMPHOCYTES NFR BLD: 29.8 % (ref 18–48)
MCH RBC QN AUTO: 29.8 PG (ref 27–31)
MCHC RBC AUTO-ENTMCNC: 31.5 G/DL (ref 32–36)
MCV RBC AUTO: 94 FL (ref 82–98)
MONOCYTES # BLD AUTO: 0.4 K/UL (ref 0.3–1)
MONOCYTES NFR BLD: 4.5 % (ref 4–15)
NEUTROPHILS # BLD AUTO: 5.2 K/UL (ref 1.8–7.7)
NEUTROPHILS NFR BLD: 62.2 % (ref 38–73)
NONHDLC SERPL-MCNC: 98 MG/DL
NRBC BLD-RTO: 0 /100 WBC
PLATELET # BLD AUTO: 266 K/UL (ref 150–450)
PMV BLD AUTO: 11.3 FL (ref 9.2–12.9)
POTASSIUM SERPL-SCNC: 4.2 MMOL/L (ref 3.5–5.1)
PROT SERPL-MCNC: 7.5 G/DL (ref 6–8.4)
RBC # BLD AUTO: 3.93 M/UL (ref 4–5.4)
SODIUM SERPL-SCNC: 142 MMOL/L (ref 136–145)
TRIGL SERPL-MCNC: 105 MG/DL (ref 30–150)
WBC # BLD AUTO: 8.39 K/UL (ref 3.9–12.7)

## 2024-04-17 PROCEDURE — 86140 C-REACTIVE PROTEIN: CPT | Performed by: STUDENT IN AN ORGANIZED HEALTH CARE EDUCATION/TRAINING PROGRAM

## 2024-04-17 PROCEDURE — 80053 COMPREHEN METABOLIC PANEL: CPT | Performed by: STUDENT IN AN ORGANIZED HEALTH CARE EDUCATION/TRAINING PROGRAM

## 2024-04-17 PROCEDURE — 80061 LIPID PANEL: CPT | Performed by: STUDENT IN AN ORGANIZED HEALTH CARE EDUCATION/TRAINING PROGRAM

## 2024-04-17 PROCEDURE — 85652 RBC SED RATE AUTOMATED: CPT | Performed by: STUDENT IN AN ORGANIZED HEALTH CARE EDUCATION/TRAINING PROGRAM

## 2024-04-17 PROCEDURE — 36415 COLL VENOUS BLD VENIPUNCTURE: CPT | Mod: PO | Performed by: STUDENT IN AN ORGANIZED HEALTH CARE EDUCATION/TRAINING PROGRAM

## 2024-04-17 PROCEDURE — 85025 COMPLETE CBC W/AUTO DIFF WBC: CPT | Performed by: STUDENT IN AN ORGANIZED HEALTH CARE EDUCATION/TRAINING PROGRAM

## 2024-05-01 ENCOUNTER — PROCEDURE VISIT (OUTPATIENT)
Dept: OBSTETRICS AND GYNECOLOGY | Facility: CLINIC | Age: 64
End: 2024-05-01
Attending: OBSTETRICS & GYNECOLOGY
Payer: MEDICARE

## 2024-05-01 VITALS — DIASTOLIC BLOOD PRESSURE: 72 MMHG | SYSTOLIC BLOOD PRESSURE: 120 MMHG | BODY MASS INDEX: 37.54 KG/M2 | HEIGHT: 59 IN

## 2024-05-01 DIAGNOSIS — N87.9 CERVICAL DYSPLASIA: ICD-10-CM

## 2024-05-01 DIAGNOSIS — Z87.411 HISTORY OF VAGINAL DYSPLASIA: ICD-10-CM

## 2024-05-01 DIAGNOSIS — R87.612 LGSIL ON PAP SMEAR OF CERVIX: Primary | ICD-10-CM

## 2024-05-01 DIAGNOSIS — Z01.812 PRE-PROCEDURE LAB EXAM: ICD-10-CM

## 2024-05-01 PROCEDURE — 88342 IMHCHEM/IMCYTCHM 1ST ANTB: CPT | Mod: 26,,, | Performed by: PATHOLOGY

## 2024-05-01 PROCEDURE — 57456 ENDOCERV CURETTAGE W/SCOPE: CPT | Mod: PBBFAC | Performed by: OBSTETRICS & GYNECOLOGY

## 2024-05-01 PROCEDURE — 57452 EXAM OF CERVIX W/SCOPE: CPT | Mod: PBBFAC

## 2024-05-01 PROCEDURE — 88305 TISSUE EXAM BY PATHOLOGIST: CPT | Performed by: PATHOLOGY

## 2024-05-01 PROCEDURE — 88305 TISSUE EXAM BY PATHOLOGIST: CPT | Mod: 26,,, | Performed by: PATHOLOGY

## 2024-05-01 PROCEDURE — 88342 IMHCHEM/IMCYTCHM 1ST ANTB: CPT | Performed by: PATHOLOGY

## 2024-05-01 NOTE — PROCEDURES
Colposcopy    Date/Time: 5/1/2024 10:30 AM    Performed by: Micheline Kiran MD  Authorized by: Micheline Kiran MD    Assistants?: Yes      Colposcopy Site:  Cervix  Position:  Supine  Acrowhite Lesion? Yes    Atypical Vessels: No    Transformation Zone Adequate?: No    Biopsy?: No    ECC Performed?: Yes    LEEP Performed?: No      Discussed left vaginal wall with WE-- as noted 4-2023.  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 Will consider continued follow up exams vs definitive tx with CKC and laser ablation

## 2024-05-01 NOTE — PROCEDURES
Colposcopy    Date/Time: 5/1/2024 10:30 AM    Performed by: Denise Fuller MA  Authorized by: Micheline Kiran MD    Consent obatined:  Prior to procedure the appropriate consent was completed and verified  Timeout:Immediately prior to procedure a time out was called to verify the correct patient, procedure, equipment, support staff and site/side marked as required  Prep:Patient was prepped and draped in the usual sterile fashion  Assistants?: Yes     I was present for the entire procedure.    Colposcopy Site:  Cervix

## 2024-05-03 ENCOUNTER — OFFICE VISIT (OUTPATIENT)
Dept: SLEEP MEDICINE | Facility: CLINIC | Age: 64
End: 2024-05-03
Attending: PSYCHIATRY & NEUROLOGY
Payer: MEDICARE

## 2024-05-03 VITALS
HEART RATE: 97 BPM | WEIGHT: 192.38 LBS | BODY MASS INDEX: 38.78 KG/M2 | DIASTOLIC BLOOD PRESSURE: 73 MMHG | HEIGHT: 59 IN | SYSTOLIC BLOOD PRESSURE: 120 MMHG

## 2024-05-03 DIAGNOSIS — G47.30 SLEEP APNEA, UNSPECIFIED TYPE: Primary | ICD-10-CM

## 2024-05-03 DIAGNOSIS — I10 BENIGN ESSENTIAL HYPERTENSION: ICD-10-CM

## 2024-05-03 DIAGNOSIS — M79.7 FIBROMYALGIA: ICD-10-CM

## 2024-05-03 DIAGNOSIS — M05.79 RHEUMATOID ARTHRITIS INVOLVING MULTIPLE SITES WITH POSITIVE RHEUMATOID FACTOR: ICD-10-CM

## 2024-05-03 DIAGNOSIS — D84.9 IMMUNOCOMPROMISED STATE: ICD-10-CM

## 2024-05-03 PROCEDURE — 99999 PR PBB SHADOW E&M-EST. PATIENT-LVL III: CPT | Mod: PBBFAC,,, | Performed by: NURSE PRACTITIONER

## 2024-05-03 PROCEDURE — 99213 OFFICE O/P EST LOW 20 MIN: CPT | Mod: PBBFAC | Performed by: NURSE PRACTITIONER

## 2024-05-03 PROCEDURE — 99214 OFFICE O/P EST MOD 30 MIN: CPT | Mod: S$PBB,ICN,, | Performed by: NURSE PRACTITIONER

## 2024-05-03 RX ORDER — METHOTREXATE 2.5 MG/1
25 TABLET ORAL
Qty: 40 TABLET | Refills: 3 | Status: SHIPPED | OUTPATIENT
Start: 2024-05-03 | End: 2024-05-03

## 2024-05-03 RX ORDER — METHOTREXATE 2.5 MG/1
25 TABLET ORAL
Qty: 40 TABLET | Refills: 3 | Status: SHIPPED | OUTPATIENT
Start: 2024-05-03

## 2024-05-03 NOTE — TELEPHONE ENCOUNTER
----- Message from Lorena Suh sent at 5/3/2024 12:03 PM CDT -----  Regarding: Clarification  Contact: [phone] Ext [phone]  Whit with [employer] is calling to get clarification of methotrexate 2.5 MG Tab . Asking for a call back

## 2024-05-03 NOTE — PROGRESS NOTES
"Referred by Dr. Jarrett    CHIEF COMPLAINT: Sleep evaluation    HISTORY OF PRESENT ILLNESS: She has never had a sleep study. +snores but denies witnessed apneic pauses.  comments that her sound of snoring can change at times. She occasionally feels chest tightness which can arouse her. +shortness of breath and occasional am headaches. Denies sinus congestion.  Tosses and turns all night  +fibromyalgia and RA  HTN, stable    On todays [place] Sleepiness Scale the patient scores a 5/24.       FAMILY HISTORY:niece, FAITH  SOCIAL HISTORY: ,retired  /73 (BP Location: Left arm, Patient Position: Sitting, BP Method: Large (Automatic))   Pulse 97   Ht 4' 11" (1.499 m)   Wt 87.3 kg (192 lb 6.4 oz)   BMI 38.86 kg/m² neck circumference 16", modified mallampati III        ASSESSMENT:   Unspecified Sleep Apnea, with symptoms of snoring, un-refreshing disrupted sleep and daytime sleepiness,  with medical comorbidities of obesity, RA, fibromyalgia, HTN. Warrants further investigation for untreated sleep apnea.   Immunocompromised state    PLAN:   1. Polysomnogram  discussed plan of care (if + plan apap/nose)   2. Discussed etiology of FAITH and potential ramifications of untreated FAITH, including inflammation,  heart disease, HTN.  We discussed potential treatment options, which could include weight loss , continuous positive airway pressure (CPAP-definitive), mandibular advancement splint by dentist  See pcp re HTN mgt/continue med and rheumatology /continue meds    Thank you for allowing me the opportunity to participate in the care of your patient        "

## 2024-05-07 ENCOUNTER — TELEPHONE (OUTPATIENT)
Dept: PRIMARY CARE CLINIC | Facility: CLINIC | Age: 64
End: 2024-05-07
Payer: MEDICARE

## 2024-05-07 VITALS — DIASTOLIC BLOOD PRESSURE: 73 MMHG | SYSTOLIC BLOOD PRESSURE: 120 MMHG

## 2024-05-07 LAB
COMMENT: NORMAL
FINAL PATHOLOGIC DIAGNOSIS: NORMAL
GROSS: NORMAL
Lab: NORMAL

## 2024-05-12 PROBLEM — N87.0 DYSPLASIA OF CERVIX, LOW GRADE (CIN 1): Status: ACTIVE | Noted: 2024-05-12

## 2024-05-15 ENCOUNTER — TELEPHONE (OUTPATIENT)
Dept: SLEEP MEDICINE | Facility: OTHER | Age: 64
End: 2024-05-15
Payer: MEDICARE

## 2024-05-15 NOTE — PROGRESS NOTES
Subjective:      Patient ID: Aysha Randolph is a 63 y.o. female.    Chief Complaint: Seropositive RA    Initial Presentation  Aysha Randolph is a 62 y.o. F with a past medical history below who presents today for follow-up for seropositive rheumatoid arthritis.      - The patient establish with [facility] in February 2023 after moving from [place].   - She was diagnosed with RA in December 2020, initially had pain and swelling in the hands, knees, shoulders, along with stiffness  - She has difficult to control RA and has been on various regimens; has failed MTX, Humira, Orencia infusions, Actemra, Rinvoq  - Prism RA showed she was a high non-responder to TNFs  - Rinvoq was discontinued in December 2023, attempted to start Xeljanx however denied, was started on Kevzara **  - She also has fibromyalgia     Interval History      Rheumatology ROS  (-) fevers, chills (-) weight loss, (+) fatigue, (+) morning stiffness,  (+) arthralgias, (-) arthritis, (-) headaches, (-)  vision changes or loss of vision, (-) hx of red eyes including uveitis, iritis, scleritis or episcleritis, (-) photophobia, (-) dry eyes, (-) dry mouth, (-) rash, (-) photosensitivity, (-) alopecia, (-) mucosal ulcers, (-) Raynaud's  phenomenon, (-) SQ nodules, (-) sense of skin tightening in hands, face or torso, (-)  hx pleurisy, (-) sharp chest pains that increase with deep breath, (-) lung fibrosis, (-) hemoptysis, (-) hx of DVT or PE (-) chest pains, (-) shortness of breath, (-) hx pericarditis (-) abdominal pain, (-) nausea, (-) vomiting, (-) diarrhea, (-) constipation, (-) melena,  (-) bloody diarrhea, (-) UC/Crohns, (-) dysphagia, (-) GERD/Reflux, (-) hematuria, proteinuria, (-) renal failure, (-) focal weakness, (-) trouble combing hair or (-) getting out of chairs,  (-) hx of low WBC, low platelets, anemia, (-) hx of pregnancy losses/pre term deliveries/pregnancy complications, (-) genital ulcers    Answers submitted by the patient for  this visit:  Rheumatology Questionnaire (Submitted on 5/13/2024)  fever: No  eye redness: No  mouth sores: No  headaches: Yes  shortness of breath: Yes  chest pain: No  trouble swallowing: No  diarrhea: No  constipation: No  unexpected weight change: No  genital sore: No  dysuria: No  During the last 3 days, have you had a skin rash?: No  Bruises or bleeds easily: Yes  cough: No    History  Medical:  Active Problem List with Overview Notes    Diagnosis Date Noted    Severe obesity (BMI 35.0-39.9) with comorbidity 05/16/2024    Dysplasia of cervix, low grade (LANDEN 1) 05/12/2024    History of vaginal dysplasia 03/06/2024    Pain aggravated by activities of daily living 01/25/2024    Fear of pain 01/24/2024    Decreased range of motion of trunk and back 01/24/2024    Osteoporosis 12/14/2023    H. pylori infection 06/07/2023    Cervical dysplasia 05/18/2023    Vaginal dysplasia 05/18/2023    Hyperlipidemia 02/01/2023    GERD (gastroesophageal reflux disease) 02/01/2023    Rheumatoid arthritis 02/01/2023    Benign essential hypertension 02/01/2023     Surgical:  Past Surgical History:   Procedure Laterality Date    ANKLE SURGERY Left     COLONOSCOPY N/A 05/31/2023    Procedure: COLONOSCOPY;  Surgeon: Robbie Ace MD;  Location: [facility];  Service: Endoscopy;  Laterality: N/A;    ESOPHAGOGASTRODUODENOSCOPY N/A 05/31/2023    Procedure: EGD (ESOPHAGOGASTRODUODENOSCOPY);  Surgeon: Robbie Ace MD;  Location: [facility];  Service: Endoscopy;  Laterality: N/A;    FRACTURE SURGERY      JOINT REPLACEMENT  April 2016    Full Left ankle replacement    WRIST FRACTURE SURGERY Left      Medication:  Current Outpatient Medications   Medication Sig Dispense Refill    albuterol (ACCUNEB) 1.25 mg/3 mL Nebu Take 3 mLs (1.25 mg total) by nebulization every 6 (six) hours as needed (shortness of breath, wheezing). 75 mL 1    albuterol (PROVENTIL/VENTOLIN HFA) 90 mcg/actuation inhaler Proventil HFA 90 mcg/actuation aerosol inhaler       albuterol (VENTOLIN HFA) 90 mcg/actuation inhaler Inhale 2 puffs into the lungs every 4 (four) hours as needed for Wheezing. Rescue 6.7 g 1    aspirin (ASPIR-81 ORAL)       atorvastatin (LIPITOR) 10 MG tablet TAKE 1 TABLET DAILY 90 tablet 3    cholecalciferol, vitamin D3, 125 mcg (5,000 unit) Tab Take 5,000 Units by mouth once a week.      diclofenac sodium 100 mg 24 hr tablet diclofenac  mg tablet,extended release 24 hr   TAKE 1 TABLET(S) EVERY DAY BY ORAL ROUTE WITH MEALS FOR 30 DAYS.      EScitalopram oxalate (LEXAPRO) 20 MG tablet TAKE 1 TABLET DAILY 90 tablet 3    hydroCHLOROthiazide (HYDRODIURIL) 25 MG tablet TAKE 1 TABLET DAILY 90 tablet 3    methotrexate 2.5 MG Tab Take 10 tablets (25 mg total) by mouth every 7 days. 40 tablet 3    multivitamin-minerals-lutein (MULTIVITAMIN 50 PLUS) Tab Take 1 tablet by mouth once daily.      omega 3-dha-epa-fish oil 120-180-500 mg Cap       pantoprazole (PROTONIX) 40 MG tablet Take 1 tablet (40 mg total) by mouth once daily. 90 tablet 3    pregabalin (LYRICA) 50 MG capsule Take 1 capsule (50 mg total) by mouth 3 (three) times daily. 90 capsule 6    sarilumab (KEVZARA) 200 mg/1.14 mL PnIj Inject 1.14 mLs (200 mg total) into the skin every 14 (fourteen) days. 2 pen 11    folic acid (FOLVITE) 1 MG tablet Take 1 tablet (1 mg total) by mouth once daily. 90 tablet 3    teriparatide (FORTEO) 20 mcg/dose (600mcg/2.4mL) PnIj Inject 0.08 mLs (20 mcg total) into the skin once daily. 2.4 mL 11     No current facility-administered medications for this visit.       Imaging/Procedures  XR Hands Bilaterally (2/9/23):  Left hand:     There is osteopenia.  There are degenerative changes of the hand and wrist.  There is calcification in the region of the TFCC.  There is edema about the digits and dorsal hand.  No acute displaced fracture or dislocation of the left hand.     Right hand:     There are degenerative changes of the right hand including the 1st carpal metacarpal joint.  No  acute displaced fracture or dislocation of the hand.  No radiopaque foreign body.  There is edema about the digits and dorsal aspect of the hand.  The wrist appears intact.     Impression:     1. No acute displaced fracture or dislocation of either hand noting degenerative changes, edema, and additional findings above.        Electronically signed by: Akshat Ruiz MD  Date:                                            02/09/2023  Time:                                           11:04     DEXA (2/15/23):  Lumbar spine (L1-L4):               BMD is 0.940 g/cm2, T-score is -1.0, and Z-score is 0.7.     Total hip:                                BMD is 0.881 g/cm2, T-score is -0.5, and Z-score is 0.7.     Femoral neck:                          BMD is 0.645 g/cm2, T-score is -1.8, and Z-score is -0.4.     Distal 1/3 radius:                      Not applicable     FRAX:     27% risk of a major osteoporotic fracture in the next 10 years.     5% risk of hip fracture in the next 10 years.     Impression:     *Osteoporosis based on T-score between -1.0 and -2.5 and elevated risk based on FRAX  *Fracture risk is very high due to calculated 10 year risk of hip fracture >4.5% (FRAX)     RECOMMENDATIONS:  *Daily calcium intake 2642-8684 mg, dietary sources preferred; Vitamin D 3150-1302 IU daily.  *Weight bearing exercise and fall precautions.  *Recommend medical therapy for osteoporosis with high risk for fracture. Consider bisphosphonates (alendronate, risedronate, zoledronic acid), or denosumab.  *Repeat BMD in 2 years    Rheumatologic labs     Component      Latest Ref Rng 4/17/2024   Sodium      136 - 145 mmol/L 142    Potassium      3.5 - 5.1 mmol/L 4.2    Chloride      95 - 110 mmol/L 103    CO2      23 - 29 mmol/L 30 (H)    Glucose      70 - 110 mg/dL 138 (H)    BUN      8 - 23 mg/dL 15    Creatinine      0.5 - 1.4 mg/dL 0.7    Calcium      8.7 - 10.5 mg/dL 9.9    PROTEIN TOTAL      6.0 - 8.4 g/dL 7.5    Albumin      3.5 -  "5.2 g/dL 3.8    BILIRUBIN TOTAL      0.1 - 1.0 mg/dL 0.8    ALP      55 - 135 U/L 77    AST      10 - 40 U/L 25    ALT      10 - 44 U/L 30    eGFR      >60 mL/min/1.73 m^2 >60.0    Anion Gap      8 - 16 mmol/L 9      Component      Latest Ref [facility] 4/17/2024   WBC      3.90 - 12.70 K/uL 8.39    RBC      4.00 - 5.40 M/uL 3.93 (L)    Hemoglobin      12.0 - 16.0 g/dL 11.7 (L)    Hematocrit      37.0 - 48.5 % 37.1    MCV      82 - 98 fL 94    MCH      27.0 - 31.0 pg 29.8    MCHC      32.0 - 36.0 g/dL 31.5 (L)    RDW      11.5 - 14.5 % 15.7 (H)    Platelet Count      150 - 450 K/uL 266       Component      Latest Ref [facility] 4/17/2024   CRP      0.0 - 8.2 mg/L 13.2 (H)    Sed Rate      0 - 36 mm/Hr 39 (H)       Component      Latest Ref [facility] 4/17/2024   Cholesterol Total      120 - 199 mg/dL 156    Triglycerides      30 - 150 mg/dL 105    HDL      40 - 75 mg/dL 58    LDL Cholesterol      63.0 - 159.0 mg/dL 77.0    HDL/Cholesterol Ratio      20.0 - 50.0 % 37.2    Total Cholesterol/HDL Ratio      2.0 - 5.0  2.7    Non-HDL Cholesterol      mg/dL 98      Rheumatologic medications history  MTX 10 tablets (11/2020-present)  Prednisone (last used in June 2023)  Humira (03/2021-12/2021; discontinued due to lack of efficacy, used genetic testing to see that she was not a responder)    Orencia (05/2022-10/2022; discontinued due to lack of efficacy)  Actemra (06/2023-09/2023)  Rinvoq (09/2023-12/2023; discontinued due to headaches)    Objective:   /71 (BP Location: Left arm)   Pulse 79   Ht 4' 11" (1.499 m)   Wt 87 kg (191 lb 12.8 oz)   BMI 38.74 kg/m²   Physical Exam   Constitutional: No distress.   HENT:   Mouth/Throat: Mucous membranes are moist.   Eyes: Conjunctivae are normal.   Cardiovascular: Normal rate, regular rhythm, normal heart sounds and normal pulses.   Pulmonary/Chest: Effort normal and breath sounds normal.   Abdominal: Soft. Bowel sounds are normal.   Musculoskeletal:         General: Swelling (soft tissue " swelling of L hand but no synovitis) and tenderness (see joint exam) present. Normal range of motion.      Cervical back: Normal range of motion. No rigidity or tenderness.   Neurological: She is alert. She displays no weakness. Gait normal.   Skin: Skin is warm.      6/1/2023 9/20/2023 12/14/2023 5/16/2024   Tender (HIGGINBOTHAM-28) 27 / 28 25 / 28 22 / 28 9 / 28    Swollen (HIGGINBOTHAM-28) 5 / 28 4 / 28  0 / 28  0 / 28    Provider Global 60 mm 30 mm 50 mm 30 mm   Patient Global 55 mm 50 mm 65 mm 45 mm   ESR 74 mm/hr 3 mm/hr 12 mm/hr 39 mm/hr   CRP 16.6 mg/L 0.7 mg/L 8.2 mg/L 13.2 mg/L   HIGGINBOTHAM-28 (ESR) 7.32 (High disease activity) 4.83 (Moderate disease activity) 5.28 (High disease activity) 4.87 (Moderate disease activity)   HIGGINBOTHAM-28 (CRP) 6.3 (High disease activity) 5.21 (High disease activity) 5.3 (High disease activity) 4.23 (Moderate disease activity)   CDAI Score 43.5  37  33.5  16.5      Assessment:     1. Rheumatoid arthritis involving multiple sites with positive rheumatoid factor    2. Fibromyalgia    3. Osteoporosis without current pathological fracture    4. Severe obesity (BMI 35.0-39.9) with comorbidity        Plan:     Problem List Items Addressed This Visit          Immunology/Multi System    Rheumatoid arthritis - Primary    Relevant Medications    folic acid (FOLVITE) 1 MG tablet    Other Relevant Orders    Hepatitis B surface antigen    HBcAB    Hepatitis B surface antibody    Quantiferon Gold TB       Endocrine    Osteoporosis    Relevant Medications    teriparatide (FORTEO) 20 mcg/dose (600mcg/2.4mL) PnIj    Other Relevant Orders    Vitamin D    Severe obesity (BMI 35.0-39.9) with comorbidity     Other Visit Diagnoses       Fibromyalgia              Aysha Randolph is a 62 y.o. F with a past medical history below who presents today for follow-up for seropositive rheumatoid arthritis.     Seropositive rheumatoid arthritis- currently uncontrolled; has failed MTX, Humira, Orencia, Actemra and now Rinvoq. No  synovitis on exam. Recent inflammatory markers normal. CDAI of 16.5 today, decreased from a few months ago just on MTX and with the addition of Lyrica. Fibromyalgia seems to have been playing a role in the patients overall pain. Inflammation markers elevated on recent blood work with MTX alone so continue plan to start Kevzara.     Osteoporosis- DEXA from 2/2023 with lowest T score of -1.8 at the femoral neck, FRAX 27% major osteoporotic fracture 5% of hip fracture in the next 10 years, no falls or fractures, currently on Alendronate     Fibromyalgia- The patient has widespread pain, on both sides of the body, above and below the waist. The patient has multiple associated symptoms with fibromyalgia that include fatigue, brain fog, waking up tired, irritable bowel syndrome, depression, insomnia, anxiety, headaches.   I spent time counseling the patient on fibromyalgia. I explained to the patient that fibromyalgia is a disorder characterized by widespread musculoskeletal pain, accompanied by fatigue, sleep, memory and mood issues. In general, it is thought that fibromyalgia symptoms are secondary to overactive nerves, which amplify painful sensations by the way the brain processes the pain signals.     Plan:  - Monitor CBC, CMP, ESR, CRP every 3 months   - Due for Hep B labs and TB with next set of labs   - Check Vit D with next labs   - Continue MTX 25 mg weekly and folic acid daily; recommended that the patient split the dose (5 tablets in AM, 5 tablets in PM) for better absorption   - Start Kevzara, patient already has medication at home   - Stop Alendronate, start Forteo (or whatever is approved by insurance) given very high risk of fractures with high FRAX  - Continue Lyrica 50 mg TID for fibromyalgia   - Referral to sleep for sleep study; has sleep study at the end of the month   - Referral to [facility] for Fibromyalgia   - Due for COVID, RSV and pneumonia vaccines     This patient was  examined with Dr. Lloyd. Plan discussed with the patient. Return to clinic in 3 months.    Kayla Cobb MD  Rheumatology Fellow, PGY4

## 2024-05-16 ENCOUNTER — OFFICE VISIT (OUTPATIENT)
Dept: RHEUMATOLOGY | Facility: CLINIC | Age: 64
End: 2024-05-16
Payer: MEDICARE

## 2024-05-16 VITALS
DIASTOLIC BLOOD PRESSURE: 71 MMHG | WEIGHT: 191.81 LBS | HEIGHT: 59 IN | SYSTOLIC BLOOD PRESSURE: 121 MMHG | BODY MASS INDEX: 38.67 KG/M2 | HEART RATE: 79 BPM

## 2024-05-16 DIAGNOSIS — M81.8 OTHER OSTEOPOROSIS WITHOUT CURRENT PATHOLOGICAL FRACTURE: ICD-10-CM

## 2024-05-16 DIAGNOSIS — M05.79 RHEUMATOID ARTHRITIS INVOLVING MULTIPLE SITES WITH POSITIVE RHEUMATOID FACTOR: Primary | ICD-10-CM

## 2024-05-16 DIAGNOSIS — M79.7 FIBROMYALGIA: ICD-10-CM

## 2024-05-16 DIAGNOSIS — E66.01 SEVERE OBESITY (BMI 35.0-39.9) WITH COMORBIDITY: ICD-10-CM

## 2024-05-16 PROCEDURE — 99213 OFFICE O/P EST LOW 20 MIN: CPT | Mod: PBBFAC | Performed by: STUDENT IN AN ORGANIZED HEALTH CARE EDUCATION/TRAINING PROGRAM

## 2024-05-16 PROCEDURE — 99999 PR PBB SHADOW E&M-EST. PATIENT-LVL III: CPT | Mod: PBBFAC,GC,, | Performed by: STUDENT IN AN ORGANIZED HEALTH CARE EDUCATION/TRAINING PROGRAM

## 2024-05-16 PROCEDURE — 99214 OFFICE O/P EST MOD 30 MIN: CPT | Mod: S$PBB,GC,, | Performed by: STUDENT IN AN ORGANIZED HEALTH CARE EDUCATION/TRAINING PROGRAM

## 2024-05-16 RX ORDER — FOLIC ACID 1 MG/1
1 TABLET ORAL DAILY
Qty: 90 TABLET | Refills: 3 | Status: SHIPPED | OUTPATIENT
Start: 2024-05-16

## 2024-05-16 RX ORDER — TERIPARATIDE 250 UG/ML
20 INJECTION, SOLUTION SUBCUTANEOUS DAILY
Qty: 2.4 ML | Refills: 11 | Status: ACTIVE | OUTPATIENT
Start: 2024-05-16 | End: 2025-05-16

## 2024-05-16 NOTE — PROGRESS NOTES
63 year - HTN,HLD,GERD,anxiety and rheumatoid arthritismorbid   Family h/o RA- sister and grand niece   Family h/o CAD    Nov 2020- RA- pain and swelling in the hands,knees,shoulders  RF 86  CCP> 250    ESR 65  Nml vit d  Neg hep panel    Initial treatment  :    Mtx 6 tabs weekly  Prednisone 10 mg daily  Humira -didn't respond    On orencia since April 2022- she did infusions till October 2022  Now she has moved from [place] to [facility]    Came on mtx 8 tabs weekly since sep 2022  Prednisone 5 mg daily  Folic acid 1 mg daily    Symptoms :    Pain,swelling,stiffness- hands,knees,elbows,shoulders  Widespread pain  Paresthesias -dorsal hands and ? Left 2nd finger    H/o trauma -left wrist and elbow-surgery  Left ankle arthrocentesis/replacement- OA     Anxiety  ISLAS  Chest pressure  Nausea    B/l phalen's positive  EMG- CTS  Left CT injection which didn't help  Will treat synovitis first and then plan surgery if CTS persist    CBC,CMP,ESR,CRP,Predmard panel  Arthritis survey  Hands and feet xrays performed     Changed oral mtx to Injectable mtx 1 ml weekly,folic acid  Unfortunately due to shortage of mtx vials she takes mtx oral tablets    We tapered off  Prednisone 4 mg daily for a month  3 mg daily for a month  2 mg daily for a month   1 mg daily for a month and stop    ESR 74  CRP 16    CMP nml  CBC - h/h 11.3/37.1  Nml white count and plt count    CDAI 43.5-Very high disease activity      B/l UE EMG-Eventually    Sleep study    Physical therapy    Weight loss    Continue lexapro  Anxiety-psychiatry    H pylori +  Antibiotics completed  2 polyps on colonoscopy    We started actemra    Pain in the hips,hands and toes unfortunately persist  Fatigue  Morning stiffness    CDAI 30    Takes fosamax    On Mtx 10 tabs weekly,folic acid  CONTINUE MTX for now    D/marino actemra  Offered rinvoq    Since starting Rinvoq, she reported an improvement in her joint pains initially within the first 3-4 days of taking it. But after about 2  weeks of taking it consistently she noticed her joint pain returned. She also has noticed increased headaches and tenderness in the temples bilaterally. This has been occurring daily for the last few months since starting the Rinvoq. She has also noticed that her vision is a little more blurry than usual.      Last time  CDAI 33.5  PRISM RA- not responsive to TNFs    We then   Switched to xeljanz 5 mg bid  Xeljanz not approved  She is going to start kevzara  On mtx 25 mg weekly  Continue mtx 10 - 5 and 5 tabs weekly ,folic acid  Interestingly when we started treating fibro CDAI dropped because she has not started biologic yet    CBT for fibro  Lyrica 50 mg tid  Lexparo  Sleep study    CDAI 16.5    Start kevzara and continue mtx and see us back in 3 months    Labs       The 10-year ASCVD risk score (Scott JANG, et al., 2019) is: 5.7%    Values used to calculate the score:      Age: 63 years      Sex: Female      Is Non- : No      Diabetic: No      Tobacco smoker: No      Systolic Blood Pressure: 121 mmHg      Is BP treated: Yes      HDL Cholesterol: 58 mg/dL      Total Cholesterol: 156 mg/dL    Vaccinations  Shingles x 2 -done  Flu- done  Pneumonia pending  Covid 2 done  RSV pending  Tdap done      Osteoporosis 3/2023  Started fosamax jan 2024      Dexa    27% risk of a major osteoporotic fracture in the next 10 years.     5% risk of hip fracture in the next 10 years.       OP treatment anabolic agent first  D/c fosamax  Archie and vit d  Weight bearing         Answers submitted by the patient for this visit:  Rheumatology Questionnaire (Submitted on 5/13/2024)  fever: No  eye redness: No  mouth sores: No  headaches: Yes  shortness of breath: Yes  chest pain: No  trouble swallowing: No  diarrhea: No  constipation: No  unexpected weight change: No  genital sore: No  dysuria: No  During the last 3 days, have you had a skin rash?: No  Bruises or bleeds easily: Yes  cough: No

## 2024-05-21 ENCOUNTER — LAB VISIT (OUTPATIENT)
Dept: LAB | Facility: HOSPITAL | Age: 64
End: 2024-05-21
Attending: INTERNAL MEDICINE
Payer: MEDICARE

## 2024-05-21 DIAGNOSIS — E78.5 HYPERLIPIDEMIA, UNSPECIFIED HYPERLIPIDEMIA TYPE: ICD-10-CM

## 2024-05-21 DIAGNOSIS — R17 ELEVATED BILIRUBIN: ICD-10-CM

## 2024-05-21 DIAGNOSIS — I10 BENIGN ESSENTIAL HYPERTENSION: ICD-10-CM

## 2024-05-21 LAB
ALBUMIN SERPL BCP-MCNC: 3.8 G/DL (ref 3.5–5.2)
ALP SERPL-CCNC: 79 U/L (ref 55–135)
ALT SERPL W/O P-5'-P-CCNC: 25 U/L (ref 10–44)
ANION GAP SERPL CALC-SCNC: 5 MMOL/L (ref 8–16)
AST SERPL-CCNC: 26 U/L (ref 10–40)
BILIRUB DIRECT SERPL-MCNC: 0.4 MG/DL (ref 0.1–0.3)
BILIRUB SERPL-MCNC: 0.9 MG/DL (ref 0.1–1)
BUN SERPL-MCNC: 17 MG/DL (ref 8–23)
CALCIUM SERPL-MCNC: 10.1 MG/DL (ref 8.7–10.5)
CHLORIDE SERPL-SCNC: 110 MMOL/L (ref 95–110)
CHOLEST SERPL-MCNC: 151 MG/DL (ref 120–199)
CHOLEST/HDLC SERPL: 2.5 {RATIO} (ref 2–5)
CO2 SERPL-SCNC: 30 MMOL/L (ref 23–29)
CREAT SERPL-MCNC: 0.9 MG/DL (ref 0.5–1.4)
ERYTHROCYTE [DISTWIDTH] IN BLOOD BY AUTOMATED COUNT: 15.7 % (ref 11.5–14.5)
EST. GFR  (NO RACE VARIABLE): >60 ML/MIN/1.73 M^2
GLUCOSE SERPL-MCNC: 111 MG/DL (ref 70–110)
HCT VFR BLD AUTO: 36.1 % (ref 37–48.5)
HDLC SERPL-MCNC: 60 MG/DL (ref 40–75)
HDLC SERPL: 39.7 % (ref 20–50)
HGB BLD-MCNC: 11.6 G/DL (ref 12–16)
LDLC SERPL CALC-MCNC: 71.4 MG/DL (ref 63–159)
MCH RBC QN AUTO: 30.9 PG (ref 27–31)
MCHC RBC AUTO-ENTMCNC: 32.1 G/DL (ref 32–36)
MCV RBC AUTO: 96 FL (ref 82–98)
NONHDLC SERPL-MCNC: 91 MG/DL
PLATELET # BLD AUTO: 250 K/UL (ref 150–450)
PMV BLD AUTO: 11 FL (ref 9.2–12.9)
POTASSIUM SERPL-SCNC: 4.6 MMOL/L (ref 3.5–5.1)
PROT SERPL-MCNC: 7.4 G/DL (ref 6–8.4)
RBC # BLD AUTO: 3.75 M/UL (ref 4–5.4)
SODIUM SERPL-SCNC: 145 MMOL/L (ref 136–145)
TRIGL SERPL-MCNC: 98 MG/DL (ref 30–150)
WBC # BLD AUTO: 7.86 K/UL (ref 3.9–12.7)

## 2024-05-21 PROCEDURE — 36415 COLL VENOUS BLD VENIPUNCTURE: CPT | Performed by: INTERNAL MEDICINE

## 2024-05-21 PROCEDURE — 80061 LIPID PANEL: CPT | Performed by: INTERNAL MEDICINE

## 2024-05-21 PROCEDURE — 85027 COMPLETE CBC AUTOMATED: CPT | Performed by: INTERNAL MEDICINE

## 2024-05-21 PROCEDURE — 82248 BILIRUBIN DIRECT: CPT | Performed by: INTERNAL MEDICINE

## 2024-05-21 PROCEDURE — 80053 COMPREHEN METABOLIC PANEL: CPT | Performed by: INTERNAL MEDICINE

## 2024-05-28 ENCOUNTER — HOSPITAL ENCOUNTER (OUTPATIENT)
Dept: SLEEP MEDICINE | Facility: HOSPITAL | Age: 64
Discharge: HOME | End: 2024-05-28
Attending: PSYCHIATRY & NEUROLOGY
Payer: MEDICARE

## 2024-05-28 DIAGNOSIS — G47.33 OSA (OBSTRUCTIVE SLEEP APNEA): ICD-10-CM

## 2024-05-28 DIAGNOSIS — G47.30 SLEEP APNEA, UNSPECIFIED TYPE: Primary | ICD-10-CM

## 2024-05-29 PROBLEM — G47.30 SLEEP APNEA: Status: ACTIVE | Noted: 2024-05-29

## 2024-05-29 PROCEDURE — 95811 POLYSOM 6/>YRS CPAP 4/> PARM: CPT

## 2024-05-29 NOTE — PROGRESS NOTES
Patient was educated about the purpose of the wires and what data was being collected. Patient was informed that the technician may need to enter the room during the night to fix leads or make adjustments to the equipment.         Small Mary nasal mask used      Follow up instructions were provided to the patient.

## 2024-05-31 ENCOUNTER — OFFICE VISIT (OUTPATIENT)
Dept: PRIMARY CARE CLINIC | Facility: CLINIC | Age: 64
End: 2024-05-31
Payer: MEDICARE

## 2024-05-31 ENCOUNTER — LAB VISIT (OUTPATIENT)
Dept: LAB | Facility: HOSPITAL | Age: 64
End: 2024-05-31
Attending: INTERNAL MEDICINE
Payer: MEDICARE

## 2024-05-31 VITALS
WEIGHT: 191.56 LBS | OXYGEN SATURATION: 96 % | HEART RATE: 84 BPM | BODY MASS INDEX: 38.62 KG/M2 | HEIGHT: 59 IN | DIASTOLIC BLOOD PRESSURE: 74 MMHG | SYSTOLIC BLOOD PRESSURE: 126 MMHG

## 2024-05-31 DIAGNOSIS — R53.83 FATIGUE, UNSPECIFIED TYPE: ICD-10-CM

## 2024-05-31 DIAGNOSIS — R10.32 LEFT LOWER QUADRANT PAIN: ICD-10-CM

## 2024-05-31 DIAGNOSIS — M05.79 RHEUMATOID ARTHRITIS INVOLVING MULTIPLE SITES WITH POSITIVE RHEUMATOID FACTOR: ICD-10-CM

## 2024-05-31 DIAGNOSIS — M81.8 OTHER OSTEOPOROSIS WITHOUT CURRENT PATHOLOGICAL FRACTURE: ICD-10-CM

## 2024-05-31 DIAGNOSIS — R11.0 NAUSEA: ICD-10-CM

## 2024-05-31 DIAGNOSIS — A04.8 H. PYLORI INFECTION: ICD-10-CM

## 2024-05-31 DIAGNOSIS — E78.5 HYPERLIPIDEMIA, UNSPECIFIED HYPERLIPIDEMIA TYPE: ICD-10-CM

## 2024-05-31 DIAGNOSIS — I10 BENIGN ESSENTIAL HYPERTENSION: ICD-10-CM

## 2024-05-31 DIAGNOSIS — Z00.00 WELLNESS EXAMINATION: Primary | ICD-10-CM

## 2024-05-31 LAB — TSH SERPL DL<=0.005 MIU/L-ACNC: 1.62 UIU/ML (ref 0.4–4)

## 2024-05-31 PROCEDURE — 99999 PR PBB SHADOW E&M-EST. PATIENT-LVL IV: CPT | Mod: PBBFAC,,, | Performed by: INTERNAL MEDICINE

## 2024-05-31 PROCEDURE — 99214 OFFICE O/P EST MOD 30 MIN: CPT | Mod: S$PBB,25,, | Performed by: INTERNAL MEDICINE

## 2024-05-31 PROCEDURE — 99397 PER PM REEVAL EST PAT 65+ YR: CPT | Mod: GZ,S$PBB,, | Performed by: INTERNAL MEDICINE

## 2024-05-31 PROCEDURE — 36415 COLL VENOUS BLD VENIPUNCTURE: CPT | Performed by: INTERNAL MEDICINE

## 2024-05-31 PROCEDURE — 99214 OFFICE O/P EST MOD 30 MIN: CPT | Mod: PBBFAC | Performed by: INTERNAL MEDICINE

## 2024-05-31 PROCEDURE — 84443 ASSAY THYROID STIM HORMONE: CPT | Performed by: INTERNAL MEDICINE

## 2024-05-31 RX ORDER — ONDANSETRON 4 MG/1
4 TABLET, ORALLY DISINTEGRATING ORAL EVERY 8 HOURS PRN
Qty: 30 TABLET | Refills: 0 | Status: SHIPPED | OUTPATIENT
Start: 2024-05-31

## 2024-05-31 NOTE — PROGRESS NOTES
[facility] Note    Chief Complaint      Chief Complaint   Patient presents with    Annual Exam     History of Present Illness      Aysha Randolph is a 63 y.o. female who presents today for chief complaint annual wellness .   HPI   Annual feels well, Last seen 11.23, labs 5.24: T bili <1. Direct 0.4, stable normocytic anemia HH 11.6/36, LDL 71  mmg 2.24  Cscope 5.23  Pap 3.24 jorje    She did see sleep med in earlier this month plan for PSG    She is having llq pain pressure like and nausea, no diverticula on cscope last year, afebrile, intermittent and brief, 4-6 weeks, no association with urination or BM      HLD on lipitor 10 and fishoil LDL 71  HTN on hctz 25, she doS not check at home, at goal today   Mood on lexapro for CARMEN since 9.11, she is feeling ok      Hx of HPylori and completed triple tx. She also saw GI and had EGD neg for PUD, She is still on PPI. She is also on ASA  Csc showed polyops x2 TA X1     She has seropos  RA and fibro sees RheumKeshavamurthy now on kevzara and mtx +folate , off prednisone. She is ok from disease mgmt, has ups and downs   Had been on rinvoq, humira, orencia, actemra in the past.  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 Saw Gyn Suri Pang in 5.23. Per his nots plan is : for LANDEN Recommend cotesting in 1 year. For VAIN1: Recommend clinical exam and cotesting annually for 2 years. Most incidences of VAIN1 are a result of an infection with nononcogenic subtypes of HPV or in the setting of postmenopausal women, may associated with atrophic changes and respond to vaginal estrogen.She discussed removal of cervic partially with gyn onc vs in office leep etc      She is orginially from [place] and [facility] [place] [phone]  Dr Katlyn Nj Gyn in [facility] [phone]   She is retired   This note documents patient's acute complaint of llq pain addressed within their primary visit for annual preventative visit.    HPI: New Problem today. Pt complains of nasuea and llq pain .    ROS: as above no emesis no constipation, no hx of diverticualr disease on report    Physical Exam: epigastric and llq ttp     A/P: get ct to assess for presence of disease before treating given relatively stable symptom course     Active Problem List with Overview Notes    Diagnosis Date Noted    Sleep apnea 05/29/2024    Severe obesity (BMI 35.0-39.9) with comorbidity 05/16/2024    Dysplasia of cervix, low grade (LANDEN 1) 05/12/2024    History of vaginal dysplasia 03/06/2024    Pain aggravated by activities of daily living 01/25/2024    Fear of pain 01/24/2024    Decreased range of motion of trunk and back 01/24/2024    Osteoporosis 12/14/2023    H. pylori infection 06/07/2023    Cervical dysplasia 05/18/2023    Vaginal dysplasia 05/18/2023    Hyperlipidemia 02/01/2023    GERD (gastroesophageal reflux disease) 02/01/2023    Rheumatoid arthritis 02/01/2023    Benign essential hypertension 02/01/2023       Health Maintenance   Topic Date Due    Shingles Vaccine (2 of 2) 11/27/2023    DEXA Scan  02/15/2025    Mammogram  02/19/2025    TETANUS VACCINE  10/02/2025    Lipid Panel  05/21/2029    Colorectal Cancer Screening  05/31/2030    Hepatitis C Screening  Completed       Past Medical History:   Diagnosis Date    Abnormal Pap smear of cervix 2017    Arthritis     Hypertension     Mild intermittent asthma, uncomplicated     Rheumatoid arthritis, unspecified 12/17/2020       Past Surgical History:   Procedure Laterality Date    ANKLE SURGERY Left     COLONOSCOPY N/A 05/31/2023    Procedure: COLONOSCOPY;  Surgeon: Robbie Ace MD;  Location: [facility];  Service: Endoscopy;  Laterality: N/A;    ESOPHAGOGASTRODUODENOSCOPY N/A 05/31/2023    Procedure: EGD  (ESOPHAGOGASTRODUODENOSCOPY);  Surgeon: Robbie Ace MD;  Location: [facility];  Service: Endoscopy;  Laterality: N/A;    FRACTURE SURGERY      JOINT REPLACEMENT  April 2016    Full Left ankle replacement    WRIST FRACTURE SURGERY Left        family history includes Arthritis in her mother and sister; Asthma in her sister and sister; Cervical cancer in her maternal cousin; Diabetes in her sister; Early death in her sister; Heart disease in her mother, sister, and sister; Hypertension in her mother; Stroke in her mother, sister, and sister.    Social History     Tobacco Use    Smoking status: Never    Smokeless tobacco: Never   Substance Use Topics    Alcohol use: Not Currently     Comment: Very rarely    Drug use: Never       Review of Systems   Constitutional:  Negative for chills, fever, malaise/fatigue and weight loss.   Respiratory:  Negative for cough, sputum production, shortness of breath and wheezing.    Cardiovascular:  Negative for chest pain, palpitations, orthopnea and leg swelling.   Gastrointestinal:  Positive for abdominal pain and nausea. Negative for constipation, diarrhea, heartburn and vomiting.   Genitourinary:  Negative for dysuria, frequency, hematuria and urgency.        Outpatient Encounter Medications as of 5/31/2024   Medication Sig Dispense Refill    albuterol (ACCUNEB) 1.25 mg/3 mL Nebu Take 3 mLs (1.25 mg total) by nebulization every 6 (six) hours as needed (shortness of breath, wheezing). 75 mL 1    albuterol (VENTOLIN HFA) 90 mcg/actuation inhaler Inhale 2 puffs into the lungs every 4 (four) hours as needed for Wheezing. Rescue 6.7 g 1    aspirin (ASPIR-81 ORAL)       atorvastatin (LIPITOR) 10 MG tablet TAKE 1 TABLET DAILY 90 tablet 3    cholecalciferol, vitamin D3, 125 mcg (5,000 unit) Tab Take 5,000 Units by mouth once a week.      diclofenac sodium 100 mg 24 hr tablet diclofenac  mg tablet,extended release 24 hr   TAKE 1 TABLET(S) EVERY DAY BY ORAL ROUTE WITH MEALS FOR 30  "DAYS.      EScitalopram oxalate (LEXAPRO) 20 MG tablet TAKE 1 TABLET DAILY 90 tablet 3    folic acid (FOLVITE) 1 MG tablet Take 1 tablet (1 mg total) by mouth once daily. 90 tablet 3    hydroCHLOROthiazide (HYDRODIURIL) 25 MG tablet TAKE 1 TABLET DAILY 90 tablet 3    methotrexate 2.5 MG Tab Take 10 tablets (25 mg total) by mouth every 7 days. 40 tablet 3    multivitamin-minerals-lutein (MULTIVITAMIN 50 PLUS) Tab Take 1 tablet by mouth once daily.      omega 3-dha-epa-fish oil 120-180-500 mg Cap       pantoprazole (PROTONIX) 40 MG tablet Take 1 tablet (40 mg total) by mouth once daily. 90 tablet 3    pregabalin (LYRICA) 50 MG capsule Take 1 capsule (50 mg total) by mouth 3 (three) times daily. 90 capsule 6    sarilumab (KEVZARA) 200 mg/1.14 mL PnIj Inject 1.14 mLs (200 mg total) into the skin every 14 (fourteen) days. 2 pen 11    teriparatide (FORTEO) 20 mcg/dose (600mcg/2.4mL) PnIj Inject 0.08 mLs (20 mcg total) into the skin once daily. 2.4 mL 11    ondansetron (ZOFRAN-ODT) 4 MG TbDL Take 1 tablet (4 mg total) by mouth every 8 (eight) hours as needed (nausea). 30 tablet 0    [DISCONTINUED] albuterol (PROVENTIL/VENTOLIN HFA) 90 mcg/actuation inhaler Proventil HFA 90 mcg/actuation aerosol inhaler      [DISCONTINUED] alendronate (FOSAMAX) 70 MG tablet Take 1 tablet (70 mg total) by mouth every 7 days. 12 tablet 3    [DISCONTINUED] folic acid (FOLVITE) 1 MG tablet Take 1 tablet (1 mg total) by mouth once daily. 90 tablet 3    [DISCONTINUED] methotrexate 2.5 MG Tab Take 10 tablets (25 mg total) by mouth every 7 days. 40 tablet 3     No facility-administered encounter medications on file as of 5/31/2024.        Review of patient's allergies indicates:  No Known Allergies        Physical Exam      Vital Signs  Pulse: 84  SpO2: 96 %  BP: 126/74  BP Location: Left arm  Patient Position: Sitting  Height and Weight  Height: 4' 11" (149.9 cm)  Weight: 86.9 kg (191 lb 9.3 oz)  BSA (Calculated - sq m): 1.9 sq meters  BMI " (Calculated): 38.7  Weight in (lb) to have BMI = 25: 123.5]    Physical Exam  Vitals reviewed.   Constitutional:       General: She is not in acute distress.     Appearance: She is well-developed. She is not diaphoretic.   HENT:      Head: Normocephalic and atraumatic.      Right Ear: External ear normal.      Left Ear: External ear normal.      Nose: Nose normal.   Eyes:      General:         Right eye: No discharge.         Left eye: No discharge.      Conjunctiva/sclera: Conjunctivae normal.   Cardiovascular:      Rate and Rhythm: Normal rate and regular rhythm.      Heart sounds: Normal heart sounds.   Pulmonary:      Effort: Pulmonary effort is normal. No respiratory distress.      Breath sounds: Normal breath sounds.   Abdominal:      General: Bowel sounds are normal. There is no distension.      Palpations: Abdomen is soft. There is no mass.      Tenderness: There is abdominal tenderness.   Musculoskeletal:         General: Normal range of motion.      Cervical back: Normal range of motion.   Skin:     Coloration: Skin is not pale.      Findings: No rash.   Neurological:      Mental Status: She is alert and oriented to person, place, and time.   Psychiatric:         Behavior: Behavior normal.         Thought Content: Thought content normal.          Laboratory:  CBC:  Recent Labs   Lab Result Units 04/17/24  1001 05/21/24  0832   WBC K/uL 8.39 7.86   RBC M/uL 3.93* 3.75*   Hemoglobin g/dL 11.7* 11.6*   Hematocrit % 37.1 36.1*   Platelets K/uL 266 250   MCV fL 94 96   MCH pg 29.8 30.9   MCHC g/dL 31.5* 32.1     CMP:  Recent Labs   Lab Result Units 04/17/24  1001 05/21/24  0832   Glucose mg/dL 138* 111*   Calcium mg/dL 9.9 10.1   Albumin g/dL 3.8 3.8   Total Protein g/dL 7.5 7.4   Sodium mmol/L 142 145   Potassium mmol/L 4.2 4.6   CO2 mmol/L 30* 30*   Chloride mmol/L 103 110   BUN mg/dL 15 17   Alkaline Phosphatase U/L 77 79   ALT U/L 30 25   AST U/L 25 26   Total Bilirubin mg/dL 0.8 0.9     URINALYSIS:  No  "results for input(s): "COLORU", "CLARITYU", "SPECGRAV", "PHUR", "PROTEINUA", "GLUCOSEU", "BILIRUBINCON", "BLOODU", "WBCU", "RBCU", "BACTERIA", "MUCUS", "NITRITE", "LEUKOCYTESUR", "UROBILINOGEN", "HYALINECASTS" in the last 2160 hours.   LIPIDS:      Assessment/Plan     Aysha Randolph is a 63 y.o.female with:    1. Wellness examination    2. Nausea  -     ondansetron (ZOFRAN-ODT) 4 MG TbDL; Take 1 tablet (4 mg total) by mouth every 8 (eight) hours as needed (nausea).  Dispense: 30 tablet; Refill: 0    3. Fatigue, unspecified type  -     TSH; Future; Expected date: 05/31/2024    4. Left lower quadrant pain  -     CT Abdomen Pelvis W Wo Contrast; Future; Expected date: 05/31/2024    5. Benign essential hypertension  Assessment & Plan:  At goal no change      6. Hyperlipidemia, unspecified hyperlipidemia type  Assessment & Plan:  At goal no change      7. Rheumatoid arthritis involving multiple sites with positive rheumatoid factor  Assessment & Plan:  Per rheum      8. Osteoporosis  Assessment & Plan:  Continue forteo       9. H. pylori infection  Assessment & Plan:  resolved           Use of the [employer] Patient Portal discussed and encouraged during today's visit  -Continue current medications and maintain follow up with specialists.  Return to clinic in .  Future Appointments   Date Time Provider Department Center   8/16/2024  9:30 AM XRAY DRAW ROOM, [facility] [place] [facility]   8/16/2024  9:40 AM XRAY DRAW ROOM, [facility] [place] [facility]   8/22/2024  9:30 AM Kayla Cobb MD [facility]   11/25/2024  9:20 AM Rosa Jarrett MD [facility]       Rosa Jarrett MD  5/31/2024 10:04 AM    Primary Care Internal Medicine                     "

## 2024-06-04 ENCOUNTER — HOSPITAL ENCOUNTER (OUTPATIENT)
Dept: RADIOLOGY | Facility: HOSPITAL | Age: 64
Discharge: HOME | End: 2024-06-04
Attending: INTERNAL MEDICINE
Payer: MEDICARE

## 2024-06-04 DIAGNOSIS — R10.32 LEFT LOWER QUADRANT PAIN: ICD-10-CM

## 2024-06-04 PROCEDURE — 25500020 PHARM REV CODE 255: Performed by: INTERNAL MEDICINE

## 2024-06-04 PROCEDURE — 74178 CT ABD&PLV WO CNTR FLWD CNTR: CPT | Mod: TC

## 2024-06-04 PROCEDURE — 74178 CT ABD&PLV WO CNTR FLWD CNTR: CPT | Mod: 26,,, | Performed by: INTERNAL MEDICINE

## 2024-06-04 RX ADMIN — IOHEXOL 100 ML: 350 INJECTION, SOLUTION INTRAVENOUS at 10:06

## 2024-06-05 ENCOUNTER — PATIENT MESSAGE (OUTPATIENT)
Dept: PRIMARY CARE CLINIC | Facility: CLINIC | Age: 64
End: 2024-06-05
Payer: MEDICARE

## 2024-06-07 ENCOUNTER — PATIENT MESSAGE (OUTPATIENT)
Dept: SLEEP MEDICINE | Facility: CLINIC | Age: 64
End: 2024-06-07

## 2024-06-07 DIAGNOSIS — G47.33 OSA (OBSTRUCTIVE SLEEP APNEA): Primary | ICD-10-CM

## 2024-06-07 PROCEDURE — 95811 POLYSOM 6/>YRS CPAP 4/> PARM: CPT | Mod: 26,,, | Performed by: PSYCHIATRY & NEUROLOGY

## 2024-06-07 NOTE — PROCEDURES
"Split-Night Report  [facility] [address] [facility] [address], [place] [address]  Phone: [phone]  Fax: [phone]       Patient Name: OLIMPIA ZAIDI Study Date: 5/28/2024   YOB: 1960 Patient MRN: [id number]   Age:  63 year     Sex: Female Referring Physician: SADIE ROA NP   Height: 4' 11" Recording Tech: Franck Ponce RRT RPSGT   Weight: 190.0 lbs Scoring Tech: Ludin Silver RRT RPSGT   BMI:  38.8 AASM  1B   Diagnostic AHI: 105.5 Interpreting Physician Paulette Sparrow MD   RERA index: - Diagnostic Low O2 sat. 81.0%   Diagnostic RDI: 105.5 Location [facility]     Polysomnogram Data: A full night polysomnogram was performed recording the standard physiologic parameters including EEG, EOG, EMG, EKG, nasal and oral airflow.  Respiratory parameters of chest and abdominal movements are recorded with [place]-Crystal motion transducers.  Oxygen saturation was recorded by pulse oximetry.    Sleep architecture: This was a split night study. During the diagnostic portion of the study, the patient fell asleep in 0.0 minutes. Sleep efficiency was 100.0%. Total sleep time (TST) during the diagnostic portion was 33.0 minutes. REM latency was - minutes. Sleep architecture in the diagnostic part was significantly disrupted due to underlying sleep apnea.    Respiratory: Moderate snoring was present. During the diagnostic portion of the study, the polysomnogram revealed a presence of 1 obstructive, - central, and - mixed apneas resulting in an Apnea index of 1.8 events per hour.  There were 57 hypopneas resulting in a Hypopnea index of 103.6 events per hour.  The combined Apnea/Hypopnea index was 105.5 events per hour.  There were a total of - RERA events resulting in a Respiratory Disturbance Index (RDI) of 105.5 events per hour.  Lowest oxygen saturation was 81.0% and time spent ?88% oxygen saturation was 1.6 minutes (4.7% of time).    During diagnostic portion [place] CO2 during sleep " ranged from - to - mmHg, was greater than 50 mmHg for - minutes and greater than 55 mmHg for - minutes.  Transcutaneous CO2 during sleep ranged from - to - mmHg, was greater than 50 mmHg for - minutes and greater than 55 mmHg for - minutes.    Motor movement / Parasomnia: There were no significant limb movements of sleep noted. The total limb movement index was 18.2 (1.8 with arousal).     Cardiac: Cardiac rhythm monitoring revealed a sinus rhythm.    During the diagnostic portion of the study, the average pulse rate was 83.3 bpm.  The minimum pulse rate was 76.0 bpm while the maximum pulse rate was 90.0 bpm. During the treatment portion of the study, the average pulse rate was 76.8 bpm.  The minimum pulse rate was 66.0 bpm while the maximum pulse rate was 89.0 bpm.     CPAP  titration: The patient qualified for split night. During the treatment portion of the study, CPAP  (continuous positive airway pressure) was from 5* cm/H20 up to 13* cm of water using a     small Mary nasal mask, chin strap, CFlex at 3, and heated humidification. Initial improvement was observed on 5 cm H2O controlling respiratory events in lateral NREM sleep. Effective control of sleep disordered breathing  was seen during supine REM sleep on 11 cm H2O. Final AHI at this pressure was below 5/hour.    Higher pressures were tolerated.       Patient perception: On a post-sleep study questionnaire, the patient indicated that sleep was better in the lab than compared to home.      IMPRESSION:  Obstructive Sleep Apnea (G47.33), very severe based on AHI criteria. The patient qualified for a split night study.   Effective control of sleep disordered breathing was achieved with CPAP  at 11 cm of water.    RECOMMENDATION:     CPAP  at 11 cm, mask of patient's choice, chin strap, and heated humidification, which is essential in conjunction with PAP to prevent airway drying and irritation.  Alternatively, use  AutoCPAP 5-12 cm  "H2O                [facility]    Split-Night Report    Patient Name: OLIMPIA ZAIDI Study Date: 5/28/2024   YOB: 1960 MRN #: [id number]   Age: 63 year BARBARA #: -   Sex: Female Referring Provider: SADIE ROA NP   Height: 4' 11" Recording Tech: Franck Ponce RRT RPSGT   Weight: 190.0 lbs Scoring Tech: Ludin Silver RRT RPSGT   BMI: 38.8 Interpreting Physician: Paulette Sparrow MD   ESS: - Neck Circumference: -   Study Overview    DIAGNOSTIC TREATMENT   Lights Off: 10:34:46 PM Lights Off: 12:55:46 AM   Lights On: 11:46:46 PM Lights On: 04:49:46 AM   Time in Bed: 33.0 min. Time in Bed: 210.0 min.   Total Sleep Time: 33.0 min. Total Sleep Time: 210.0 min.   Sleep Efficiency: 100.0% Sleep Efficiency: 100.0%   Sleep Latency: 0.0 min. Sleep Latency: 0.0 min.   REM Latency from Sleep Onset: - min. REM Latency from Sleep Onset: 152.0 min.   Wake After Sleep Onset: - min. Wake After Sleep Onset: - min.     DIAGNOSTIC TREATMENT    Count Index  Count Index   Awakenings: - 0.0 Awakenings: - 0.0   Arousals: 14 25.5 Arousals: 26 7.4   Apneas & Hypopneas: 58 105.5 Apneas & Hypopneas: 39 11.1    Limb Movements: 10 18.2 Limb Movements: 7 2.0   Snores: - - Snores: - -   Desaturations: 45 81.8 Desaturations: 40 11.4   Minimum SpO2 TST: 81.0% Minimum SpO2 TST: 82.0%       Sleep Architecture     DIAGNOSTIC TREATMENT ENTIRE NIGHT   Stages Time (min) % TST Time (min) % TST Time (min) % TST   WAKE 105.0  103.0  208.0    Stage N1 7.0 21.2% 10.5 5.0% 17.5 7.2%   Stage N2 26.0 78.8% 118.5 56.4% 144.5 59.5%   Stage N3 0.0 0.0% 69.5 33.1% 69.5 28.6%   REM 0.0 0.0% 11.5 5.5% 11.5 4.7%       Arousal Summary     DIAGNOSTIC TREATMENT    NREM REM TST Index NREM REM TST Index   Respiratory Arousals 7 - 7 12.7 14 - 14 4.0   PLM Arousals 1 - 1 1.8 - - - -   Isolated LM Arousals - - - - - - - -   Spontaneous Arousals 6 - 6 10.9 12 - 12 3.4   Total 14 - 14 25.5 26 - 26 7.4   Respiratory Summary    DIAGNOSTIC By " Sleep Stage By Body Position Total    NREM REM Supine Non-Supine    Time (min) 33.0 0.0 11.5 21.5 33.0           Obstructive Apnea 1 - - 1 1   Mixed Apnea - - - - -   Central Apnea - - - - -   Total Apneas 1 - - 1 1   Total Apnea Index 1.8 - - 2.8 1.8           Total Hypopnea 57 - 19 38 57   Total Hypopnea Index 103.6 - 99.1 106.0 103.6           Apnea & Hypopnea 58 - 19 39 58   Apnea & Hypopnea Index 105.5 - 99.1 108.8 105.5           RERAs - - - - -   RERA Index - - - - -           .5 - 99.1 108.8 105.5      TREATMENT By Sleep Stage By Body Position Total    NREM REM Supine Non-Supine    Time (min) 198.5 11.5 173.0 37.0 210.0           Obstructive Apnea 3 - 3 - 3   Mixed Apnea - - - - -   Central Apnea 2 - 2 - 2   Total Apneas 5 - 5 - 5   Total Apnea Index 1.5 - 1.7 - 1.4           Total Hypopnea 33 1 32 2 34   Total Hypopnea Index 10.0 5.2 11.1 3.2 9.7           Apnea & Hypopnea 38 1 37 2 39   Apnea & Hypopnea Index 11.5 5.2 12.8 3.2 11.1           RERAs - - - - -   RERA Index - - - - -           RDI 11.5 5.2 12.8 3.2 11.1      Scoring Criteria: Hypopneas scored at Choose an item.% desaturation criteria.    Respiratory Event Durations     DIAGNOSTIC TREATMENT   Apnea NREM REM NREM REM   Average (seconds) 10.6 - 12.9 -   Maximum (seconds) 10.6 - 14.3 -   Hypopnea       Average (seconds) 12.6 - 14.4 16.5   Maximum (seconds) 20.2 - 20.7 16.5   Oxygen Saturation Summary     DIAGNOSTIC TREATMENT    Wake NREM REM TST Wake NREM REM TST   Average SpO2 93.7% 91.6% - 91.6% 95.1% 93.6% 93.9% 93.7%   Minimum SpO2 82.0% 81.0% - 81.0% 84.0% 82.0% 91.0% 82.0%   Maximum SpO2 99.0% 96.0% - 96.0%  99.0% 97.0% 96.0% 97.0%     DIAGNOSTIC   Oxygen Saturation Distribution    Range (%) Time in range (min) Time in range (%)   90.0 - 100.0 25.6 77.7%   80.0 - 90.0 7.3 22.3%   70.0 - 80.0 - -   60.0 - 70.0 - -   50.0 - 60.0 - -   0.0 - 50.0 - -   Time Spent ?88% SpO2    Range (%) Time in range (min) Time in range (%)   0.0 - 88.0  1.6 4.7%          Count Index   Desaturations 45 81.8      TREATMENT   Oxygen Saturation Distribution    Range (%) Time in range (min) Time in range (%)   90.0 - 100.0 308.9 98.7%   80.0 - 90.0 4.2 1.3%   70.0 - 80.0 - -   60.0 - 70.0 - -   50.0 - 60.0 - -   0.0 - 50.0 - -   Time Spent ?88% SpO2    Range (%) Time in range (min) Time in range (%)   0.0 - 88.0 2.0 0.6%      Count Index   Desaturations 40 11.4      Limb Movement Summary     DIAGNOSTIC TREATMENT    Count Index Count Index   Isolated Limb Movements - - 2 0.6   Periodic Limb Movements (PLMs) 10 18.2 5 1.4   Total Limb Movements 10 18.2 7 2.0     Cardiac Summary     DIAGNOSTIC TREATMENT    Wake NREM REM Total Wake NREM REM Total   Average MO (BPM) 78.7 83.3 - 79.9 74.7 76.7 78.0 76.1   Minimum MO (BPM) 70.0 76.0 - 70.0 65.0 66.0 74.0 65.0   Maximum MO (BPM) 95.0 90.0 - 95.0 89.0 89.0 84.0 89.0         Diagnostic EtCO2    Stage Min (mmHg) Average (mmHg) Max (mmHg)   Wake - - -   NREM(1+2+3) - - -   REM - - -       Range (mmHg) Time in range (min) Time in range (%)   20.0 - 40.0 - -   40.0 - 50.0 - -   50.0 - 55.0 - -   55.0 - 100.0 - -   Excluded data <20.0 & >65.0 138.0 100.0%       TcCO2 Summary    DIAGNOSTIC    Stage Min (mmHg) Average (mmHg) Max (mmHg)   Wake - - -   NREM(1+2+3) - - -   REM - - -       Range (mmHg) Time in range (min) Time in range (%)   - - -   - - -   - - -   - - -   - - -       TREATMENT    Stage Min (mmHg) Average (mmHg) Max (mmHg)   Wake - - -   NREM(1+2+3) - - -   REM - - -       Range (mmHg) Time in range (min) Time in range (%)   - - -   - - -   - - -   - - -   - - -       Comments    -      Titration Summary    PAP Device PAP Level O2 Level Time (min) TST (min) NREM (min) REM (min) Wake (min) Sleep Eff% OA# CA# MA# Hyp# AHI RERA RDI Min SpO2 SpO2 ?88% (min) Ar. Index   - Off - 138.0 33.0 33.0 0.0 105.0 23.9% 1 - - 57 105.5 - 105.5 81.0  1.6 25.5   CPAP 5 - 175.5 105.5 105.5 0.0 70.0 60.1% - - - 15 8.5 - 8.5 82.0  0.5 7.4   CPAP  7 - 9.0 9.0 9.0 0.0 0.0 100.0% - - - 9 60.0 - 60.0 84.0  0.4 33.3   CPAP 9 - 10.0 10.0 10.0 0.0 0.0 100.0% 3 - - 4 42.0 - 42.0 85.0  0.4 12.0   CPAP 11 - 55.0 53.5 42.0 11.5 1.5 97.3% - 1 - 3 4.5 - 4.5 85.0  0.3 3.4   CPAP 12 - 27.5 27.0 27.0 0.0 0.5 98.2% - 1 - 3 8.9 - 8.9 88.0  0.0 2.2   CPAP 13 - 36.0 5.0 5.0 0.0 31.0 13.9% - - - - - - - 92.0  0.0 24.0

## 2024-06-29 ENCOUNTER — TELEPHONE (OUTPATIENT)
Dept: RHEUMATOLOGY | Facility: HOSPITAL | Age: 64
End: 2024-06-29
Payer: MEDICARE

## 2024-07-03 ENCOUNTER — PATIENT MESSAGE (OUTPATIENT)
Dept: PRIMARY CARE CLINIC | Facility: CLINIC | Age: 64
End: 2024-07-03
Payer: MEDICARE

## 2024-07-03 DIAGNOSIS — M81.8 OTHER OSTEOPOROSIS WITHOUT CURRENT PATHOLOGICAL FRACTURE: ICD-10-CM

## 2024-07-03 DIAGNOSIS — E78.5 HYPERLIPIDEMIA, UNSPECIFIED HYPERLIPIDEMIA TYPE: Primary | ICD-10-CM

## 2024-07-03 RX ORDER — HYDROCHLOROTHIAZIDE 25 MG/1
25 TABLET ORAL DAILY
Qty: 90 TABLET | Refills: 3 | Status: SHIPPED | OUTPATIENT
Start: 2024-07-03

## 2024-07-03 RX ORDER — ESCITALOPRAM OXALATE 20 MG/1
20 TABLET ORAL DAILY
Qty: 90 TABLET | Refills: 3 | Status: SHIPPED | OUTPATIENT
Start: 2024-07-03

## 2024-07-03 RX ORDER — ATORVASTATIN CALCIUM 10 MG/1
10 TABLET, FILM COATED ORAL DAILY
Qty: 90 TABLET | Refills: 3 | Status: SHIPPED | OUTPATIENT
Start: 2024-07-03 | End: 2024-07-05 | Stop reason: SDUPTHER

## 2024-07-03 RX ORDER — PEN NEEDLE, DIABETIC 30 GX3/16"
1 NEEDLE, DISPOSABLE MISCELLANEOUS DAILY
Qty: 100 EACH | Refills: 3 | Status: SHIPPED | OUTPATIENT
Start: 2024-07-03

## 2024-07-03 NOTE — TELEPHONE ENCOUNTER
No care due was identified.  Health [facility] Embedded Care Due Messages. Reference number: [id number].   7/03/2024 2:10:09 PM CDT

## 2024-07-05 RX ORDER — ATORVASTATIN CALCIUM 10 MG/1
10 TABLET, FILM COATED ORAL DAILY
Qty: 30 TABLET | Refills: 0 | Status: SHIPPED | OUTPATIENT
Start: 2024-07-05

## 2024-07-05 NOTE — TELEPHONE ENCOUNTER
Pt requesting refill on Lipitor, asking for a short supply while awaiting shipment from [facility]    LOV 5/31/24

## 2024-08-05 DIAGNOSIS — E78.5 HYPERLIPIDEMIA, UNSPECIFIED HYPERLIPIDEMIA TYPE: ICD-10-CM

## 2024-08-05 RX ORDER — ATORVASTATIN CALCIUM 10 MG/1
10 TABLET, FILM COATED ORAL DAILY
Qty: 90 TABLET | Refills: 3 | Status: SHIPPED | OUTPATIENT
Start: 2024-08-05

## 2024-08-16 ENCOUNTER — LAB VISIT (OUTPATIENT)
Dept: LAB | Facility: HOSPITAL | Age: 64
End: 2024-08-16
Attending: STUDENT IN AN ORGANIZED HEALTH CARE EDUCATION/TRAINING PROGRAM
Payer: MEDICARE

## 2024-08-16 DIAGNOSIS — M81.8 OTHER OSTEOPOROSIS WITHOUT CURRENT PATHOLOGICAL FRACTURE: ICD-10-CM

## 2024-08-16 DIAGNOSIS — M05.79 RHEUMATOID ARTHRITIS INVOLVING MULTIPLE SITES WITH POSITIVE RHEUMATOID FACTOR: ICD-10-CM

## 2024-08-16 LAB
25(OH)D3+25(OH)D2 SERPL-MCNC: 60 NG/ML (ref 30–96)
HBV CORE AB SERPL QL IA: NORMAL
HBV SURFACE AB SER-ACNC: <3 MIU/ML
HBV SURFACE AB SER-ACNC: NORMAL M[IU]/ML
HBV SURFACE AG SERPL QL IA: NORMAL

## 2024-08-16 PROCEDURE — 87340 HEPATITIS B SURFACE AG IA: CPT | Performed by: STUDENT IN AN ORGANIZED HEALTH CARE EDUCATION/TRAINING PROGRAM

## 2024-08-16 PROCEDURE — 36415 COLL VENOUS BLD VENIPUNCTURE: CPT | Performed by: STUDENT IN AN ORGANIZED HEALTH CARE EDUCATION/TRAINING PROGRAM

## 2024-08-16 PROCEDURE — 86706 HEP B SURFACE ANTIBODY: CPT | Mod: 91 | Performed by: STUDENT IN AN ORGANIZED HEALTH CARE EDUCATION/TRAINING PROGRAM

## 2024-08-16 PROCEDURE — 86704 HEP B CORE ANTIBODY TOTAL: CPT | Performed by: STUDENT IN AN ORGANIZED HEALTH CARE EDUCATION/TRAINING PROGRAM

## 2024-08-16 PROCEDURE — 82306 VITAMIN D 25 HYDROXY: CPT | Performed by: STUDENT IN AN ORGANIZED HEALTH CARE EDUCATION/TRAINING PROGRAM

## 2024-08-22 ENCOUNTER — PATIENT MESSAGE (OUTPATIENT)
Dept: RHEUMATOLOGY | Facility: CLINIC | Age: 64
End: 2024-08-22

## 2024-08-22 ENCOUNTER — OFFICE VISIT (OUTPATIENT)
Dept: RHEUMATOLOGY | Facility: CLINIC | Age: 64
End: 2024-08-22
Payer: MEDICARE

## 2024-08-22 VITALS
BODY MASS INDEX: 40.03 KG/M2 | DIASTOLIC BLOOD PRESSURE: 72 MMHG | WEIGHT: 198.19 LBS | SYSTOLIC BLOOD PRESSURE: 115 MMHG | HEART RATE: 77 BPM

## 2024-08-22 DIAGNOSIS — M05.79 RHEUMATOID ARTHRITIS INVOLVING MULTIPLE SITES WITH POSITIVE RHEUMATOID FACTOR: Primary | ICD-10-CM

## 2024-08-22 DIAGNOSIS — Z79.631 LONG TERM METHOTREXATE USER: ICD-10-CM

## 2024-08-22 DIAGNOSIS — M81.8 OTHER OSTEOPOROSIS WITHOUT CURRENT PATHOLOGICAL FRACTURE: ICD-10-CM

## 2024-08-22 DIAGNOSIS — M79.7 FIBROMYALGIA: ICD-10-CM

## 2024-08-22 PROCEDURE — 99999 PR PBB SHADOW E&M-EST. PATIENT-LVL IV: CPT | Mod: PBBFAC,GC,, | Performed by: STUDENT IN AN ORGANIZED HEALTH CARE EDUCATION/TRAINING PROGRAM

## 2024-08-22 PROCEDURE — 99214 OFFICE O/P EST MOD 30 MIN: CPT | Mod: PBBFAC | Performed by: STUDENT IN AN ORGANIZED HEALTH CARE EDUCATION/TRAINING PROGRAM

## 2024-08-22 NOTE — PATIENT INSTRUCTIONS
- Vaccines: pneumonia, COVID booster, flu vaccine (wait until end of Sept/beginning of Oct for COVID and flu vaccines   - Can decrease Vit D to 1000-2000U daily instead of 5000U

## 2024-08-22 NOTE — PROGRESS NOTES
63 year - HTN,HLD,GERD,anxiety and rheumatoid arthritismorbid   Family h/o RA- sister and grand niece   Family h/o CAD    Nov 2020- RA- pain and swelling in the hands,knees,shoulders  RF 86  CCP> 250    ESR 65  Nml vit d  Neg hep panel    Initial treatment  :    Mtx 6 tabs weekly  Prednisone 10 mg daily  Humira -didn't respond    On orencia since April 2022- she did infusions till October 2022  Now she has moved from [place] to [facility]    Came on mtx 8 tabs weekly since sep 2022  Prednisone 5 mg daily  Folic acid 1 mg daily    Symptoms :    Pain,swelling,stiffness- hands,knees,elbows,shoulders  Widespread pain  Paresthesias -dorsal hands and ? Left 2nd finger    H/o trauma -left wrist and elbow-surgery  Left ankle arthrocentesis/replacement- OA     Anxiety  ISLAS  Chest pressure  Nausea    B/l phalen's positive  EMG- CTS  Left CT injection which didn't help  Will treat synovitis first and then plan surgery if CTS persist    CBC,CMP,ESR,CRP,Predmard panel  Arthritis survey  Hands and feet xrays performed     Changed oral mtx to Injectable mtx 1 ml weekly,folic acid  Unfortunately due to shortage of mtx vials she takes mtx oral tablets    We tapered off  Prednisone 4 mg daily for a month  3 mg daily for a month  2 mg daily for a month   1 mg daily for a month and stop    ESR 74  CRP 16    CMP nml  CBC - h/h 11.3/37.1  Nml white count and plt count    CDAI 43.5-Very high disease activity    B/l UE EMG-Eventually    Sleep study    Physical therapy    Weight loss    Continue lexapro  Anxiety-psychiatry    H pylori +  Antibiotics completed  2 polyps on colonoscopy    We started actemra    Pain in the hips,hands and toes unfortunately persist  Fatigue  Morning stiffness    CDAI 30    On Mtx 10 tabs weekly,folic acid  CONTINUE MTX for now    D/marino actemra  Offered rinvoq    Since starting Rinvoq, she reported an improvement in her joint pains initially within the first 3-4 days of taking it. But after about 2 weeks of taking it  consistently she noticed her joint pain returned. She also has noticed increased headaches and tenderness in the temples bilaterally. This has been occurring daily for the last few months since starting the Rinvoq. She has also noticed that her vision is a little more blurry than usual.      Last time  CDAI 33.5  PRISM RA- not responsive to TNFs    We then   Switched to xeljanz 5 mg bid  Xeljanz not approved  She then started kevzara  On mtx 25 mg weekly  Continue mtx 10 - 5 and 5 tabs weekly ,folic acid    CBT for fibro  Lyrica 50 mg tid  Lexapro  Sleep study very severe sleep apnea needing auto PAP    CDAI 16.5 dropped to 4    She now comes in with good improvement in her symptoms    SUGGESTIO NS        -continue kevzara  -continue mtx  6 months of good disease control then slowly taper mtx    Continue forteo-minimal till 5/2027 and transition to bisphosphonates  Archie and vit d  Vit d level    Sleep management-autoPAP    Cardiovascular risk  The 10-year ASCVD risk score (Scott JANG, et al., 2019) is: 5%    Values used to calculate the score:      Age: 63 years      Sex: Female      Is Non- : No      Diabetic: No      Tobacco smoker: No      Systolic Blood Pressure: 115 mmHg      Is BP treated: Yes      HDL Cholesterol: 60 mg/dL      Total Cholesterol: 151 mg/dL    Vaccinations  Shingles x 2 -done  Flu- done  Pneumonia pending  Covid 2 done  RSV pending  Tdap done

## 2024-08-22 NOTE — PROGRESS NOTES
Subjective:      Patient ID: Aysha Randolph is a 63 y.o. female.    Chief Complaint: Seropositive RA    Initial Presentation  Aysha Randolph is a 63 y.o. F with a past medical history below who presents today for follow-up for seropositive rheumatoid arthritis.      - The patient establish with [facility] in February 2023 after moving from [place].   - She was diagnosed with RA in December 2020, initially had pain and swelling in the hands, knees, shoulders, along with stiffness  - She has difficult to control RA and has been on various regimens; has failed MTX, Humira, Orencia infusions, Actemra, Rinvoq  - Prism RA showed she was a high non-responder to TNFs  - Rinvoq was discontinued in December 2023, attempted to start Xeljanx however denied, was started on Kevzara in March 2024  - She also has fibromyalgia     Interval History  Today she reports significant improvement in joint pain and swelling. She occasionally has discomfort in some of the joints. She does not that she cut out Splenda a week ago and has noticed an improvement in the swelling of her MCPs especially when she first wakes up. She also had a sleep study, found to have severe sleep apnea. Has since started using an AutoPAP and has noticed a significant improvement in overall wellbeing.     Rheumatology ROS  (-) fevers, chills (-) weight loss, (-) fatigue, (+) morning stiffness (occasionally but significantly improved),  (+) arthralgias (occasionally but significantly improved), (-) arthritis, (-) headaches, (-)  vision changes or loss of vision, (-) hx of red eyes including uveitis, iritis, scleritis or episcleritis, (-) photophobia, (-) dry eyes, (-) dry mouth, (-) rash, (-) photosensitivity, (-) alopecia, (-) mucosal ulcers, (-) Raynaud's  phenomenon, (-) SQ nodules, (-) sense of skin tightening in hands, face or torso, (-)  hx pleurisy, (-) sharp chest pains that increase with deep breath, (-) lung fibrosis, (-) hemoptysis, (-) hx of DVT  or PE (-) chest pains, (-) shortness of breath, (-) hx pericarditis (-) abdominal pain, (-) nausea, (-) vomiting, (-) diarrhea, (-) constipation, (-) melena,  (-) bloody diarrhea, (-) UC/Crohns, (-) dysphagia, (-) GERD/Reflux, (-) hematuria, proteinuria, (-) renal failure, (-) focal weakness, (-) trouble combing hair or (-) getting out of chairs,  (-) hx of low WBC, low platelets, anemia, (-) hx of pregnancy losses/pre term deliveries/pregnancy complications, (-) genital ulcers    History  Medical:  Active Problem List with Overview Notes    Diagnosis Date Noted    Sleep apnea 05/29/2024    Severe obesity (BMI 35.0-39.9) with comorbidity 05/16/2024    Dysplasia of cervix, low grade (LANDEN 1) 05/12/2024    History of vaginal dysplasia 03/06/2024    Pain aggravated by activities of daily living 01/25/2024    Fear of pain 01/24/2024    Decreased range of motion of trunk and back 01/24/2024    Osteoporosis 12/14/2023    H. pylori infection 06/07/2023    Cervical dysplasia 05/18/2023    Vaginal dysplasia 05/18/2023    Hyperlipidemia 02/01/2023    GERD (gastroesophageal reflux disease) 02/01/2023    Rheumatoid arthritis 02/01/2023    Benign essential hypertension 02/01/2023     Surgical:  Past Surgical History:   Procedure Laterality Date    ANKLE SURGERY Left     COLONOSCOPY N/A 05/31/2023    Procedure: COLONOSCOPY;  Surgeon: Robbie Ace MD;  Location: [facility];  Service: Endoscopy;  Laterality: N/A;    ESOPHAGOGASTRODUODENOSCOPY N/A 05/31/2023    Procedure: EGD (ESOPHAGOGASTRODUODENOSCOPY);  Surgeon: Robbie Ace MD;  Location: [facility];  Service: Endoscopy;  Laterality: N/A;    FRACTURE SURGERY      JOINT REPLACEMENT  April 2016    Full Left ankle replacement    WRIST FRACTURE SURGERY Left      Medication:  Current Outpatient Medications   Medication Sig Dispense Refill    albuterol (ACCUNEB) 1.25 mg/3 mL Nebu Take 3 mLs (1.25 mg total) by nebulization every 6 (six) hours as needed (shortness of breath,  "wheezing). 75 mL 1    albuterol (VENTOLIN HFA) 90 mcg/actuation inhaler Inhale 2 puffs into the lungs every 4 (four) hours as needed for Wheezing. Rescue 6.7 g 1    aspirin (ASPIR-81 ORAL)       atorvastatin (LIPITOR) 10 MG tablet Take 1 tablet (10 mg total) by mouth once daily. 90 tablet 3    cholecalciferol, vitamin D3, 125 mcg (5,000 unit) Tab Take 5,000 Units by mouth once a week.      EScitalopram oxalate (LEXAPRO) 20 MG tablet Take 1 tablet (20 mg total) by mouth once daily. 90 tablet 3    folic acid (FOLVITE) 1 MG tablet Take 1 tablet (1 mg total) by mouth once daily. 90 tablet 3    hydroCHLOROthiazide (HYDRODIURIL) 25 MG tablet Take 1 tablet (25 mg total) by mouth once daily. 90 tablet 3    methotrexate 2.5 MG Tab Take 10 tablets (25 mg total) by mouth every 7 days. 40 tablet 3    multivitamin-minerals-lutein (MULTIVITAMIN 50 PLUS) Tab Take 1 tablet by mouth once daily.      omega 3-dha-epa-fish oil 120-180-500 mg Cap       ondansetron (ZOFRAN-ODT) 4 MG TbDL Take 1 tablet (4 mg total) by mouth every 8 (eight) hours as needed (nausea). 30 tablet 0    pantoprazole (PROTONIX) 40 MG tablet Take 1 tablet (40 mg total) by mouth once daily. 90 tablet 3    pen needle, diabetic (BD ULTRA-FINE MINI PEN NEEDLE) 31 gauge x 3/16" Ndle Inject 1 each into the skin once daily. 100 each 3    pregabalin (LYRICA) 50 MG capsule Take 1 capsule (50 mg total) by mouth 3 (three) times daily. 90 capsule 6    sarilumab (KEVZARA) 200 mg/1.14 mL PnIj Inject 1.14 mLs (200 mg total) into the skin every 14 (fourteen) days. 2 pen 11    teriparatide (FORTEO) 20 mcg/dose (600mcg/2.4mL) PnIj Inject 0.08 mLs (20 mcg total) into the skin once daily. 2.4 mL 11    diclofenac sodium 100 mg 24 hr tablet diclofenac  mg tablet,extended release 24 hr   TAKE 1 TABLET(S) EVERY DAY BY ORAL ROUTE WITH MEALS FOR 30 DAYS. (Patient not taking: Reported on 8/22/2024)       No current facility-administered medications for this visit. "     Imaging/Procedures  DEXA (2/15/23):  Lumbar spine (L1-L4):               BMD is 0.940 g/cm2, T-score is -1.0, and Z-score is 0.7.     Total hip:                                BMD is 0.881 g/cm2, T-score is -0.5, and Z-score is 0.7.     Femoral neck:                          BMD is 0.645 g/cm2, T-score is -1.8, and Z-score is -0.4.     Distal 1/3 radius:                      Not applicable     FRAX:     27% risk of a major osteoporotic fracture in the next 10 years.     5% risk of hip fracture in the next 10 years.     Impression:     *Osteoporosis based on T-score between -1.0 and -2.5 and elevated risk based on FRAX  *Fracture risk is very high due to calculated 10 year risk of hip fracture >4.5% (FRAX)     RECOMMENDATIONS:  *Daily calcium intake 2741-0646 mg, dietary sources preferred; Vitamin D 5779-6157 IU daily.  *Weight bearing exercise and fall precautions.  *Recommend medical therapy for osteoporosis with high risk for fracture. Consider bisphosphonates (alendronate, risedronate, zoledronic acid), or denosumab.  *Repeat BMD in 2 years    Rheumatologic medications history  MTX 10 tablets (11/2020-present)  Prednisone (last used in June 2023)  Humira (03/2021-12/2021; discontinued due to lack of efficacy, used genetic testing to see that she was not a responder)    Orencia (05/2022-10/2022; discontinued due to lack of efficacy)  Actemra (06/2023-09/2023)  Rinvoq (09/2023-12/2023; discontinued due to headaches)  Kevzara (03/2024-present)  Forteo (05/2024-present)    Objective:   /72   Pulse 77   Wt 89.9 kg (198 lb 3.1 oz)   BMI 40.03 kg/m²   Physical Exam   Constitutional: No distress.   HENT:   Mouth/Throat: Mucous membranes are moist.   Eyes: Conjunctivae are normal.   Cardiovascular: Normal rate, regular rhythm, normal heart sounds and normal pulses.   Pulmonary/Chest: Effort normal and breath sounds normal.   Abdominal: Soft. Bowel sounds are normal.   Musculoskeletal:         General:  Swelling (soft slight soft tissue swelling of L hand but no synovitis) present. No tenderness. Normal range of motion.      Cervical back: Normal range of motion. No rigidity or tenderness.   Neurological: She is alert. She displays no weakness. Gait normal.   Skin: Skin is warm.      9/20/2023 12/14/2023 5/16/2024 8/22/2024   Tender (HIGGINBOTHAM-28) 25 / 28 22 / 28 9 / 28 1 / 28    Swollen (HIGGINBOTHAM-28) 4 / 28 0 / 28 0 / 28  0 / 28    Provider Global 30 mm 50 mm 30 mm 10 mm   Patient Global 50 mm 65 mm 45 mm 20 mm   ESR 3 mm/hr 12 mm/hr 39 mm/hr --   CRP 0.7 mg/L 8.2 mg/L 13.2 mg/L --   HIGGINBOTHAM-28 (ESR) 4.83 (Moderate disease activity) 5.28 (High disease activity) 4.87 (Moderate disease activity) --   HIGGINB[place]AM-28 (CRP) 5.21 (High disease activity) 5.3 (High disease activity) 4.23 (Moderate disease activity) --   CDAI Score 37  33.5  16.5  4      Assessment:     1. Rheumatoid arthritis involving multiple sites with positive rheumatoid factor    2. Fibromyalgia    3. Osteoporosis without current pathological fracture    4. Long term methotrexate user      Plan:     Problem List Items Addressed This Visit          Immunology/Multi System    Rheumatoid arthritis - Primary       Endocrine    Osteoporosis     Other Visit Diagnoses       Fibromyalgia        Long term methotrexate user              Aysha Randolph is a 63 y.o. F with a past medical history below who presents today for follow-up for seropositive rheumatoid arthritis.     Seropositive rheumatoid arthritis- currently uncontrolled; has failed MTX, Humira, Orencia, Actemra and now Rinvoq. No synovitis on exam. Recent inflammatory markers normal. CDAI of 16.5 today, decreased from a few months ago just on MTX and with the addition of Lyrica. Fibromyalgia seems to have been playing a role in the patients overall pain. CDAI 4 on MTX 25 mg weekly, folic acid and Kevzara 200 mg q 14 days.     Osteoporosis- DEXA from 2/2023 with lowest T score of -1.8 at the femoral neck, FRAX 27%  major osteoporotic fracture 5% of hip fracture in the next 10 years, no falls or fractures, currently on Forteo (05/2024-present), Vit D and calcium through the diet     Fibromyalgia- The patient has widespread pain, on both sides of the body, above and below the waist. The patient has multiple associated symptoms with fibromyalgia that include fatigue, brain fog, waking up tired, irritable bowel syndrome, depression, insomnia, anxiety, headaches.   I spent time counseling the patient on fibromyalgia. I explained to the patient that fibromyalgia is a disorder characterized by widespread musculoskeletal pain, accompanied by fatigue, sleep, memory and mood issues. In general, it is thought that fibromyalgia symptoms are secondary to overactive nerves, which amplify painful sensations by the way the brain processes the pain signals.     Plan:  - Monitor CBC, CMP, ESR, CRP every 3 months   - Continue MTX 25 mg weekly and folic acid daily; recommended that the patient split the dose (5 tablets in AM, 5 tablets in PM) for better absorption; if patient continues to be in remission in 3 months will taper MTX slowly   - Continue Kevzara 200 mg q 14 days  - Continue Forteo, Vit D (currently taking 5000U daily, recommended decreasing to 1000-2000U daily), and calcium (through the diet)  - Continue Lyrica 50 mg TID for fibromyalgia, now that FAITH is being treated adequately fibromyalgia may be better controlled   - Due for COVID, RSV and pneumonia vaccines; she did get her Shingles vaccine    This patient was examined with Dr. Lloyd. Plan discussed with the patient. Return to clinic in 3 months.    Kayla Cobb MD  Rheumatology Fellow, PGY5

## 2024-09-10 DIAGNOSIS — M05.79 RHEUMATOID ARTHRITIS INVOLVING MULTIPLE SITES WITH POSITIVE RHEUMATOID FACTOR: ICD-10-CM

## 2024-09-11 RX ORDER — METHOTREXATE 2.5 MG/1
25 TABLET ORAL
Qty: 40 TABLET | Refills: 3 | Status: SHIPPED | OUTPATIENT
Start: 2024-09-11

## 2024-09-13 ENCOUNTER — OFFICE VISIT (OUTPATIENT)
Dept: INTERNAL MEDICINE | Facility: CLINIC | Age: 64
End: 2024-09-13
Payer: MEDICARE

## 2024-09-13 VITALS
DIASTOLIC BLOOD PRESSURE: 70 MMHG | TEMPERATURE: 99 F | HEIGHT: 59 IN | RESPIRATION RATE: 12 BRPM | HEART RATE: 82 BPM | WEIGHT: 191.81 LBS | BODY MASS INDEX: 38.67 KG/M2 | OXYGEN SATURATION: 98 % | SYSTOLIC BLOOD PRESSURE: 120 MMHG

## 2024-09-13 DIAGNOSIS — J06.9 UPPER RESPIRATORY TRACT INFECTION, UNSPECIFIED TYPE: Primary | ICD-10-CM

## 2024-09-13 PROCEDURE — 99999 PR PBB SHADOW E&M-EST. PATIENT-LVL IV: CPT | Mod: PBBFAC,GC,,

## 2024-09-13 PROCEDURE — 99214 OFFICE O/P EST MOD 30 MIN: CPT | Mod: PBBFAC

## 2024-09-13 NOTE — PROGRESS NOTES
Aysha Randolph  Date of birth 1960    PRESENTING HISTORY    Ms. Randolph is a 63 year old F with PMH of HTN, RA, Fibromyalgia, GERD, Fatigue, sore throat, Osteoporosis, FAITH on CPAP and HLD that presents today for urgent care visit and complains of fatigue, sore throat and a cough. Patient is followed by Rheumatology last visit 8/22 with normal metabolic and hepatitis panel c/w RA on remission with MTX with indication to continue current mgmt for fibromyalgia and RA. Last visit in May with PCP pt complained of nausea and abdominal pain CTAP was performed without acute findings. Patient states her cough, fatigue and sore throat started Wednesday. Patient refers it is associated with sputum production, ear discomfort and 2 episodes of watery diarrhea. Patient referred she was in a senior conference in [place] and she and her  had contact with a sick contact there from an elderly lady. Patient states her  started with symptomps prior to her and her symptoms have been continuously worsening specially today and that have interrupted her sleep.  and has not taken any medication except the inhaler PRN which has contributed to improvement in her symptoms. Patient refers laying down makes her symptoms worse, and has been stable her PO intake and fluid intake. Pt refers she hasn't have had other episodes in the last six months. Patient endorses chills, watery eyes, nasal congestion, thoracic pressure, shortness of breath. Patient also refers she got 2 COVID vaccines but hasn't taken her influenza, DTAP, pneumonia vaccine. Denies anorexia, N/V, blood in the stool, hemoptysis, fevers, weight loss, passing out, urinary symptoms, abdominal pain.     ROS- see above     OBJECTIVE:    Vitals Signs:   120/70 RR 12 SatO2 98 T 99`F HR 82 bpm     Recent Results (from the past 1008 hour(s))   Hepatitis B surface antigen    Collection Time: 08/16/24  9:19 AM   Result Value Ref Range    Hepatitis B Surface Ag  Non-reactive Non-reactive   HBcAB    Collection Time: 08/16/24  9:19 AM   Result Value Ref Range    Hep B Core Total Ab Non-reactive Non-reactive   Hepatitis B surface antibody    Collection Time: 08/16/24  9:19 AM   Result Value Ref Range    Hep B S Ab <3.00 mIU/mL    Hep B S Ab Non-reactive    Vitamin D    Collection Time: 08/16/24  9:19 AM   Result Value Ref Range    Vit D, 25-Hydroxy 60 30 - 96 ng/mL   Quantiferon Gold TB    Collection Time: 08/16/24  9:19 AM   Result Value Ref Range    NIL 0.48756 IU/mL    TB1 - Nil -0.002 IU/mL    TB2 - Nil 0.003 IU/mL    Mitogen - Nil 9.956 IU/mL    TB Gold Plus Negative Negative     On-clinic Covid and flu test neg     Physical Exam  Constitutional:       Appearance: Normal appearance. She is obese.   HENT:      Head: Normocephalic and atraumatic.      Right Ear: Tympanic membrane and ear canal normal.      Left Ear: Tympanic membrane and ear canal normal.      Nose: Nose normal.      Mouth/Throat:      Mouth: Mucous membranes are moist.      Pharynx: Oropharynx is clear.   Eyes:      Extraocular Movements: Extraocular movements intact.      Conjunctiva/sclera: Conjunctivae normal.      Pupils: Pupils are equal, round, and reactive to light.   Cardiovascular:      Rate and Rhythm: Normal rate and regular rhythm.      Pulses: Normal pulses.      Heart sounds: Normal heart sounds.   Pulmonary:      Effort: Pulmonary effort is normal.      Breath sounds: Normal breath sounds.   Abdominal:      General: Bowel sounds are normal. There is no distension.      Palpations: Abdomen is soft.      Tenderness: There is no abdominal tenderness.   Musculoskeletal:         General: No swelling or tenderness. Normal range of motion.      Cervical back: Normal range of motion and neck supple.   Skin:     General: Skin is warm and dry.      Capillary Refill: Capillary refill takes less than 2 seconds.   Neurological:      General: No focal deficit present.      Mental Status: She is alert and  oriented to person, place, and time.      Cranial Nerves: No cranial nerve deficit.      Sensory: No sensory deficit.   Psychiatric:         Mood and Affect: Mood normal.         Behavior: Behavior normal.         Thought Content: Thought content normal.       ASSESSMENT AND PLAN     Cough: Patient with URI symptoms, and PE wnl, normal Temp and RR. I consider this is more likely an upper respiratory infection secondary to viral infection.   I educated the patient on her condition and encouraged Supportive management with tylenol and fluids.  HTN- controlled continue hydrodiuril. F/up with PCP  RA- controlled f/up with Rheum. MTX   Fibromyalgia- Controlled on Pregabalin. F/up with Rheum   Osteoporosis- Controlled continue Teriparatide Sarlizumab as indicated by PCP  Anxiety-Controlled continue Lexapro.

## 2024-09-29 ENCOUNTER — HOSPITAL ENCOUNTER (EMERGENCY)
Facility: HOSPITAL | Age: 64
Discharge: HOME | End: 2024-09-29
Attending: EMERGENCY MEDICINE
Payer: MEDICARE

## 2024-09-29 VITALS
BODY MASS INDEX: 38.3 KG/M2 | HEART RATE: 85 BPM | RESPIRATION RATE: 24 BRPM | HEIGHT: 59 IN | WEIGHT: 190 LBS | DIASTOLIC BLOOD PRESSURE: 62 MMHG | TEMPERATURE: 98 F | OXYGEN SATURATION: 98 % | SYSTOLIC BLOOD PRESSURE: 130 MMHG

## 2024-09-29 DIAGNOSIS — S09.90XA CLOSED HEAD INJURY, INITIAL ENCOUNTER: ICD-10-CM
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
## 2024-09-29 DIAGNOSIS — S42.492A OTHER CLOSED DISPLACED FRACTURE OF DISTAL END OF LEFT HUMERUS, INITIAL ENCOUNTER: Primary | ICD-10-CM

## 2024-09-29 DIAGNOSIS — T14.90XA TRAUMA: ICD-10-CM

## 2024-09-29 PROCEDURE — 99156 MOD SED OTH PHYS/QHP 5/>YRS: CPT

## 2024-09-29 PROCEDURE — 63600175 PHARM REV CODE 636 W HCPCS

## 2024-09-29 PROCEDURE — 99157 MOD SED OTHER PHYS/QHP EA: CPT

## 2024-09-29 PROCEDURE — 63600175 PHARM REV CODE 636 W HCPCS: Performed by: STUDENT IN AN ORGANIZED HEALTH CARE EDUCATION/TRAINING PROGRAM

## 2024-09-29 PROCEDURE — 96374 THER/PROPH/DIAG INJ IV PUSH: CPT | Mod: 59

## 2024-09-29 PROCEDURE — 99285 EMERGENCY DEPT VISIT HI MDM: CPT | Mod: 25

## 2024-09-29 PROCEDURE — 96361 HYDRATE IV INFUSION ADD-ON: CPT

## 2024-09-29 PROCEDURE — 25000003 PHARM REV CODE 250: Performed by: STUDENT IN AN ORGANIZED HEALTH CARE EDUCATION/TRAINING PROGRAM

## 2024-09-29 PROCEDURE — 96375 TX/PRO/DX INJ NEW DRUG ADDON: CPT | Mod: 59

## 2024-09-29 PROCEDURE — 24535 CLTX SPRCNDYLR HUMERAL FX W/: CPT | Mod: LT

## 2024-09-29 PROCEDURE — 96376 TX/PRO/DX INJ SAME DRUG ADON: CPT

## 2024-09-29 RX ORDER — MORPHINE SULFATE 4 MG/ML
4 INJECTION, SOLUTION INTRAMUSCULAR; INTRAVENOUS
Status: COMPLETED | OUTPATIENT
Start: 2024-09-29 | End: 2024-09-29

## 2024-09-29 RX ORDER — IBUPROFEN 600 MG/1
600 TABLET ORAL EVERY 6 HOURS PRN
Qty: 20 TABLET | Refills: 0 | Status: SHIPPED | OUTPATIENT
Start: 2024-09-29

## 2024-09-29 RX ORDER — ACETAMINOPHEN 325 MG/1
650 TABLET ORAL EVERY 6 HOURS PRN
Qty: 40 TABLET | Refills: 0 | Status: SHIPPED | OUTPATIENT
Start: 2024-09-29

## 2024-09-29 RX ORDER — ASPIRIN 81 MG/1
81 TABLET ORAL 2 TIMES DAILY
Qty: 60 TABLET | Refills: 0 | Status: SHIPPED | OUTPATIENT
Start: 2024-09-29 | End: 2024-10-29

## 2024-09-29 RX ORDER — HYDROMORPHONE HYDROCHLORIDE 1 MG/ML
1 INJECTION, SOLUTION INTRAMUSCULAR; INTRAVENOUS; SUBCUTANEOUS
Status: COMPLETED | OUTPATIENT
Start: 2024-09-29 | End: 2024-09-29

## 2024-09-29 RX ORDER — HYDROMORPHONE HYDROCHLORIDE 1 MG/ML
1 INJECTION, SOLUTION INTRAMUSCULAR; INTRAVENOUS; SUBCUTANEOUS ONCE
Status: COMPLETED | OUTPATIENT
Start: 2024-09-29 | End: 2024-09-29

## 2024-09-29 RX ORDER — OXYCODONE HYDROCHLORIDE 5 MG/1
5 TABLET ORAL EVERY 6 HOURS PRN
Qty: 12 TABLET | Refills: 0 | Status: SHIPPED | OUTPATIENT
Start: 2024-09-29 | End: 2024-09-29

## 2024-09-29 RX ORDER — OXYCODONE HYDROCHLORIDE 5 MG/1
5 TABLET ORAL EVERY 6 HOURS PRN
Qty: 12 TABLET | Refills: 0 | Status: SHIPPED | OUTPATIENT
Start: 2024-09-29

## 2024-09-29 RX ORDER — ACETAMINOPHEN 325 MG/1
650 TABLET ORAL EVERY 6 HOURS PRN
Qty: 40 TABLET | Refills: 0 | Status: SHIPPED | OUTPATIENT
Start: 2024-09-29 | End: 2024-09-29

## 2024-09-29 RX ORDER — IBUPROFEN 600 MG/1
600 TABLET ORAL EVERY 6 HOURS PRN
Qty: 20 TABLET | Refills: 0 | Status: SHIPPED | OUTPATIENT
Start: 2024-09-29 | End: 2024-09-29

## 2024-09-29 RX ORDER — ONDANSETRON HYDROCHLORIDE 2 MG/ML
4 INJECTION, SOLUTION INTRAVENOUS
Status: COMPLETED | OUTPATIENT
Start: 2024-09-29 | End: 2024-09-29

## 2024-09-29 RX ORDER — PROPOFOL 10 MG/ML
40 VIAL (ML) INTRAVENOUS ONCE
Status: COMPLETED | OUTPATIENT
Start: 2024-09-29 | End: 2024-09-29

## 2024-09-29 RX ORDER — METHOCARBAMOL 750 MG/1
750 TABLET, FILM COATED ORAL 3 TIMES DAILY
Qty: 15 TABLET | Refills: 0 | Status: SHIPPED | OUTPATIENT
Start: 2024-09-29 | End: 2024-10-04

## 2024-09-29 RX ORDER — PROPOFOL 10 MG/ML
VIAL (ML) INTRAVENOUS CODE/TRAUMA/SEDATION MEDICATION
Status: COMPLETED | OUTPATIENT
Start: 2024-09-29 | End: 2024-09-29

## 2024-09-29 RX ORDER — METHOCARBAMOL 750 MG/1
750 TABLET, FILM COATED ORAL 3 TIMES DAILY
Qty: 15 TABLET | Refills: 0 | Status: SHIPPED | OUTPATIENT
Start: 2024-09-29 | End: 2024-09-29

## 2024-09-29 RX ADMIN — PROPOFOL 20 MG: 10 INJECTION, EMULSION INTRAVENOUS at 08:09

## 2024-09-29 RX ADMIN — PROPOFOL 100 MG: 10 INJECTION, EMULSION INTRAVENOUS at 09:09

## 2024-09-29 RX ADMIN — HYDROMORPHONE HYDROCHLORIDE 1 MG: 1 INJECTION, SOLUTION INTRAMUSCULAR; INTRAVENOUS; SUBCUTANEOUS at 02:09

## 2024-09-29 RX ADMIN — ONDANSETRON 4 MG: 2 INJECTION INTRAMUSCULAR; INTRAVENOUS at 01:09

## 2024-09-29 RX ADMIN — PROPOFOL 40 MG: 10 INJECTION, EMULSION INTRAVENOUS at 08:09

## 2024-09-29 RX ADMIN — MORPHINE SULFATE 4 MG: 4 INJECTION INTRAVENOUS at 01:09

## 2024-09-29 RX ADMIN — HYDROMORPHONE HYDROCHLORIDE 1 MG: 1 INJECTION, SOLUTION INTRAMUSCULAR; INTRAVENOUS; SUBCUTANEOUS at 05:09

## 2024-09-29 RX ADMIN — ONDANSETRON 4 MG: 2 INJECTION INTRAMUSCULAR; INTRAVENOUS at 08:09

## 2024-09-29 RX ADMIN — SODIUM CHLORIDE 1000 ML: 0.9 INJECTION, SOLUTION INTRAVENOUS at 08:09

## 2024-09-29 NOTE — ED TRIAGE NOTES
Aysha Randolph, a 63 y.o. female presents to the ED w/ complaint of fall. Patient had a trip and fall in a parking lot. Laceration to left eyebrow noted. Patient endorses left arm pain. Left arm stabilized by EMS. Takes baby aspirin daily. Denies LOC, SOB, chest pain, or blurry visions.     Triage note:  Chief Complaint   Patient presents with    Fall     Arrive via EMS from [facility]. Trip and fall in parking lot. Lac to left eyebrow, deformity noted to left arm. +crepitus, arrives in sling. Received 15 mg toradol pta     Review of patient's allergies indicates:  No Known Allergies  Past Medical History:   Diagnosis Date    Abnormal Pap smear of cervix 2017    Arthritis     Hyperlipidemia     Hypertension     Mild intermittent asthma, uncomplicated     Osteoporosis     Rheumatoid arthritis, unspecified 12/17/2020

## 2024-09-29 NOTE — ED PROVIDER NOTES
Source of History:  Patient and chart    Chief complaint:  Fall (Arrive via EMS from [facility]. Trip and fall in parking lot. Lac to left eyebrow, deformity noted to left arm. +crepitus, arrives in sling. Received 15 mg toradol pta)      HPI:  Aysha Randolph is a 63 y.o. female with a medical history as below presents to the emergency department with a chief complaint of mechanical fall.  Patient denies losing consciousness, denies changes in vision, not tried to ambulate since the event.  She endorses mild nausea with mild neck pain and significant left arm pain.  She denies taking any blood thinners.  She has no further concerns at this time.    Review of patient's allergies indicates:  No Known Allergies    No current facility-administered medications on file prior to encounter.     Current Outpatient Medications on File Prior to Encounter   Medication Sig Dispense Refill    albuterol (ACCUNEB) 1.25 mg/3 mL Nebu Take 3 mLs (1.25 mg total) by nebulization every 6 (six) hours as needed (shortness of breath, wheezing). 75 mL 1    albuterol (VENTOLIN HFA) 90 mcg/actuation inhaler Inhale 2 puffs into the lungs every 4 (four) hours as needed for Wheezing. Rescue 6.7 g 1    atorvastatin (LIPITOR) 10 MG tablet Take 1 tablet (10 mg total) by mouth once daily. 90 tablet 3    cholecalciferol, vitamin D3, 125 mcg (5,000 unit) Tab Take 5,000 Units by mouth once daily.      EScitalopram oxalate (LEXAPRO) 20 MG tablet Take 1 tablet (20 mg total) by mouth once daily. 90 tablet 3    folic acid (FOLVITE) 1 MG tablet Take 1 tablet (1 mg total) by mouth once daily. 90 tablet 3    hydroCHLOROthiazide (HYDRODIURIL) 25 MG tablet Take 1 tablet (25 mg total) by mouth once daily. 90 tablet 3    methotrexate 2.5 MG Tab Take 10 tablets (25 mg total) by mouth every 7 days. 40 tablet 3    multivitamin-minerals-lutein (MULTIVITAMIN 50 PLUS) Tab Take 1 tablet by mouth once daily.      omega 3-dha-epa-fish oil 120-180-500 mg Cap        "ondansetron (ZOFRAN-ODT) 4 MG TbDL Take 1 tablet (4 mg total) by mouth every 8 (eight) hours as needed (nausea). (Patient not taking: Reported on 9/13/2024) 30 tablet 0    pantoprazole (PROTONIX) 40 MG tablet Take 1 tablet (40 mg total) by mouth once daily. 90 tablet 3    pen needle, diabetic (BD ULTRA-FINE MINI PEN NEEDLE) 31 gauge x 3/16" Ndle Inject 1 each into the skin once daily. 100 each 3    pregabalin (LYRICA) 50 MG capsule Take 1 capsule (50 mg total) by mouth 3 (three) times daily. 90 capsule 6    sarilumab (KEVZARA) 200 mg/1.14 mL PnIj Inject 1.14 mLs (200 mg total) into the skin every 14 (fourteen) days. 2 pen 11    teriparatide (FORTEO) 20 mcg/dose (600mcg/2.4mL) PnIj Inject 0.08 mLs (20 mcg total) into the skin once daily. 2.4 mL 11       PMH:  As per HPI and below:  Past Medical History:   Diagnosis Date    Abnormal Pap smear of cervix 2017    Arthritis     Hyperlipidemia     Hypertension     Mild intermittent asthma, uncomplicated     Osteoporosis     Rheumatoid arthritis, unspecified 12/17/2020     Past Surgical History:   Procedure Laterality Date    ANKLE SURGERY Left     COLONOSCOPY N/A 05/31/2023    Procedure: COLONOSCOPY;  Surgeon: Robbie Ace MD;  Location: [facility];  Service: Endoscopy;  Laterality: N/A;    ESOPHAGOGASTRODUODENOSCOPY N/A 05/31/2023    Procedure: EGD (ESOPHAGOGASTRODUODENOSCOPY);  Surgeon: Robbie Ace MD;  Location: [facility];  Service: Endoscopy;  Laterality: N/A;    FRACTURE SURGERY      JOINT REPLACEMENT  April 2016    Full Left ankle replacement    WRIST FRACTURE SURGERY Left        Social History     Socioeconomic History    Marital status:    Tobacco Use    Smoking status: Never    Smokeless tobacco: Never   Substance and Sexual Activity    Alcohol use: Not Currently     Comment: Very rarely    Drug use: Never    Sexual activity: Yes     Partners: Male     Birth control/protection: None       Family History   Problem Relation Name Age of Onset    " Arthritis Mother My mom     Heart disease Mother My mom     Hypertension Mother My mom     Stroke Mother My mom         Suffered a stroke and her left side became paralyzed    Cervical cancer Maternal Cousin maternal 2nd     Arthritis Sister Younger sister     Asthma Sister Younger sister     Diabetes Sister Younger sister         Diagnosed at the age of 12    Heart disease Sister Younger sister     Stroke Sister Younger sister         Has suffered several mini strokes    Asthma Sister Older sister          of an asthma attack at the age of 46    Early death Sister Older sister          at the age of 46    Heart disease Sister Older sister     Stroke Sister Older sister     Breast cancer Neg Hx      Colon cancer Neg Hx         Physical Exam:      Vitals:    24 2202   BP: 130/62   Pulse: 85   Resp: (!) 24   Temp:      Physical Exam  Gen:  Hemodynamically stable .  anxious secondary to pain  Mental Status:  Anxious, but oriented    Skin: Warm, dry. No rashes seen.  Eyes: No conjunctival injection.  PERRLA  Pulm: CTAB. No increased work of breathing.  No significant tachypnea.  No conversational dyspnea.    CV: Regular rate.   Abd: Soft.  Not distended.  Nontender.   MSK:  Left arm has apparent deformity just proximal to the elbow.  Skin is not broken.  Radial and ulnar arteries are palpable.  No apparent neurological deficits and arm.    Neuro: Awake. Speech normal. No focal neuro deficit observed.  Cranial nerves 2-12 grossly intact.  Cerebellar function intact.    Some ecchymosis and dried blood apparent around her left periorbital region.  No signs of ocular trauma.    Procedures  Laboratory Studies:  Labs Reviewed - No data to display      X rays (independently interpreted by me):  There is a distal humerus fracture evident on x-ray    Chart reviewed.  Patient has a history of rheumatoid arthritis, osteoporosis, and polymyalgia    Imaging Results               X-Ray Elbow 2 Views Left (Final result)   Result time 09/29/24 22:35:02   Procedure changed from X-Ray Elbow Complete Left     Final result by Matt Browne MD (09/29/24 22:35:02)                   Impression:      Interval attempted external reduction of previous displaced supracondylar fracture of the left distal humerus.  Improvement in degree of displacement with significant residual offset.    This report was flagged in Epic as abnormal.      Electronically signed by: Matt Browne MD  Date:    09/29/2024  Time:    22:35               Narrative:    EXAMINATION:  XR ELBOW 2 VIEWS LEFT    CLINICAL HISTORY:  Post splint;    TECHNIQUE:  Frontal and lateral radiographs of the left elbow.    COMPARISON:  09/29/2024 at 20:43 hours.    FINDINGS:  There has been interval attempted external reduction of the previous displaced supracondylar fracture of the left distal humerus.  There is some modest decrease in the degree of displacement.  Persistent residual offset remains.  No new fracture is identified.  The joint spaces remain stable.  There is an overlying splint in place.                                       X-Ray Elbow 2 Views Left (Final result)  Result time 09/29/24 21:00:48   Procedure changed from X-Ray Elbow Complete Left     Final result by Akshat Ruiz MD (09/29/24 21:00:48)                   Impression:      As above      Electronically signed by: Akshat Ruiz MD  Date:    09/29/2024  Time:    21:00               Narrative:    EXAMINATION:  XR ELBOW 2 VIEWS LEFT    CLINICAL HISTORY:  Post reduction;    COMPARISON:  09/29/2024, CT 09/29/2024    FINDINGS:  Two views left elbow.    On the lateral view, positioning results in increased distraction of the distal humeral fracture noting several fracture fragments in the region.  No convincing new fracture however please see CT 09/29/2024.                                       CT Arm Elbow Without Contrast Left (Final result)  Result time 09/29/24 18:39:37      Final result by Akshat Ruiz,  MD (09/29/24 18:39:37)                   Impression:      1. Fracture of the distal humerus and suspected extension to the radial head as described.      Electronically signed by: Akshat Ruiz MD  Date:    09/29/2024  Time:    18:39               Narrative:    EXAMINATION:  CT ARM ELBOW WITHOUT CONTRAST LEFT; CT 3D RENDERING WO INDEPENDENT WORKSTATION    CLINICAL HISTORY:  Fracture, elbow;; Fracture, elbow;3D;    TECHNIQUE:  Axial images of the left arm/elbow were obtained at 0.625 mm intervals without administration of IV contrast.  Coronal and sagittal reformatted images were reviewed.  3D reconstructed images were created on a separate workstation and reviewed.    COMPARISON:  Radiograph 09/29/2024    FINDINGS:  There is an acute comminuted fracture of the distal humerus, primary fracture planes are in the supracondylar region.  There is extension of the fracture to the lateral humeral epicondyle.  There is questionable nondisplaced linear fracture of the lateral aspect of the radial head noting motion artifact limits evaluation.  There is diffuse edema about the elbow noting effusion.  Additional induration is noted along the distal aspect of the forearm.  No convincing radiopaque foreign body.  The ulna appears intact.                                       CT 3D Rendering WO Independent Workstation (Final result)  Result time 09/29/24 18:39:37      Final result by Akshat Ruiz MD (09/29/24 18:39:37)                   Impression:      1. Fracture of the distal humerus and suspected extension to the radial head as described.      Electronically signed by: Akshat Ruiz MD  Date:    09/29/2024  Time:    18:39               Narrative:    EXAMINATION:  CT ARM ELBOW WITHOUT CONTRAST LEFT; CT 3D RENDERING WO INDEPENDENT WORKSTATION    CLINICAL HISTORY:  Fracture, elbow;; Fracture, elbow;3D;    TECHNIQUE:  Axial images of the left arm/elbow were obtained at 0.625 mm intervals without administration of IV  contrast.  Coronal and sagittal reformatted images were reviewed.  3D reconstructed images were created on a separate workstation and reviewed.    COMPARISON:  Radiograph 09/29/2024    FINDINGS:  There is an acute comminuted fracture of the distal humerus, primary fracture planes are in the supracondylar region.  There is extension of the fracture to the lateral humeral epicondyle.  There is questionable nondisplaced linear fracture of the lateral aspect of the radial head noting motion artifact limits evaluation.  There is diffuse edema about the elbow noting effusion.  Additional induration is noted along the distal aspect of the forearm.  No convincing radiopaque foreign body.  The ulna appears intact.                                       X-Ray Humerus 2 View Left (Final result)  Result time 09/29/24 18:05:46      Final result by Akshat Ruiz MD (09/29/24 18:05:46)                   Impression:      1. Distal humeral fracture described in separate report, no proximal fracture.      Electronically signed by: Akshat Ruiz MD  Date:    09/29/2024  Time:    18:05               Narrative:    EXAMINATION:  XR HUMERUS 2 VIEW LEFT    CLINICAL HISTORY:  fx;    COMPARISON:  None    FINDINGS:  Two views left humerus.    Please see separately dictated report for details regarding distal humeral fracture.  Proximally, the humerus is intact.  The glenohumeral joint is intact without dislocation.  The acromioclavicular joint is intact.  There is osteopenia.                                       X-Ray Forearm Left (Final result)  Result time 09/29/24 15:12:31      Final result by Matt Browne MD (09/29/24 15:12:31)                   Impression:      No evidence of a fracture or dislocation of the left forearm.    Partially visualized displaced supracondylar fracture of the left distal humerus.      Electronically signed by: Matt Browne MD  Date:    09/29/2024  Time:    15:12               Narrative:     EXAMINATION:  XR FOREARM LEFT    CLINICAL HISTORY:  Injury, unspecified, initial encounter    TECHNIQUE:  AP and lateral views of the left forearm were performed.    COMPARISON:  None    FINDINGS:  The bone mineralization is within normal limits.  There is a partially visualized acute displaced fracture of the supracondylar portion of the left distal humerus.    There is joint space narrowing.  The soft tissues are unremarkable.  No radiopaque foreign body is identified.                                       X-Ray Elbow Complete Left (Final result)  Result time 09/29/24 15:11:31      Final result by Matt Browne MD (09/29/24 15:11:31)                   Impression:      1.  Acute displaced supracondylar fracture of the left distal humerus.    2.  Lipohemarthrosis.      Electronically signed by: Matt Browne MD  Date:    09/29/2024  Time:    15:11               Narrative:    EXAMINATION:  XR ELBOW COMPLETE 3 VIEW LEFT    CLINICAL HISTORY:  Unspecified fall, initial encounter    TECHNIQUE:  AP, lateral, and oblique views of the left elbow were performed.    COMPARISON:  None    FINDINGS:  The bone mineralization is within normal limits.  There is an acute displaced fracture of the supracondylar portion of the left distal humerus.    There is joint space narrowing.  There is lipohemarthrosis.  No radiopaque foreign body is identified.  The soft tissue swelling of the left elbow.                                       X-Ray Shoulder Trauma Left (Final result)  Result time 09/29/24 14:54:25      Final result by Akshat Ruiz MD (09/29/24 14:54:25)                   Impression:      1. No acute displaced fracture or dislocation of the left shoulder.      Electronically signed by: Akshat Ruiz MD  Date:    09/29/2024  Time:    14:54               Narrative:    EXAMINATION:  XR SHOULDER TRAUMA 3 VIEW LEFT    CLINICAL HISTORY:  Unspecified fall, initial encounter    TECHNIQUE:  Three views of the left shoulder were  performed.    COMPARISON  None    FINDINGS:  Three views left shoulder.    The left humeral head maintains appropriate relationship with the glenoid.  The acromioclavicular joint is intact noting degenerative change.  No acute displaced left rib fracture.  There is osteopenia.                                       CT Cervical Spine Without Contrast (Final result)  Result time 09/29/24 14:27:56      Final result by Stu Welsh DO (09/29/24 14:27:56)                   Impression:      CT head: Subcutaneous hematoma overlies the left inferior frontal calvarium extending to the left periorbital preseptal soft tissues without gross underlying fracture    No acute intracranial findings specifically without evidence for acute intracranial hemorrhage or sulcal effacement to suggest large territory recent infarction    CT cervical spine: Spondylo degenerative change cervical spine as detailed above without evidence for acute fracture.    Clinical correlation and further evaluation as warranted..      Electronically signed by: Stu Welsh DO  Date:    09/29/2024  Time:    14:27               Narrative:    EXAMINATION:  CT HEAD WITHOUT CONTRAST; CT CERVICAL SPINE WITHOUT CONTRAST    CLINICAL HISTORY:  trauma;    TECHNIQUE:  CT head: Multiple sequential 5 mm axial images of the head without contrast.  Coronal and sagittal reformatted imaging from the axial acquisition.    CT cervical spine: Multiple sequential 1.25 mm axial images of the cervical spine without contrast.  Coronal and sagittal reformatted imaging from the axial acquisition.    COMPARISON:  None    FINDINGS:  CT head: Subcutaneous soft tissue swelling induration overlying the left inferior frontal calvarium extending to the preseptal periorbital soft tissues suggestive for subcutaneous hematoma without gross underlying fracture.  There is a mild degree of generalized cerebral volume loss with compensatory enlargement ventricles without hydrocephalus.  No  evidence for acute intracranial hemorrhage or sulcal effacement to suggest large territory recent infarction.  Mild moderate opacification left ethmoid air cells remaining visualized paranasal sinuses are clear.  There is partial opacification inferior right mastoid air cells.    CT cervical spine: Straightening of the expected normal cervical lordosis.  There is degenerative disc disease with intervertebral height loss and endplate degeneration all levels.  Trace grade 1 retrolisthesis of C6 on C7 and trace grade 1 anterolisthesis of C7 on T1.  Allowing for degenerative change cervical vertebral body heights and contours are within normal is without evidence for acute fracture.    Please note evaluation of the central canal neural foramen is distorted by artifact from beam hardening and motion.  Allowing for scatter artifacts degenerative change most pronounced at C6/C7 with posterior disc osteophyte, uncovertebral joint hypertrophy and facet arthropathy probable mild central canal stenosis with moderate to severe bilateral bony neural foraminal stenosis    No consolidation visualized lung apices.                                       CT Head Without Contrast (Final result)  Result time 09/29/24 14:27:56      Final result by Stu Welsh DO (09/29/24 14:27:56)                   Impression:      CT head: Subcutaneous hematoma overlies the left inferior frontal calvarium extending to the left periorbital preseptal soft tissues without gross underlying fracture    No acute intracranial findings specifically without evidence for acute intracranial hemorrhage or sulcal effacement to suggest large territory recent infarction    CT cervical spine: Spondylo degenerative change cervical spine as detailed above without evidence for acute fracture.    Clinical correlation and further evaluation as warranted..      Electronically signed by: Stu Welsh DO  Date:    09/29/2024  Time:    14:27               Narrative:     EXAMINATION:  CT HEAD WITHOUT CONTRAST; CT CERVICAL SPINE WITHOUT CONTRAST    CLINICAL HISTORY:  trauma;    TECHNIQUE:  CT head: Multiple sequential 5 mm axial images of the head without contrast.  Coronal and sagittal reformatted imaging from the axial acquisition.    CT cervical spine: Multiple sequential 1.25 mm axial images of the cervical spine without contrast.  Coronal and sagittal reformatted imaging from the axial acquisition.    COMPARISON:  None    FINDINGS:  CT head: Subcutaneous soft tissue swelling induration overlying the left inferior frontal calvarium extending to the preseptal periorbital soft tissues suggestive for subcutaneous hematoma without gross underlying fracture.  There is a mild degree of generalized cerebral volume loss with compensatory enlargement ventricles without hydrocephalus.  No evidence for acute intracranial hemorrhage or sulcal effacement to suggest large territory recent infarction.  Mild moderate opacification left ethmoid air cells remaining visualized paranasal sinuses are clear.  There is partial opacification inferior right mastoid air cells.    CT cervical spine: Straightening of the expected normal cervical lordosis.  There is degenerative disc disease with intervertebral height loss and endplate degeneration all levels.  Trace grade 1 retrolisthesis of C6 on C7 and trace grade 1 anterolisthesis of C7 on T1.  Allowing for degenerative change cervical vertebral body heights and contours are within normal is without evidence for acute fracture.    Please note evaluation of the central canal neural foramen is distorted by artifact from beam hardening and motion.  Allowing for scatter artifacts degenerative change most pronounced at C6/C7 with posterior disc osteophyte, uncovertebral joint hypertrophy and facet arthropathy probable mild central canal stenosis with moderate to severe bilateral bony neural foraminal stenosis    No consolidation visualized lung apices.                                       Medications Given:  Medications   morphine injection 4 mg (4 mg Intravenous Given 9/29/24 1347)   ondansetron injection 4 mg (4 mg Intravenous Given 9/29/24 1347)   HYDROmorphone injection 1 mg (1 mg Intravenous Given 9/29/24 1453)   HYDROmorphone injection 1 mg (1 mg Intravenous Given 9/29/24 1729)   propofol (DIPRIVAN) 10 mg/mL IVP (100 mg Intravenous Given 9/29/24 2100)   sodium chloride 0.9% bolus 1,000 mL 1,000 mL (0 mLs Intravenous Stopped 9/29/24 2204)   ondansetron injection 4 mg (4 mg Intravenous Given 9/29/24 2016)   propofol (DIPRIVAN) 10 mg/mL IVP (20 mg Intravenous Given 9/29/24 2053)       Discussed with:  Orthopedic surgery.  Dr. Craig will evaluate the patient and provide recommendations.    MDM:    63 y.o. female with trauma following fall    Soft tissue injury, fracture, dislocation    X-ray is positive for distal humerus fracture.  Compartments remain soft.  The arm remains neurovascularly intact.  The skin is not broken.  I have consulted Orthopedic surgery.  They will evaluate the patient provide recommendations.  CT scan of the head and neck are negative for acute findings.  I have cleared the C-collar.    At this time patient's care has been assumed by the following team.      Medical Decision Making       Diagnostic Impression:    1. Other closed displaced fracture of distal end of left humerus, initial encounter    2. Fall    3. Trauma    4. Closed head injury, initial encounter         ED Disposition Condition    Discharge Stable          ED Prescriptions       Medication Sig Dispense Start Date End Date Auth. Provider    ibuprofen (ADVIL,MOTRIN) 600 MG tablet  (Status: Discontinued) Take 1 tablet (600 mg total) by mouth every 6 (six) hours as needed for Pain. 20 tablet 9/29/2024 9/29/2024 Aron Lockett MD    methocarbamoL (ROBAXIN) 750 MG Tab  (Status: Discontinued) Take 1 tablet (750 mg total) by mouth 3 (three) times daily. for 5 days 15 tablet  9/29/2024 9/29/2024 Aron Lockett MD    acetaminophen (TYLENOL) 325 MG tablet  (Status: Discontinued) Take 2 tablets (650 mg total) by mouth every 6 (six) hours as needed for Pain. 40 tablet 9/29/2024 9/29/2024 Aron Lockett MD    oxyCODONE (ROXICODONE) 5 MG immediate release tablet  (Status: Discontinued) Take 1 tablet (5 mg total) by mouth every 6 (six) hours as needed for Pain. 12 tablet 9/29/2024 9/29/2024 Micheline Chaudhry MD    acetaminophen (TYLENOL) 325 MG tablet Take 2 tablets (650 mg total) by mouth every 6 (six) hours as needed for Pain. 40 tablet 9/29/2024 -- Aron Lockett MD    ibuprofen (ADVIL,MOTRIN) 600 MG tablet Take 1 tablet (600 mg total) by mouth every 6 (six) hours as needed for Pain. 20 tablet 9/29/2024 -- Aron Lockett MD    methocarbamoL (ROBAXIN) 750 MG Tab Take 1 tablet (750 mg total) by mouth 3 (three) times daily. for 5 days 15 tablet 9/29/2024 10/4/2024 Aron Lockett MD    oxyCODONE (ROXICODONE) 5 MG immediate release tablet Take 1 tablet (5 mg total) by mouth every 6 (six) hours as needed for Pain. 12 tablet 9/29/2024 -- Micheline Chaudhry MD    aspirin (ECOTRIN) 81 MG EC tablet Take 1 tablet (81 mg total) by mouth 2 (two) times a day. 60 tablet 9/29/2024 10/29/2024 Aron Lockett MD          Follow-up Information       Follow up With Specialties Details Why Contact Info Additional Information    [facility] Schedule an appointment as soon as possible for a visit in 3 days  [address]  [facility] [address] [phone] [facility] - Main Building, 5th Floor Please park in [facility] and take Atrium elevator    [facility] - Emergency Dept Emergency Medicine  As needed, If symptoms worsen [address]  [facility] [address] [phone]             Patient and/or family understands the plan and is in agreement, verbalized understanding, questions answered    PACHECO Piedra,  MD  Resident  Emergency Medicine         Enrrique Piedra MD  Resident  09/29/24 1524    63 F hx above here after mechanical trip and fall with left arm and head injury. No LOC  Left arm placed in sling and c collar applied by EMS  Deformity noted to left distal humerus, pulses intact, no open wounds  + left periorbital hematoma w/ abrasion  Ct head, cervical spine negative for acute traumatic injury  Xray left humerus shows a displaced distal humerus fx.   Ortho consulted, turned over to oncoming team pending consultation and final dispo.    ANGEL Oscar MD  Staff ED Physician             Alexia Oscar MD  09/30/24 6821

## 2024-09-29 NOTE — DISCHARGE INSTRUCTIONS
You were seen today after a fall.  You were diagnosed with a fracture of your left elbow.  Please continue to wear the sling for comfort.  You should periodically take your arm out of the sling and range your shoulder to prevent frozen shoulder.  Please keep the splint clean and dry.  You should follow up as soon as possible with Orthopedics, within the next week.  A referral has been placed for you today.  If you do not receive a call to schedule follow up appointment, you should call the number listed in his discharge paperwork.  If you develop any worsening pain, numbness, tingling, problems with your splint or any other worsening symptoms or concerns, you should return to the emergency department for re-evaluation.  You have been prescribed aspirin to take in the morning and at night.  This is to prevent blood clots.  You have been prescribed Tylenol, ibuprofen, Robaxin which is a muscle relaxer as well as oxycodone which is an opioid pain medication to help with your pain.  You can take the Tylenol or ibuprofen and if needed, take the oxycodone for breakthrough pain.  Please do not drive or operate machinery while taking the oxycodone as it can cause drowsiness.  You can also take the Robaxin which is a muscle relaxer instead of the oxycodone if needed.

## 2024-09-29 NOTE — ED NOTES
Nurses Note -- 4 Eyes      9/29/2024   1:58 PM      Skin assessed during: Daily Assessment      [] No Altered Skin Integrity Present    []Prevention Measures Documented      [x] Yes- Altered Skin Integrity Present or Discovered   [x] LDA Added if Not in Epic (Describe Wound)   [x] New Altered Skin Integrity was Present on Admit and Documented in LDA   [x] Wound Image Taken    Wound Care Consulted? No    Attending Nurse:  ADALI Santa    Second RN/Staff Member:   ADALI Escamilla

## 2024-09-29 NOTE — PROVIDER PROGRESS NOTES - EMERGENCY DEPT.
"Encounter Date: 9/29/2024    ED Physician Progress Notes        Physician Note:   ED RESIDENT  PHYSICIAN HAND-OFF NOTE:  3:42 PM 9/29/2024  Aysha Randolph is a 63 y.o. female who presented to the ED on 9/29/2024 and has been managed by  and Dorothea, who reports patient C/O fall. I assumed care of patient from off-going ED physician team at 3:42 PM pending Ortho evaluation and recs.    On my evaluation, Asyha Randolph appears well, hemodynamically stable and in NAD. Thus far, Aysha Randolph has received:  Medications  morphine injection 4 mg (4 mg Intravenous Given 9/29/24 1347)  ondansetron injection 4 mg (4 mg Intravenous Given 9/29/24 1347)  HYDROmorphone injection 1 mg (1 mg Intravenous Given 9/29/24 1453)    On my exam, I appreciate:  /64   Pulse 95   Temp 98.5 °F (36.9 °C) (Oral)   Resp 16   Ht 4' 11" (1.499 m)   Wt 86.2 kg (190 lb)   SpO2 (!) 94%   BMI 38.38 kg/m²   Constitutional: Well-appearing; Well-Nourished; Non-Toxic-appearing and in NAD.  Head/Face: Contusions and abrasions mainly around left orbit  Oropharynx: Speaking Full Sentences with No drooling or slurring of speech.  Cardiovascular: Reg Rate; Reg Rhythm; No Murmurs.  Pulmonary/Chest: AT Thorax with Lungs CTA B/L.  Abdominal: Soft, ND, NT.  Musculoskeletal: FROM, NML Gait. Compartments of L UE are soft. N/V intact distal to L elbow injury. Crepitus and tenderness of L elbow  Neuro/Psych: Calm; Cooperative, Following Command, Answering Questions Appropriately, and No Gait/Balance deficits.    ED Course as of 09/29/24 1542  ------------------------------------------------------------  Time: 09/29 1521  Comment: At this time patient's care has been assumed by the following team.   By: Enrrique Piedra MD       Disposition: I anticipate patient will DC to home with ortho surgery follow up  I have discussed and counseled Aysha Randolph regarding exam, results, diagnosis, treatment, and plan.    We discussed the case with " orthopedics and they will splint the elbow, obtain a CT scan of the elbow and have her follow up in clinic with possible surgery.  They stated that she does not need to wait for the read of the CT to be discharged as they will likely use the imaging for preoperative planning.  Ortho would like to reduce the fracture more under sedation.  Pt tolerated sedation well. Fracture in better positioning. She did not require BVM only NC and jaw thrust.   Plan to DC when meds wear off.   Upon re-evaluation patient ambulates well without hypoxia, shortness of breath.  She is tolerating p.o. and is clinically sober.  She will be discharged to home with follow-up to orthopedic surgery and ED return precautions.  ______________________  Aron Lockett,   Emergency Medicine [id number]  3:42 PM 9/29/2024

## 2024-09-30 ENCOUNTER — TELEPHONE (OUTPATIENT)
Dept: ORTHOPEDICS | Facility: CLINIC | Age: 64
End: 2024-09-30
Payer: MEDICARE

## 2024-09-30 PROBLEM — S42.402A CLOSED FRACTURE OF LEFT DISTAL HUMERUS: Status: ACTIVE | Noted: 2024-09-30

## 2024-09-30 NOTE — HPI
Aysha Randolph is a 63 y.o. female with PMH significant for HTN, HLD, RA (on methotrexate and kevzara) presenting with left elbow pain after tripping in a parking lot. Patient did hit her head but denies LOC. The patient denies prior hx of falls. Patient denies numbness and tingling. Denies any other musculoskeletal pain or injuries. She is right hand dominant. CT head negative for any acute pathology. Doesn't take any anticoagulation at baseline.

## 2024-09-30 NOTE — ED NOTES
Sedation Consents verified. Pt on cardiac monitoring, pulse ox, capnography, automatic bp cuff. Airway cart, code cart outside room. Suction setup

## 2024-09-30 NOTE — PROCEDURES
"Procedural Sedation        Date/Time: 2024 9:12 PM    Performed by: Micheline Chaudhry MD  Authorized by: Alexia Oscar MD  Consent Done: Yes  Consent: Written consent obtained.  Risks and benefits: risks, benefits and alternatives were discussed  Consent given by: patient  Patient understanding: patient states understanding of the procedure being performed  Patient consent: the patient's understanding of the procedure matches consent given  Relevant documents: relevant documents present and verified  Site marked: the operative site was marked  Imaging studies: imaging studies available  Patient identity confirmed: , name and verbally with patient  Time out: Immediately prior to procedure a "time out" was called to verify the correct patient, procedure, equipment, support staff and site/side marked as required.  ASA Class: Class 2 - Mild Illness without functional impairment.  Mallampati Score: Class 3 - Visualization of the soft palate and base of the uvula.   NPO STATUS:  Date/Time of last solid: 2024 8:00 AM  Date/Time of last fluid: 2024 8:00 AM    Equipment: on cardiac monitor., on BP monitor., on CO2 monitor., airway equipment available., suction available. and on supplemental oxygen.     Sedation type: moderate (conscious) sedation    Sedatives: propofol and see MAR for details  Vitals: Vital signs were monitored during sedation.  Complications: No complications.   Patient/Family history of anesthesia or sedation complications: No       "

## 2024-10-01 ENCOUNTER — HOSPITAL ENCOUNTER (OUTPATIENT)
Dept: RADIOLOGY | Facility: HOSPITAL | Age: 64
Discharge: HOME | End: 2024-10-01
Attending: ORTHOPAEDIC SURGERY
Payer: MEDICARE

## 2024-10-01 ENCOUNTER — OFFICE VISIT (OUTPATIENT)
Dept: ORTHOPEDICS | Facility: CLINIC | Age: 64
End: 2024-10-01
Payer: MEDICARE

## 2024-10-01 VITALS — HEIGHT: 59 IN | BODY MASS INDEX: 39.83 KG/M2 | WEIGHT: 197.56 LBS

## 2024-10-01 DIAGNOSIS — S62.102A CLOSED FRACTURE OF LEFT WRIST, INITIAL ENCOUNTER: Primary | ICD-10-CM

## 2024-10-01 DIAGNOSIS — S62.102A CLOSED FRACTURE OF LEFT WRIST, INITIAL ENCOUNTER: ICD-10-CM

## 2024-10-01 DIAGNOSIS — S42.402A CLOSED FRACTURE OF DISTAL END OF LEFT HUMERUS, UNSPECIFIED FRACTURE MORPHOLOGY, INITIAL ENCOUNTER: Primary | ICD-10-CM

## 2024-10-01 PROCEDURE — 73110 X-RAY EXAM OF WRIST: CPT | Mod: TC,PN,LT

## 2024-10-01 PROCEDURE — 99214 OFFICE O/P EST MOD 30 MIN: CPT | Mod: S$PBB,,, | Performed by: ORTHOPAEDIC SURGERY

## 2024-10-01 PROCEDURE — 73110 X-RAY EXAM OF WRIST: CPT | Mod: 26,LT,, | Performed by: RADIOLOGY

## 2024-10-01 PROCEDURE — 99999 PR PBB SHADOW E&M-EST. PATIENT-LVL IV: CPT | Mod: PBBFAC,,, | Performed by: ORTHOPAEDIC SURGERY

## 2024-10-01 PROCEDURE — 99214 OFFICE O/P EST MOD 30 MIN: CPT | Mod: PBBFAC,25,PN | Performed by: ORTHOPAEDIC SURGERY

## 2024-10-01 RX ORDER — SODIUM CHLORIDE 9 MG/ML
INJECTION, SOLUTION INTRAVENOUS CONTINUOUS
Status: CANCELLED | OUTPATIENT
Start: 2024-10-01

## 2024-10-01 RX ORDER — CEFAZOLIN SODIUM 2 G/50ML
2 SOLUTION INTRAVENOUS
Status: CANCELLED | OUTPATIENT
Start: 2024-10-01

## 2024-10-01 NOTE — ASSESSMENT & PLAN NOTE
Aysha Randolph is a 63 y.o. female with PMH significant for HTN, HLD, RA (on methotrexate and kevzara) presenting with a left distal humerus fracture. Closed, NVI. Reduction performed to improve alignment and decrease skin compromise risk.    - Leave splint c/d/I  - Mulimodal pain control  - NWB LUE  - Patient consented for surgery  - f/u outpatient in clinic      Procedure Note: Left distal humerus manipulation  The risk and benefits of anesthetic injection were explained to the patient and She verbalized understanding. A time out was performed. Please see ED note for sedation details. Once sedated, the arm was manipulated to improve position of the fracture fragments. The manipulation was confirmed with fluoro. The patient was placed in a  posterior slap and reverse sugartong  splint. Capillary refill, sensory, and motor function were assessed following application of the splint and deemed to be satisfactory. Formal post-reduction x-rays were obtained, reviewed, and interpreted by me.

## 2024-10-01 NOTE — PROGRESS NOTES
Patient ID:   Aysha Randolph is a 63 y.o. female.    Chief Complaint:   Left elbow fracture    HPI:   The patient is a 63-year-old right-hand dominant female who sustained a ground level fall September 29, 2024.  She was taken by ambulance to [facility].  X-rays revealed that she had a fracture of the distal humerus.  She underwent an attempted closed reduction under conscious sedation.  She was placed into a long-arm splint.  At the present time, she reports pain in her left elbow region.  She does report a history of prior injuries on her left side.  She reports prior surgery on her left distal radius as well as nonoperative management a fracture in the proximal portion of her left elbow.  The present time she reports some numbness in her hand.  States that the numbness predates the fracture of the left elbow.  She has had a diagnosis of carpal tunnel syndrome in the past.    Medications:    Current Outpatient Medications:     acetaminophen (TYLENOL) 325 MG tablet, Take 2 tablets (650 mg total) by mouth every 6 (six) hours as needed for Pain., Disp: 40 tablet, Rfl: 0    albuterol (ACCUNEB) 1.25 mg/3 mL Nebu, Take 3 mLs (1.25 mg total) by nebulization every 6 (six) hours as needed (shortness of breath, wheezing)., Disp: 75 mL, Rfl: 1    albuterol (VENTOLIN HFA) 90 mcg/actuation inhaler, Inhale 2 puffs into the lungs every 4 (four) hours as needed for Wheezing. Rescue, Disp: 6.7 g, Rfl: 1    aspirin (ECOTRIN) 81 MG EC tablet, Take 1 tablet (81 mg total) by mouth 2 (two) times a day., Disp: 60 tablet, Rfl: 0    atorvastatin (LIPITOR) 10 MG tablet, Take 1 tablet (10 mg total) by mouth once daily., Disp: 90 tablet, Rfl: 3    cholecalciferol, vitamin D3, 125 mcg (5,000 unit) Tab, Take 5,000 Units by mouth once daily., Disp: , Rfl:     EScitalopram oxalate (LEXAPRO) 20 MG tablet, Take 1 tablet (20 mg total) by mouth once daily., Disp: 90 tablet, Rfl: 3    folic acid (FOLVITE) 1 MG tablet,  "Take 1 tablet (1 mg total) by mouth once daily., Disp: 90 tablet, Rfl: 3    hydroCHLOROthiazide (HYDRODIURIL) 25 MG tablet, Take 1 tablet (25 mg total) by mouth once daily., Disp: 90 tablet, Rfl: 3    ibuprofen (ADVIL,MOTRIN) 600 MG tablet, Take 1 tablet (600 mg total) by mouth every 6 (six) hours as needed for Pain., Disp: 20 tablet, Rfl: 0    methocarbamoL (ROBAXIN) 750 MG Tab, Take 1 tablet (750 mg total) by mouth 3 (three) times daily. for 5 days, Disp: 15 tablet, Rfl: 0    methotrexate 2.5 MG Tab, Take 10 tablets (25 mg total) by mouth every 7 days., Disp: 40 tablet, Rfl: 3    multivitamin-minerals-lutein (MULTIVITAMIN 50 PLUS) Tab, Take 1 tablet by mouth once daily., Disp: , Rfl:     omega 3-dha-epa-fish oil 120-180-500 mg Cap, , Disp: , Rfl:     oxyCODONE (ROXICODONE) 5 MG immediate release tablet, Take 1 tablet (5 mg total) by mouth every 6 (six) hours as needed for Pain., Disp: 12 tablet, Rfl: 0    pantoprazole (PROTONIX) 40 MG tablet, Take 1 tablet (40 mg total) by mouth once daily., Disp: 90 tablet, Rfl: 3    pen needle, diabetic (BD ULTRA-FINE MINI PEN NEEDLE) 31 gauge x 3/16" Ndle, Inject 1 each into the skin once daily., Disp: 100 each, Rfl: 3    pregabalin (LYRICA) 50 MG capsule, Take 1 capsule (50 mg total) by mouth 3 (three) times daily., Disp: 90 capsule, Rfl: 6    sarilumab (KEVZARA) 200 mg/1.14 mL PnIj, Inject 1.14 mLs (200 mg total) into the skin every 14 (fourteen) days., Disp: 2 pen , Rfl: 11    teriparatide (FORTEO) 20 mcg/dose (600mcg/2.4mL) PnIj, Inject 0.08 mLs (20 mcg total) into the skin once daily., Disp: 2.4 mL, Rfl: 11    ondansetron (ZOFRAN-ODT) 4 MG TbDL, Take 1 tablet (4 mg total) by mouth every 8 (eight) hours as needed (nausea). (Patient not taking: Reported on 10/1/2024), Disp: 30 tablet, Rfl: 0    Allergies:  Review of patient's allergies indicates:  No Known Allergies    Past Medical History:  Past Medical History:   Diagnosis Date    Abnormal Pap smear of cervix 2017    " Arthritis     Hyperlipidemia     Hypertension     Mild intermittent asthma, uncomplicated     Osteoporosis     Rheumatoid arthritis, unspecified 2020        Past Surgical History:  Past Surgical History:   Procedure Laterality Date    ANKLE SURGERY Left     COLONOSCOPY N/A 2023    Procedure: COLONOSCOPY;  Surgeon: Robbie Ace MD;  Location: [facility];  Service: Endoscopy;  Laterality: N/A;    ESOPHAGOGASTRODUODENOSCOPY N/A 2023    Procedure: EGD (ESOPHAGOGASTRODUODENOSCOPY);  Surgeon: Robbie Ace MD;  Location: [facility] END[facility];  Service: Endoscopy;  Laterality: N/A;    FRACTURE SURGERY      JOINT REPLACEMENT  2016    Full Left ankle replacement    WRIST FRACTURE SURGERY Left        Social History:  Social History     Occupational History    Not on file   Tobacco Use    Smoking status: Never    Smokeless tobacco: Never   Substance and Sexual Activity    Alcohol use: Not Currently     Comment: Very rarely    Drug use: Never    Sexual activity: Yes     Partners: Male     Birth control/protection: None       Family History:  Family History   Problem Relation Name Age of Onset    Arthritis Mother My mom     Heart disease Mother My mom     Hypertension Mother My mom     Stroke Mother My mom         Suffered a stroke and her left side became paralyzed    Cervical cancer Maternal Cousin maternal 2nd     Arthritis Sister Younger sister     Asthma Sister Younger sister     Diabetes Sister Younger sister         Diagnosed at the age of 12    Heart disease Sister Younger sister     Stroke Sister Younger sister         Has suffered several mini strokes    Asthma Sister Older sister          of an asthma attack at the age of 46    Early death Sister Older sister          at the age of 46    Heart disease Sister Older sister     Stroke Sister Older sister     Breast cancer Neg Hx      Colon cancer Neg Hx          ROS:  Review of Systems   Musculoskeletal:  Positive for falls, joint pain, joint  "swelling, myalgias and stiffness.   Neurological:  Positive for numbness and paresthesias.   All other systems reviewed and are negative.      Vitals:  Ht 4' 11" (1.499 m)   Wt 89.6 kg (197 lb 8.5 oz)   BMI 39.90 kg/m²     Physical Examination:  Comprehensive Orthopaedic Musculoskeletal Exam    General      Constitutional: appears stated age, severely obese, well-developed and well-nourished    Scleral icterus: no    Labored breathing: no    Psychiatric: normal mood and affect and no acute distress    Neurological: alert and oriented x3    Skin: intact    Lymphadenopathy: none     Ortho Exam   Left elbow exam:  The previously applied splint was removed and she was examined.  Her skin is intact.  There was some ecchymoses present about the elbow.  Range of motion was not assessed.  There is tenderness to palpation over the distal humerus.  There is obvious swelling present about the distal humerus.  Light touch exam is intact in the radial, ulnar, and median nerve distributions.  She has a intact to wrist extension, wrist flexion, thumb extension, thumb flexion, finger extension, finger flexion, and and interossei.  2+ radial pulse at the level of the wrist    Imaging:  I have independently reviewed the following imaging studies performed at [facility]:     X-Ray Elbow 2 Views Left  Narrative: EXAMINATION:  XR ELBOW 2 VIEWS LEFT    CLINICAL HISTORY:  Post splint;    TECHNIQUE:  Frontal and lateral radiographs of the left elbow.    COMPARISON:  09/29/2024 at 20:43 hours.    FINDINGS:  There has been interval attempted external reduction of the previous displaced supracondylar fracture of the left distal humerus.  There is some modest decrease in the degree of displacement.  Persistent residual offset remains.  No new fracture is identified.  The joint spaces remain stable.  There is an overlying splint in place.  Impression: Interval attempted external reduction of previous displaced supracondylar fracture of the left " distal humerus.  Improvement in degree of displacement with significant residual offset.    This report was flagged in Epic as abnormal.    Electronically signed by: Matt Browne MD  Date:    09/29/2024  Time:    22:35  X-Ray Elbow 2 Views Left  Narrative: EXAMINATION:  XR ELBOW 2 VIEWS LEFT    CLINICAL HISTORY:  Post reduction;    COMPARISON:  09/29/2024, CT 09/29/2024    FINDINGS:  Two views left elbow.    On the lateral view, positioning results in increased distraction of the distal humeral fracture noting several fracture fragments in the region.  No convincing new fracture however please see CT 09/29/2024.  Impression: As above    Electronically signed by: Akshat Ruiz MD  Date:    09/29/2024  Time:    21:00  CT 3D Rendering WO Independent Workstation  Narrative: EXAMINATION:  CT ARM ELBOW WITHOUT CONTRAST LEFT; CT 3D RENDERING WO INDEPENDENT WORKSTATION    CLINICAL HISTORY:  Fracture, elbow;; Fracture, elbow;3D;    TECHNIQUE:  Axial images of the left arm/elbow were obtained at 0.625 mm intervals without administration of IV contrast.  Coronal and sagittal reformatted images were reviewed.  3D reconstructed images were created on a separate workstation and reviewed.    COMPARISON:  Radiograph 09/29/2024    FINDINGS:  There is an acute comminuted fracture of the distal humerus, primary fracture planes are in the supracondylar region.  There is extension of the fracture to the lateral humeral epicondyle.  There is questionable nondisplaced linear fracture of the lateral aspect of the radial head noting motion artifact limits evaluation.  There is diffuse edema about the elbow noting effusion.  Additional induration is noted along the distal aspect of the forearm.  No convincing radiopaque foreign body.  The ulna appears intact.  Impression: 1. Fracture of the distal humerus and suspected extension to the radial head as described.    Electronically signed by: Akshat Ruiz  MD  Date:    09/29/2024  Time:    18:39  CT Arm Elbow Without Contrast Left  Narrative: EXAMINATION:  CT ARM ELBOW WITHOUT CONTRAST LEFT; CT 3D RENDERING WO INDEPENDENT WORKSTATION    CLINICAL HISTORY:  Fracture, elbow;; Fracture, elbow;3D;    TECHNIQUE:  Axial images of the left arm/elbow were obtained at 0.625 mm intervals without administration of IV contrast.  Coronal and sagittal reformatted images were reviewed.  3D reconstructed images were created on a separate workstation and reviewed.    COMPARISON:  Radiograph 09/29/2024    FINDINGS:  There is an acute comminuted fracture of the distal humerus, primary fracture planes are in the supracondylar region.  There is extension of the fracture to the lateral humeral epicondyle.  There is questionable nondisplaced linear fracture of the lateral aspect of the radial head noting motion artifact limits evaluation.  There is diffuse edema about the elbow noting effusion.  Additional induration is noted along the distal aspect of the forearm.  No convincing radiopaque foreign body.  The ulna appears intact.  Impression: 1. Fracture of the distal humerus and suspected extension to the radial head as described.    Electronically signed by: Akshat Ruiz MD  Date:    09/29/2024  Time:    18:39  X-Ray Humerus 2 View Left  Narrative: EXAMINATION:  XR HUMERUS 2 VIEW LEFT    CLINICAL HISTORY:  fx;    COMPARISON:  None    FINDINGS:  Two views left humerus.    Please see separately dictated report for details regarding distal humeral fracture.  Proximally, the humerus is intact.  The glenohumeral joint is intact without dislocation.  The acromioclavicular joint is intact.  There is osteopenia.  Impression: 1. Distal humeral fracture described in separate report, no proximal fracture.    Electronically signed by: Akshat Ruiz MD  Date:    09/29/2024  Time:    18:05  X-Ray Forearm Left  Narrative: EXAMINATION:  XR FOREARM LEFT    CLINICAL HISTORY:  Injury, unspecified,  initial encounter    TECHNIQUE:  AP and lateral views of the left forearm were performed.    COMPARISON:  None    FINDINGS:  The bone mineralization is within normal limits.  There is a partially visualized acute displaced fracture of the supracondylar portion of the left distal humerus.    There is joint space narrowing.  The soft tissues are unremarkable.  No radiopaque foreign body is identified.  Impression: No evidence of a fracture or dislocation of the left forearm.    Partially visualized displaced supracondylar fracture of the left distal humerus.    Electronically signed by: Matt Browne MD  Date:    09/29/2024  Time:    15:12  X-Ray Elbow Complete Left  Narrative: EXAMINATION:  XR ELBOW COMPLETE 3 VIEW LEFT    CLINICAL HISTORY:  Unspecified fall, initial encounter    TECHNIQUE:  AP, lateral, and oblique views of the left elbow were performed.    COMPARISON:  None    FINDINGS:  The bone mineralization is within normal limits.  There is an acute displaced fracture of the supracondylar portion of the left distal humerus.    There is joint space narrowing.  There is lipohemarthrosis.  No radiopaque foreign body is identified.  The soft tissue swelling of the left elbow.  Impression: 1.  Acute displaced supracondylar fracture of the left distal humerus.    2.  Lipohemarthrosis.    Electronically signed by: Matt Browne MD  Date:    09/29/2024  Time:    15:11  X-Ray Shoulder Trauma Left  Narrative: EXAMINATION:  XR SHOULDER TRAUMA 3 VIEW LEFT    CLINICAL HISTORY:  Unspecified fall, initial encounter    TECHNIQUE:  Three views of the left shoulder were performed.    COMPARISON  None    FINDINGS:  Three views left shoulder.    The left humeral head maintains appropriate relationship with the glenoid.  The acromioclavicular joint is intact noting degenerative change.  No acute displaced left rib fracture.  There is osteopenia.  Impression: 1. No acute displaced fracture or dislocation of the left  shoulder.    Electronically signed by: Akshat Ruiz MD  Date:    09/29/2024  Time:    14:54    Assessment:  1. Closed fracture of distal end of left humerus, unspecified fracture morphology, initial encounter      Plan:  I reviewed the x-ray findings with the patient. I have recommended ORIF of the left distal humerus. Risks and benefits reviewed. Plan for surgery on Thursday, Oct. 3, 2024. Discussed holding ASA and ibuprofen. Discussed skipping next sairlumab injection.     Orders Placed This Encounter    Diet NPO    Cleanse with Chlorhexidine (CHG)    Place CLIFFORD hose    Place sequential compression device    IP VTE LOW RISK PATIENT    Case Request Operating Room: ORIF, FRACTURE, HUMERUS, DISTAL     No follow-ups on file.

## 2024-10-01 NOTE — H&P (VIEW-ONLY)
Patient ID:   Aysha Randolph is a 63 y.o. female.    Chief Complaint:   Left elbow fracture    HPI:   The patient is a 63-year-old right-hand dominant female who sustained a ground level fall September 29, 2024.  She was taken by ambulance to [facility].  X-rays revealed that she had a fracture of the distal humerus.  She underwent an attempted closed reduction under conscious sedation.  She was placed into a long-arm splint.  At the present time, she reports pain in her left elbow region.  She does report a history of prior injuries on her left side.  She reports prior surgery on her left distal radius as well as nonoperative management a fracture in the proximal portion of her left elbow.  The present time she reports some numbness in her hand.  States that the numbness predates the fracture of the left elbow.  She has had a diagnosis of carpal tunnel syndrome in the past.    Medications:    Current Outpatient Medications:     acetaminophen (TYLENOL) 325 MG tablet, Take 2 tablets (650 mg total) by mouth every 6 (six) hours as needed for Pain., Disp: 40 tablet, Rfl: 0    albuterol (ACCUNEB) 1.25 mg/3 mL Nebu, Take 3 mLs (1.25 mg total) by nebulization every 6 (six) hours as needed (shortness of breath, wheezing)., Disp: 75 mL, Rfl: 1    albuterol (VENTOLIN HFA) 90 mcg/actuation inhaler, Inhale 2 puffs into the lungs every 4 (four) hours as needed for Wheezing. Rescue, Disp: 6.7 g, Rfl: 1    aspirin (ECOTRIN) 81 MG EC tablet, Take 1 tablet (81 mg total) by mouth 2 (two) times a day., Disp: 60 tablet, Rfl: 0    atorvastatin (LIPITOR) 10 MG tablet, Take 1 tablet (10 mg total) by mouth once daily., Disp: 90 tablet, Rfl: 3    cholecalciferol, vitamin D3, 125 mcg (5,000 unit) Tab, Take 5,000 Units by mouth once daily., Disp: , Rfl:     EScitalopram oxalate (LEXAPRO) 20 MG tablet, Take 1 tablet (20 mg total) by mouth once daily., Disp: 90 tablet, Rfl: 3    folic acid (FOLVITE) 1 MG tablet,  "Take 1 tablet (1 mg total) by mouth once daily., Disp: 90 tablet, Rfl: 3    hydroCHLOROthiazide (HYDRODIURIL) 25 MG tablet, Take 1 tablet (25 mg total) by mouth once daily., Disp: 90 tablet, Rfl: 3    ibuprofen (ADVIL,MOTRIN) 600 MG tablet, Take 1 tablet (600 mg total) by mouth every 6 (six) hours as needed for Pain., Disp: 20 tablet, Rfl: 0    methocarbamoL (ROBAXIN) 750 MG Tab, Take 1 tablet (750 mg total) by mouth 3 (three) times daily. for 5 days, Disp: 15 tablet, Rfl: 0    methotrexate 2.5 MG Tab, Take 10 tablets (25 mg total) by mouth every 7 days., Disp: 40 tablet, Rfl: 3    multivitamin-minerals-lutein (MULTIVITAMIN 50 PLUS) Tab, Take 1 tablet by mouth once daily., Disp: , Rfl:     omega 3-dha-epa-fish oil 120-180-500 mg Cap, , Disp: , Rfl:     oxyCODONE (ROXICODONE) 5 MG immediate release tablet, Take 1 tablet (5 mg total) by mouth every 6 (six) hours as needed for Pain., Disp: 12 tablet, Rfl: 0    pantoprazole (PROTONIX) 40 MG tablet, Take 1 tablet (40 mg total) by mouth once daily., Disp: 90 tablet, Rfl: 3    pen needle, diabetic (BD ULTRA-FINE MINI PEN NEEDLE) 31 gauge x 3/16" Ndle, Inject 1 each into the skin once daily., Disp: 100 each, Rfl: 3    pregabalin (LYRICA) 50 MG capsule, Take 1 capsule (50 mg total) by mouth 3 (three) times daily., Disp: 90 capsule, Rfl: 6    sarilumab (KEVZARA) 200 mg/1.14 mL PnIj, Inject 1.14 mLs (200 mg total) into the skin every 14 (fourteen) days., Disp: 2 pen , Rfl: 11    teriparatide (FORTEO) 20 mcg/dose (600mcg/2.4mL) PnIj, Inject 0.08 mLs (20 mcg total) into the skin once daily., Disp: 2.4 mL, Rfl: 11    ondansetron (ZOFRAN-ODT) 4 MG TbDL, Take 1 tablet (4 mg total) by mouth every 8 (eight) hours as needed (nausea). (Patient not taking: Reported on 10/1/2024), Disp: 30 tablet, Rfl: 0    Allergies:  Review of patient's allergies indicates:  No Known Allergies    Past Medical History:  Past Medical History:   Diagnosis Date    Abnormal Pap smear of cervix 2017    " Arthritis     Hyperlipidemia     Hypertension     Mild intermittent asthma, uncomplicated     Osteoporosis     Rheumatoid arthritis, unspecified 2020        Past Surgical History:  Past Surgical History:   Procedure Laterality Date    ANKLE SURGERY Left     COLONOSCOPY N/A 2023    Procedure: COLONOSCOPY;  Surgeon: Robbie Ace MD;  Location: [facility];  Service: Endoscopy;  Laterality: N/A;    ESOPHAGOGASTRODUODENOSCOPY N/A 2023    Procedure: EGD (ESOPHAGOGASTRODUODENOSCOPY);  Surgeon: Robbie Ace MD;  Location: [facility] ENDO;  Service: Endoscopy;  Laterality: N/A;    FRACTURE SURGERY      JOINT REPLACEMENT  2016    Full Left ankle replacement    WRIST FRACTURE SURGERY Left        Social History:  Social History     Occupational History    Not on file   Tobacco Use    Smoking status: Never    Smokeless tobacco: Never   Substance and Sexual Activity    Alcohol use: Not Currently     Comment: Very rarely    Drug use: Never    Sexual activity: Yes     Partners: Male     Birth control/protection: None       Family History:  Family History   Problem Relation Name Age of Onset    Arthritis Mother My mom     Heart disease Mother My mom     Hypertension Mother My mom     Stroke Mother My mom         Suffered a stroke and her left side became paralyzed    Cervical cancer Maternal Cousin maternal 2nd     Arthritis Sister Younger sister     Asthma Sister Younger sister     Diabetes Sister Younger sister         Diagnosed at the age of 12    Heart disease Sister Younger sister     Stroke Sister Younger sister         Has suffered several mini strokes    Asthma Sister Older sister          of an asthma attack at the age of 46    Early death Sister Older sister          at the age of 46    Heart disease Sister Older sister     Stroke Sister Older sister     Breast cancer Neg Hx      Colon cancer Neg Hx          ROS:  Review of Systems   Musculoskeletal:  Positive for falls, joint pain, joint  "swelling, myalgias and stiffness.   Neurological:  Positive for numbness and paresthesias.   All other systems reviewed and are negative.      Vitals:  Ht 4' 11" (1.499 m)   Wt 89.6 kg (197 lb 8.5 oz)   BMI 39.90 kg/m²     Physical Examination:  Comprehensive Orthopaedic Musculoskeletal Exam    General      Constitutional: appears stated age, severely obese, well-developed and well-nourished    Scleral icterus: no    Labored breathing: no    Psychiatric: normal mood and affect and no acute distress    Neurological: alert and oriented x3    Skin: intact    Lymphadenopathy: none     Ortho Exam   Left elbow exam:  The previously applied splint was removed and she was examined.  Her skin is intact.  There was some ecchymoses present about the elbow.  Range of motion was not assessed.  There is tenderness to palpation over the distal humerus.  There is obvious swelling present about the distal humerus.  Light touch exam is intact in the radial, ulnar, and median nerve distributions.  She has a intact to wrist extension, wrist flexion, thumb extension, thumb flexion, finger extension, finger flexion, and and interossei.  2+ radial pulse at the level of the wrist    Imaging:  I have independently reviewed the following imaging studies performed at [facility]:     X-Ray Elbow 2 Views Left  Narrative: EXAMINATION:  XR ELBOW 2 VIEWS LEFT    CLINICAL HISTORY:  Post splint;    TECHNIQUE:  Frontal and lateral radiographs of the left elbow.    COMPARISON:  09/29/2024 at 20:43 hours.    FINDINGS:  There has been interval attempted external reduction of the previous displaced supracondylar fracture of the left distal humerus.  There is some modest decrease in the degree of displacement.  Persistent residual offset remains.  No new fracture is identified.  The joint spaces remain stable.  There is an overlying splint in place.  Impression: Interval attempted external reduction of previous displaced supracondylar fracture of the left " distal humerus.  Improvement in degree of displacement with significant residual offset.    This report was flagged in Epic as abnormal.    Electronically signed by: Matt Browne MD  Date:    09/29/2024  Time:    22:35  X-Ray Elbow 2 Views Left  Narrative: EXAMINATION:  XR ELBOW 2 VIEWS LEFT    CLINICAL HISTORY:  Post reduction;    COMPARISON:  09/29/2024, CT 09/29/2024    FINDINGS:  Two views left elbow.    On the lateral view, positioning results in increased distraction of the distal humeral fracture noting several fracture fragments in the region.  No convincing new fracture however please see CT 09/29/2024.  Impression: As above    Electronically signed by: Akshat Ruiz MD  Date:    09/29/2024  Time:    21:00  CT 3D Rendering WO Independent Workstation  Narrative: EXAMINATION:  CT ARM ELBOW WITHOUT CONTRAST LEFT; CT 3D RENDERING WO INDEPENDENT WORKSTATION    CLINICAL HISTORY:  Fracture, elbow;; Fracture, elbow;3D;    TECHNIQUE:  Axial images of the left arm/elbow were obtained at 0.625 mm intervals without administration of IV contrast.  Coronal and sagittal reformatted images were reviewed.  3D reconstructed images were created on a separate workstation and reviewed.    COMPARISON:  Radiograph 09/29/2024    FINDINGS:  There is an acute comminuted fracture of the distal humerus, primary fracture planes are in the supracondylar region.  There is extension of the fracture to the lateral humeral epicondyle.  There is questionable nondisplaced linear fracture of the lateral aspect of the radial head noting motion artifact limits evaluation.  There is diffuse edema about the elbow noting effusion.  Additional induration is noted along the distal aspect of the forearm.  No convincing radiopaque foreign body.  The ulna appears intact.  Impression: 1. Fracture of the distal humerus and suspected extension to the radial head as described.    Electronically signed by: Akshat Ruiz  MD  Date:    09/29/2024  Time:    18:39  CT Arm Elbow Without Contrast Left  Narrative: EXAMINATION:  CT ARM ELBOW WITHOUT CONTRAST LEFT; CT 3D RENDERING WO INDEPENDENT WORKSTATION    CLINICAL HISTORY:  Fracture, elbow;; Fracture, elbow;3D;    TECHNIQUE:  Axial images of the left arm/elbow were obtained at 0.625 mm intervals without administration of IV contrast.  Coronal and sagittal reformatted images were reviewed.  3D reconstructed images were created on a separate workstation and reviewed.    COMPARISON:  Radiograph 09/29/2024    FINDINGS:  There is an acute comminuted fracture of the distal humerus, primary fracture planes are in the supracondylar region.  There is extension of the fracture to the lateral humeral epicondyle.  There is questionable nondisplaced linear fracture of the lateral aspect of the radial head noting motion artifact limits evaluation.  There is diffuse edema about the elbow noting effusion.  Additional induration is noted along the distal aspect of the forearm.  No convincing radiopaque foreign body.  The ulna appears intact.  Impression: 1. Fracture of the distal humerus and suspected extension to the radial head as described.    Electronically signed by: Akshat Ruiz MD  Date:    09/29/2024  Time:    18:39  X-Ray Humerus 2 View Left  Narrative: EXAMINATION:  XR HUMERUS 2 VIEW LEFT    CLINICAL HISTORY:  fx;    COMPARISON:  None    FINDINGS:  Two views left humerus.    Please see separately dictated report for details regarding distal humeral fracture.  Proximally, the humerus is intact.  The glenohumeral joint is intact without dislocation.  The acromioclavicular joint is intact.  There is osteopenia.  Impression: 1. Distal humeral fracture described in separate report, no proximal fracture.    Electronically signed by: Akshat Ruiz MD  Date:    09/29/2024  Time:    18:05  X-Ray Forearm Left  Narrative: EXAMINATION:  XR FOREARM LEFT    CLINICAL HISTORY:  Injury, unspecified,  initial encounter    TECHNIQUE:  AP and lateral views of the left forearm were performed.    COMPARISON:  None    FINDINGS:  The bone mineralization is within normal limits.  There is a partially visualized acute displaced fracture of the supracondylar portion of the left distal humerus.    There is joint space narrowing.  The soft tissues are unremarkable.  No radiopaque foreign body is identified.  Impression: No evidence of a fracture or dislocation of the left forearm.    Partially visualized displaced supracondylar fracture of the left distal humerus.    Electronically signed by: Matt Browne MD  Date:    09/29/2024  Time:    15:12  X-Ray Elbow Complete Left  Narrative: EXAMINATION:  XR ELBOW COMPLETE 3 VIEW LEFT    CLINICAL HISTORY:  Unspecified fall, initial encounter    TECHNIQUE:  AP, lateral, and oblique views of the left elbow were performed.    COMPARISON:  None    FINDINGS:  The bone mineralization is within normal limits.  There is an acute displaced fracture of the supracondylar portion of the left distal humerus.    There is joint space narrowing.  There is lipohemarthrosis.  No radiopaque foreign body is identified.  The soft tissue swelling of the left elbow.  Impression: 1.  Acute displaced supracondylar fracture of the left distal humerus.    2.  Lipohemarthrosis.    Electronically signed by: Matt Browne MD  Date:    09/29/2024  Time:    15:11  X-Ray Shoulder Trauma Left  Narrative: EXAMINATION:  XR SHOULDER TRAUMA 3 VIEW LEFT    CLINICAL HISTORY:  Unspecified fall, initial encounter    TECHNIQUE:  Three views of the left shoulder were performed.    COMPARISON  None    FINDINGS:  Three views left shoulder.    The left humeral head maintains appropriate relationship with the glenoid.  The acromioclavicular joint is intact noting degenerative change.  No acute displaced left rib fracture.  There is osteopenia.  Impression: 1. No acute displaced fracture or dislocation of the left  shoulder.    Electronically signed by: Akshat Ruiz MD  Date:    09/29/2024  Time:    14:54    Assessment:  1. Closed fracture of distal end of left humerus, unspecified fracture morphology, initial encounter      Plan:  I reviewed the x-ray findings with the patient. I have recommended ORIF of the left distal humerus. Risks and benefits reviewed. Plan for surgery on Thursday, Oct. 3, 2024. Discussed holding ASA and ibuprofen. Discussed skipping next sairlumab injection.     Orders Placed This Encounter    Diet NPO    Cleanse with Chlorhexidine (CHG)    Place CLIFFORD hose    Place sequential compression device    IP VTE LOW RISK PATIENT    Case Request Operating Room: ORIF, FRACTURE, HUMERUS, DISTAL     No follow-ups on file.

## 2024-10-01 NOTE — CONSULTS
[facility] Dept  Orthopedics  Consult Note    Patient Name: Aysha Randolph  MRN: [id number]  Admission Date: 9/29/2024  Hospital Length of Stay: 0 days  Attending Provider: No att. providers found  Primary Care Provider: Rosa Jarrett MD        Consults  Subjective:     Principal Problem:Closed fracture of left distal humerus    Chief Complaint:   Chief Complaint   Patient presents with    Fall     Arrive via EMS from [facility]. Trip and fall in parking lot. Lac to left eyebrow, deformity noted to left arm. +crepitus, arrives in sling. Received 15 mg toradol pta        HPI: Aysha Randolph is a 63 y.o. female with PMH significant for HTN, HLD, RA (on methotrexate and kevzara) presenting with left elbow pain after tripping in a parking lot. Patient did hit her head but denies LOC. The patient denies prior hx of falls. Patient denies numbness and tingling. Denies any other musculoskeletal pain or injuries. She is right hand dominant. CT head negative for any acute pathology. Doesn't take any anticoagulation at baseline.       Past Medical History:   Diagnosis Date    Abnormal Pap smear of cervix 2017    Arthritis     Hyperlipidemia     Hypertension     Mild intermittent asthma, uncomplicated     Osteoporosis     Rheumatoid arthritis, unspecified 12/17/2020       Past Surgical History:   Procedure Laterality Date    ANKLE SURGERY Left     COLONOSCOPY N/A 05/31/2023    Procedure: COLONOSCOPY;  Surgeon: Robbie Ace MD;  Location: [facility];  Service: Endoscopy;  Laterality: N/A;    ESOPHAGOGASTRODUODENOSCOPY N/A 05/31/2023    Procedure: EGD (ESOPHAGOGASTRODUODENOSCOPY);  Surgeon: Robbie Ace MD;  Location: [facility];  Service: Endoscopy;  Laterality: N/A;    FRACTURE SURGERY      JOINT REPLACEMENT  April 2016    Full Left ankle replacement    WRIST FRACTURE SURGERY Left        Review of patient's allergies indicates:  No Known Allergies    No current facility-administered medications for this  "encounter.     Current Outpatient Medications   Medication Sig    acetaminophen (TYLENOL) 325 MG tablet Take 2 tablets (650 mg total) by mouth every 6 (six) hours as needed for Pain.    albuterol (ACCUNEB) 1.25 mg/3 mL Nebu Take 3 mLs (1.25 mg total) by nebulization every 6 (six) hours as needed (shortness of breath, wheezing).    albuterol (VENTOLIN HFA) 90 mcg/actuation inhaler Inhale 2 puffs into the lungs every 4 (four) hours as needed for Wheezing. Rescue    aspirin (ECOTRIN) 81 MG EC tablet Take 1 tablet (81 mg total) by mouth 2 (two) times a day.    atorvastatin (LIPITOR) 10 MG tablet Take 1 tablet (10 mg total) by mouth once daily.    cholecalciferol, vitamin D3, 125 mcg (5,000 unit) Tab Take 5,000 Units by mouth once daily.    EScitalopram oxalate (LEXAPRO) 20 MG tablet Take 1 tablet (20 mg total) by mouth once daily.    folic acid (FOLVITE) 1 MG tablet Take 1 tablet (1 mg total) by mouth once daily.    hydroCHLOROthiazide (HYDRODIURIL) 25 MG tablet Take 1 tablet (25 mg total) by mouth once daily.    ibuprofen (ADVIL,MOTRIN) 600 MG tablet Take 1 tablet (600 mg total) by mouth every 6 (six) hours as needed for Pain.    methocarbamoL (ROBAXIN) 750 MG Tab Take 1 tablet (750 mg total) by mouth 3 (three) times daily. for 5 days    methotrexate 2.5 MG Tab Take 10 tablets (25 mg total) by mouth every 7 days.    multivitamin-minerals-lutein (MULTIVITAMIN 50 PLUS) Tab Take 1 tablet by mouth once daily.    omega 3-dha-epa-fish oil 120-180-500 mg Cap     ondansetron (ZOFRAN-ODT) 4 MG TbDL Take 1 tablet (4 mg total) by mouth every 8 (eight) hours as needed (nausea). (Patient not taking: Reported on 9/13/2024)    oxyCODONE (ROXICODONE) 5 MG immediate release tablet Take 1 tablet (5 mg total) by mouth every 6 (six) hours as needed for Pain.    pantoprazole (PROTONIX) 40 MG tablet Take 1 tablet (40 mg total) by mouth once daily.    pen needle, diabetic (BD ULTRA-FINE MINI PEN NEEDLE) 31 gauge x 3/16" Ndle Inject 1 each " "into the skin once daily.    pregabalin (LYRICA) 50 MG capsule Take 1 capsule (50 mg total) by mouth 3 (three) times daily.    sarilumab (KEVZARA) 200 mg/1.14 mL PnIj Inject 1.14 mLs (200 mg total) into the skin every 14 (fourteen) days.    teriparatide (FORTEO) 20 mcg/dose (600mcg/2.4mL) PnIj Inject 0.08 mLs (20 mcg total) into the skin once daily.     Family History       Problem Relation (Age of Onset)    Arthritis Mother, Sister    Asthma Sister, Sister    Cervical cancer Maternal Cousin    Diabetes Sister    Early death Sister    Heart disease Mother, Sister, Sister    Hypertension Mother    Stroke Mother, Sister, Sister          Tobacco Use    Smoking status: Never    Smokeless tobacco: Never   Substance and Sexual Activity    Alcohol use: Not Currently     Comment: Very rarely    Drug use: Never    Sexual activity: Yes     Partners: Male     Birth control/protection: None     ROS  Constitutional: negative for fevers  Eyes: negative visual changes  ENT: negative for hearing loss  Respiratory: negative for dyspnea  Cardiovascular: negative for chest pain  Gastrointestinal: negative for abdominal pain  Genitourinary: negative for dysuria  Neurological: negative for headaches  Behavioral/Psych: negative for hallucinations  Endocrine: negative for temperature intolerance    Objective:     Vital Signs (Most Recent):  Temp: 98.3 °F (36.8 °C) (09/29/24 1811)  Pulse: 85 (09/29/24 2202)  Resp: (!) 24 (09/29/24 2202)  BP: 130/62 (09/29/24 2202)  SpO2: 98 % (09/29/24 2202) Vital Signs (24h Range):  Pulse:  [84-90] 85  Resp:  [14-24] 24  SpO2:  [96 %-99 %] 98 %  BP: (119-168)/(58-88) 130/62     Weight: 86.2 kg (190 lb)  Height: 4' 11" (149.9 cm)  Body mass index is 38.38 kg/m².    No intake or output data in the 24 hours ending 09/30/24 1856     Ortho/SPM Exam  General:  no acute distress, appears stated age   Neuro: alert and oriented x3  Psych: normal mood  Head: normocephalic, periorbital abrasions on the left  Eyes: " no scleral icterus  Mouth: moist mucous membranes  Cardiovascular: extremities warm and well perfused  Lungs: breathing comfortably, equal chest rise bilat  Skin: clean, dry, intact (any exceptions noted in below musculoskeletal exam)    MSK:  RUE:  - Skin intact throughout, no open wounds  - No swelling  - No ecchymosis, erythema, or signs of cellulitis  - NonTTP throughout  - AROM and PROM of the shoulder, elbow, wrist, and hand intact without pain  - Axillary/AIN/PIN/Radial/Median/Ulnar Nerves assessed in isolation without deficit  - SILT throughout  - Compartments soft  - Radial artery palpated   - Capillary Refill <3s    LUE:  - Skin intact throughout, no open wounds  - Moderate swelling and ecchymosis to elbow  - TTP to distal humerus  - AROM and PROM of the wrist and hand intact without pain  - Pain with ROM of elbow or shoulder  - Axillary/AIN/PIN/Radial/Median/Ulnar Nerves assessed in isolation without deficit  - SILT throughout  - Compartments soft  - Radial artery palpated   - Capillary Refill <3s    RLE:  - Skin intact throughout, no open wounds  - No swelling  - No ecchymosis, erythema, or signs of cellulitis  - NonTTP throughout  - AROM and PROM of the hip, knee, ankle, and foot intact without pain  - TA/EHL/Gastroc/FHL assessed in isolation without deficit  - SILT throughout  - Compartments soft  - DP and PT palpated  - Capillary Refill <3s  - Negative Log roll      LLE:  - Skin intact throughout, no open wounds  - No swelling  - No ecchymosis, erythema, or signs of cellulitis  - NonTTP throughout  - AROM and PROM of the hip, knee, ankle, and foot intact without pain  - TA/EHL/Gastroc/FHL assessed in isolation without deficit  - SILT throughout  - Compartments soft  - DP and PT palpated  - Capillary Refill <3s  - Negative Log roll      Spine/pelvis/axial body:  No tenderness to palpation of cervical, thoracic, or lumbar spine  No pain with compression of pelvis  No chest wall or abdominal  tenderness         Significant Labs:   All pertinent labs within the past 24 hours have been reviewed.    Significant Imaging: I have reviewed and interpreted all pertinent imaging results/findings.  Imaging of left humerus shows a displaced and rotated distal humerus fracture  Assessment/Plan:     * Closed fracture of left distal humerus  Aysha Randolph is a 63 y.o. female with PMH significant for HTN, HLD, RA (on methotrexate and kevzara) presenting with a left distal humerus fracture. Closed, NVI. Reduction performed to improve alignment and decrease skin compromise risk.    - Leave splint c/d/I  - Mulimodal pain control  - NWB LUE  - Patient consented for surgery  - f/u outpatient in clinic      Procedure Note: Left distal humerus manipulation  The risk and benefits of anesthetic injection were explained to the patient and She verbalized understanding. A time out was performed. Please see ED note for sedation details. Once sedated, the arm was manipulated to improve position of the fracture fragments. The manipulation was confirmed with fluoro. The patient was placed in a  posterior slap and reverse sugartong  splint. Capillary refill, sensory, and motor function were assessed following application of the splint and deemed to be satisfactory. Formal post-reduction x-rays were obtained, reviewed, and interpreted by me.               KORTNEY Hahn MD  Orthopedics  [facility] - Emergency Dept

## 2024-10-02 ENCOUNTER — TELEPHONE (OUTPATIENT)
Dept: RHEUMATOLOGY | Facility: CLINIC | Age: 64
End: 2024-10-02
Payer: MEDICARE

## 2024-10-02 NOTE — TELEPHONE ENCOUNTER
Called patient to discuss medications and upcoming ORIF. She will continue MTX but skip next dose of Kevzara per Orthopedics request. OK to continue Forteo.     Kayla Cobb MD  Rheumatology Fellow, PGY5

## 2024-10-03 ENCOUNTER — HOSPITAL ENCOUNTER (OUTPATIENT)
Facility: HOSPITAL | Age: 64
Discharge: HOME | End: 2024-10-03
Attending: ORTHOPAEDIC SURGERY | Admitting: ORTHOPAEDIC SURGERY
Payer: MEDICARE

## 2024-10-03 ENCOUNTER — ANESTHESIA EVENT (OUTPATIENT)
Dept: SURGERY | Facility: HOSPITAL | Age: 64
End: 2024-10-03
Payer: MEDICARE

## 2024-10-03 ENCOUNTER — ANESTHESIA (OUTPATIENT)
Dept: SURGERY | Facility: HOSPITAL | Age: 64
End: 2024-10-03
Payer: MEDICARE

## 2024-10-03 DIAGNOSIS — Z01.818 PRE-OP TESTING: ICD-10-CM

## 2024-10-03 DIAGNOSIS — S42.402A CLOSED FRACTURE OF DISTAL END OF LEFT HUMERUS, UNSPECIFIED FRACTURE MORPHOLOGY, INITIAL ENCOUNTER: ICD-10-CM

## 2024-10-03 LAB
ANION GAP SERPL CALC-SCNC: 15 MMOL/L (ref 8–16)
BASOPHILS # BLD AUTO: 0.06 K/UL (ref 0–0.2)
BASOPHILS NFR BLD: 0.7 % (ref 0–1.9)
BUN SERPL-MCNC: 22 MG/DL (ref 8–23)
CALCIUM SERPL-MCNC: 10 MG/DL (ref 8.7–10.5)
CHLORIDE SERPL-SCNC: 100 MMOL/L (ref 95–110)
CO2 SERPL-SCNC: 30 MMOL/L (ref 23–29)
CREAT SERPL-MCNC: 0.9 MG/DL (ref 0.5–1.4)
DIFFERENTIAL METHOD BLD: ABNORMAL
EOSINOPHIL # BLD AUTO: 0.3 K/UL (ref 0–0.5)
EOSINOPHIL NFR BLD: 3.5 % (ref 0–8)
ERYTHROCYTE [DISTWIDTH] IN BLOOD BY AUTOMATED COUNT: 14.6 % (ref 11.5–14.5)
EST. GFR  (NO RACE VARIABLE): >60 ML/MIN/1.73 M^2
GLUCOSE SERPL-MCNC: 124 MG/DL (ref 70–110)
HCT VFR BLD AUTO: 35.3 % (ref 37–48.5)
HGB BLD-MCNC: 11.9 G/DL (ref 12–16)
IMM GRANULOCYTES # BLD AUTO: 0.03 K/UL (ref 0–0.04)
IMM GRANULOCYTES NFR BLD AUTO: 0.3 % (ref 0–0.5)
LYMPHOCYTES # BLD AUTO: 2 K/UL (ref 1–4.8)
LYMPHOCYTES NFR BLD: 22.9 % (ref 18–48)
MCH RBC QN AUTO: 33.1 PG (ref 27–31)
MCHC RBC AUTO-ENTMCNC: 33.7 G/DL (ref 32–36)
MCV RBC AUTO: 98 FL (ref 82–98)
MONOCYTES # BLD AUTO: 0.7 K/UL (ref 0.3–1)
MONOCYTES NFR BLD: 8.3 % (ref 4–15)
NEUTROPHILS # BLD AUTO: 5.7 K/UL (ref 1.8–7.7)
NEUTROPHILS NFR BLD: 64.3 % (ref 38–73)
NRBC BLD-RTO: 0 /100 WBC
OHS QRS DURATION: 94 MS
OHS QTC CALCULATION: 481 MS
PLATELET # BLD AUTO: 217 K/UL (ref 150–450)
PMV BLD AUTO: 10.4 FL (ref 9.2–12.9)
POTASSIUM SERPL-SCNC: 3.8 MMOL/L (ref 3.5–5.1)
RBC # BLD AUTO: 3.6 M/UL (ref 4–5.4)
SODIUM SERPL-SCNC: 145 MMOL/L (ref 136–145)
WBC # BLD AUTO: 8.92 K/UL (ref 3.9–12.7)

## 2024-10-03 PROCEDURE — 36000710: Performed by: ORTHOPAEDIC SURGERY

## 2024-10-03 PROCEDURE — 64415 NJX AA&/STRD BRCH PLXS IMG: CPT | Performed by: ANESTHESIOLOGY

## 2024-10-03 PROCEDURE — 36000711: Performed by: ORTHOPAEDIC SURGERY

## 2024-10-03 PROCEDURE — 27201423 OPTIME MED/SURG SUP & DEVICES STERILE SUPPLY: Performed by: ORTHOPAEDIC SURGERY

## 2024-10-03 PROCEDURE — 71000033 HC RECOVERY, INTIAL HOUR: Performed by: ORTHOPAEDIC SURGERY

## 2024-10-03 PROCEDURE — 63600175 PHARM REV CODE 636 W HCPCS: Performed by: STUDENT IN AN ORGANIZED HEALTH CARE EDUCATION/TRAINING PROGRAM

## 2024-10-03 PROCEDURE — C1769 GUIDE WIRE: HCPCS | Performed by: ORTHOPAEDIC SURGERY

## 2024-10-03 PROCEDURE — 36415 COLL VENOUS BLD VENIPUNCTURE: CPT | Performed by: ORTHOPAEDIC SURGERY

## 2024-10-03 PROCEDURE — 71000015 HC POSTOP RECOV 1ST HR: Performed by: ORTHOPAEDIC SURGERY

## 2024-10-03 PROCEDURE — 63600175 PHARM REV CODE 636 W HCPCS

## 2024-10-03 PROCEDURE — 25000003 PHARM REV CODE 250

## 2024-10-03 PROCEDURE — 25000003 PHARM REV CODE 250: Performed by: STUDENT IN AN ORGANIZED HEALTH CARE EDUCATION/TRAINING PROGRAM

## 2024-10-03 PROCEDURE — C1713 ANCHOR/SCREW BN/BN,TIS/BN: HCPCS | Performed by: ORTHOPAEDIC SURGERY

## 2024-10-03 PROCEDURE — 85025 COMPLETE CBC W/AUTO DIFF WBC: CPT | Performed by: ORTHOPAEDIC SURGERY

## 2024-10-03 PROCEDURE — 37000009 HC ANESTHESIA EA ADD 15 MINS: Performed by: ORTHOPAEDIC SURGERY

## 2024-10-03 PROCEDURE — 93005 ELECTROCARDIOGRAM TRACING: CPT

## 2024-10-03 PROCEDURE — 93010 ELECTROCARDIOGRAM REPORT: CPT | Mod: ,,, | Performed by: INTERNAL MEDICINE

## 2024-10-03 PROCEDURE — 24586 OPTX PARTCLR FX&/DISLC ELBOW: CPT | Mod: LT,GC,, | Performed by: ORTHOPAEDIC SURGERY

## 2024-10-03 PROCEDURE — 71000039 HC RECOVERY, EACH ADD'L HOUR: Performed by: ORTHOPAEDIC SURGERY

## 2024-10-03 PROCEDURE — 37000008 HC ANESTHESIA 1ST 15 MINUTES: Performed by: ORTHOPAEDIC SURGERY

## 2024-10-03 PROCEDURE — 63600175 PHARM REV CODE 636 W HCPCS: Performed by: ORTHOPAEDIC SURGERY

## 2024-10-03 PROCEDURE — 25000003 PHARM REV CODE 250: Performed by: ORTHOPAEDIC SURGERY

## 2024-10-03 PROCEDURE — 80048 BASIC METABOLIC PNL TOTAL CA: CPT | Performed by: ORTHOPAEDIC SURGERY

## 2024-10-03 DEVICE — SCREW HEXALOBE 3.5 X 22MM: Type: IMPLANTABLE DEVICE | Site: HUMERUS | Status: FUNCTIONAL

## 2024-10-03 DEVICE — IMPLANTABLE DEVICE: Type: IMPLANTABLE DEVICE | Site: HUMERUS | Status: FUNCTIONAL

## 2024-10-03 DEVICE — SCREW BNE LOK HEXLB 3.5X14: Type: IMPLANTABLE DEVICE | Site: HUMERUS | Status: FUNCTIONAL

## 2024-10-03 DEVICE — SCREW BONE NL HEXALOBE 3.5 X 2: Type: IMPLANTABLE DEVICE | Site: HUMERUS | Status: FUNCTIONAL

## 2024-10-03 DEVICE — SCREW LOCK HEXALOBE 2.7 X 20MM: Type: IMPLANTABLE DEVICE | Site: HUMERUS | Status: FUNCTIONAL

## 2024-10-03 DEVICE — SCREW BONE LOCK HEXALOBE 2.7 X: Type: IMPLANTABLE DEVICE | Site: HUMERUS | Status: FUNCTIONAL

## 2024-10-03 DEVICE — SCREW LOCK HEXALOBE 2.7 X 16MM: Type: IMPLANTABLE DEVICE | Site: HUMERUS | Status: FUNCTIONAL

## 2024-10-03 RX ORDER — GLUCAGON 1 MG
1 KIT INJECTION
Status: DISCONTINUED | OUTPATIENT
Start: 2024-10-03 | End: 2024-10-03 | Stop reason: HOSPADM

## 2024-10-03 RX ORDER — PROPOFOL 10 MG/ML
VIAL (ML) INTRAVENOUS
Status: DISCONTINUED | OUTPATIENT
Start: 2024-10-03 | End: 2024-10-03

## 2024-10-03 RX ORDER — PHENYLEPHRINE HYDROCHLORIDE 10 MG/ML
INJECTION INTRAVENOUS
Status: DISCONTINUED | OUTPATIENT
Start: 2024-10-03 | End: 2024-10-03

## 2024-10-03 RX ORDER — PROCHLORPERAZINE EDISYLATE 5 MG/ML
5 INJECTION INTRAMUSCULAR; INTRAVENOUS EVERY 30 MIN PRN
Status: DISCONTINUED | OUTPATIENT
Start: 2024-10-03 | End: 2024-10-03 | Stop reason: HOSPADM

## 2024-10-03 RX ORDER — ACETAMINOPHEN 10 MG/ML
INJECTION, SOLUTION INTRAVENOUS
Status: DISCONTINUED | OUTPATIENT
Start: 2024-10-03 | End: 2024-10-03

## 2024-10-03 RX ORDER — CEPHALEXIN 500 MG/1
500 CAPSULE ORAL EVERY 6 HOURS
Qty: 4 CAPSULE | Refills: 0 | Status: SHIPPED | OUTPATIENT
Start: 2024-10-03 | End: 2024-10-04

## 2024-10-03 RX ORDER — ONDANSETRON HYDROCHLORIDE 2 MG/ML
INJECTION, SOLUTION INTRAVENOUS
Status: DISCONTINUED | OUTPATIENT
Start: 2024-10-03 | End: 2024-10-03

## 2024-10-03 RX ORDER — ROCURONIUM BROMIDE 10 MG/ML
INJECTION, SOLUTION INTRAVENOUS
Status: DISCONTINUED | OUTPATIENT
Start: 2024-10-03 | End: 2024-10-03

## 2024-10-03 RX ORDER — ONDANSETRON 4 MG/1
4 TABLET, FILM COATED ORAL 2 TIMES DAILY
Qty: 15 TABLET | Refills: 0 | Status: SHIPPED | OUTPATIENT
Start: 2024-10-03

## 2024-10-03 RX ORDER — HYDROCODONE BITARTRATE AND ACETAMINOPHEN 5; 325 MG/1; MG/1
1 TABLET ORAL EVERY 6 HOURS PRN
Qty: 28 TABLET | Refills: 0 | Status: SHIPPED | OUTPATIENT
Start: 2024-10-03 | End: 2024-10-10

## 2024-10-03 RX ORDER — MIDAZOLAM HYDROCHLORIDE 1 MG/ML
INJECTION INTRAMUSCULAR; INTRAVENOUS
Status: DISCONTINUED | OUTPATIENT
Start: 2024-10-03 | End: 2024-10-03

## 2024-10-03 RX ORDER — CEFAZOLIN SODIUM 1 G/3ML
INJECTION, POWDER, FOR SOLUTION INTRAMUSCULAR; INTRAVENOUS
Status: DISCONTINUED | OUTPATIENT
Start: 2024-10-03 | End: 2024-10-03

## 2024-10-03 RX ORDER — OXYCODONE HYDROCHLORIDE 5 MG/1
5 TABLET ORAL
Status: DISCONTINUED | OUTPATIENT
Start: 2024-10-03 | End: 2024-10-03 | Stop reason: HOSPADM

## 2024-10-03 RX ORDER — DEXAMETHASONE SODIUM PHOSPHATE 4 MG/ML
INJECTION, SOLUTION INTRA-ARTICULAR; INTRALESIONAL; INTRAMUSCULAR; INTRAVENOUS; SOFT TISSUE
Status: DISCONTINUED | OUTPATIENT
Start: 2024-10-03 | End: 2024-10-03

## 2024-10-03 RX ORDER — LIDOCAINE HYDROCHLORIDE 20 MG/ML
INJECTION INTRAVENOUS
Status: DISCONTINUED | OUTPATIENT
Start: 2024-10-03 | End: 2024-10-03

## 2024-10-03 RX ORDER — HYDROMORPHONE HYDROCHLORIDE 2 MG/ML
0.2 INJECTION, SOLUTION INTRAMUSCULAR; INTRAVENOUS; SUBCUTANEOUS EVERY 5 MIN PRN
Status: DISCONTINUED | OUTPATIENT
Start: 2024-10-03 | End: 2024-10-03 | Stop reason: HOSPADM

## 2024-10-03 RX ORDER — ROPIVACAINE HYDROCHLORIDE 2 MG/ML
INJECTION, SOLUTION EPIDURAL; INFILTRATION; PERINEURAL
Status: DISCONTINUED | OUTPATIENT
Start: 2024-10-03 | End: 2024-10-03

## 2024-10-03 RX ORDER — SODIUM CHLORIDE 9 MG/ML
INJECTION, SOLUTION INTRAVENOUS CONTINUOUS
Status: DISCONTINUED | OUTPATIENT
Start: 2024-10-03 | End: 2024-10-03 | Stop reason: HOSPADM

## 2024-10-03 RX ORDER — HYDROMORPHONE HYDROCHLORIDE 2 MG/ML
INJECTION, SOLUTION INTRAMUSCULAR; INTRAVENOUS; SUBCUTANEOUS
Status: DISCONTINUED | OUTPATIENT
Start: 2024-10-03 | End: 2024-10-03

## 2024-10-03 RX ORDER — FENTANYL CITRATE 50 UG/ML
INJECTION, SOLUTION INTRAMUSCULAR; INTRAVENOUS
Status: DISCONTINUED | OUTPATIENT
Start: 2024-10-03 | End: 2024-10-03

## 2024-10-03 RX ADMIN — PHENYLEPHRINE HYDROCHLORIDE 200 MCG: 10 INJECTION INTRAVENOUS at 01:10

## 2024-10-03 RX ADMIN — ROCURONIUM BROMIDE 50 MG: 10 INJECTION, SOLUTION INTRAVENOUS at 10:10

## 2024-10-03 RX ADMIN — FENTANYL CITRATE 50 MCG: 50 INJECTION INTRAMUSCULAR; INTRAVENOUS at 11:10

## 2024-10-03 RX ADMIN — HYDROMORPHONE HYDROCHLORIDE 0.4 MG: 2 INJECTION INTRAMUSCULAR; INTRAVENOUS; SUBCUTANEOUS at 12:10

## 2024-10-03 RX ADMIN — PHENYLEPHRINE HYDROCHLORIDE 100 MCG: 10 INJECTION INTRAVENOUS at 01:10

## 2024-10-03 RX ADMIN — LIDOCAINE HYDROCHLORIDE 100 MG: 20 INJECTION, SOLUTION INTRAVENOUS at 10:10

## 2024-10-03 RX ADMIN — HYDROMORPHONE HYDROCHLORIDE 0.2 MG: 2 INJECTION, SOLUTION INTRAMUSCULAR; INTRAVENOUS; SUBCUTANEOUS at 04:10

## 2024-10-03 RX ADMIN — SODIUM CHLORIDE, SODIUM LACTATE, POTASSIUM CHLORIDE, AND CALCIUM CHLORIDE: .6; .31; .03; .02 INJECTION, SOLUTION INTRAVENOUS at 10:10

## 2024-10-03 RX ADMIN — PHENYLEPHRINE HYDROCHLORIDE 50 MCG: 10 INJECTION INTRAVENOUS at 10:10

## 2024-10-03 RX ADMIN — SUGAMMADEX 200 MG: 100 INJECTION, SOLUTION INTRAVENOUS at 02:10

## 2024-10-03 RX ADMIN — DEXAMETHASONE SODIUM PHOSPHATE 4 MG: 4 INJECTION, SOLUTION INTRA-ARTICULAR; INTRALESIONAL; INTRAMUSCULAR; INTRAVENOUS; SOFT TISSUE at 10:10

## 2024-10-03 RX ADMIN — SODIUM CHLORIDE, SODIUM LACTATE, POTASSIUM CHLORIDE, AND CALCIUM CHLORIDE: .6; .31; .03; .02 INJECTION, SOLUTION INTRAVENOUS at 11:10

## 2024-10-03 RX ADMIN — PROPOFOL 50 MG: 10 INJECTION, EMULSION INTRAVENOUS at 01:10

## 2024-10-03 RX ADMIN — ROPIVACAINE HYDROCHLORIDE 20 ML: 2 INJECTION, SOLUTION EPIDURAL; INFILTRATION at 03:10

## 2024-10-03 RX ADMIN — PHENYLEPHRINE HYDROCHLORIDE 150 MCG: 10 INJECTION INTRAVENOUS at 11:10

## 2024-10-03 RX ADMIN — ROCURONIUM BROMIDE 20 MG: 10 INJECTION, SOLUTION INTRAVENOUS at 10:10

## 2024-10-03 RX ADMIN — ROCURONIUM BROMIDE 10 MG: 10 INJECTION, SOLUTION INTRAVENOUS at 11:10

## 2024-10-03 RX ADMIN — ONDANSETRON 4 MG: 2 INJECTION, SOLUTION INTRAMUSCULAR; INTRAVENOUS at 01:10

## 2024-10-03 RX ADMIN — FENTANYL CITRATE 100 MCG: 50 INJECTION INTRAMUSCULAR; INTRAVENOUS at 10:10

## 2024-10-03 RX ADMIN — ONDANSETRON 4 MG: 2 INJECTION, SOLUTION INTRAMUSCULAR; INTRAVENOUS at 10:10

## 2024-10-03 RX ADMIN — OXYCODONE 5 MG: 5 TABLET ORAL at 03:10

## 2024-10-03 RX ADMIN — PROPOFOL 150 MG: 10 INJECTION, EMULSION INTRAVENOUS at 10:10

## 2024-10-03 RX ADMIN — CEFAZOLIN 2 G: 2 INJECTION, POWDER, FOR SOLUTION INTRAMUSCULAR; INTRAVENOUS at 10:10

## 2024-10-03 RX ADMIN — ACETAMINOPHEN 1000 MG: 10 INJECTION, SOLUTION INTRAVENOUS at 12:10

## 2024-10-03 RX ADMIN — MIDAZOLAM HYDROCHLORIDE 2 MG: 1 INJECTION, SOLUTION INTRAMUSCULAR; INTRAVENOUS at 10:10

## 2024-10-03 NOTE — ANESTHESIA PROCEDURE NOTES
Intubation    Date/Time: 10/3/2024 10:32 AM    Performed by: Patel Morse CRNA  Authorized by: Kevin Flynn MD    Intubation:     Induction:  Intravenous    Intubated:  Postinduction    Mask Ventilation:  Easy with oral airway    Attempts:  1    Attempted By:  Student    Method of Intubation:  Direct    Blade:  Sravanthi 3    Laryngeal View Grade: Grade IIA - cords partially seen      Difficult Airway Encountered?: No      Complications:  None    Airway Device:  Oral endotracheal tube    Airway Device Size:  7.0    Style/Cuff Inflation:  Cuffed (inflated to minimal occlusive pressure)    Inflation Amount (mL):  8    Tube secured:  21    Secured at:  The lips    Placement Verified By:  Capnometry    Complicating Factors:  None    Findings Post-Intubation:  Atraumatic/condition of teeth unchanged and BS equal bilateral

## 2024-10-03 NOTE — OP NOTE
Operative Note     Date of Service: 10/03/2024     Pre-Operative Diagnosis:  Left extra-articular distal humerus fracture    Post-Operative Diagnosis: Same     Procedure(s):   1. Open reduction internal fixation left distal humerus fracture     Anesthesia: General     Surgeon:  Eben Jesus MD, was present and scrubbed for the key portions of the procedure.     Assistant(s):   MD Ash Gardnre MD    Indications   Patient is a 63 y.o.female with a ground level fall sustaining a left extra-articular distal humerus fracture around 1 week ago. The patient was checked again in the Holding Room. The risks, benefits, complications, treatment options, and expected outcomes were reviewed again with the patient. The patient has elected to proceed with open reduction internal fixation left distal humerus. The risks and potential complications include but are not limited to infection, nerve injury, vascular injury, persistent pain, potential skin necrosis, deep vein thrombosis, possible pulmonary embolus, complications of the anesthetics and failure of the implants with potential need for future surgery to remove or revise. The patient concurred with the proposed plan, giving informed consent. The site of surgery was identified by the patient and properly noted/marked by me.     Implant:   1. Acumed posterolateral humerus plate   2. Acumed medial humerus plate     Procedure Details:   The patient was taken to Operating Room. A Time-Out was held and the patient, location and procedure of open reduction internal fixation left distal humerus were verified and agreed upon by all members of the operating room staff. Prophylacic antibiotics were given.The patient was given general anesthesia.   Following the successful induction of anesthesia the patient was placed lateral decubitus position.  An axillary roll was placed and all bony prominences were appropriately padded.  The left arm was placed over an  upper extremity bone foam pad.  The left upper extremity was prepped and draped in normal sterile manner.     A longitudinal midline posterior incision was made curving radially around the olecranon tip, extending down to the subcutaneous tissue. Bleeders were identified, isolated, and cauterized.  Dissection was carried out to the level of the triceps muscle.  The fascia was incised and flaps were dissected medially and laterally.  We started medially and identified the ulnar nerve and unroofed the ulnar nerve proximally and down to the medial olecranon in the cubital tunnel.  Once the ulnar nerve was fully mobilized we turned our attention radially.  Dissection was carried out to the level of the bone and electrocautery was used to elevate the triceps muscle off the posterior aspect of the humerus.  Proximally the radial nerve was identified and protected throughout the case.  Once we had fully mobilized the triceps and protected the ulnar and radial nerves we turned our attention to the fracture.  Fracture was irrigated and curetted and rongeured until we had clean fracture edges.  Combination of traction, rotation, and manipulation was used to reduce the fracture.  The reduction was held with a point-to-point reduction clamp.  We then placed 2 lag screws measuring 2.7 mm in diameter across the fracture.  We did have to change 1 of the lag screws to a 3.0 mm screw to achieve adequate purchase.  We then removed our clamps in the provisional lag screws held the fracture in place.  We then selected a Acumed distal humerus posterolateral plate and provisionally fixated the plate with K-wires and olive wires, we then took x-rays to ensure adequate positioning.  The radial nerve crossed the posterior aspect of the humerus immediately proximal to the tip of the plate.  We confirmed no soft tissue or neurovascular structures were incarcerated underneath the plate.  Placed locking screws distally and cortical screws  proximally.  We then turned our attention to the medial aspect of the fracture.  The ulnar nerve was mobilized and a medial Accu Med distal humerus plate was placed on the medial epicondyle and fixated to the shaft.  The ulnar nerve was evaluated and determined to be free just posterior to the medial plate along the medial epicondyle.  We placed locking screws in the distal aspect of the plate.  We then placed a eccentrically placed screw in the oblong hole proximally on the shaft in order to compress further through the plate.  We then placed the rest of the screws proximally distally.  Intraoperative fluoroscopy confirmed reduction of fracture and adequate position of all hardware.  The elbow was taken through range of motion and there were no blocks to motion in flexion-extension pronation or supination.  The wound was thoroughly irrigated with normal saline using pulse lavage.  Wound was closed with 2-0 Monocryl for the deep tissues and subcutaneously and a 3-0 Vicryl for the skin.  Wound was dressed with Xeroform, 4 x 4, ABD, cast padding, posterior slab splint, and an Ace wrap.  All counts were correct at the conclusion procedure.      Findings:  Extra-articular distal humerus fracture   Estimated blood loss:  200 mL   Drains:  None   Total IV fluids: Per anesthesia records   Specimens:  None   Complications: None, pt tolerated the procedure well   Disposition: Awakened from anesthesia, and taken to the recovery room in a stable condition, having suffered no apparent untoward event   Condition: Stable   Post-Operative Management   Patient will be nonweightbearing to left upper extremity.  Maintain splint and surgical dressings until clinic appointment in 2 weeks.  24 hours of postoperative Keflex was prescribed.  Multimodal pain control.  Okay to resume aspirin.  Patient will hold her next sairlumab injection as discussed in clinic.    Wiley Martinez MD   [facility] Orthopedic surgery    I have reviewed the notes,  assessments, and/or procedures performed by Dr. Martinez , I concur with her/his documentation of Aysha Randolph.

## 2024-10-03 NOTE — PLAN OF CARE
Discharge criteria met. Voicing desire to go home. Discharge instructions given to patient and spouse. Verbalized understanding. All lines removed. Vital signs stable. Pain, nausea, vomiting controlled. Patient discharged per wheelchair in care of spouse.

## 2024-10-03 NOTE — BRIEF OP NOTE
[facility] - Surgery ([facility])  Brief Operative Note    Surgery Date: 10/3/2024     Surgeons and Role:     * Eben Jesus MD - Primary    Assisting Surgeon: None    Pre-op Diagnosis:  Closed fracture of distal end of left humerus, unspecified fracture morphology, initial encounter [S42.402A]    Post-op Diagnosis:  Post-Op Diagnosis Codes:     * Closed fracture of distal end of left humerus, unspecified fracture morphology, initial encounter [S42.402A]    Procedure(s) (LRB):  ORIF, FRACTURE, HUMERUS, DISTAL (Left)    Anesthesia: General    Operative Findings: Extra articular distal humerus fracture    Estimated Blood Loss: 200 mL         Specimens:   Specimen (24h ago, onward)      None              Discharge Note    OUTCOME: Patient tolerated treatment/procedure well without complication and is now ready for discharge.    DISPOSITION: Home or Self Care    FINAL DIAGNOSIS:  <principal problem not specified>    FOLLOWUP: In clinic    DISCHARGE INSTRUCTIONS:  No discharge procedures on file.

## 2024-10-03 NOTE — ANESTHESIA POSTPROCEDURE EVALUATION
Anesthesia Post Evaluation    Patient: Aysha Randolph    Procedure(s) Performed: Procedure(s) (LRB):  ORIF, FRACTURE, HUMERUS, DISTAL (Left)    Final Anesthesia Type: general      Patient location during evaluation: PACU  Patient participation: Yes- Able to Participate  Level of consciousness: awake and alert  Post-procedure vital signs: reviewed and stable  Pain management: adequate  Airway patency: patent    PONV status at discharge: No PONV  Anesthetic complications: no      Cardiovascular status: stable  Respiratory status: spontaneous ventilation  Hydration status: euvolemic  Follow-up not needed.              Vitals Value Taken Time   /56 10/03/24 1532   Temp 36.7 °C (98.1 °F) 10/03/24 1415   Pulse 108 10/03/24 1534   Resp 9 10/03/24 1534   SpO2 97 % 10/03/24 1534   Vitals shown include unfiled device data.      No case tracking events are documented in the log.      Pain/Mercedes Score: Pain Rating Prior to Med Admin: 8 (10/3/2024  3:03 PM)  Mercedes Score: 9 (10/3/2024  3:30 PM)

## 2024-10-03 NOTE — PROGRESS NOTES
Dr. Narendra Gutierrez came to do a neuro assessment of patients left arm. He stated that the neuro check was good and patient can have a bedside block. Dr. Franck Robetrson was then notified of clearance to complete the block. He stated would be headed our way.

## 2024-10-03 NOTE — INTERVAL H&P NOTE
The patient has been examined and the H&P has been reviewed:    I concur with the findings and no changes have occurred since H&P was written.    Surgery risks, benefits and alternative options discussed and understood by patient/family.    To the OR today with Dr. Jesus for left distal humerus ORIF.      There are no hospital problems to display for this patient.    I have reviewed the H&P. There are no interval changes to report.

## 2024-10-03 NOTE — PROGRESS NOTES
[facility] postop check    63-year-old female status post left distal humerus ORIF    Seen in PACU, resting comfortably, pain controlled.  Denies any numbness or tingling    Left upper extremity exam  In long-arm splint, clean dry intact  Motor intact to AIN/PIN/ulnar  Sensation intact to light touch in M/U/R  Cap refill less than 2 seconds, WWP    Ash Gutierrez MD  [facility] PGY-3

## 2024-10-03 NOTE — ANESTHESIA PROCEDURE NOTES
Peripheral Block    Patient location during procedure: pre-op   Block not for primary anesthetic.  Reason for block: at surgeon's request and post-op pain management   Post-op Pain Location: left arm   Start time: 10/3/2024 3:19 PM  Timeout: 10/3/2024 3:13 PM   End time: 10/3/2024 3:25 PM    Staffing  Authorizing Provider: Kevin Flynn MD  Performing Provider: Franck Robertson Jr., MD    Staffing  Performed by: Franck Robertson Jr., MD  Authorized by: Kevin Flynn MD    Preanesthetic Checklist  Completed: patient identified, IV checked, site marked, risks and benefits discussed, surgical consent, monitors and equipment checked, pre-op evaluation and timeout performed  Peripheral Block  Patient position: supine  Prep: ChloraPrep  Patient monitoring: heart rate, cardiac monitor, continuous pulse ox, continuous capnometry and frequent blood pressure checks  Block type: supraclavicular  Laterality: left  Injection technique: single shot  Needle  Needle type: Stimuplex   Needle gauge: 22 G  Needle length: 2 in  Needle localization: anatomical landmarks and ultrasound guidance   -ultrasound image captured on disc.  Assessment  Injection assessment: negative aspiration, negative parasthesia and local visualized surrounding nerve  Paresthesia pain: none  Heart rate change: no  Slow fractionated injection: yes  Pain Tolerance: comfortable throughout block and no complaints  Medications:    Medications: ropivacaine (NAROPIN) solution 0.2% - Perineural   20 mL - 10/3/2024 3:25:00 PM    Additional Notes  VSS.  [facility] RN monitoring vitals throughout procedure.  Patient tolerated procedure well.

## 2024-10-03 NOTE — DISCHARGE SUMMARY
[facility] - [facility] (Hospital)  Discharge Note  Short Stay    Procedure(s) (LRB):  ORIF, FRACTURE, HUMERUS, DISTAL (Left)      OUTCOME: Patient tolerated treatment/procedure well without complication and is now ready for discharge.    DISPOSITION: Home or Self Care    FINAL DIAGNOSIS:  <principal problem not specified>    FOLLOWUP: In clinic    DISCHARGE INSTRUCTIONS:  No discharge procedures on file.     TIME SPENT ON DISCHARGE: 10 minutes    I have reviewed the notes, assessments, and/or procedures performed by Dr. Martinez, I concur with her/his documentation of Aysha Randolph.

## 2024-10-03 NOTE — TRANSFER OF CARE
"Anesthesia Transfer of Care Note    Patient: Aysha Randolph    Procedure(s) Performed: Procedure(s) (LRB):  ORIF, FRACTURE, HUMERUS, DISTAL (Left)    Patient location: PACU    Anesthesia Type: general    Transport from OR: Transported from OR on 6-10 L/min O2 by face mask with adequate spontaneous ventilation    Post pain: adequate analgesia    Post assessment: no apparent anesthetic complications and tolerated procedure well    Post vital signs: stable    Level of consciousness: sedated    Nausea/Vomiting: no nausea/vomiting    Complications: none    Transfer of care protocol was followed      Last vitals: Visit Vitals  BP (!) 126/74 (BP Location: Right arm, Patient Position: Lying)   Pulse 101   Temp 36.6 °C (97.8 °F) (Tympanic)   Resp 16   Ht 4' 11" (1.499 m)   Wt 87.1 kg (192 lb)   SpO2 98%   Breastfeeding No   BMI 38.78 kg/m²     "

## 2024-10-03 NOTE — ANESTHESIA PREPROCEDURE EVALUATION
[facility]  Anesthesia Pre-Operative Evaluation         Patient Name: Aysha Randolph  YOB: 1960  MRN: [id number]    SUBJECTIVE:     Pre-operative evaluation for Procedure(s) (LRB):  ORIF, FRACTURE, HUMERUS, DISTAL (Left)     10/03/2024    Aysha Randolph is a 63 y.o. female w/ a significant PMHx as below who presents for the above procedure.      LDA: None documented.    Prev airway: None documented.     Drips: None documented.    Patient Active Problem List   Diagnosis    Hyperlipidemia    GERD (gastroesophageal reflux disease)    Rheumatoid arthritis    Benign essential hypertension    Cervical dysplasia    Vaginal dysplasia    H. pylori infection    Osteoporosis    Fear of pain    Decreased range of motion of trunk and back    Pain aggravated by activities of daily living    History of vaginal dysplasia    Dysplasia of cervix, low grade (LANDEN 1)    Severe obesity (BMI 35.0-39.9) with comorbidity    Sleep apnea    Upper respiratory tract infection    Closed fracture of left distal humerus       Review of patient's allergies indicates:  No Known Allergies    Current Inpatient Medications:      No current facility-administered medications on file prior to encounter.     Current Outpatient Medications on File Prior to Encounter   Medication Sig Dispense Refill    acetaminophen (TYLENOL) 325 MG tablet Take 2 tablets (650 mg total) by mouth every 6 (six) hours as needed for Pain. 40 tablet 0    albuterol (ACCUNEB) 1.25 mg/3 mL Nebu Take 3 mLs (1.25 mg total) by nebulization every 6 (six) hours as needed (shortness of breath, wheezing). 75 mL 1    albuterol (VENTOLIN HFA) 90 mcg/actuation inhaler Inhale 2 puffs into the lungs every 4 (four) hours as needed for Wheezing. Rescue 6.7 g 1    aspirin (ECOTRIN) 81 MG EC tablet Take 1 tablet (81 mg total) by mouth 2 (two) times a day. 60 tablet 0    atorvastatin (LIPITOR) 10 MG tablet Take 1 tablet (10 mg total) by mouth once daily. 90 tablet 3     "cholecalciferol, vitamin D3, 125 mcg (5,000 unit) Tab Take 5,000 Units by mouth once daily.      EScitalopram oxalate (LEXAPRO) 20 MG tablet Take 1 tablet (20 mg total) by mouth once daily. 90 tablet 3    folic acid (FOLVITE) 1 MG tablet Take 1 tablet (1 mg total) by mouth once daily. 90 tablet 3    hydroCHLOROthiazide (HYDRODIURIL) 25 MG tablet Take 1 tablet (25 mg total) by mouth once daily. 90 tablet 3    ibuprofen (ADVIL,MOTRIN) 600 MG tablet Take 1 tablet (600 mg total) by mouth every 6 (six) hours as needed for Pain. 20 tablet 0    methocarbamoL (ROBAXIN) 750 MG Tab Take 1 tablet (750 mg total) by mouth 3 (three) times daily. for 5 days 15 tablet 0    methotrexate 2.5 MG Tab Take 10 tablets (25 mg total) by mouth every 7 days. 40 tablet 3    multivitamin-minerals-lutein (MULTIVITAMIN 50 PLUS) Tab Take 1 tablet by mouth once daily.      omega 3-dha-epa-fish oil 120-180-500 mg Cap       ondansetron (ZOFRAN-ODT) 4 MG TbDL Take 1 tablet (4 mg total) by mouth every 8 (eight) hours as needed (nausea). (Patient not taking: Reported on 10/1/2024) 30 tablet 0    oxyCODONE (ROXICODONE) 5 MG immediate release tablet Take 1 tablet (5 mg total) by mouth every 6 (six) hours as needed for Pain. 12 tablet 0    pantoprazole (PROTONIX) 40 MG tablet Take 1 tablet (40 mg total) by mouth once daily. 90 tablet 3    pen needle, diabetic (BD ULTRA-FINE MINI PEN NEEDLE) 31 gauge x 3/16" Ndle Inject 1 each into the skin once daily. 100 each 3    pregabalin (LYRICA) 50 MG capsule Take 1 capsule (50 mg total) by mouth 3 (three) times daily. 90 capsule 6    sarilumab (KEVZARA) 200 mg/1.14 mL PnIj Inject 1.14 mLs (200 mg total) into the skin every 14 (fourteen) days. 2 pen 11    teriparatide (FORTEO) 20 mcg/dose (600mcg/2.4mL) PnIj Inject 0.08 mLs (20 mcg total) into the skin once daily. 2.4 mL 11       Past Surgical History:   Procedure Laterality Date    ANKLE SURGERY Left     COLONOSCOPY N/A 05/31/2023    Procedure: COLONOSCOPY;  " "Surgeon: Robbie Ace MD;  Location: [facility] ENDO;  Service: Endoscopy;  Laterality: N/A;    ESOPHAGOGASTRODUODENOSCOPY N/A 05/31/2023    Procedure: EGD (ESOPHAGOGASTRODUODENOSCOPY);  Surgeon: Robbie Ace MD;  Location: [facility] ENDO;  Service: Endoscopy;  Laterality: N/A;    FRACTURE SURGERY      JOINT REPLACEMENT  April 2016    Full Left ankle replacement    WRIST FRACTURE SURGERY Left        Social History     Socioeconomic History    Marital status:    Tobacco Use    Smoking status: Never    Smokeless tobacco: Never   Substance and Sexual Activity    Alcohol use: Not Currently     Comment: Very rarely    Drug use: Never    Sexual activity: Yes     Partners: Male     Birth control/protection: None       OBJECTIVE:     Vital Signs Range (Last 24H):         CBC:   No results for input(s): "WBC", "RBC", "HGB", "HCT", "PLT", "MCV", "MCH", "MCHC" in the last 72 hours.    CMP: No results for input(s): "NA", "K", "CL", "CO2", "BUN", "CREATININE", "GLU", "MG", "PHOS", "CALCIUM", "ALBUMIN", "PROT", "ALKPHOS", "ALT", "AST", "BILITOT" in the last 72 hours.    INR:  No results for input(s): "PT", "INR", "PROTIME", "APTT" in the last 72 hours.    Diagnostic Studies: No relevant studies.    EKG: No recent studies available.    2D ECHO:  No results found for this or any previous visit.      ASSESSMENT/PLAN:           Pre-op Assessment    I have reviewed the Patient Summary Reports.    I have reviewed the NPO Status.   I have reviewed the Medications.     Review of Systems  Anesthesia Hx:  No problems with previous Anesthesia                Cardiovascular:     Hypertension                                        Pulmonary:    Asthma    Sleep Apnea                Hepatic/GI:     GERD             Endocrine:        Morbid Obesity / BMI > 40      Physical Exam  General: Alert and Cooperative    Airway:  Mallampati: III / II  Mouth Opening: Normal  TM Distance: Normal  Tongue: Normal    Dental:  Any loose or missing teeth " verified with patient  Chest/Lungs:  Normal Respiratory Rate    Heart:  Rate: Normal        Anesthesia Plan  Type of Anesthesia, risks & benefits discussed:    Anesthesia Type: Gen ETT  Intra-op Monitoring Plan: Standard ASA Monitors  Post Op Pain Control Plan: multimodal analgesia  Induction:  IV  Airway Plan: Direct, Post-Induction  Informed Consent: Informed consent signed with the Patient and all parties understand the risks and agree with anesthesia plan.  All questions answered.   ASA Score: 3  Day of Surgery Review of History & Physical: H&P Update referred to the surgeon/provider.    Ready For Surgery From Anesthesia Perspective.     .

## 2024-10-04 VITALS
TEMPERATURE: 98 F | BODY MASS INDEX: 38.71 KG/M2 | DIASTOLIC BLOOD PRESSURE: 60 MMHG | RESPIRATION RATE: 15 BRPM | HEART RATE: 98 BPM | WEIGHT: 192 LBS | SYSTOLIC BLOOD PRESSURE: 128 MMHG | OXYGEN SATURATION: 97 % | HEIGHT: 59 IN

## 2024-10-04 DIAGNOSIS — S42.402A CLOSED FRACTURE OF DISTAL END OF LEFT HUMERUS, UNSPECIFIED FRACTURE MORPHOLOGY, INITIAL ENCOUNTER: Primary | ICD-10-CM

## 2024-10-16 ENCOUNTER — OFFICE VISIT (OUTPATIENT)
Dept: ORTHOPEDICS | Facility: CLINIC | Age: 64
End: 2024-10-16
Payer: MEDICARE

## 2024-10-16 ENCOUNTER — HOSPITAL ENCOUNTER (OUTPATIENT)
Dept: RADIOLOGY | Facility: HOSPITAL | Age: 64
Discharge: HOME | End: 2024-10-16
Attending: ORTHOPAEDIC SURGERY
Payer: MEDICARE

## 2024-10-16 VITALS — WEIGHT: 198.44 LBS | BODY MASS INDEX: 40.07 KG/M2

## 2024-10-16 DIAGNOSIS — S42.402D: Primary | ICD-10-CM

## 2024-10-16 DIAGNOSIS — S42.402A CLOSED FRACTURE OF DISTAL END OF LEFT HUMERUS, UNSPECIFIED FRACTURE MORPHOLOGY, INITIAL ENCOUNTER: ICD-10-CM

## 2024-10-16 PROCEDURE — 73080 X-RAY EXAM OF ELBOW: CPT | Mod: 26,LT,, | Performed by: INTERNAL MEDICINE

## 2024-10-16 PROCEDURE — 73080 X-RAY EXAM OF ELBOW: CPT | Mod: TC,PN,LT

## 2024-10-16 PROCEDURE — 99999 PR PBB SHADOW E&M-EST. PATIENT-LVL IV: CPT | Mod: PBBFAC,,, | Performed by: ORTHOPAEDIC SURGERY

## 2024-10-16 PROCEDURE — 99024 POSTOP FOLLOW-UP VISIT: CPT | Mod: POP,,, | Performed by: ORTHOPAEDIC SURGERY

## 2024-10-16 PROCEDURE — 99214 OFFICE O/P EST MOD 30 MIN: CPT | Mod: PBBFAC,25,PN | Performed by: ORTHOPAEDIC SURGERY

## 2024-10-16 NOTE — PROGRESS NOTES
Patient ID:   Aysha Randolph is a 63 y.o. female.    Chief Complaint:   Surgical aftercare s/p ORIF left distal humerus fracture    HPI:   Patient is returning for her first post-op visit. She is doing well and reports minimal pain.     Medications:    Current Outpatient Medications:     acetaminophen (TYLENOL) 325 MG tablet, Take 2 tablets (650 mg total) by mouth every 6 (six) hours as needed for Pain., Disp: 40 tablet, Rfl: 0    albuterol (ACCUNEB) 1.25 mg/3 mL Nebu, Take 3 mLs (1.25 mg total) by nebulization every 6 (six) hours as needed (shortness of breath, wheezing)., Disp: 75 mL, Rfl: 1    albuterol (VENTOLIN HFA) 90 mcg/actuation inhaler, Inhale 2 puffs into the lungs every 4 (four) hours as needed for Wheezing. Rescue, Disp: 6.7 g, Rfl: 1    aspirin (ECOTRIN) 81 MG EC tablet, Take 1 tablet (81 mg total) by mouth 2 (two) times a day., Disp: 60 tablet, Rfl: 0    atorvastatin (LIPITOR) 10 MG tablet, Take 1 tablet (10 mg total) by mouth once daily., Disp: 90 tablet, Rfl: 3    cholecalciferol, vitamin D3, 125 mcg (5,000 unit) Tab, Take 5,000 Units by mouth once daily., Disp: , Rfl:     EScitalopram oxalate (LEXAPRO) 20 MG tablet, Take 1 tablet (20 mg total) by mouth once daily., Disp: 90 tablet, Rfl: 3    folic acid (FOLVITE) 1 MG tablet, Take 1 tablet (1 mg total) by mouth once daily., Disp: 90 tablet, Rfl: 3    hydroCHLOROthiazide (HYDRODIURIL) 25 MG tablet, Take 1 tablet (25 mg total) by mouth once daily., Disp: 90 tablet, Rfl: 3    ibuprofen (ADVIL,MOTRIN) 600 MG tablet, Take 1 tablet (600 mg total) by mouth every 6 (six) hours as needed for Pain., Disp: 20 tablet, Rfl: 0    methotrexate 2.5 MG Tab, Take 10 tablets (25 mg total) by mouth every 7 days., Disp: 40 tablet, Rfl: 3    multivitamin-minerals-lutein (MULTIVITAMIN 50 PLUS) Tab, Take 1 tablet by mouth once daily., Disp: , Rfl:     omega 3-dha-epa-fish oil 120-180-500 mg Cap, , Disp: , Rfl:     ondansetron (ZOFRAN) 4 MG tablet, Take 1 tablet (4 mg  "total) by mouth 2 (two) times daily., Disp: 15 tablet, Rfl: 0    oxyCODONE (ROXICODONE) 5 MG immediate release tablet, Take 1 tablet (5 mg total) by mouth every 6 (six) hours as needed for Pain., Disp: 12 tablet, Rfl: 0    pantoprazole (PROTONIX) 40 MG tablet, Take 1 tablet (40 mg total) by mouth once daily., Disp: 90 tablet, Rfl: 3    pen needle, diabetic (BD ULTRA-FINE MINI PEN NEEDLE) 31 gauge x 3/16" Ndle, Inject 1 each into the skin once daily., Disp: 100 each, Rfl: 3    pregabalin (LYRICA) 50 MG capsule, Take 1 capsule (50 mg total) by mouth 3 (three) times daily., Disp: 90 capsule, Rfl: 6    sarilumab (KEVZARA) 200 mg/1.14 mL PnIj, Inject 1.14 mLs (200 mg total) into the skin every 14 (fourteen) days., Disp: 2 pen , Rfl: 11    teriparatide (FORTEO) 20 mcg/dose (600mcg/2.4mL) PnIj, Inject 0.08 mLs (20 mcg total) into the skin once daily., Disp: 2.4 mL, Rfl: 11    ondansetron (ZOFRAN-ODT) 4 MG TbDL, Take 1 tablet (4 mg total) by mouth every 8 (eight) hours as needed (nausea). (Patient not taking: Reported on 9/13/2024), Disp: 30 tablet, Rfl: 0    Allergies:  Review of patient's allergies indicates:  No Known Allergies    Vitals:  Wt 90 kg (198 lb 6.6 oz)   BMI 40.07 kg/m²     Physical Examination:  Ortho Exam   Left elbow exam:  Incision C/D/I  ROM -    Imaging Studies:  I have ordered and independently reviewed the following imaging studies performed at [facility] today    Left elbow XR series demonstrates maintenance of the fracture reduction.     Assessment:  1. Closed fracture of distal end of left humerus with routine healing, subsequent encounter      Plan:  - Suture removal  - PT for elbow ROM  - Follow-up in one month with new x-ray of left elbow    Orders Placed This Encounter    Ambulatory referral/consult to Physical/Occupational Therapy     No follow-ups on file.          "

## 2024-10-18 ENCOUNTER — CLINICAL SUPPORT (OUTPATIENT)
Dept: REHABILITATION | Facility: HOSPITAL | Age: 64
End: 2024-10-18
Attending: ORTHOPAEDIC SURGERY
Payer: MEDICARE

## 2024-10-18 DIAGNOSIS — M62.81 MUSCLE WEAKNESS: ICD-10-CM

## 2024-10-18 DIAGNOSIS — S42.402D: ICD-10-CM

## 2024-10-18 DIAGNOSIS — M25.522 LEFT ELBOW PAIN: Primary | ICD-10-CM

## 2024-10-18 DIAGNOSIS — M25.622 ELBOW STIFFNESS, LEFT: ICD-10-CM

## 2024-10-18 PROCEDURE — 97165 OT EVAL LOW COMPLEX 30 MIN: CPT | Mod: PN

## 2024-10-18 PROCEDURE — 97110 THERAPEUTIC EXERCISES: CPT | Mod: PN

## 2024-10-18 PROCEDURE — 97140 MANUAL THERAPY 1/> REGIONS: CPT | Mod: PN

## 2024-10-18 NOTE — PLAN OF CARE
[facility]  Occupational Therapy Initial Evaluation     Name: Aysha Randolph  Clinic Number: [id number]    Therapy Diagnosis:   Encounter Diagnoses   Name Primary?    Closed fracture of distal end of left humerus with routine healing, subsequent encounter     Left elbow pain Yes    Muscle weakness     Elbow stiffness, left      Physician: Eben Jesus MD    Physician Orders: Eval and Treat  Medical Diagnosis: S42.402D (ICD-10-CM) - Closed fracture of distal end of left humerus with routine healing, subsequent encounter  Surgical Procedure and Date: L distal humerus ORIF, 10/3/2024 / Date of Injury: 9/29/2024  Evaluation Date: 10/18/2024  Insurance Authorization Period Expiration: 10/16/2025  Plan of Care Certification Period: 12/27/2024  Date of Return to MD: 11/20/2024  Visit # / Visits authorized: 1 / 1  FOTO: 1/3    Precautions:  Standard and Weightbearing - standard ORIF protocol    Time In: 12:30 pm  Time Out: 1:15 pm  Total Appointment Time (timed & untimed codes): 45 minutes    Subjective     Date of Onset: 9/29/2024    History of Current Condition/Mechanism of Injury: Patient states that she fell on 9/29 and suffered a L distal humerus fx. She ultimately underwent ORIF surgery on 10/3/2024. She is currently 2w1d p/o and doing well overall. She doesn't have any true pain, but is having some discomfort and stiffness of the L elbow.      Involved Side: Left  Dominant Side: Right    Mechanism of Injury: SUSIE  Surgical Procedure: s/p ORIF left distal humerus fracture  Imaging:  FINDINGS:  Postoperative changes from ORIF of the supracondylar fracture.  No hardware complication.  Alignment is improved and near anatomic.  There is some callus formation but no significant osseous bridging.  Plaster cast in place.    Prior Therapy: not for this condition    Pain:  Functional Pain Scale Rating 0-10:   1/10 on average  1/10 at best  1/10 at worst  Location: L elbow   Description:  discomfort  Aggravating Factors: constant   Easing Factors: rest    Occupation:    Working presently: retired  Duties: B/IADLs     Functional Limitations/Social History:    Previous functional status includes: Independent with all ADLs.     Current Functional Status   Home/Living environment: lives with their spouse      Limitation of Functional Status as follows:   ADLs/IADLs:     - Feeding: mod I    - Bathing: min A by     - Dressing/Grooming: min A by  - dressing, bra    - Home Management: mod A    - Driving: dep     Leisure: Gardening    Patient's Goals for Therapy: regain ROM and strength of the LUE    Past Medical History/Physical Systems Review:   Aysha Randolph  has a past medical history of Abnormal Pap smear of cervix, Arthritis, Hyperlipidemia, Hypertension, Mild intermittent asthma, uncomplicated, Osteoporosis, and Rheumatoid arthritis, unspecified.    Aysha Randolph  has a past surgical history that includes Ankle surgery (Left); Wrist fracture surgery (Left); Colonoscopy (N/A, 05/31/2023); Esophagogastroduodenoscopy (N/A, 05/31/2023); Fracture surgery; Joint replacement (April 2016); and Open reduction and internal fixation (ORIF) of fracture of distal humerus (Left, 10/3/2024).    Aysha has a current medication list which includes the following prescription(s): acetaminophen, albuterol, albuterol, aspirin, atorvastatin, cholecalciferol (vitamin d3), escitalopram oxalate, folic acid, hydrochlorothiazide, ibuprofen, methotrexate, multivitamin 50 plus, omega 3-dha-epa-fish oil, ondansetron, ondansetron, oxycodone, pantoprazole, pen needle, diabetic, pregabalin, kevzara, and teriparatide.    Review of patient's allergies indicates:  No Known Allergies     Objective     Observation/Appearance: Patient arrives with no brace/sling. The incision is well healed with no SOI, steristrips are in place, there is appropriate p/o stiffness and edema     Special Tests: n/a    Edema. Measured in  centimeters.   10/18/2024 10/18/2024      Left Right     Elbow 2 in above 35.5 cm 35 cm     Elbow 2 in below 29.7 cm 29.8 cm         Elbow and Wrist ROM. Measured in degrees.   10/18/2024 10/18/2024      Left Right     Elbow Ext/Flex -40/90 WNL     Supination/Pronation 55/90 WNL     Wrist Ext/Flex 60/35 WNL     Wrist RD/UD NM NM       Hand ROM. Measured in degrees.   10/18/2024 10/18/2024      Left Right     Full fist  WNL WNL        Strength (Dynamometer) and Pinch Strength (Pinch Gauge)  Measured in pounds.   10/18/2024 10/18/2024      Left Right     Rung II 5# 35#       Sensation: Not formally assessed  Patient experiences paraesthesias in the entire hand      CMS Impairment/Limitation/Restriction for FOTO elbow Survey    Therapist reviewed FOTO scores for Aysha Randolph on 10/18/2024.   FOTO documents entered into [employer] - see Media section.    Limitation Score: 96%         Treatment     Total Treatment time (time-based codes) separate from Evaluation: 25 minutes    Aysha received the following supervised modalities after being cleared for contradictions for 5 minutes:   -Patient tolerates MHP x 5 min in order to decrease pain and increase soft tissue extensibility in preparation for ROM, concurrent with assessment of deficits and HEP review.    Aysha received the following manual therapy techniques for 10 minutes:   -I provide gentle scar mob to decrease scar tenderness, adherence, and hypersensitivity.    - scar massage discussed    Aysha received therapeutic exercises for 15 minutes including:  - Therapist A PROM  - passive elbow extension  - active elbow flexion to 90  - pro/sup  - wrist flexion/extension  - shoulder squeeze  - shoulder shrugs    Patient Education and Home Exercises      Education provided:   -role of OT, goals for OT, scheduling/cancellations, insurance limitations with patient.  -Additional Education provided:   - HEP in place   - pain modalities  - sling as needed    Written Home  Exercises Provided: yes.  Exercises were reviewed and Aysha was able to demonstrate them prior to the end of the session.    Aysah demonstrated good  understanding of the education provided.     Pt was advised to perform these exercises free of pain, and to stop performing them if pain occurs.    See EMR under Patient Instructions for exercises provided 10/18/2024.    Assessment     Aysha Randolph is a 63 y.o. female referred to outpatient occupational therapy and presents with a medical diagnosis of S42.402D (ICD-10-CM) - Closed fracture of distal end of left humerus with routine healing, subsequent encounter.  She is currently 2w1d s/p L distal humerus ORIF. She is doing well overall with no true c/o pain, but having some discomfort in the elbow. She has a sling, which we discussed using as needed for comfort.   Upon assessment, deficits include: L elbow discomfort, pain, weakness  Patient also reports functional difficulty with: she is having her  assist with most of her B/IADLs including dressing, bathing he L side, and household duties. She is hoping to get back to normal soon to do these independently.   I initiate HEP per protocol including: elbow/wrist/hand ROM, shoulder shrugs/squeezes. Patient received written handouts and demos indep with HEP. Skilled education provided to patient as indicated. Continue to progress per protocol as tolerated until patient meets LTGs.      Assessment of Occupational Performance   Performance Deficits    Physical:  Joint Mobility  Joint Stability  Muscle Power/Strength  Muscle Endurance  Skin Integrity/Scar Formation  Edema  Control of Voluntary Movement   Strength  Gross Motor Coordination  Fine Motor Coordination  Postural Control  Pain    Cognitive:  No Deficits    Psychosocial:    No Deficits     Following medical record review it is determined that pt will benefit from occupational therapy services in order to maximize pain free and/or functional use of  left elbow. The following goals were discussed with the patient and patient is in agreement with them as to be addressed in the treatment plan. The patient's rehab potential is Excellent.     Anticipated barriers to occupational therapy: n/a    Plan of care discussed with patient: Yes  Patient's spiritual, cultural and educational needs considered and patient is agreeable to the plan of care and goals as stated below:     Medical Necessity is demonstrated by the following  Occupational Profile/History  Co-morbidities and personal factors that may impact the plan of care [x] LOW: Brief chart review  [] MODERATE: Expanded chart review   [] HIGH: Extensive chart review    Moderate / High Support Documentation: n/a     Examination  Performance deficits relating to physical, cognitive or psychosocial skills that result in activity limitations and/or participation restrictions  [x] LOW: addressing 1-3 Performance deficits  [] MODERATE: 3-5 Performance deficits  [] HIGH: 5+ Performance deficits (please support below)    Moderate / High Support Documentation: n/a     Treatment Options [x] LOW: Limited options  [] MODERATE: Several options  [] HIGH: Multiple options      Decision Making/ Complexity Score: low       Goals:   The following goals were discussed with the patient and patient is in agreement with them as to be addressed in the treatment plan.   Short term Goals:  1) Initiate HEP  2) Pt will increase AROM of L elbow by 5-10 degrees in order to assist with self grooming tasks by 4 weeks.  3) Pt will reduce edema by .5-1 cm in affected elbow by 4 weeks.  4) Pt will reduce pain to less than 4/10 by 4 weeks.  5) Pt will increase functional  strength by 5# in order to A in opening containers for med management or home management tasks by 4 weeks.   6) Patient will be able to achieve less than or equal to 80% on the FOTO, demonstrating overall improved functional ability with upper extremity. (Self-care  category)    Long Term Goals:  Goals to be met by discharge:  1) Independent with HEP  2) Pt will increase L elbow HURTADO by at least 35 degrees in order to increase functional use for grasp with home management or work related tasks by d/c.   3) Pt will decrease edema to trace or none to increase functional ROM by d/c.   4) Pt will decrease pain to trace or none while completing light home management tasks or work related tasks by d/c.   5) Patient will be able to achieve less than or equal to 50% on the FOTO, demonstrating overall improved functional ability with upper extremity.  (Self-care category)      Plan     Certification Period/Plan of care expiration: 10/18/2024 to 12/27/2024.    Outpatient Occupational Therapy 2 times weekly for 10 weeks to include the following interventions: Paraffin, Fluidotherapy, Manual therapy/joint mobilizations, Modalities for pain management, US 3 mhz, Therapeutic exercises/activities., Iontophoresis with 2.0 cc Dexamethasone, Strengthening, Orthotic Fabrication/Fit/Training, Edema Control, Scar Management, Wound Care, Electrical Modalities, Joint Protection, and Energy Conservation.    Tomasa Jones, OT    I certify the need for these services furnished under this plan of treatment and while under my care.        Eben Jesus MD, FAAOS  , Orthopaedic Sports Medicine  Residency   [facility] Department of Orthopaedic Surgery  Assistant Orthopaedic Surgeon, [place]  Head Team Physician, [place]

## 2024-10-21 NOTE — PROGRESS NOTES
"[facility]  Occupational Therapy Treatment Note     Date: 10/22/2024  Name: Aysha Randolph  Clinic Number: [id number]    Therapy Diagnosis:   Encounter Diagnoses   Name Primary?    Left elbow pain Yes    Muscle weakness     Elbow stiffness, left      Physician: Eben Jesus MD    Physician Orders: Eval and Treat  Medical Diagnosis: S42.402D (ICD-10-CM) - Closed fracture of distal end of left humerus with routine healing, subsequent encounter  Surgical Procedure and Date: L distal humerus ORIF, 10/3/2024 / Date of Injury: 9/29/2024  Evaluation Date: 10/18/2024  Insurance Authorization Period Expiration: 10/16/2025  Plan of Care Certification Period: 12/27/2024  Date of Return to MD: 11/20/2024  Visit # / Visits authorized: 2 / 1  FOTO: 1/3     Precautions:  Standard and Weightbearing - standard ORIF protocol     Time In: 12:30 pm  Time Out: 1:15 pm  Total Appointment Time (timed & untimed codes): 45 minutes    Subjective     Pt reports: "I've been doing the exercises at home. I was able to bring my hand to my head."  she was compliant with home exercise program given last session.   Response to previous treatment:low pain  Functional change: tolerated progression of treatment well, improved range of motion     Pain: 2/10  Location: left elbow      Objective     Objective Measures updated at progress report unless specified.   Observation/Appearance: Patient arrives with no brace/sling. The incision is well healed with no SOI, steristrips are in place, there is appropriate p/o stiffness and edema      Special Tests: n/a     Edema. Measured in centimeters.    10/18/2024 10/18/2024         Left Right       Elbow 2 in above 35.5 cm 35 cm       Elbow 2 in below 29.7 cm 29.8 cm             Elbow and Wrist ROM. Measured in degrees.    10/18/2024 10/18/2024 10/22/2024        Left Right Left      Elbow Ext/Flex -40/90 WNL -36/120      Supination/Pronation 55/90 WNL 70/90      Wrist Ext/Flex " 60/35 WNL 60/50      Wrist RD/UD NM NM NM         Hand ROM. Measured in degrees.    10/18/2024 10/18/2024         Left Right       Full fist  WNL WNL           Strength (Dynamometer) and Pinch Strength (Pinch Gauge)  Measured in pounds.    10/18/2024 10/18/2024 10/22/2024        Left Right  Left     Rung II 5# 35#  15#        Sensation: Not formally assessed  Patient experiences paraesthesias in the entire hand                CMS Impairment/Limitation/Restriction for FOTO elbow Survey     Therapist reviewed FOTO scores for Aysha Randolph on 10/18/2024.   FOTO documents entered into [employer] - see Media section.     Limitation Score: 96%            Treatment     Aysha received the treatments listed below:     Aysha received the following supervised modalities after being cleared for contradictions for 5 minutes:   -Patient tolerates MHP x 5 min in order to decrease pain and increase soft tissue extensibility in preparation for ROM, concurrent with assessment of deficits and HEP review.     Aysha received the following manual therapy techniques for 10 minutes:   -all but 3 steri strips removed, where removed incision healed no signs or symptoms of infection  -I provide gentle scar mob to decrease scar tenderness, adherence, and hypersensitivity.       Aysha received therapeutic exercises for 30 minutes including:  - Therapist A PROM  - supine dowel chest press 2/15  - supine dowel flexion 2/15  - active elbow flexion 3 ways 2/15  - pro/sup 2/15  - pulleys 3 minutes  - wall slides 2/15  - shoulder squeeze  - shoulder shrugs  - scifit UBE forward/reverse Level 1 3 minutes each direction    Patient Education and Home Exercises     Education provided:   - on current condition and home exercise program   - Progress towards goals     Written Home Exercises Provided: Patient instructed to cont prior HEP.  Exercises were reviewed and Aysha was able to demonstrate them prior to the end of the session.  Aysha  demonstrated good  understanding of the HEP provided.   .   See EMR under Patient Instructions for exercises provided prior visit.      Assessment     Patient to clinic today for first treatment after initial evaluation. Patient with good participation today and reporting low pain report throughout.  Removed all but 3 steri-strips with incision where removed healed and no signs or symptoms of infection. Will recheck remaining area next session for possible removal. Patient responded well to scar mobilization and progression of treatment. She was able to perform all P/AA/active range of motion exercises this date all with good technique and without complaints. She is demonstrating overall improved range of motion, and hand strength since initial evaluation and reports compliance with home exercise program. Patient's very motivated and meeting post op landmarks.     Aysha is progressing well towards her goals and there are no updates to goals at this time. Pt prognosis is Good.     Pt will continue to benefit from skilled outpatient occupational therapy to address the deficits listed in the problem list on initial evaluation provide pt/family education and to maximize pt's level of independence in the home and community environment.     Anticipated barriers to occupational therapy: none    Pt's spiritual, cultural and educational needs considered and pt agreeable to plan of care and goals.    Goals:  Short term Goals:  1) Initiate home exercise program-met  2) Pt will increase AROM of L elbow by 5-10 degrees in order to assist with self grooming tasks by 4 weeks. -met  3) Pt will reduce edema by .5-1 cm in affected elbow by 4 weeks. -Not met, progressing  4) Pt will reduce pain to less than 4/10 by 4 weeks. -met  5) Pt will increase functional  strength by 5# in order to A in opening containers for med management or home management tasks by 4 weeks. -met  6) Patient will be able to achieve less than or equal to  80% on the FOTO, demonstrating overall improved functional ability with upper extremity. (Self-care category) -Not met, progressing     Long Term Goals:  Goals to be met by discharge:  1) Independent with home exercise program -Not met, progressing  2) Pt will increase L elbow HURTADO by at least 35 degrees in order to increase functional use for grasp with home management or work related tasks by d/c. -Not met, progressing  3) Pt will decrease edema to trace or none to increase functional ROM by d/c. -Not met, progressing  4) Pt will decrease pain to trace or none while completing light home management tasks or work related tasks by d/c. -Not met, progressing  5) Patient will be able to achieve less than or equal to 50% on the FOTO, demonstrating overall improved functional ability with upper extremity.  (Self-care category) -Not met, progressing    Plan     Continue with OT plan of care   Updates/Grading for next session: progress as able    LAURA Rodriguez

## 2024-10-22 ENCOUNTER — CLINICAL SUPPORT (OUTPATIENT)
Dept: REHABILITATION | Facility: HOSPITAL | Age: 64
End: 2024-10-22
Payer: MEDICARE

## 2024-10-22 DIAGNOSIS — M25.622 ELBOW STIFFNESS, LEFT: ICD-10-CM

## 2024-10-22 DIAGNOSIS — M25.522 LEFT ELBOW PAIN: Primary | ICD-10-CM

## 2024-10-22 DIAGNOSIS — M62.81 MUSCLE WEAKNESS: ICD-10-CM

## 2024-10-22 PROCEDURE — 97140 MANUAL THERAPY 1/> REGIONS: CPT | Mod: PN

## 2024-10-22 PROCEDURE — 97110 THERAPEUTIC EXERCISES: CPT | Mod: PN

## 2024-10-23 NOTE — PROGRESS NOTES
"[facility]  Occupational Therapy Treatment Note     Date: 10/24/2024  Name: Aysha Randolph  Clinic Number: [id number]    Therapy Diagnosis:   Encounter Diagnoses   Name Primary?    Left elbow pain Yes    Muscle weakness     Elbow stiffness, left      Physician: Eben Jesus MD    Physician Orders: Eval and Treat  Medical Diagnosis: S42.402D (ICD-10-CM) - Closed fracture of distal end of left humerus with routine healing, subsequent encounter  Surgical Procedure and Date: L distal humerus ORIF, 10/3/2024 / Date of Injury: 9/29/2024  Evaluation Date: 10/18/2024  Insurance Authorization Period Expiration: 10/16/2025  Plan of Care Certification Period: 12/27/2024  Date of Return to MD: 11/20/2024  Visit # / Visits authorized: 3 / 20  FOTO: 1/3     Precautions:  Standard and Weightbearing - standard ORIF protocol     Time In: 12:30 pm  Time Out: 1:15 pm  Total Appointment Time (timed & untimed codes): 45 minutes    Subjective     Pt reports: "I may have a little pinch where the stitch was, but only slight discomfort."  she was compliant with home exercise program given last session.   Response to previous treatment:low pain  Functional change: tolerated progression of treatment well, improved range of motion     Pain: 1/10  Location: left elbow      Objective     Objective Measures updated at progress report unless specified.   Observation/Appearance: Patient arrives with no brace/sling. The incision is well healed with no SOI, steristrips are in place, there is appropriate p/o stiffness and edema      Special Tests: n/a     Edema. Measured in centimeters.    10/18/2024 10/18/2024         Left Right       Elbow 2 in above 35.5 cm 35 cm       Elbow 2 in below 29.7 cm 29.8 cm             Elbow and Wrist ROM. Measured in degrees.    10/18/2024 10/18/2024 10/22/2024        Left Right Left      Elbow Ext/Flex -40/90 WNL -36/120      Supination/Pronation 55/90 WNL 70/90      Wrist Ext/Flex " 60/35 WNL 60/50      Wrist RD/UD NM NM NM         Hand ROM. Measured in degrees.    10/18/2024 10/18/24         Left Right       Full fist  WNL WNL           Strength (Dynamometer) and Pinch Strength (Pinch Gauge)  Measured in pounds.    10/18/2024 10/18/2024 10/22/2024        Left Right  Left     Rung II 5# 35#  15#        Sensation: Not formally assessed  Patient experiences paraesthesias in the entire hand                CMS Impairment/Limitation/Restriction for FOTO elbow Survey     Therapist reviewed FOTO scores for Aysha Randolph on 10/18/2024.   FOTO documents entered into [employer] - see Media section.     Limitation Score: 96%            Treatment     Aysha received the treatments listed below:     Aysha received the following supervised modalities after being cleared for contradictions for 5 minutes:   -Patient tolerates MHP x 5 min in order to decrease pain and increase soft tissue extensibility in preparation for ROM, concurrent with assessment of deficits.     Aysha received the following manual therapy techniques for 20 minutes:   -I provide gentle scar mob to decrease scar tenderness, adherence, and hypersensitivity.    - elbow joint mobs     Aysha received therapeutic exercises for 25  minutes including:  - Therapist A PROM - elbow and shoulder   - supine dowel chest press 2/15  - supine dowel flexion 2/15  - active elbow flexion 3 ways 2/15  - wrist flext/ext (flexion in neutral) 2/15  - pro/sup 2/15  - pulleys 3 minutes  - wall slides 2/15  - shoulder squeeze  - shoulder shrugs  - green sponge squeezes     Patient Education and Home Exercises     Education provided:   - on current condition and home exercise program   - Progress towards goals     Written Home Exercises Provided: Patient instructed to cont prior HEP.  Exercises were reviewed and Aysha was able to demonstrate them prior to the end of the session.  Aysha demonstrated good  understanding of the HEP provided.   .   See EMR under  Patient Instructions for exercises provided prior visit.      Assessment   Patient is currently 3w p/o. Upon arrival, patient states that she is doing well overall with very mild discomfort. The remaining steristrips are in place but falling off. The scar still needs a little approximation so I applied a few more strips in the associated areas. Along the scar there is a stitch trying to expel itself which is a little discomforting, but the therapist could not pull it out, so I suggest to the patient that she apply hot compresses and allow the stitch to eventually come out on its own. Patient responded well to scar mobilization and progression of treatment. She was able to perform all P/AA/active range of motion exercises this date all with good technique and without complaints. She is demonstrating overall improved range of motion and reports compliance with home exercise program. Provided patient with resisted sponge for light  strengthening. Patient's very motivated and meeting post op landmarks.     Aysha is progressing well towards her goals and there are no updates to goals at this time. Pt prognosis is Good.     Pt will continue to benefit from skilled outpatient occupational therapy to address the deficits listed in the problem list on initial evaluation provide pt/family education and to maximize pt's level of independence in the home and community environment.     Anticipated barriers to occupational therapy: none    Pt's spiritual, cultural and educational needs considered and pt agreeable to plan of care and goals.    Goals:  Short term Goals:  1) Initiate home exercise program Met, 10/24/2024  2) Pt will increase AROM of L elbow by 5-10 degrees in order to assist with self grooming tasks by 4 weeks. -Met, 10/24/2024  3) Pt will reduce edema by .5-1 cm in affected elbow by 4 weeks. Not met 10/24/2024, continue progressing   4) Pt will reduce pain to less than 4/10 by 4 weeks. Met, 10/24/2024  5) Pt  will increase functional  strength by 5# in order to A in opening containers for med management or home management tasks by 4 weeks.Met, 10/24/2024  6) Patient will be able to achieve less than or equal to 80% on the FOTO, demonstrating overall improved functional ability with upper extremity. (Self-care category) Not met 10/24/2024, continue progressing      Long Term Goals:  Goals to be met by discharge:  1) Independent with home exercise program Not met 10/24/2024, continue progressing   2) Pt will increase L elbow HURTADO by at least 35 degrees in order to increase functional use for grasp with home management or work related tasks by d/c. Not met 10/24/2024, continue progressing   3) Pt will decrease edema to trace or none to increase functional ROM by d/c. Not met 10/24/2024, continue progressing   4) Pt will decrease pain to trace or none while completing light home management tasks or work related tasks by d/c.Not met 10/24/2024, continue progressing   5) Patient will be able to achieve less than or equal to 50% on the FOTO, demonstrating overall improved functional ability with upper extremity.  (Self-care category) Not met 10/24/2024, continue progressing     Plan     Continue with OT plan of care   Updates/Grading for next session: progress as able    Tomasa Jones, OT

## 2024-10-24 ENCOUNTER — CLINICAL SUPPORT (OUTPATIENT)
Dept: REHABILITATION | Facility: HOSPITAL | Age: 64
End: 2024-10-24
Payer: MEDICARE

## 2024-10-24 DIAGNOSIS — M62.81 MUSCLE WEAKNESS: ICD-10-CM

## 2024-10-24 DIAGNOSIS — M25.622 ELBOW STIFFNESS, LEFT: ICD-10-CM

## 2024-10-24 DIAGNOSIS — M25.522 LEFT ELBOW PAIN: Primary | ICD-10-CM

## 2024-10-24 PROCEDURE — 97140 MANUAL THERAPY 1/> REGIONS: CPT | Mod: PN

## 2024-10-24 PROCEDURE — 97110 THERAPEUTIC EXERCISES: CPT | Mod: PN

## 2024-10-29 ENCOUNTER — CLINICAL SUPPORT (OUTPATIENT)
Dept: REHABILITATION | Facility: HOSPITAL | Age: 64
End: 2024-10-29
Payer: MEDICARE

## 2024-10-29 DIAGNOSIS — M25.622 ELBOW STIFFNESS, LEFT: ICD-10-CM

## 2024-10-29 DIAGNOSIS — M25.522 LEFT ELBOW PAIN: Primary | ICD-10-CM

## 2024-10-29 DIAGNOSIS — M62.81 MUSCLE WEAKNESS: ICD-10-CM

## 2024-10-29 PROCEDURE — 97140 MANUAL THERAPY 1/> REGIONS: CPT | Mod: PN

## 2024-10-29 PROCEDURE — 97110 THERAPEUTIC EXERCISES: CPT | Mod: PN

## 2024-10-31 ENCOUNTER — CLINICAL SUPPORT (OUTPATIENT)
Dept: REHABILITATION | Facility: HOSPITAL | Age: 64
End: 2024-10-31
Payer: MEDICARE

## 2024-10-31 DIAGNOSIS — M25.622 ELBOW STIFFNESS, LEFT: ICD-10-CM

## 2024-10-31 DIAGNOSIS — M62.81 MUSCLE WEAKNESS: ICD-10-CM

## 2024-10-31 DIAGNOSIS — M25.522 LEFT ELBOW PAIN: Primary | ICD-10-CM

## 2024-10-31 PROCEDURE — 97110 THERAPEUTIC EXERCISES: CPT | Mod: PN

## 2024-10-31 PROCEDURE — 97140 MANUAL THERAPY 1/> REGIONS: CPT | Mod: PN

## 2024-11-04 NOTE — PROGRESS NOTES
"[facility]  Occupational Therapy Treatment Note     Date: 11/5/2024  Name: Aysha Randolph  Clinic Number: [id number]    Therapy Diagnosis:   Encounter Diagnoses   Name Primary?    Left elbow pain Yes    Muscle weakness     Elbow stiffness, left        Physician: Eben Jesus MD    Physician Orders: Eval and Treat  Medical Diagnosis: S42.402D (ICD-10-CM) - Closed fracture of distal end of left humerus with routine healing, subsequent encounter  Surgical Procedure and Date: L distal humerus ORIF, 10/3/2024 / Date of Injury: 9/29/2024  Evaluation Date: 10/18/2024  Insurance Authorization Period Expiration: 10/16/2025  Plan of Care Certification Period: 12/27/2024  Date of Return to MD: 11/20/2024  Visit # / Visits authorized: 6 / 20  FOTO: 2/3     Precautions:  Standard and Weightbearing - standard ORIF protocol     Time In: 12:20 pm  Time Out: 1:20 pm  Total Appointment Time (timed & untimed codes): 60 minutes    Subjective     Pt reports: "I'm feeling really good!"  she was compliant with home exercise program given last session.   Response to previous treatment:low pain  Functional change: tolerated progression of treatment well, improved range of motion, improved FOTO score    Pain: 1/10  Location: left elbow      Objective     Objective Measures updated at progress report unless specified.   Observation/Appearance: Patient arrives with no brace/sling. The incision is well healed with no SOI, steristrips are in place, there is appropriate p/o stiffness and edema      Special Tests: n/a     Edema. Measured in centimeters.    10/18/2024 10/18/2024         Left Right       Elbow 2 in above 35.5 cm 35 cm       Elbow 2 in below 29.7 cm 29.8 cm             Elbow and Wrist ROM. Measured in degrees.    10/18/2024 10/18/2024 10/22/2024        Left Right Left      Elbow Ext/Flex -40/90 WNL -36/120      Supination/Pronation 55/90 WNL 70/90      Wrist Ext/Flex 60/35 WNL 60/50      Wrist " RD/UD NM NM NM         Hand ROM. Measured in degrees.    10/18/2024 10/18/24         Left Right       Full fist  WNL WNL           Strength (Dynamometer) and Pinch Strength (Pinch Gauge)  Measured in pounds.    10/18/2024 10/18/2024 10/22/2024        Left Right  Left     Rung II 5# 35#  15#        Sensation: Not formally assessed  Patient experiences paraesthesias in the entire hand                CMS Impairment/Limitation/Restriction for FOTO elbow Survey     Therapist reviewed FOTO scores for Aysha Randolph on 10/18/2024.   FOTO documents entered into [employer] - see Media section.     Limitation Score: 96%  6th:42%      Treatment     Aysha received the treatments listed below:     Aysha received the following supervised modalities after being cleared for contradictions for 5 minutes:   -Patient tolerates MHP x 5 min in order to decrease pain and increase soft tissue extensibility in preparation for ROM, concurrent with assessment of deficits.     Aysha received the following manual therapy techniques for 10 minutes:   -I provide gentle scar mob to decrease scar tenderness, adherence, and hypersensitivity.    -I provide gentle STM to L bicep region in order to increase soft tissue extensibility, decrease pain, and tenderness/hypersensitivity in the stated area, and promote scar tissue remodeling (concurrent with LLPS elbow ext)     Aysha received therapeutic exercises for 45 minutes including:  - Scifit UBE forward/reverse Level 1 3 minutes each direction  - Therapist A PROM - elbow and shoulder   - supine dowel chest press 2/15  - supine dowel flexion 2/15  - supine assisted tricep press 0# 2/15   - LLPS with palm supinated x 3 min  - seated dowel AA elbow flex and extension   - forearm pro/sup with 0# dowel  - active elbow flexion 3 ways 2/10  - wrist flext/ext/UD/RD 1# 2/10  - pulleys 3 minutes  - wall slides 2/15  - shoulder squeeze and shrugs  - large gripper and medium poms L1 with ext stretch      Patient Education and Home Exercises     Education provided:   - on current condition and home exercise program   - Progress towards goals     Written Home Exercises Provided: Patient instructed to cont prior HEP.  Exercises were reviewed and Asyha was able to demonstrate them at the end of the session.  Aysha demonstrated good  understanding of the HEP provided.      See EMR under Patient Instructions for exercises provided prior visit.   Assessment   Patient is currently 5w p/o.  Her incision his closed and healing well. Patient continues to respond well to scar mobilization and progression of treatment. She was able to perform all P/AA/active range of motion exercises this date all with good technique and without complaints. She is demonstrating overall improved range of motion and reports compliance with home exercise program. Patient's with an improved FOTO score indicating increased functional use Left upper extremity with self care tasks. Patient's very motivated and meeting post op landmarks.     Aysha is progressing well towards her goals and there are no updates to goals at this time. Pt prognosis is Good.     Pt will continue to benefit from skilled outpatient occupational therapy to address the deficits listed in the problem list on initial evaluation provide pt/family education and to maximize pt's level of independence in the home and community environment.     Anticipated barriers to occupational therapy: none    Pt's spiritual, cultural and educational needs considered and pt agreeable to plan of care and goals.    Goals:  Short term Goals:  1) Initiate home exercise program Met, 11/5/2024  2) Pt will increase AROM of L elbow by 5-10 degrees in order to assist with self grooming tasks by 4 weeks. -Met, 11/5/2024  3) Pt will reduce edema by .5-1 cm in affected elbow by 4 weeks. Not met 11/5/2024, continue progressing   4) Pt will reduce pain to less than 4/10 by 4 weeks. Met, 11/5/2024  5) Pt  will increase functional  strength by 5# in order to A in opening containers for med management or home management tasks by 4 weeks.Met, 11/5/2024  6) Patient will be able to achieve less than or equal to 80% on the FOTO, demonstrating overall improved functional ability with upper extremity. (Self-care category) Met     Long Term Goals:  Goals to be met by discharge:  1) Independent with home exercise program Not met 11/5/2024, continue progressing   2) Pt will increase L elbow HURTADO by at least 35 degrees in order to increase functional use for grasp with home management or work related tasks by d/c. Not met 11/5/2024, continue progressing   3) Pt will decrease edema to trace or none to increase functional ROM by d/c. Not met 11/5/2024, continue progressing   4) Pt will decrease pain to trace or none while completing light home management tasks or work related tasks by d/c.Not met 11/5/2024, continue progressing   5) Patient will be able to achieve less than or equal to 50% on the FOTO, demonstrating overall improved functional ability with upper extremity.  (Self-care category) Not met 11/5/2024, continue progressing     Plan     Continue with OT plan of care   Updates/Grading for next session: progress as able    LAURA Rodriguez

## 2024-11-05 ENCOUNTER — CLINICAL SUPPORT (OUTPATIENT)
Dept: REHABILITATION | Facility: HOSPITAL | Age: 64
End: 2024-11-05
Payer: MEDICARE

## 2024-11-05 ENCOUNTER — LAB VISIT (OUTPATIENT)
Dept: LAB | Facility: HOSPITAL | Age: 64
End: 2024-11-05
Attending: STUDENT IN AN ORGANIZED HEALTH CARE EDUCATION/TRAINING PROGRAM
Payer: MEDICARE

## 2024-11-05 DIAGNOSIS — M25.522 LEFT ELBOW PAIN: Primary | ICD-10-CM

## 2024-11-05 DIAGNOSIS — M25.622 ELBOW STIFFNESS, LEFT: ICD-10-CM

## 2024-11-05 DIAGNOSIS — M05.79 RHEUMATOID ARTHRITIS INVOLVING MULTIPLE SITES WITH POSITIVE RHEUMATOID FACTOR: ICD-10-CM

## 2024-11-05 DIAGNOSIS — M62.81 MUSCLE WEAKNESS: ICD-10-CM

## 2024-11-05 LAB
ALBUMIN SERPL BCP-MCNC: 4.1 G/DL (ref 3.5–5.2)
ALP SERPL-CCNC: 75 U/L (ref 40–150)
ALT SERPL W/O P-5'-P-CCNC: 35 U/L (ref 10–44)
ANION GAP SERPL CALC-SCNC: 13 MMOL/L (ref 8–16)
AST SERPL-CCNC: 33 U/L (ref 10–40)
BASOPHILS # BLD AUTO: 0.05 K/UL (ref 0–0.2)
BASOPHILS NFR BLD: 0.7 % (ref 0–1.9)
BILIRUB SERPL-MCNC: 1.2 MG/DL (ref 0.1–1)
BUN SERPL-MCNC: 17 MG/DL (ref 8–23)
CALCIUM SERPL-MCNC: 9.7 MG/DL (ref 8.7–10.5)
CHLORIDE SERPL-SCNC: 101 MMOL/L (ref 95–110)
CHOLEST SERPL-MCNC: 168 MG/DL (ref 120–199)
CHOLEST/HDLC SERPL: 2.4 {RATIO} (ref 2–5)
CO2 SERPL-SCNC: 27 MMOL/L (ref 23–29)
CREAT SERPL-MCNC: 0.8 MG/DL (ref 0.5–1.4)
CRP SERPL-MCNC: 1.6 MG/L (ref 0–8.2)
DIFFERENTIAL METHOD BLD: ABNORMAL
EOSINOPHIL # BLD AUTO: 0.2 K/UL (ref 0–0.5)
EOSINOPHIL NFR BLD: 2 % (ref 0–8)
ERYTHROCYTE [DISTWIDTH] IN BLOOD BY AUTOMATED COUNT: 14.5 % (ref 11.5–14.5)
ERYTHROCYTE [SEDIMENTATION RATE] IN BLOOD BY PHOTOMETRIC METHOD: 5 MM/HR (ref 0–36)
EST. GFR  (NO RACE VARIABLE): >60 ML/MIN/1.73 M^2
GLUCOSE SERPL-MCNC: 97 MG/DL (ref 70–110)
HCT VFR BLD AUTO: 36.5 % (ref 37–48.5)
HDLC SERPL-MCNC: 70 MG/DL (ref 40–75)
HDLC SERPL: 41.7 % (ref 20–50)
HGB BLD-MCNC: 11.9 G/DL (ref 12–16)
IMM GRANULOCYTES # BLD AUTO: 0.02 K/UL (ref 0–0.04)
IMM GRANULOCYTES NFR BLD AUTO: 0.3 % (ref 0–0.5)
LDLC SERPL CALC-MCNC: 73.2 MG/DL (ref 63–159)
LYMPHOCYTES # BLD AUTO: 2.7 K/UL (ref 1–4.8)
LYMPHOCYTES NFR BLD: 35.9 % (ref 18–48)
MCH RBC QN AUTO: 32.8 PG (ref 27–31)
MCHC RBC AUTO-ENTMCNC: 32.6 G/DL (ref 32–36)
MCV RBC AUTO: 101 FL (ref 82–98)
MONOCYTES # BLD AUTO: 0.5 K/UL (ref 0.3–1)
MONOCYTES NFR BLD: 6.2 % (ref 4–15)
NEUTROPHILS # BLD AUTO: 4.2 K/UL (ref 1.8–7.7)
NEUTROPHILS NFR BLD: 54.9 % (ref 38–73)
NONHDLC SERPL-MCNC: 98 MG/DL
NRBC BLD-RTO: 0 /100 WBC
PLATELET # BLD AUTO: 212 K/UL (ref 150–450)
PMV BLD AUTO: 11.6 FL (ref 9.2–12.9)
POTASSIUM SERPL-SCNC: 4 MMOL/L (ref 3.5–5.1)
PROT SERPL-MCNC: 7.2 G/DL (ref 6–8.4)
RBC # BLD AUTO: 3.63 M/UL (ref 4–5.4)
SODIUM SERPL-SCNC: 141 MMOL/L (ref 136–145)
TRIGL SERPL-MCNC: 124 MG/DL (ref 30–150)
WBC # BLD AUTO: 7.63 K/UL (ref 3.9–12.7)

## 2024-11-05 PROCEDURE — 85652 RBC SED RATE AUTOMATED: CPT | Performed by: STUDENT IN AN ORGANIZED HEALTH CARE EDUCATION/TRAINING PROGRAM

## 2024-11-05 PROCEDURE — 97110 THERAPEUTIC EXERCISES: CPT | Mod: KX,PN

## 2024-11-05 PROCEDURE — 36415 COLL VENOUS BLD VENIPUNCTURE: CPT | Mod: PO | Performed by: STUDENT IN AN ORGANIZED HEALTH CARE EDUCATION/TRAINING PROGRAM

## 2024-11-05 PROCEDURE — 85025 COMPLETE CBC W/AUTO DIFF WBC: CPT | Performed by: STUDENT IN AN ORGANIZED HEALTH CARE EDUCATION/TRAINING PROGRAM

## 2024-11-05 PROCEDURE — 80053 COMPREHEN METABOLIC PANEL: CPT | Performed by: STUDENT IN AN ORGANIZED HEALTH CARE EDUCATION/TRAINING PROGRAM

## 2024-11-05 PROCEDURE — 97140 MANUAL THERAPY 1/> REGIONS: CPT | Mod: KX,PN

## 2024-11-05 PROCEDURE — 80061 LIPID PANEL: CPT | Performed by: STUDENT IN AN ORGANIZED HEALTH CARE EDUCATION/TRAINING PROGRAM

## 2024-11-05 PROCEDURE — 86140 C-REACTIVE PROTEIN: CPT | Performed by: STUDENT IN AN ORGANIZED HEALTH CARE EDUCATION/TRAINING PROGRAM

## 2024-11-06 DIAGNOSIS — M79.7 FIBROMYALGIA: ICD-10-CM

## 2024-11-06 RX ORDER — PREGABALIN 50 MG/1
50 CAPSULE ORAL 3 TIMES DAILY
Qty: 90 CAPSULE | Refills: 6 | Status: SHIPPED | OUTPATIENT
Start: 2024-11-06

## 2024-11-06 NOTE — PROGRESS NOTES
"[facility]  Occupational Therapy Treatment Note     Date: 11/7/2024  Name: Aysha Randolph  Clinic Number: [id number]    Therapy Diagnosis:   Encounter Diagnoses   Name Primary?    Left elbow pain Yes    Muscle weakness     Elbow stiffness, left      Physician: Eben Jesus MD    Physician Orders: Eval and Treat  Medical Diagnosis: S42.402D (ICD-10-CM) - Closed fracture of distal end of left humerus with routine healing, subsequent encounter  Surgical Procedure and Date: L distal humerus ORIF, 10/3/2024 / Date of Injury: 9/29/2024  Evaluation Date: 10/18/2024  Insurance Authorization Period Expiration: 10/16/2025  Plan of Care Certification Period: 12/27/2024  Date of Return to MD: 11/20/2024  Visit # / Visits authorized: 6(+1) / 20  FOTO: 2/3     Precautions:  Standard and Weightbearing - standard ORIF protocol     Time In: 12:25 pm  Time Out: 1:25 pm  Total Appointment Time (timed & untimed codes): 60 minutes    Subjective     Pt reports: "I felt good after last time."  she was compliant with home exercise program given last session.   Response to previous treatment:low pain  Functional change: tolerated progression of treatment well, improved range of motion    Pain: 1/10  Location: left elbow      Objective     Objective Measures updated at progress report unless specified.   Observation/Appearance: Patient arrives with no brace/sling. The incision is well healed with no SOI, steristrips are in place, there is appropriate p/o stiffness and edema      Special Tests: n/a     Edema. Measured in centimeters.    10/18/2024 10/18/2024         Left Right       Elbow 2 in above 35.5 cm 35 cm       Elbow 2 in below 29.7 cm 29.8 cm             Elbow and Wrist ROM. Measured in degrees.    10/18/2024 10/18/2024 10/22/2024  11/7/2024      Left Right Left   Left   Elbow Ext/Flex -40/90 WNL -36/120  -25/125   Supination/Pronation 55/90 WNL 70/90  70/90    Wrist Ext/Flex 60/35 WNL 60/50   " 65/65   Wrist RD/UD NM NM NM  NM       Hand ROM. Measured in degrees.    10/18/2024 10/18/24         Left Right       Full fist  WNL WNL           Strength (Dynamometer) and Pinch Strength (Pinch Gauge)  Measured in pounds.    10/18/2024 10/18/2024 10/22/2024  11/7/2024      Left Right  Left  Left   Rung II 5# 35#  15#  20#      Sensation: Not formally assessed  Patient experiences paraesthesias in the entire hand                CMS Impairment/Limitation/Restriction for FOTO elbow Survey     Therapist reviewed FOTO scores for Aysha Randolph on 10/18/2024.   FOTO documents entered into [employer] - see Media section.     Limitation Score: 96%  6th:42%      Treatment     Aysha received the treatments listed below:     Aysha received the following supervised modalities after being cleared for contradictions for 5 minutes:   -Patient tolerates MHP x 5 min in order to decrease pain and increase soft tissue extensibility in preparation for ROM, concurrent with assessment of deficits.     Aysha received the following manual therapy techniques for 10 minutes:   -I provide gentle scar mob to decrease scar tenderness, adherence, and hypersensitivity.    -I provide gentle STM to L bicep region in order to increase soft tissue extensibility, decrease pain, and tenderness/hypersensitivity in the stated area, and promote scar tissue remodeling (concurrent with LLPS elbow ext)     Aysha received therapeutic exercises for 30 minutes including:  - Scifit UBE forward/reverse Level 1 3 minutes each direction  - Therapist A PROM - elbow and shoulder   - supine dowel chest press 2/15  - supine dowel flexion 2/15  - supine assisted tricep press 0# 2/15   - LLPS with palm supinated x 3 min  - forearm pro/sup with 0# dowel  - active elbow flexion 3 ways 2/10  - wrist flext/ext/UD/RD 2# 2/15    Aysha received therapeutic activity for 15 minutes including:  - red band rows 2/15  - large gripper and medium poms L1 with ext stretch   -  pulleys 3 minutes  - wall slides 2/15  Patient Education and Home Exercises     Education provided:   - on current condition and home exercise program   - Progress towards goals     Written Home Exercises Provided: Patient instructed to cont prior HEP.  Exercises were reviewed and Aysha was able to demonstrate them at the end of the session.  Aysha demonstrated good  understanding of the HEP provided.      See EMR under Patient Instructions for exercises provided prior visit.   Assessment   Patient is currently 5w p/o.  Her incision his closed and healing well. She continues to have some sensitivity at incision however responds well to desensitization techniques. She was able to perform all P/AA/active range of motion exercises this date all with good technique and without complaints. She is demonstrating overall improved range of motion and strength and reports compliance with home exercise program. Patient's very motivated and meeting post op landmarks.     Aysha is progressing well towards her goals and there are no updates to goals at this time. Pt prognosis is Good.     Pt will continue to benefit from skilled outpatient occupational therapy to address the deficits listed in the problem list on initial evaluation provide pt/family education and to maximize pt's level of independence in the home and community environment.     Anticipated barriers to occupational therapy: none    Pt's spiritual, cultural and educational needs considered and pt agreeable to plan of care and goals.    Goals:  Short term Goals:  1) Initiate home exercise program Met, 11/7/2024  2) Pt will increase AROM of L elbow by 5-10 degrees in order to assist with self grooming tasks by 4 weeks. -Met, 11/7/2024  3) Pt will reduce edema by .5-1 cm in affected elbow by 4 weeks. Not met 11/7/2024, continue progressing   4) Pt will reduce pain to less than 4/10 by 4 weeks. Met, 11/7/2024  5) Pt will increase functional  strength by 5# in  order to A in opening containers for med management or home management tasks by 4 weeks.Met, 11/7/2024  6) Patient will be able to achieve less than or equal to 80% on the FOTO, demonstrating overall improved functional ability with upper extremity. (Self-care category) Met     Long Term Goals:  Goals to be met by discharge:  1) Independent with home exercise program Not met 11/7/2024, continue progressing   2) Pt will increase L elbow HURTADO by at least 35 degrees in order to increase functional use for grasp with home management or work related tasks by d/c. Not met 11/7/2024, continue progressing   3) Pt will decrease edema to trace or none to increase functional ROM by d/c. Not met 11/7/2024, continue progressing   4) Pt will decrease pain to trace or none while completing light home management tasks or work related tasks by d/c.Not met 11/7/2024, continue progressing   5) Patient will be able to achieve less than or equal to 50% on the FOTO, demonstrating overall improved functional ability with upper extremity.  (Self-care category) Not met 11/7/2024, continue progressing     Plan     Continue with OT plan of care   Updates/Grading for next session: progress as able    LAURA Rodriguez

## 2024-11-07 ENCOUNTER — CLINICAL SUPPORT (OUTPATIENT)
Dept: REHABILITATION | Facility: HOSPITAL | Age: 64
End: 2024-11-07
Payer: MEDICARE

## 2024-11-07 DIAGNOSIS — M62.81 MUSCLE WEAKNESS: ICD-10-CM

## 2024-11-07 DIAGNOSIS — M25.622 ELBOW STIFFNESS, LEFT: ICD-10-CM

## 2024-11-07 DIAGNOSIS — M25.522 LEFT ELBOW PAIN: Primary | ICD-10-CM

## 2024-11-07 PROCEDURE — 97140 MANUAL THERAPY 1/> REGIONS: CPT | Mod: PN

## 2024-11-07 PROCEDURE — 97530 THERAPEUTIC ACTIVITIES: CPT | Mod: PN

## 2024-11-07 PROCEDURE — 97110 THERAPEUTIC EXERCISES: CPT | Mod: PN

## 2024-11-08 ENCOUNTER — OFFICE VISIT (OUTPATIENT)
Dept: SLEEP MEDICINE | Facility: CLINIC | Age: 64
End: 2024-11-08
Attending: PSYCHIATRY & NEUROLOGY
Payer: MEDICARE

## 2024-11-08 VITALS
SYSTOLIC BLOOD PRESSURE: 142 MMHG | DIASTOLIC BLOOD PRESSURE: 83 MMHG | HEART RATE: 99 BPM | BODY MASS INDEX: 39.16 KG/M2 | WEIGHT: 193.88 LBS

## 2024-11-08 DIAGNOSIS — G47.33 OBSTRUCTIVE SLEEP APNEA SYNDROME: Primary | ICD-10-CM

## 2024-11-08 PROCEDURE — 99999 PR PBB SHADOW E&M-EST. PATIENT-LVL II: CPT | Mod: PBBFAC,,, | Performed by: NURSE PRACTITIONER

## 2024-11-08 PROCEDURE — 99212 OFFICE O/P EST SF 10 MIN: CPT | Mod: PBBFAC | Performed by: NURSE PRACTITIONER

## 2024-11-08 NOTE — PROGRESS NOTES
Cc: FAITH      She underwent split PSG since seen reviewed with her today and began apap 5-12cm. Left humerus fracture impacted use recent use. Helps significantly. Snoring resolved. She feel more energetic. Using N30i mask w/o chin strap. Sleep more consolidated.     Remote 8-9/3/2024 avg 6:43h/n AHI 3.9, 90% tile 10cm  BP (!) 142/83 (BP Location: Right arm, Patient Position: Sitting)   Pulse 99   Wt 88 kg (193 lb 14.4 oz)   BMI 39.16 kg/m²     +fibromyalgia and RA  HTN, stable    Split PSG 5/28/24 .5, titrated CPAP 11cm      ASSESSMENT:   FAITH, very severe. Adherent and benefits from therapy. AHI<5.   She has medical comorbidities of RA, fibromyalgia, HTN  Immunocompromised state    PLAN:   1. Continue apap 5-12cm. DME [facility] supplies   2. Discussed controlof FAITH  See pcp re HTN mgt/continue med and rheumatology /continue meds

## 2024-11-11 NOTE — PROGRESS NOTES
"[facility]  Occupational Therapy Treatment Note     Date: 11/12/2024  Name: Aysha Randolph  Clinic Number: [id number]    Therapy Diagnosis:   Encounter Diagnoses   Name Primary?    Left elbow pain Yes    Muscle weakness     Elbow stiffness, left      Physician: Eben Jesus MD    Physician Orders: Eval and Treat  Medical Diagnosis: S42.402D (ICD-10-CM) - Closed fracture of distal end of left humerus with routine healing, subsequent encounter  Surgical Procedure and Date: L distal humerus ORIF, 10/3/2024 / Date of Injury: 9/29/2024  Evaluation Date: 10/18/2024  Insurance Authorization Period Expiration: 10/16/2025  Plan of Care Certification Period: 12/27/2024  Date of Return to MD: 11/20/2024  Visit # / Visits authorized: 7(+1) / 20  FOTO: 2/3     Precautions:  Standard and Weightbearing - standard ORIF protocol     Time In: 1:00 pm  Time Out: 2:00 pm  Total Appointment Time (timed & untimed codes): 60 minutes    Subjective     Pt reports: "I've been really trying to use this arm more."  she was compliant with home exercise program given last session.   Response to previous treatment:low pain  Functional change: tolerated progression of treatment well, improved range of motion    Pain: 1/10  Location: left elbow      Objective     Objective Measures updated at progress report unless specified.   Observation/Appearance: Patient arrives with no brace/sling. The incision is well healed with no SOI, steristrips are in place, there is appropriate p/o stiffness and edema      Special Tests: n/a     Edema. Measured in centimeters.    10/18/2024 10/18/2024         Left Right       Elbow 2 in above 35.5 cm 35 cm       Elbow 2 in below 29.7 cm 29.8 cm             Elbow and Wrist ROM. Measured in degrees.    10/18/2024 10/18/2024 10/22/2024  11/7/2024      Left Right Left   Left   Elbow Ext/Flex -40/90 WNL -36/120  -25/125   Supination/Pronation 55/90 WNL 70/90  70/90    Wrist Ext/Flex 60/35 " WNL 60/50   65/65   Wrist RD/UD NM NM NM  NM       Hand ROM. Measured in degrees.    10/18/2024 10/18/24         Left Right       Full fist  WNL WNL           Strength (Dynamometer) and Pinch Strength (Pinch Gauge)  Measured in pounds.    10/18/2024 10/18/2024 10/22/2024  11/7/2024      Left Right  Left  Left   Rung II 5# 35#  15#  20#      Sensation: Not formally assessed  Patient experiences paraesthesias in the entire hand                CMS Impairment/Limitation/Restriction for FOTO elbow Survey     Therapist reviewed FOTO scores for Aysha Randolph on 10/18/2024.   FOTO documents entered into [employer] - see Media section.     Limitation Score: 96%  6th:42%      Treatment     Aysha received the treatments listed below:     Aysha received the following supervised modalities after being cleared for contradictions for 5 minutes:   -Patient tolerates MHP x 5 min in order to decrease pain and increase soft tissue extensibility in preparation for ROM, concurrent with assessment of deficits.     Aysha received the following manual therapy techniques for 10 minutes:   -I provide gentle scar mob to decrease scar tenderness, adherence, and hypersensitivity.    -I provide gentle STM to L bicep region in order to increase soft tissue extensibility, decrease pain, and tenderness/hypersensitivity in the stated area, and promote scar tissue remodeling (concurrent with LLPS elbow ext)     Aysha received therapeutic exercises for 30 minutes including:  - Scifit UBE forward/reverse Level 1 3 minutes each direction  - Therapist A PROM - elbow and shoulder   - supine dowel chest press 2/15  - supine dowel flexion 2/15  - supine assisted tricep press 0# 2/15   - LLPS with palm supinated x 3 min  - forearm pro/sup with 0# dowel  - active elbow flexion 3 ways 2/10  - wrist flext/ext/UD/RD 2# 2/15    Aysha received therapeutic activity for 15 minutes including:  - farmer's carry 3# 3 laps  - red band rows 2/15  - large gripper and  medium poms L1 with ext stretch   - pulleys 3 minutes  - wall slides 2/15  Patient Education and Home Exercises     Education provided:   - on current condition and home exercise program   - Progress towards goals     Written Home Exercises Provided: Patient instructed to cont prior HEP.  Exercises were reviewed and Aysha was able to demonstrate them at the end of the session.  Aysha demonstrated good  understanding of the HEP provided.      See EMR under Patient Instructions for exercises provided prior visit.   Assessment   Patient is currently 5w 4 days p/o.  Her incision his closed and healing well. She continues to have some sensitivity at incision however responds well to desensitization techniques. She was able to perform all P/AA/active range of motion exercises this date all with good technique and without complaints. Tolerated progression of treatment well.  Patient's very motivated and meeting post op landmarks.     Aysha is progressing well towards her goals and there are no updates to goals at this time. Pt prognosis is Good.     Pt will continue to benefit from skilled outpatient occupational therapy to address the deficits listed in the problem list on initial evaluation provide pt/family education and to maximize pt's level of independence in the home and community environment.     Anticipated barriers to occupational therapy: none    Pt's spiritual, cultural and educational needs considered and pt agreeable to plan of care and goals.    Goals:  Short term Goals:  1) Initiate home exercise program Met, 11/12/2024  2) Pt will increase AROM of L elbow by 5-10 degrees in order to assist with self grooming tasks by 4 weeks. -Met, 11/12/2024  3) Pt will reduce edema by .5-1 cm in affected elbow by 4 weeks. Not met 11/12/2024, continue progressing   4) Pt will reduce pain to less than 4/10 by 4 weeks. Met, 11/12/2024  5) Pt will increase functional  strength by 5# in order to A in opening containers  for med management or home management tasks by 4 weeks.Met, 11/12/2024  6) Patient will be able to achieve less than or equal to 80% on the FOTO, demonstrating overall improved functional ability with upper extremity. (Self-care category) Met     Long Term Goals:  Goals to be met by discharge:  1) Independent with home exercise program Not met 11/12/2024, continue progressing   2) Pt will increase L elbow HURTADO by at least 35 degrees in order to increase functional use for grasp with home management or work related tasks by d/c. Not met 11/12/2024, continue progressing   3) Pt will decrease edema to trace or none to increase functional ROM by d/c. Not met 11/12/2024, continue progressing   4) Pt will decrease pain to trace or none while completing light home management tasks or work related tasks by d/c.Not met 11/12/2024, continue progressing   5) Patient will be able to achieve less than or equal to 50% on the FOTO, demonstrating overall improved functional ability with upper extremity.  (Self-care category) Not met 11/12/2024, continue progressing     Plan     Continue with OT plan of care   Updates/Grading for next session: progress as able    LAURA Rodriguez

## 2024-11-12 ENCOUNTER — CLINICAL SUPPORT (OUTPATIENT)
Dept: REHABILITATION | Facility: HOSPITAL | Age: 64
End: 2024-11-12
Payer: MEDICARE

## 2024-11-12 DIAGNOSIS — M25.622 ELBOW STIFFNESS, LEFT: ICD-10-CM

## 2024-11-12 DIAGNOSIS — M62.81 MUSCLE WEAKNESS: ICD-10-CM

## 2024-11-12 DIAGNOSIS — M25.522 LEFT ELBOW PAIN: Primary | ICD-10-CM

## 2024-11-12 PROCEDURE — 97110 THERAPEUTIC EXERCISES: CPT | Mod: PN

## 2024-11-12 PROCEDURE — 97140 MANUAL THERAPY 1/> REGIONS: CPT | Mod: PN

## 2024-11-12 PROCEDURE — 97530 THERAPEUTIC ACTIVITIES: CPT | Mod: PN

## 2024-11-14 ENCOUNTER — CLINICAL SUPPORT (OUTPATIENT)
Dept: REHABILITATION | Facility: HOSPITAL | Age: 64
End: 2024-11-14
Payer: MEDICARE

## 2024-11-14 DIAGNOSIS — M25.522 LEFT ELBOW PAIN: Primary | ICD-10-CM

## 2024-11-14 DIAGNOSIS — M25.622 ELBOW STIFFNESS, LEFT: ICD-10-CM

## 2024-11-14 DIAGNOSIS — M62.81 MUSCLE WEAKNESS: ICD-10-CM

## 2024-11-14 PROCEDURE — 97530 THERAPEUTIC ACTIVITIES: CPT | Mod: PN

## 2024-11-14 PROCEDURE — 97110 THERAPEUTIC EXERCISES: CPT | Mod: PN

## 2024-11-14 PROCEDURE — 97140 MANUAL THERAPY 1/> REGIONS: CPT | Mod: PN

## 2024-11-14 NOTE — PROGRESS NOTES
"[facility]  Occupational Therapy Treatment Note     Date: 11/14/2024  Name: Aysha Randolph  Clinic Number: [id number]    Therapy Diagnosis:   Encounter Diagnoses   Name Primary?    Left elbow pain Yes    Muscle weakness     Elbow stiffness, left      Physician: Eben Jesus MD    Physician Orders: Eval and Treat  Medical Diagnosis: S42.402D (ICD-10-CM) - Closed fracture of distal end of left humerus with routine healing, subsequent encounter  Surgical Procedure and Date: L distal humerus ORIF, 10/3/2024 / Date of Injury: 9/29/2024  Evaluation Date: 10/18/2024  Insurance Authorization Period Expiration: 10/16/2025  Plan of Care Certification Period: 12/27/2024  Date of Return to MD: 11/20/2024  Visit # / Visits authorized: 8(+1) / 20  FOTO: 2/3     Precautions:  Standard and Weightbearing - standard ORIF protocol     Time In: 12:10 pm  Time Out: 1:10 pm  Total Appointment Time (timed & untimed codes): 60 minutes    Subjective     Pt reports: "I'm feeling really good."  she was compliant with home exercise program given last session.   Response to previous treatment:low pain  Functional change: tolerated progression of treatment well, improved range of motion    Pain: 1/10  Location: left elbow      Objective     Objective Measures updated at progress report unless specified.   Observation/Appearance: Patient arrives with no brace/sling. The incision is well healed with no SOI, steristrips are in place, there is appropriate p/o stiffness and edema      Special Tests: n/a     Edema. Measured in centimeters.    10/18/2024 10/18/2024         Left Right       Elbow 2 in above 35.5 cm 35 cm       Elbow 2 in below 29.7 cm 29.8 cm             Elbow and Wrist ROM. Measured in degrees.    10/18/2024 10/18/2024 10/22/2024  11/7/2024      Left Right Left   Left   Elbow Ext/Flex -40/90 WNL -36/120  -25/125   Supination/Pronation 55/90 WNL 70/90  70/90    Wrist Ext/Flex 60/35 WNL 60/50   65/65 "   Wrist RD/UD NM NM NM  NM       Hand ROM. Measured in degrees.    10/18/2024 10/18/24         Left Right       Full fist  WNL WNL           Strength (Dynamometer) and Pinch Strength (Pinch Gauge)  Measured in pounds.    10/18/2024 10/18/2024 10/22/2024  11/7/2024      Left Right  Left  Left   Rung II 5# 35#  15#  20#      Sensation: Not formally assessed  Patient experiences paraesthesias in the entire hand                CMS Impairment/Limitation/Restriction for FOTO elbow Survey     Therapist reviewed FOTO scores for Aysha Randolph on 10/18/2024.   FOTO documents entered into [employer] - see Media section.     Limitation Score: 96%  6th:42%      Treatment     Aysha received the treatments listed below:     Aysha received the following supervised modalities after being cleared for contradictions for 5 minutes:   -Patient tolerates MHP x 5 min in order to decrease pain and increase soft tissue extensibility in preparation for ROM, concurrent with assessment of deficits.     Aysha received the following manual therapy techniques for 10 minutes:   -I provide gentle scar mob to decrease scar tenderness, adherence, and hypersensitivity.    -I provide gentle STM to L bicep region in order to increase soft tissue extensibility, decrease pain, and tenderness/hypersensitivity in the stated area, and promote scar tissue remodeling (concurrent with LLPS elbow ext)     Aysha received therapeutic exercises for 30 minutes including:  - Scifit UBE forward/reverse Level 1 3 minutes each direction  - Therapist A PROM - elbow and shoulder   - supine dowel chest press 1# 2/15  - supine dowel flexion 1# 2/15  - supine assisted tricep press 0# 2/15   - LLPS with palm supinated x 3 min  - forearm pro/sup with 0# dowel  - active elbow flexion 3 ways 1# 2/15  - wrist flext/ext/UD/RD 2# 2/15    Aysha received therapeutic activity for 15 minutes including:  - farmer's carry 4# 3 laps  - red band rows 2/15  - large gripper and medium  poms L1 with ext stretch   - pulleys 3 minutes  - wall slides 2/15  Patient Education and Home Exercises     Education provided:   - on current condition and home exercise program   - Progress towards goals     Written Home Exercises Provided: Patient instructed to cont prior HEP.  Exercises were reviewed and Aysha was able to demonstrate them at the end of the session.  Aysha demonstrated good  understanding of the HEP provided.      See EMR under Patient Instructions for exercises provided prior visit.   Assessment   Patient is currently 5w 4 days p/o.  Her incision his closed and healing well. She continues to have some sensitivity at incision however responds well to desensitization techniques. She was able to perform all P/AA/active range of motion exercises this date all with good technique and without complaints. Tolerated progression of treatment well.  Patient's very motivated and meeting post op landmarks.     Aysha is progressing well towards her goals and there are no updates to goals at this time. Pt prognosis is Good.     Pt will continue to benefit from skilled outpatient occupational therapy to address the deficits listed in the problem list on initial evaluation provide pt/family education and to maximize pt's level of independence in the home and community environment.     Anticipated barriers to occupational therapy: none    Pt's spiritual, cultural and educational needs considered and pt agreeable to plan of care and goals.    Goals:  Short term Goals:  1) Initiate home exercise program Met, 11/14/2024  2) Pt will increase AROM of L elbow by 5-10 degrees in order to assist with self grooming tasks by 4 weeks. -Met, 11/14/2024  3) Pt will reduce edema by .5-1 cm in affected elbow by 4 weeks. Not met 11/14/2024, continue progressing   4) Pt will reduce pain to less than 4/10 by 4 weeks. Met, 11/14/2024  5) Pt will increase functional  strength by 5# in order to A in opening containers for  med management or home management tasks by 4 weeks.Met, 11/14/2024  6) Patient will be able to achieve less than or equal to 80% on the FOTO, demonstrating overall improved functional ability with upper extremity. (Self-care category) Met     Long Term Goals:  Goals to be met by discharge:  1) Independent with home exercise program Not met 11/14/2024, continue progressing   2) Pt will increase L elbow HURTADO by at least 35 degrees in order to increase functional use for grasp with home management or work related tasks by d/c. Not met 11/14/2024, continue progressing   3) Pt will decrease edema to trace or none to increase functional ROM by d/c. Not met 11/14/2024, continue progressing   4) Pt will decrease pain to trace or none while completing light home management tasks or work related tasks by d/c.Not met 11/14/2024, continue progressing   5) Patient will be able to achieve less than or equal to 50% on the FOTO, demonstrating overall improved functional ability with upper extremity.  (Self-care category) Not met 11/14/2024, continue progressing     Plan     Continue with OT plan of care   Updates/Grading for next session: progress as able    LAURA Rodriguez

## 2024-11-20 ENCOUNTER — HOSPITAL ENCOUNTER (OUTPATIENT)
Dept: RADIOLOGY | Facility: HOSPITAL | Age: 64
Discharge: HOME | End: 2024-11-20
Attending: ORTHOPAEDIC SURGERY
Payer: MEDICARE

## 2024-11-20 ENCOUNTER — OFFICE VISIT (OUTPATIENT)
Dept: ORTHOPEDICS | Facility: CLINIC | Age: 64
End: 2024-11-20
Payer: MEDICARE

## 2024-11-20 ENCOUNTER — TELEPHONE (OUTPATIENT)
Dept: ORTHOPEDICS | Facility: CLINIC | Age: 64
End: 2024-11-20
Payer: MEDICARE

## 2024-11-20 ENCOUNTER — PATIENT MESSAGE (OUTPATIENT)
Dept: ADMINISTRATIVE | Facility: HOSPITAL | Age: 64
End: 2024-11-20
Payer: MEDICARE

## 2024-11-20 VITALS — BODY MASS INDEX: 40 KG/M2 | HEIGHT: 59 IN | WEIGHT: 198.44 LBS

## 2024-11-20 DIAGNOSIS — S42.402D: Primary | ICD-10-CM

## 2024-11-20 DIAGNOSIS — S42.402D: ICD-10-CM

## 2024-11-20 PROCEDURE — 99213 OFFICE O/P EST LOW 20 MIN: CPT | Mod: PBBFAC,25,PN | Performed by: ORTHOPAEDIC SURGERY

## 2024-11-20 PROCEDURE — 73080 X-RAY EXAM OF ELBOW: CPT | Mod: TC,FY,LT

## 2024-11-20 PROCEDURE — 73080 X-RAY EXAM OF ELBOW: CPT | Mod: 26,LT,, | Performed by: RADIOLOGY

## 2024-11-20 PROCEDURE — 99024 POSTOP FOLLOW-UP VISIT: CPT | Mod: POP,,, | Performed by: ORTHOPAEDIC SURGERY

## 2024-11-20 PROCEDURE — 99999 PR PBB SHADOW E&M-EST. PATIENT-LVL III: CPT | Mod: PBBFAC,,, | Performed by: ORTHOPAEDIC SURGERY

## 2024-11-20 NOTE — TELEPHONE ENCOUNTER
Called and spoke to pt. Pt stated they would be late to appt and wanted to know if she could still be seen. Told pt she can come and we will see her. Pt verbalized understanding.

## 2024-11-20 NOTE — PROGRESS NOTES
"[facility]  Occupational Therapy Treatment Note     Date: 11/21/2024  Name: Aysha Randolph  Clinic Number: [id number]    Therapy Diagnosis:   Encounter Diagnoses   Name Primary?    Left elbow pain Yes    Muscle weakness     Elbow stiffness, left      Physician: Eben Jesus MD    Physician Orders: Eval and Treat; I reviewed the radiographs in detail. The patient may advance her ROM and strengthening. She may start to weightbear on he LUE. She will return in about 6 weeks with a new x-ray of the left elbow.   Medical Diagnosis: S42.402D (ICD-10-CM) - Closed fracture of distal end of left humerus with routine healing, subsequent encounter  Surgical Procedure and Date: L distal humerus ORIF, 10/3/2024 / Date of Injury: 9/29/2024  Evaluation Date: 10/18/2024  Insurance Authorization Period Expiration: 10/16/2025  Plan of Care Certification Period: 12/27/2024  Date of Return to MD: 1/7/2024  Visit # / Visits authorized: 9(+1) / 20  FOTO: 3/3     Precautions:  Standard      Time In: 12:30 pm  Time Out: 1:15 pm  Total Appointment Time (timed & untimed codes): 45 minutes    Subjective     Pt reports: "I saw the doctor yesterday."  she was compliant with home exercise program given last session.   Response to previous treatment:low pain  Functional change: tolerated progression of treatment well, improved range of motion    Pain: 1/10  Location: left elbow      Objective     Objective Measures updated at progress report unless specified.   Observation/Appearance: Patient arrives with no brace/sling. The incision is well healed with no SOI, steristrips are in place, there is appropriate p/o stiffness and edema      Special Tests: n/a     Edema. Measured in centimeters.    10/18/2024 10/18/2024         Left Right       Elbow 2 in above 35.5 cm 35 cm       Elbow 2 in below 29.7 cm 29.8 cm             Elbow and Wrist ROM. Measured in degrees.    10/18/2024 10/18/2024 10/22/2024  11/7/2024      " Left Right Left   Left   Elbow Ext/Flex -40/90 WNL -36/120  -25/125   Supination/Pronation 55/90 WNL 70/90  70/90    Wrist Ext/Flex 60/35 WNL 60/50   65/65   Wrist RD/UD NM NM NM  NM       Hand ROM. Measured in degrees.    10/18/2024 10/18/24         Left Right       Full fist  WNL WNL           Strength (Dynamometer) and Pinch Strength (Pinch Gauge)  Measured in pounds.    10/18/2024 10/18/2024 10/22/2024  11/7/2024      Left Right  Left  Left   Rung II 5# 35#  15#  20#      Sensation: Not formally assessed  Patient experiences paraesthesias in the entire hand                CMS Impairment/Limitation/Restriction for FOTO elbow Survey     Therapist reviewed FOTO scores for Aysha Randolph on 10/18/2024.   FOTO documents entered into [employer] - see Media section.     Limitation Score: 96%  6th:42%  10th:50%      Treatment     Aysha received the treatments listed below:     Aysha received the following supervised modalities after being cleared for contradictions for 5 minutes:   -Patient tolerates MHP x 5 min in order to decrease pain and increase soft tissue extensibility in preparation for ROM, concurrent with assessment of deficits.     Aysha received the following manual therapy techniques for 10 minutes:   -I provide gentle scar mob to decrease scar tenderness, adherence, and hypersensitivity.    -I provide gentle STM to L bicep region in order to increase soft tissue extensibility, decrease pain, and tenderness/hypersensitivity in the stated area, and promote scar tissue remodeling (concurrent with LLPS elbow ext)     Aysha received therapeutic exercises for 20 minutes including:  - Scifit UBE forward/reverse Level 1 3 minutes each direction  - Therapist A PROM - elbow and shoulder   - seated dowel chest press 3# 2/15  - LLPS with palm supinated x 3 min  - forearm pro/sup with hammer  - active elbow flexion 3 ways 2# 2/15  - wrist flext/ext/UD/RD 2# 2/15    Aysha received therapeutic activity for 15 minutes  including:  - farmer's carry 6# 3 laps  - red band rows 2/15  - large gripper and medium poms L1 with ext stretch   - wall pushups 2/15  Patient Education and Home Exercises     Education provided:   - on current condition and home exercise program   - Progress towards goals     Written Home Exercises Provided: Patient instructed to cont prior HEP.  Exercises were reviewed and Aysha was able to demonstrate them at the end of the session.  Aysha demonstrated good  understanding of the HEP provided.      See EMR under Patient Instructions for exercises provided prior visit.   Assessment   Patient is currently 6w 4 days p/o.  She was cleared by Dr. Jesus to begin strengthening and no longer has precautions. She continues to have some sensitivity at incision however responds well to desensitization techniques. She was able to perform all P/AA/active range of motion exercises this date all with good technique and without complaints. Tolerated progression to strengthening exercises well completing all in a pain free range of motion. Fatigue noted after.  Patient's very motivated and meeting post op landmarks.     Aysha is progressing well towards her goals and there are no updates to goals at this time. Pt prognosis is Good.     Pt will continue to benefit from skilled outpatient occupational therapy to address the deficits listed in the problem list on initial evaluation provide pt/family education and to maximize pt's level of independence in the home and community environment.     Anticipated barriers to occupational therapy: none    Pt's spiritual, cultural and educational needs considered and pt agreeable to plan of care and goals.    Goals:  Short term Goals:  1) Initiate home exercise program Met, 11/21/2024  2) Pt will increase AROM of L elbow by 5-10 degrees in order to assist with self grooming tasks by 4 weeks. -Met, 11/21/2024  3) Pt will reduce edema by .5-1 cm in affected elbow by 4 weeks. met  4) Pt  will reduce pain to less than 4/10 by 4 weeks. Met, 11/21/2024  5) Pt will increase functional  strength by 5# in order to A in opening containers for med management or home management tasks by 4 weeks.Met, 11/21/2024  6) Patient will be able to achieve less than or equal to 80% on the FOTO, demonstrating overall improved functional ability with upper extremity. (Self-care category) Met     Long Term Goals:  Goals to be met by discharge:  1) Independent with home exercise program Not met 11/21/2024, continue progressing   2) Pt will increase L elbow HURTADO by at least 35 degrees in order to increase functional use for grasp with home management or work related tasks by d/c. Not met 11/21/2024, continue progressing   3) Pt will decrease edema to trace or none to increase functional ROM by d/c. Not met 11/21/2024, continue progressing   4) Pt will decrease pain to trace or none while completing light home management tasks or work related tasks by d/c.Not met 11/21/2024, continue progressing   5) Patient will be able to achieve less than or equal to 50% on the FOTO, demonstrating overall improved functional ability with upper extremity.  (Self-care category) Not met 11/21/2024, continue progressing     Plan     Continue with OT plan of care   Updates/Grading for next session: progress as able    LAURA Rodriguez

## 2024-11-20 NOTE — TELEPHONE ENCOUNTER
----- Message from Yudelka sent at 11/20/2024 12:32 PM CST -----  Type:  Needs Medical Advice    Who Called: pt  Would the patient rather a call back or a response via [facility]? call  Best Call Back Number:  [phone]  Additional Information: pt states they are on they way back from vacation wont make it to appt until around a little after 2 would like to know if she cans till be seen today

## 2024-11-21 ENCOUNTER — CLINICAL SUPPORT (OUTPATIENT)
Dept: REHABILITATION | Facility: HOSPITAL | Age: 64
End: 2024-11-21
Payer: MEDICARE

## 2024-11-21 DIAGNOSIS — M25.622 ELBOW STIFFNESS, LEFT: ICD-10-CM

## 2024-11-21 DIAGNOSIS — M62.81 MUSCLE WEAKNESS: ICD-10-CM

## 2024-11-21 DIAGNOSIS — M25.522 LEFT ELBOW PAIN: Primary | ICD-10-CM

## 2024-11-21 PROCEDURE — 97110 THERAPEUTIC EXERCISES: CPT | Mod: KX,PN

## 2024-11-21 PROCEDURE — 97530 THERAPEUTIC ACTIVITIES: CPT | Mod: KX,PN

## 2024-11-21 PROCEDURE — 97140 MANUAL THERAPY 1/> REGIONS: CPT | Mod: KX,PN

## 2024-11-21 NOTE — PROGRESS NOTES
Patient ID:   Aysha Randolph is a 63 y.o. female.    Chief Complaint:   Six weeks s/p ORIF left distal humerus    HPI:   The patient is returning for evaluation of the left arm. She is doing well. She has minimal pain (1/10). She reports stiffness in extension. She is currently in PT.     Medications:    Current Outpatient Medications:     acetaminophen (TYLENOL) 325 MG tablet, Take 2 tablets (650 mg total) by mouth every 6 (six) hours as needed for Pain., Disp: 40 tablet, Rfl: 0    albuterol (ACCUNEB) 1.25 mg/3 mL Nebu, Take 3 mLs (1.25 mg total) by nebulization every 6 (six) hours as needed (shortness of breath, wheezing)., Disp: 75 mL, Rfl: 1    albuterol (VENTOLIN HFA) 90 mcg/actuation inhaler, Inhale 2 puffs into the lungs every 4 (four) hours as needed for Wheezing. Rescue, Disp: 6.7 g, Rfl: 1    atorvastatin (LIPITOR) 10 MG tablet, Take 1 tablet (10 mg total) by mouth once daily., Disp: 90 tablet, Rfl: 3    cholecalciferol, vitamin D3, 125 mcg (5,000 unit) Tab, Take 5,000 Units by mouth once daily., Disp: , Rfl:     EScitalopram oxalate (LEXAPRO) 20 MG tablet, Take 1 tablet (20 mg total) by mouth once daily., Disp: 90 tablet, Rfl: 3    folic acid (FOLVITE) 1 MG tablet, Take 1 tablet (1 mg total) by mouth once daily., Disp: 90 tablet, Rfl: 3    hydroCHLOROthiazide (HYDRODIURIL) 25 MG tablet, Take 1 tablet (25 mg total) by mouth once daily., Disp: 90 tablet, Rfl: 3    ibuprofen (ADVIL,MOTRIN) 600 MG tablet, Take 1 tablet (600 mg total) by mouth every 6 (six) hours as needed for Pain., Disp: 20 tablet, Rfl: 0    methotrexate 2.5 MG Tab, Take 10 tablets (25 mg total) by mouth every 7 days., Disp: 40 tablet, Rfl: 3    multivitamin-minerals-lutein (MULTIVITAMIN 50 PLUS) Tab, Take 1 tablet by mouth once daily., Disp: , Rfl:     omega 3-dha-epa-fish oil 120-180-500 mg Cap, , Disp: , Rfl:     ondansetron (ZOFRAN) 4 MG tablet, Take 1 tablet (4 mg total) by mouth 2 (two) times daily., Disp: 15 tablet, Rfl: 0     "oxyCODONE (ROXICODONE) 5 MG immediate release tablet, Take 1 tablet (5 mg total) by mouth every 6 (six) hours as needed for Pain., Disp: 12 tablet, Rfl: 0    pantoprazole (PROTONIX) 40 MG tablet, Take 1 tablet (40 mg total) by mouth once daily., Disp: 90 tablet, Rfl: 3    pen needle, diabetic (BD ULTRA-FINE MINI PEN NEEDLE) 31 gauge x 3/16" Ndle, Inject 1 each into the skin once daily., Disp: 100 each, Rfl: 3    pregabalin (LYRICA) 50 MG capsule, Take 1 capsule (50 mg total) by mouth 3 (three) times daily., Disp: 90 capsule, Rfl: 6    sarilumab (KEVZARA) 200 mg/1.14 mL PnIj, Inject 1.14 mLs (200 mg total) into the skin every 14 (fourteen) days., Disp: 2 pen , Rfl: 11    teriparatide (FORTEO) 20 mcg/dose (600mcg/2.4mL) PnIj, Inject 0.08 mLs (20 mcg total) into the skin once daily., Disp: 2.4 mL, Rfl: 11    aspirin (ECOTRIN) 81 MG EC tablet, Take 1 tablet (81 mg total) by mouth 2 (two) times a day., Disp: 60 tablet, Rfl: 0    ondansetron (ZOFRAN-ODT) 4 MG TbDL, Take 1 tablet (4 mg total) by mouth every 8 (eight) hours as needed (nausea). (Patient not taking: Reported on 11/20/2024), Disp: 30 tablet, Rfl: 0    Allergies:  Review of patient's allergies indicates:  No Known Allergies    Vitals:  Ht 4' 11" (1.499 m)   Wt 90 kg (198 lb 6.6 oz)   BMI 40.07 kg/m²     Physical Examination:  Ortho Exam   Left upper extremity exam:  Healed surgical incision. No erythema.   ROM elbow: -20 to 145    Imaging Studies:  I have ordered and independently reviewed the following imaging studies performed at [facility] today    X-Ray Elbow Complete Left  Narrative: EXAMINATION:  XR ELBOW COMPLETE 3 VIEW LEFT    CLINICAL HISTORY:  Unspecified fracture of lower end of left humerus, subsequent encounter for fracture with routine healing    TECHNIQUE:  XR ELBOW COMPLETE 3 VIEW LEFT    COMPARISON:  10/16/2024    FINDINGS:  Status post medial and lateral plate and screw fixation construct of supracondylar and transcondylar humerus fracture " which remains incompletely healed.  Some blurring of the fracture line anteriorly is visualized but no anterior bridging callus.  Elbow joint remains congruent with probable small effusion.  Impression: See above    Electronically signed by: Aayush Farah Jr  Date:    11/20/2024  Time:    14:45    Assessment:  1. Closed fracture of distal end of left humerus with routine healing, subsequent encounter      Plan:  I reviewed the radiographs in detail. The patient may advance her ROM and strengthening. She may start to weightbear on he LUE. She will return in about 6 weeks with a new x-ray of the left elbow.        No follow-ups on file.

## 2024-11-25 ENCOUNTER — OFFICE VISIT (OUTPATIENT)
Dept: PRIMARY CARE CLINIC | Facility: CLINIC | Age: 64
End: 2024-11-25
Payer: MEDICARE

## 2024-11-25 VITALS
BODY MASS INDEX: 40.04 KG/M2 | HEIGHT: 59 IN | SYSTOLIC BLOOD PRESSURE: 132 MMHG | DIASTOLIC BLOOD PRESSURE: 80 MMHG | HEART RATE: 97 BPM | OXYGEN SATURATION: 96 % | WEIGHT: 198.63 LBS

## 2024-11-25 DIAGNOSIS — Z12.31 ENCOUNTER FOR SCREENING MAMMOGRAM FOR MALIGNANT NEOPLASM OF BREAST: ICD-10-CM

## 2024-11-25 DIAGNOSIS — M79.7 FIBROMYALGIA: ICD-10-CM

## 2024-11-25 DIAGNOSIS — Z23 NEEDS FLU SHOT: ICD-10-CM

## 2024-11-25 DIAGNOSIS — G47.33 OBSTRUCTIVE SLEEP APNEA SYNDROME: ICD-10-CM

## 2024-11-25 DIAGNOSIS — I10 BENIGN ESSENTIAL HYPERTENSION: Primary | ICD-10-CM

## 2024-11-25 DIAGNOSIS — S42.495D OTHER CLOSED NONDISPLACED FRACTURE OF DISTAL END OF LEFT HUMERUS WITH ROUTINE HEALING, SUBSEQUENT ENCOUNTER: ICD-10-CM

## 2024-11-25 PROBLEM — J06.9 UPPER RESPIRATORY TRACT INFECTION: Status: RESOLVED | Noted: 2024-09-13 | Resolved: 2024-11-25

## 2024-11-25 PROCEDURE — 99999 PR PBB SHADOW E&M-EST. PATIENT-LVL IV: CPT | Mod: PBBFAC,,, | Performed by: INTERNAL MEDICINE

## 2024-11-25 PROCEDURE — 99214 OFFICE O/P EST MOD 30 MIN: CPT | Mod: PBBFAC | Performed by: INTERNAL MEDICINE

## 2024-11-25 PROCEDURE — 99999PBSHW PR PBB SHADOW TECHNICAL ONLY FILED TO HB: Mod: PBBFAC,,,

## 2024-11-25 PROCEDURE — 99214 OFFICE O/P EST MOD 30 MIN: CPT | Mod: S$PBB,,, | Performed by: INTERNAL MEDICINE

## 2024-11-25 PROCEDURE — G0008 ADMIN INFLUENZA VIRUS VAC: HCPCS | Mod: PBBFAC

## 2024-11-25 PROCEDURE — 90653 IIV ADJUVANT VACCINE IM: CPT | Mod: PBBFAC

## 2024-11-25 RX ORDER — PREGABALIN 50 MG/1
50 CAPSULE ORAL 3 TIMES DAILY
Qty: 90 CAPSULE | Refills: 0 | Status: SHIPPED | OUTPATIENT
Start: 2024-11-25

## 2024-11-25 RX ADMIN — INFLUENZA A VIRUS A/VICTORIA/4897/2022 IVR-238 (H1N1) ANTIGEN (FORMALDEHYDE INACTIVATED), INFLUENZA A VIRUS A/THAILAND/8/2022 IVR-237 (H3N2) ANTIGEN (FORMALDEHYDE INACTIVATED), INFLUENZA B VIRUS B/AUSTRIA/1359417/2021 BVR-26 ANTIGEN (FORMALDEHYDE INACTIVATED) 0.5 ML: 15; 15; 15 INJECTION, SUSPENSION INTRAMUSCULAR at 10:11

## 2024-11-25 NOTE — PROGRESS NOTES
"[facility]  Occupational Therapy Treatment Note     Date: 11/26/2024  Name: Aysha Randolph  Clinic Number: [id number]    Therapy Diagnosis:   Encounter Diagnoses   Name Primary?    Left elbow pain Yes    Muscle weakness     Elbow stiffness, left      Physician: Eben Jesus MD    Physician Orders: Eval and Treat; I reviewed the radiographs in detail. The patient may advance her ROM and strengthening. She may start to weightbear on he LUE. She will return in about 6 weeks with a new x-ray of the left elbow.   Medical Diagnosis: S42.402D (ICD-10-CM) - Closed fracture of distal end of left humerus with routine healing, subsequent encounter  Surgical Procedure and Date: L distal humerus ORIF, 10/3/2024 / Date of Injury: 9/29/2024  Evaluation Date: 10/18/2024  Insurance Authorization Period Expiration: 10/16/2025  Plan of Care Certification Period: 12/27/2024  Date of Return to MD: 1/7/2024  Visit # / Visits authorized: 10(+1) / 20  FOTO: 3/3     Precautions:  Standard      Time In: 12:30 pm  Time Out: 1:15 pm  Total Appointment Time (timed & untimed codes): 45 minutes    Subjective     Pt reports: "I felt really good after last time."  she was compliant with home exercise program given last session.   Response to previous treatment:pain free  Functional change: tolerated progression of treatment well, improved range of motion    Pain: 0/10  Location: left elbow      Objective     Objective Measures updated at progress report unless specified.   Observation/Appearance: Patient arrives with no brace/sling. The incision is well healed with no SOI, steristrips are in place, there is appropriate p/o stiffness and edema      Special Tests: n/a     Edema. Measured in centimeters.    10/18/2024 10/18/2024         Left Right       Elbow 2 in above 35.5 cm 35 cm       Elbow 2 in below 29.7 cm 29.8 cm             Elbow and Wrist ROM. Measured in degrees.    10/18/2024 10/18/2024 10/22/2024  " 11/7/2024      Left Right Left   Left   Elbow Ext/Flex -40/90 WNL -36/120  -25/125   Supination/Pronation 55/90 WNL 70/90  70/90    Wrist Ext/Flex 60/35 WNL 60/50   65/65   Wrist RD/UD NM NM NM  NM       Hand ROM. Measured in degrees.    10/18/2024 10/18/24         Left Right       Full fist  WNL WNL           Strength (Dynamometer) and Pinch Strength (Pinch Gauge)  Measured in pounds.    10/18/2024 10/18/2024 10/22/2024  11/7/2024  11/26/2024     Left Right  Left  Left Left   Rung II 5# 35#  15#  20# 25#      Sensation: Not formally assessed  Patient experiences paraesthesias in the entire hand                CMS Impairment/Limitation/Restriction for FOTO elbow Survey     Therapist reviewed FOTO scores for Aysha Randolph on 10/18/2024.   FOTO documents entered into [facility] [place] [facility] - see Media section.     Limitation Score: 96%  6th:42%  10th:50%      Treatment     Aysha received the treatments listed below:     Aysha received the following supervised modalities after being cleared for contradictions for 5 minutes:   -Patient tolerates MHP x 5 min in order to decrease pain and increase soft tissue extensibility in preparation for ROM, concurrent with assessment of deficits.     Aysha received the following manual therapy techniques for 10 minutes:   -I provide gentle scar mob to decrease scar tenderness, adherence, and hypersensitivity.    -I provide gentle STM to L bicep region in order to increase soft tissue extensibility, decrease pain, and tenderness/hypersensitivity in the stated area, and promote scar tissue remodeling (concurrent with LLPS elbow ext)     Aysha received therapeutic exercises for 20 minutes including:  - Scifit UBE forward/reverse Level 1 3 minutes each direction  - Therapist A PROM - elbow and shoulder   - seated dowel chest press 3# 2/15  - LLPS with palm supinated x 3 min  - forearm pro/sup with hammer  - active elbow flexion 3 ways 3# 2/15  - wrist flext/ext/UD/RD 3# 2/15    Aysha received  therapeutic activity for 15 minutes including:  - farmer's carry 6# 3 laps  - red putty squeezes and pinches 2 minutes each  Patient Education and Home Exercises     Education provided:   - on current condition and home exercise program   - Progress towards goals     Written Home Exercises Provided: Patient instructed to cont prior HEP.  Exercises were reviewed and Aysha was able to demonstrate them at the end of the session.  Aysha demonstrated good  understanding of the HEP provided.      See EMR under Patient Instructions for exercises provided prior visit.   Assessment   Patient is currently 7w 4 days p/o.  She was cleared by Dr. Jesus to begin strengthening and no longer has precautions. She continues to have some sensitivity at incision however responds well to desensitization techniques and overall improving. She was able to perform all P/AA/active range of motion exercises this date all with good technique and without complaints. Good endurance noted with strengthening exercises well completing all in a pain free range of motion. Patient's very motivated and meeting post op landmarks.     Aysha is progressing well towards her goals and there are no updates to goals at this time. Pt prognosis is Good.     Pt will continue to benefit from skilled outpatient occupational therapy to address the deficits listed in the problem list on initial evaluation provide pt/family education and to maximize pt's level of independence in the home and community environment.     Anticipated barriers to occupational therapy: none    Pt's spiritual, cultural and educational needs considered and pt agreeable to plan of care and goals.    Goals:  Short term Goals:  1) Initiate home exercise program Met, 11/26/2024  2) Pt will increase AROM of L elbow by 5-10 degrees in order to assist with self grooming tasks by 4 weeks. -Met, 11/26/2024  3) Pt will reduce edema by .5-1 cm in affected elbow by 4 weeks. met  4) Pt will reduce  pain to less than 4/10 by 4 weeks. Met, 11/26/2024  5) Pt will increase functional  strength by 5# in order to A in opening containers for med management or home management tasks by 4 weeks.Met, 11/26/2024  6) Patient will be able to achieve less than or equal to 80% on the FOTO, demonstrating overall improved functional ability with upper extremity. (Self-care category) Met     Long Term Goals:  Goals to be met by discharge:  1) Independent with home exercise program Not met 11/26/2024, continue progressing   2) Pt will increase L elbow HURTADO by at least 35 degrees in order to increase functional use for grasp with home management or work related tasks by d/c. Not met 11/26/2024, continue progressing   3) Pt will decrease edema to trace or none to increase functional ROM by d/c. Not met 11/26/2024, continue progressing   4) Pt will decrease pain to trace or none while completing light home management tasks or work related tasks by d/c.Not met 11/26/2024, continue progressing   5) Patient will be able to achieve less than or equal to 50% on the FOTO, demonstrating overall improved functional ability with upper extremity.  (Self-care category) Not met 11/26/2024, continue progressing     Plan     Continue with OT plan of care   Updates/Grading for next session: progress as able    LAURA Rodriguez

## 2024-11-25 NOTE — PROGRESS NOTES
[facility] Note    Chief Complaint      Chief Complaint   Patient presents with    Follow-up     6 mth     History of Present Illness      Aysha Randolph is a 63 y.o. female who presents today for chief complaint follow up chronic issues .     HPI   Last seen 5.24 annual   Annual labs up to date     mmg 2.24  Cscope 5.23  [facility]    Interval hx: ortho Dr Jesus 11.24 on follow up o L humerus fx s/p ORIF she is in outpatient OT PT mechanical fall, she's doing well     11.24 sleep med FAITH she is using APAP    Chronic hx      HLD on lipitor 10 and fishoil LDL 73  HTN on hctz 25, she does not check at home, abovet goal today   Mood on lexapro 20mg  for CARMEN , she is feeling ok      Hx of HPylori and completed triple tx. She also saw GI and had EGD neg for PUD, She is still on PPI. She is also on ASA  Csc showed polyops x2 TA X1     She has seropos  RA and fibro sees Rheum now on kevzara and mtx +folate , off prednisone. She is ok from disease mgmt, has ups and downs   Had been on rinvoq, humira, orencia, actemra in the past.  Using tyelnol for acute pain  and lyrica as needed   now seeing dr delacruz      Osteopenia high fxon forteo via rheum  forteo. This will end 8.2026. 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 Saw Gyn Onc Bird in 5.23. Per his nots plan is : for LANDEN Recommend cotesting in 1 year. For VAIN1: Recommend clinical exam and cotesting annually for 2 years. Most incidences of VAIN1 are a result of an infection with nononcogenic subtypes of HPV or in the setting of postmenopausal women, may associated with atrophic changes and respond to vaginal estrogen.She discussed removal of cervic partially with gyn onc vs in office leep etc     Flu shto today      Active Problem List with Overview Notes    Diagnosis Date Noted    Left elbow pain 10/18/2024    Muscle weakness 10/18/2024    Elbow stiffness, left 10/18/2024     Closed fracture of left distal humerus 09/30/2024    Sleep apnea 05/29/2024    Severe obesity (BMI 35.0-39.9) with comorbidity 05/16/2024    Dysplasia of cervix, low grade (LANDEN 1) 05/12/2024    History of vaginal dysplasia 03/06/2024    Pain aggravated by activities of daily living 01/25/2024    Fear of pain 01/24/2024    Decreased range of motion of trunk and back 01/24/2024    Osteoporosis 12/14/2023    H. pylori infection 06/07/2023    Cervical dysplasia 05/18/2023    Vaginal dysplasia 05/18/2023    Hyperlipidemia 02/01/2023    GERD (gastroesophageal reflux disease) 02/01/2023    Rheumatoid arthritis 02/01/2023    Benign essential hypertension 02/01/2023       Health Maintenance   Topic Date Due    Shingles Vaccine (2 of 2) 11/27/2023    DEXA Scan  02/15/2025    Mammogram  02/19/2025    TETANUS VACCINE  10/02/2025    Lipid Panel  11/05/2029    Colorectal Cancer Screening  05/31/2030    Hepatitis C Screening  Completed       Past Medical History:   Diagnosis Date    Abnormal Pap smear of cervix 2017    Arthritis     Hyperlipidemia     Hypertension     Mild intermittent asthma, uncomplicated     Osteoporosis     Rheumatoid arthritis, unspecified 12/17/2020       Past Surgical History:   Procedure Laterality Date    ANKLE SURGERY Left     COLONOSCOPY N/A 05/31/2023    Procedure: COLONOSCOPY;  Surgeon: Robbie Ace MD;  Location: [facility];  Service: Endoscopy;  Laterality: N/A;    ESOPHAGOGASTRODUODENOSCOPY N/A 05/31/2023    Procedure: EGD (ESOPHAGOGASTRODUODENOSCOPY);  Surgeon: Robbie Ace MD;  Location: [facility];  Service: Endoscopy;  Laterality: N/A;    FRACTURE SURGERY      JOINT REPLACEMENT  April 2016    Full Left ankle replacement    OPEN REDUCTION AND INTERNAL FIXATION (ORIF) OF FRACTURE OF DISTAL HUMERUS Left 10/3/2024    Procedure: ORIF, FRACTURE, HUMERUS, DISTAL;  Surgeon: Eben Jesus MD;  Location: [facility];  Service: Orthopedics;  Laterality: Left;  Lateral decubitus, bean bag, bone  foam, Acumed elbow plates, c-arm    WRIST FRACTURE SURGERY Left        family history includes Arthritis in her mother and sister; Asthma in her sister and sister; Cervical cancer in her maternal cousin; Diabetes in her sister; Early death in her sister; Heart disease in her mother, sister, and sister; Hypertension in her mother; Stroke in her mother, sister, and sister.    Social History     Tobacco Use    Smoking status: Never    Smokeless tobacco: Never   Substance Use Topics    Alcohol use: Not Currently     Comment: Very rarely    Drug use: Never       ROS     Outpatient Encounter Medications as of 11/25/2024   Medication Sig Dispense Refill    acetaminophen (TYLENOL) 325 MG tablet Take 2 tablets (650 mg total) by mouth every 6 (six) hours as needed for Pain. 40 tablet 0    albuterol (ACCUNEB) 1.25 mg/3 mL Nebu Take 3 mLs (1.25 mg total) by nebulization every 6 (six) hours as needed (shortness of breath, wheezing). 75 mL 1    albuterol (VENTOLIN HFA) 90 mcg/actuation inhaler Inhale 2 puffs into the lungs every 4 (four) hours as needed for Wheezing. Rescue 6.7 g 1    atorvastatin (LIPITOR) 10 MG tablet Take 1 tablet (10 mg total) by mouth once daily. 90 tablet 3    cholecalciferol, vitamin D3, 125 mcg (5,000 unit) Tab Take 5,000 Units by mouth once daily.      EScitalopram oxalate (LEXAPRO) 20 MG tablet Take 1 tablet (20 mg total) by mouth once daily. 90 tablet 3    folic acid (FOLVITE) 1 MG tablet Take 1 tablet (1 mg total) by mouth once daily. 90 tablet 3    hydroCHLOROthiazide (HYDRODIURIL) 25 MG tablet Take 1 tablet (25 mg total) by mouth once daily. 90 tablet 3    ibuprofen (ADVIL,MOTRIN) 600 MG tablet Take 1 tablet (600 mg total) by mouth every 6 (six) hours as needed for Pain. 20 tablet 0    methotrexate 2.5 MG Tab Take 10 tablets (25 mg total) by mouth every 7 days. 40 tablet 3    multivitamin-minerals-lutein (MULTIVITAMIN 50 PLUS) Tab Take 1 tablet by mouth once daily.      omega 3-dha-epa-fish oil  "120-180-500 mg Cap       ondansetron (ZOFRAN) 4 MG tablet Take 1 tablet (4 mg total) by mouth 2 (two) times daily. 15 tablet 0    ondansetron (ZOFRAN-ODT) 4 MG TbDL Take 1 tablet (4 mg total) by mouth every 8 (eight) hours as needed (nausea). 30 tablet 0    oxyCODONE (ROXICODONE) 5 MG immediate release tablet Take 1 tablet (5 mg total) by mouth every 6 (six) hours as needed for Pain. 12 tablet 0    pantoprazole (PROTONIX) 40 MG tablet Take 1 tablet (40 mg total) by mouth once daily. 90 tablet 3    pen needle, diabetic (BD ULTRA-FINE MINI PEN NEEDLE) 31 gauge x 3/16" Ndle Inject 1 each into the skin once daily. 100 each 3    sarilumab (KEVZARA) 200 mg/1.14 mL PnIj Inject 1.14 mLs (200 mg total) into the skin every 14 (fourteen) days. 2 pen 11    teriparatide (FORTEO) 20 mcg/dose (600mcg/2.4mL) PnIj Inject 0.08 mLs (20 mcg total) into the skin once daily. 2.4 mL 11    [DISCONTINUED] pregabalin (LYRICA) 50 MG capsule Take 1 capsule (50 mg total) by mouth 3 (three) times daily. 90 capsule 6    aspirin (ECOTRIN) 81 MG EC tablet Take 1 tablet (81 mg total) by mouth 2 (two) times a day. 60 tablet 0    pregabalin (LYRICA) 50 MG capsule Take 1 capsule (50 mg total) by mouth 3 (three) times daily. 90 capsule 0    [DISCONTINUED] pregabalin (LYRICA) 50 MG capsule Take 1 capsule (50 mg total) by mouth 3 (three) times daily. 90 capsule 6     Facility-Administered Encounter Medications as of 11/25/2024   Medication Dose Route Frequency Provider Last Rate Last Admin    [COMPLETED] influenza (adjuvanted) (Fluad) 45 mcg/0.5 mL IM vaccine (> or = 64 yo) 0.5 mL  0.5 mL Intramuscular 1 time in Clinic/HOD    0.5 mL at 11/25/24 1033        Review of patient's allergies indicates:  No Known Allergies        Physical Exam      Vital Signs  Pulse: 97  SpO2: 96 %  BP: 132/80  BP Location: Right arm  Patient Position: Sitting  Height and Weight  Height: 4' 11" (149.9 cm)  Weight: 90.1 kg (198 lb 10.2 oz)  BSA (Calculated - sq m): 1.94 sq " "meters  BMI (Calculated): 40.1  Weight in (lb) to have BMI = 25: 123.5]    Physical Exam     Laboratory:  CBC:  Recent Labs   Lab Result Units 10/03/24  0758 11/05/24  0943   WBC K/uL 8.92 7.63   RBC M/uL 3.60* 3.63*   Hemoglobin g/dL 11.9* 11.9*   Hematocrit % 35.3* 36.5*   Platelets K/uL 217 212   MCV fL 98 101*   MCH pg 33.1* 32.8*   MCHC g/dL 33.7 32.6     CMP:  Recent Labs   Lab Result Units 11/05/24  0943   Glucose mg/dL 97   Calcium mg/dL 9.7   Albumin g/dL 4.1   Total Protein g/dL 7.2   Sodium mmol/L 141   Potassium mmol/L 4.0   CO2 mmol/L 27   Chloride mmol/L 101   BUN mg/dL 17   Alkaline Phosphatase U/L 75   ALT U/L 35   AST U/L 33   Total Bilirubin mg/dL 1.2*     URINALYSIS:  No results for input(s): "COLORU", "CLARITYU", "SPECGRAV", "PHUR", "PROTEINUA", "GLUCOSEU", "BILIRUBINCON", "BLOODU", "WBCU", "RBCU", "BACTERIA", "MUCUS", "NITRITE", "LEUKOCYTESUR", "UROBILINOGEN", "HYALINECASTS" in the last 2160 hours.   LIPIDS:  Recent Labs   Lab Result Units 11/05/24  0943   HDL mg/dL 70   Cholesterol mg/dL 168   Triglycerides mg/dL 124   LDL Cholesterol mg/dL 73.2   HDL/Cholesterol Ratio % 41.7   Non-HDL Cholesterol mg/dL 98   Total Cholesterol/HDL Ratio  2.4     TSH:  No results for input(s): "TSH" in the last 2160 hours.  A1C:  No results for input(s): "HGBA1C" in the last 2160 hours.    Radiology:      Assessment/Plan     Aysha Randolph is a 63 y.o.female with:    1. Benign essential hypertension  Assessment & Plan:  At goal      2. Needs flu shot  -     influenza (adjuvanted) (Fluad) 45 mcg/0.5 mL IM vaccine (> or = 66 yo) 0.5 mL    3. Fibromyalgia  -     pregabalin (LYRICA) 50 MG capsule; Take 1 capsule (50 mg total) by mouth 3 (three) times daily.  Dispense: 90 capsule; Refill: 0    4. Encounter for screening mammogram for malignant neoplasm of breast  -     Mammo Digital Screening Larry w/ [facility]; Future; Expected date: 02/20/2025    5. Obstructive sleep apnea syndrome  Assessment & Plan:  Continue apap "         6. Other closed nondisplaced fracture of distal end of left humerus with routine healing, subsequent encounter  Assessment & Plan:  Per ortho            Use of the [facility] Patient Portal discussed and encouraged during today's visit  -Continue current medications and maintain follow up with specialists.    Future Appointments   Date Time Provider Department Center   11/26/2024 12:30 PM Eli Jha [facility]   12/3/2024 12:30 PM Yudelka Eli, [facility]   12/5/2024 12:30 PM Yudelka, Eli, [facility]   12/10/2024  1:00 PM Yudelka, Eli, [facility]   12/12/2024 12:30 PM Yudelka, Eli, [facility] [place] [facility]   12/17/2024 12:30 PM Yudelka, Eli, [facility]   12/19/2024 12:30 PM Radha Jhaecca, [facility] [place] [facility]   12/24/2024 12:30 PM Yudelka Eli, [facility]   12/31/2024 12:30 PM Radha Jhaecca, [facility]   1/7/2025  1:00 PM [facility] ORTHO CLINIC LIMIT [id number] [facility] OR[facility]   1/7/2025  1:15 PM Eben Jesus MD [id number]       Rosa Jarrett MD  11/25/2024 9:49 AM    Primary Care Internal Medicine

## 2024-11-26 ENCOUNTER — CLINICAL SUPPORT (OUTPATIENT)
Dept: REHABILITATION | Facility: HOSPITAL | Age: 64
End: 2024-11-26
Payer: MEDICARE

## 2024-11-26 DIAGNOSIS — M25.522 LEFT ELBOW PAIN: Primary | ICD-10-CM

## 2024-11-26 DIAGNOSIS — M62.81 MUSCLE WEAKNESS: ICD-10-CM

## 2024-11-26 DIAGNOSIS — M25.622 ELBOW STIFFNESS, LEFT: ICD-10-CM

## 2024-11-26 PROCEDURE — 97140 MANUAL THERAPY 1/> REGIONS: CPT | Mod: KX,PN

## 2024-11-26 PROCEDURE — 97530 THERAPEUTIC ACTIVITIES: CPT | Mod: KX,PN

## 2024-11-26 PROCEDURE — 97110 THERAPEUTIC EXERCISES: CPT | Mod: KX,PN

## 2024-12-02 NOTE — PROGRESS NOTES
"[facility]  Occupational Therapy Treatment Note     Date: 12/3/2024  Name: Aysha Randolph  Clinic Number: [id number]    Therapy Diagnosis:   Encounter Diagnoses   Name Primary?    Left elbow pain Yes    Muscle weakness     Elbow stiffness, left      Physician: Eben Jesus MD    Physician Orders: Eval and Treat; I reviewed the radiographs in detail. The patient may advance her ROM and strengthening. She may start to weightbear on he LUE. She will return in about 6 weeks with a new x-ray of the left elbow.   Medical Diagnosis: S42.402D (ICD-10-CM) - Closed fracture of distal end of left humerus with routine healing, subsequent encounter  Surgical Procedure and Date: L distal humerus ORIF, 10/3/2024 / Date of Injury: 9/29/2024  Evaluation Date: 10/18/2024  Insurance Authorization Period Expiration: 10/16/2025  Plan of Care Certification Period: 12/27/2024  Date of Return to MD: 1/7/2024  Visit # / Visits authorized: 11(+1) / 20  FOTO: 3/3     Precautions:  Standard      Time In: 12:30 pm  Time Out: 1:25 pm  Total Appointment Time (timed & untimed codes): 55 minutes    Subjective     Pt reports: "I'm feeling really good today."  she was compliant with home exercise program given last session.   Response to previous treatment:pain free  Functional change: tolerated progression of treatment well    Pain: 0/10  Location: left elbow      Objective     Objective Measures updated at progress report unless specified.   Observation/Appearance: Patient arrives with no brace/sling. The incision is well healed with no SOI, steristrips are in place, there is appropriate p/o stiffness and edema     Edema. Measured in centimeters.    10/18/2024 10/18/2024         Left Right       Elbow 2 in above 35.5 cm 35 cm       Elbow 2 in below 29.7 cm 29.8 cm             Elbow and Wrist ROM. Measured in degrees.    10/18/2024 10/18/2024 10/22/2024  11/7/2024      Left Right Left   Left   Elbow Ext/Flex " -40/90 WNL -36/120  -25/125   Supination/Pronation 55/90 WNL 70/90  70/90    Wrist Ext/Flex 60/35 WNL 60/50   65/65   Wrist RD/UD NM NM NM  NM       Hand ROM. Measured in degrees.    10/18/2024 10/18/24         Left Right       Full fist  WNL WNL           Strength (Dynamometer) and Pinch Strength (Pinch Gauge)  Measured in pounds.    10/18/2024 10/18/2024 10/22/2024  11/7/2024  11/26/2024     Left Right  Left  Left Left   Rung II 5# 35#  15#  20# 25#      Sensation: Not formally assessed  Patient experiences paraesthesias in the entire hand                CMS Impairment/Limitation/Restriction for FOTO elbow Survey     Therapist reviewed FOTO scores for Aysha Randolph on 10/18/2024.   FOTO documents entered into [employer] - see Media section.     Limitation Score: 96%  6th:42%  10th:50%      Treatment     Aysha received the treatments listed below:     Aysha received the following manual therapy techniques for 10 minutes:   -I provide gentle scar mob to decrease scar tenderness, adherence, and hypersensitivity.    -I provide gentle STM to L bicep region in order to increase soft tissue extensibility, decrease pain, and tenderness/hypersensitivity in the stated area, and promote scar tissue remodeling (concurrent with LLPS elbow ext)     Aysha received therapeutic exercises for 30 minutes including:  - Scifit UBE forward/reverse Level 1 3 minutes each direction  - Therapist A PROM - elbow and shoulder   - seated dowel chest press 3# 2/15  - forearm pro/sup with 3#   - active elbow flexion 3 ways 3# 2/15  - wrist flext/ext/UD/RD 3# 2/15    Aysha received therapeutic activity for 15 minutes including:  - farmer's/baby carry 15#medball 3 laps  - pom pom  brown gripper 2nd setting 1 container   - pom pom  green clothespin 1 container  Patient Education and Home Exercises     Education provided:   - on current condition and home exercise program   - Progress towards goals     Written Home Exercises  Provided: Patient instructed to cont prior HEP.  Exercises were reviewed and Aysha was able to demonstrate them at the end of the session.  Aysha demonstrated good  understanding of the HEP provided.      See EMR under Patient Instructions for exercises provided prior visit.   Assessment   Patient is currently 8w 4 days p/o. She continues to have some sensitivity at incision however is becoming very minimal. She was able to perform all P/AA/active range of motion/strengthening exercises this date all with good technique and without complaints. Good endurance noted with strengthening exercises well completing all in a pain free range of motion. Patient's very motivated and meeting post op landmarks.     Aysha is progressing well towards her goals and there are no updates to goals at this time. Pt prognosis is Good.     Pt will continue to benefit from skilled outpatient occupational therapy to address the deficits listed in the problem list on initial evaluation provide pt/family education and to maximize pt's level of independence in the home and community environment.     Anticipated barriers to occupational therapy: none    Pt's spiritual, cultural and educational needs considered and pt agreeable to plan of care and goals.    Goals:  Short term Goals:  1) Initiate home exercise program Met, 12/3/2024  2) Pt will increase AROM of L elbow by 5-10 degrees in order to assist with self grooming tasks by 4 weeks. -Met, 12/3/2024  3) Pt will reduce edema by .5-1 cm in affected elbow by 4 weeks. met  4) Pt will reduce pain to less than 4/10 by 4 weeks. Met, 12/3/2024  5) Pt will increase functional  strength by 5# in order to A in opening containers for med management or home management tasks by 4 weeks.Met, 12/3/2024  6) Patient will be able to achieve less than or equal to 80% on the FOTO, demonstrating overall improved functional ability with upper extremity. (Self-care category) Met     Long Term Goals:  Goals  to be met by discharge:  1) Independent with home exercise program Not met 12/3/2024, continue progressing   2) Pt will increase L elbow HURTADO by at least 35 degrees in order to increase functional use for grasp with home management or work related tasks by d/c. Not met 12/3/2024, continue progressing   3) Pt will decrease edema to trace or none to increase functional ROM by d/c. Not met 12/3/2024, continue progressing   4) Pt will decrease pain to trace or none while completing light home management tasks or work related tasks by d/c.Not met 12/3/2024, continue progressing   5) Patient will be able to achieve less than or equal to 50% on the FOTO, demonstrating overall improved functional ability with upper extremity.  (Self-care category) Not met 12/3/2024, continue progressing     Plan     Continue with OT plan of care   Updates/Grading for next session: progress as able, anticipate d/c in the next 1-2 visits    LAURA Rodriguez

## 2024-12-03 ENCOUNTER — CLINICAL SUPPORT (OUTPATIENT)
Dept: REHABILITATION | Facility: HOSPITAL | Age: 64
End: 2024-12-03
Payer: MEDICARE

## 2024-12-03 DIAGNOSIS — M25.622 ELBOW STIFFNESS, LEFT: ICD-10-CM

## 2024-12-03 DIAGNOSIS — M25.522 LEFT ELBOW PAIN: Primary | ICD-10-CM

## 2024-12-03 DIAGNOSIS — M62.81 MUSCLE WEAKNESS: ICD-10-CM

## 2024-12-03 PROCEDURE — 97530 THERAPEUTIC ACTIVITIES: CPT | Mod: PN

## 2024-12-03 PROCEDURE — 97110 THERAPEUTIC EXERCISES: CPT | Mod: PN

## 2024-12-03 PROCEDURE — 97140 MANUAL THERAPY 1/> REGIONS: CPT | Mod: PN

## 2024-12-03 NOTE — PROGRESS NOTES
"[facility]  Occupational Therapy Treatment Note     Date: 12/5/2024  Name: Aysha Randolph  Clinic Number: [id number]    Therapy Diagnosis:   Encounter Diagnoses   Name Primary?    Left elbow pain Yes    Muscle weakness     Elbow stiffness, left      Physician: Eben Jesus MD    Physician Orders: Eval and Treat; I reviewed the radiographs in detail. The patient may advance her ROM and strengthening. She may start to weightbear on he LUE. She will return in about 6 weeks with a new x-ray of the left elbow.   Medical Diagnosis: S42.402D (ICD-10-CM) - Closed fracture of distal end of left humerus with routine healing, subsequent encounter  Surgical Procedure and Date: L distal humerus ORIF, 10/3/2024 / Date of Injury: 9/29/2024  Evaluation Date: 10/18/2024  Insurance Authorization Period Expiration: 10/16/2025  Plan of Care Certification Period: 12/27/2024  Date of Return to MD: 1/7/2024  Visit # / Visits authorized: 12(+1) / 20  FOTO: 3/3     Precautions:  Standard      Time In: 12:30 pm  Time Out: 1:25 pm  Total Appointment Time (timed & untimed codes): 55 minutes    Subjective     Pt reports: "I was a little sore after last time."  she was compliant with home exercise program given last session.   Response to previous treatment:slight increase in pain  Functional change: tolerated progression of treatment well    Pain: 1/10  Location: left elbow      Objective     Objective Measures updated at progress report unless specified.   Observation/Appearance: Patient arrives with no brace/sling. The incision is well healed with no SOI, steristrips are in place, there is appropriate p/o stiffness and edema     Edema. Measured in centimeters.    10/18/2024 10/18/2024         Left Right       Elbow 2 in above 35.5 cm 35 cm       Elbow 2 in below 29.7 cm 29.8 cm             Elbow and Wrist ROM. Measured in degrees.    10/18/2024 10/18/2024 10/22/2024  11/7/2024      Left Right Left   Left "   Elbow Ext/Flex -40/90 WNL -36/120  -25/125   Supination/Pronation 55/90 WNL 70/90  70/90    Wrist Ext/Flex 60/35 WNL 60/50   65/65   Wrist RD/UD NM NM NM  NM       Hand ROM. Measured in degrees.    10/18/2024 10/18/24         Left Right       Full fist  WNL WNL           Strength (Dynamometer) and Pinch Strength (Pinch Gauge)  Measured in pounds.    10/18/2024 10/18/2024 10/22/2024  11/7/2024  11/26/2024     Left Right  Left  Left Left   Rung II 5# 35#  15#  20# 25#      Sensation: Not formally assessed  Patient experiences paraesthesias in the entire hand                CMS Impairment/Limitation/Restriction for FOTO elbow Survey     Therapist reviewed FOTO scores for Aysha Randolph on 10/18/2024.   FOTO documents entered into [employer] - see Media section.     Limitation Score: 96%  6th:42%  10th:50%      Treatment     Aysha received the treatments listed below:     Aysha received the following manual therapy techniques for 10 minutes:   -I provide gentle scar mob to decrease scar tenderness, adherence, and hypersensitivity.    -I provide gentle STM to L bicep region in order to increase soft tissue extensibility, decrease pain, and tenderness/hypersensitivity in the stated area, and promote scar tissue remodeling (concurrent with LLPS elbow ext)     Aysha received therapeutic exercises for 30 minutes including:  - Scifit UBE forward/reverse Level 1 3 minutes each direction  - Therapist A PROM - elbow and shoulder   - seated dowel chest press 3# 2/15  - forearm pro/sup with 3#   - active elbow flexion 3 ways 3# 2/15  - wrist flext/ext/UD/RD 3# 2/15    Aysha received therapeutic activity for 15 minutes including:  - farmer's/baby carry 15# medball 3 laps  - pom pom  brown gripper 2nd setting 1 container   - pom pom  green clothespin 1 container  Patient Education and Home Exercises     Education provided:   - on current condition and home exercise program   - Progress towards goals     Written  Home Exercises Provided: Patient instructed to cont prior HEP.  Exercises were reviewed and Aysha was able to demonstrate them at the end of the session.  Aysha demonstrated good  understanding of the HEP provided.      See EMR under Patient Instructions for exercises provided prior visit.   Assessment   Patient is currently 8w 6 days p/o. She continues to have some sensitivity at incision however is becoming very minimal. She was able to perform all P/AA/active range of motion/strengthening exercises this date all with good technique and without complaints. Good endurance noted with strengthening exercises well completing all in a pain free range of motion. Minimal soreness noted from previous session however did not limit her in today's session. Patient's very motivated and meeting post op landmarks.     Aysha is progressing well towards her goals and there are no updates to goals at this time. Pt prognosis is Good.     Pt will continue to benefit from skilled outpatient occupational therapy to address the deficits listed in the problem list on initial evaluation provide pt/family education and to maximize pt's level of independence in the home and community environment.     Anticipated barriers to occupational therapy: none    Pt's spiritual, cultural and educational needs considered and pt agreeable to plan of care and goals.    Goals:  Short term Goals:  1) Initiate home exercise program Met, 12/5/2024  2) Pt will increase AROM of L elbow by 5-10 degrees in order to assist with self grooming tasks by 4 weeks. -Met, 12/5/2024  3) Pt will reduce edema by .5-1 cm in affected elbow by 4 weeks. met  4) Pt will reduce pain to less than 4/10 by 4 weeks. Met, 12/5/2024  5) Pt will increase functional  strength by 5# in order to A in opening containers for med management or home management tasks by 4 weeks.Met, 12/5/2024  6) Patient will be able to achieve less than or equal to 80% on the FOTO, demonstrating  overall improved functional ability with upper extremity. (Self-care category) Met     Long Term Goals:  Goals to be met by discharge:  1) Independent with home exercise program Not met 12/5/2024, continue progressing   2) Pt will increase L elbow HURTADO by at least 35 degrees in order to increase functional use for grasp with home management or work related tasks by d/c. Not met 12/5/2024, continue progressing   3) Pt will decrease edema to trace or none to increase functional ROM by d/c. Not met 12/5/2024, continue progressing   4) Pt will decrease pain to trace or none while completing light home management tasks or work related tasks by d/c.Not met 12/5/2024, continue progressing   5) Patient will be able to achieve less than or equal to 50% on the FOTO, demonstrating overall improved functional ability with upper extremity.  (Self-care category) Not met 12/5/2024, continue progressing     Plan     Continue with OT plan of care   Updates/Grading for next session: progress as able, anticipate d/c in the next 1-2 visits    LAURA Rodriguez

## 2024-12-05 ENCOUNTER — CLINICAL SUPPORT (OUTPATIENT)
Dept: REHABILITATION | Facility: HOSPITAL | Age: 64
End: 2024-12-05
Payer: MEDICARE

## 2024-12-05 DIAGNOSIS — M25.622 ELBOW STIFFNESS, LEFT: ICD-10-CM

## 2024-12-05 DIAGNOSIS — M25.522 LEFT ELBOW PAIN: Primary | ICD-10-CM

## 2024-12-05 DIAGNOSIS — M62.81 MUSCLE WEAKNESS: ICD-10-CM

## 2024-12-05 PROCEDURE — 97140 MANUAL THERAPY 1/> REGIONS: CPT | Mod: PN

## 2024-12-05 PROCEDURE — 97530 THERAPEUTIC ACTIVITIES: CPT | Mod: PN

## 2024-12-05 PROCEDURE — 97110 THERAPEUTIC EXERCISES: CPT | Mod: PN

## 2024-12-11 NOTE — PROGRESS NOTES
"[facility]  Occupational Therapy Treatment Note     Date: 12/12/2024  Name: Aysha Randolph  Clinic Number: [id number]    Therapy Diagnosis:   Encounter Diagnoses   Name Primary?    Left elbow pain Yes    Muscle weakness     Elbow stiffness, left      Physician: Eben Jesus MD    Physician Orders: Eval and Treat; I reviewed the radiographs in detail. The patient may advance her ROM and strengthening. She may start to weightbear on he LUE. She will return in about 6 weeks with a new x-ray of the left elbow.   Medical Diagnosis: S42.402D (ICD-10-CM) - Closed fracture of distal end of left humerus with routine healing, subsequent encounter  Surgical Procedure and Date: L distal humerus ORIF, 10/3/2024 / Date of Injury: 9/29/2024  Evaluation Date: 10/18/2024  Insurance Authorization Period Expiration: 10/16/2025  Plan of Care Certification Period: 12/27/2024  Date of Return to MD: 1/7/2024  Visit # / Visits authorized: 13(+1) / 20  FOTO: 3/3    Precautions:  Standard      Time In: 12:25 pm  Time Out: 1:15 pm  Total Appointment Time (timed & untimed codes): 50 minutes    Subjective     Pt reports: "I'm starting to use my arm more when I push up from a chair."  she was compliant with home exercise program given last session.   Response to previous treatment:pain free  Functional change: tolerated progression of treatment well    Pain: 0/10  Location: left elbow      Objective     Objective Measures updated at progress report unless specified.   Observation/Appearance: Patient arrives with no brace/sling. The incision is well healed with no SOI, steristrips are in place, there is appropriate p/o stiffness and edema     Edema. Measured in centimeters.    10/18/2024 10/18/2024 12/12/2024        Left Right Left     Elbow 2 in above 35.5 cm 35 cm  35     Elbow 2 in below 29.7 cm 29.8 cm 29.7            Elbow and Wrist ROM. Measured in degrees.    10/18/2024 10/18/2024 10/22/2024  11/7/2024  " 12/12/2024     Left Right Left   Left Left   Elbow Ext/Flex -40/90 WNL -36/120  -25/125 -10/140   Supination/Pronation 55/90 WNL 70/90  70/90  WNL   Wrist Ext/Flex 60/35 WNL 60/50   65/65 WNL      Hand ROM. Measured in degrees.    10/18/2024 10/18/24         Left Right       Full fist  WNL WNL           Strength (Dynamometer) and Pinch Strength (Pinch Gauge)  Measured in pounds.    10/18/2024 10/18/2024 10/22/2024  11/7/2024  11/26/2024 12/12/2024     Left Right  Left  Left Left Left   Rung II 5# 35#  15#  20# 25# 30#      Sensation: Not formally assessed  Patient experiences paraesthesias in the entire hand                CMS Impairment/Limitation/Restriction for FOTO elbow Survey     Therapist reviewed FOTO scores for Aysha Randolph on 10/18/2024.   FOTO documents entered into [employer] - see Media section.     Limitation Score: 96%  6th:42%  10th:50%      Treatment     Aysha received the treatments listed below:     Aysha received the following manual therapy techniques for 10 minutes:   -I provide gentle scar mob to decrease scar tenderness, adherence, and hypersensitivity.    -I provide gentle STM to L bicep region in order to increase soft tissue extensibility, decrease pain, and tenderness/hypersensitivity in the stated area, and promote scar tissue remodeling (concurrent with LLPS elbow ext)     Aysha received therapeutic exercises for 25 minutes including:  - Scifit UBE forward/reverse Level 3 3 minutes each direction  - Therapist A PROM - elbow and shoulder   - seated dowel chest press 3# 2/15  - forearm pro/sup with 3#   - active elbow flexion 3 ways 3# 2/15  - wrist flext/ext/UD/RD 3# 2/15    Aysha received therapeutic activity for 15 minutes including:  - farmer's/baby carry 15# medball 3 laps  - pom pom  brown gripper 2nd setting 1 container   - pom pom  green clothespin 1 container  Patient Education and Home Exercises     Education provided:   - on current condition and home  exercise program   - Progress towards goals     Written Home Exercises Provided: Patient instructed to cont prior HEP.  Exercises were reviewed and Aysha was able to demonstrate them at the end of the session.  Aysha demonstrated good  understanding of the HEP provided.      See EMR under Patient Instructions for exercises provided prior visit.   Assessment   Patient is currently 9w 6 days p/o. Sensitivity at incision is very minimal. She was able to perform all P/AA/active range of motion/strengthening exercises this date all with good technique and without complaints. Good endurance noted with strengthening exercises well completing all in a pain free range of motion. Fatigue noted after. Patient's demonstrating overall improved range of motion and strength. She continues to lack about 10 degrees of elbow extension however continues to improve and is approaching discharge status.  Aysha is progressing well towards her goals and there are no updates to goals at this time. Pt prognosis is Good.     Pt will continue to benefit from skilled outpatient occupational therapy to address the deficits listed in the problem list on initial evaluation provide pt/family education and to maximize pt's level of independence in the home and community environment.     Anticipated barriers to occupational therapy: none    Pt's spiritual, cultural and educational needs considered and pt agreeable to plan of care and goals.    Goals:  Short term Goals:  1) Initiate home exercise program Met, 12/12/2024  2) Pt will increase AROM of L elbow by 5-10 degrees in order to assist with self grooming tasks by 4 weeks. -Met, 12/12/2024  3) Pt will reduce edema by .5-1 cm in affected elbow by 4 weeks. met  4) Pt will reduce pain to less than 4/10 by 4 weeks. Met, 12/12/2024  5) Pt will increase functional  strength by 5# in order to A in opening containers for med management or home management tasks by 4 weeks.Met, 12/12/2024  6) Patient  will be able to achieve less than or equal to 80% on the FOTO, demonstrating overall improved functional ability with upper extremity. (Self-care category) Met     Long Term Goals:  Goals to be met by discharge:  1) Independent with home exercise program Not met 12/12/2024, continue progressing   2) Pt will increase L elbow HURTADO by at least 35 degrees in order to increase functional use for grasp with home management or work related tasks by d/c. Not met 12/12/2024, continue progressing   3) Pt will decrease edema to trace or none to increase functional ROM by d/c. met  4) Pt will decrease pain to trace or none while completing light home management tasks or work related tasks by d/c.met  5) Patient will be able to achieve less than or equal to 50% on the FOTO, demonstrating overall improved functional ability with upper extremity.  (Self-care category) met    Plan     Continue with OT plan of care   Updates/Grading for next session: progress as able, anticipate d/c in the next 1-2 visits    LAURA Rodriguez

## 2024-12-12 ENCOUNTER — CLINICAL SUPPORT (OUTPATIENT)
Dept: REHABILITATION | Facility: HOSPITAL | Age: 64
End: 2024-12-12
Payer: MEDICARE

## 2024-12-12 DIAGNOSIS — M25.522 LEFT ELBOW PAIN: Primary | ICD-10-CM

## 2024-12-12 DIAGNOSIS — M25.622 ELBOW STIFFNESS, LEFT: ICD-10-CM

## 2024-12-12 DIAGNOSIS — M62.81 MUSCLE WEAKNESS: ICD-10-CM

## 2024-12-12 PROCEDURE — 97110 THERAPEUTIC EXERCISES: CPT | Mod: KX,PN

## 2024-12-12 PROCEDURE — 97530 THERAPEUTIC ACTIVITIES: CPT | Mod: KX,PN

## 2024-12-12 PROCEDURE — 97140 MANUAL THERAPY 1/> REGIONS: CPT | Mod: KX,PN

## 2024-12-16 DIAGNOSIS — M05.79 RHEUMATOID ARTHRITIS INVOLVING MULTIPLE SITES WITH POSITIVE RHEUMATOID FACTOR: ICD-10-CM

## 2024-12-16 RX ORDER — METHOTREXATE 2.5 MG/1
25 TABLET ORAL
Qty: 40 TABLET | Refills: 3 | Status: SHIPPED | OUTPATIENT
Start: 2024-12-16

## 2024-12-16 NOTE — PROGRESS NOTES
"[facility]  Occupational Therapy Treatment Note     Date: 12/17/2024  Name: Aysha Randolph  Clinic Number: [id number]    Therapy Diagnosis:   Encounter Diagnoses   Name Primary?    Left elbow pain Yes    Muscle weakness     Elbow stiffness, left      Physician: Eben Jesus MD    Physician Orders: Eval and Treat; I reviewed the radiographs in detail. The patient may advance her ROM and strengthening. She may start to weightbear on he LUE. She will return in about 6 weeks with a new x-ray of the left elbow.   Medical Diagnosis: S42.402D (ICD-10-CM) - Closed fracture of distal end of left humerus with routine healing, subsequent encounter  Surgical Procedure and Date: L distal humerus ORIF, 10/3/2024 / Date of Injury: 9/29/2024  Evaluation Date: 10/18/2024  Insurance Authorization Period Expiration: 10/16/2025  Plan of Care Certification Period: 12/27/2024  Date of Return to MD: 1/62024  Visit # / Visits authorized: 14(+1) / 20  FOTO: 3/3    Precautions:  Standard      Time In: 1:00 pm  Time Out: 1:50 pm  Total Appointment Time (timed & untimed codes): 50 minutes    Subjective     Pt reports: "Overall I feel like I'm doing well, I'll be in [place] for the holidays then come back to see the MD in January.   she was compliant with home exercise program given last session.   Response to previous treatment:pain free  Functional change: tolerated progression of treatment well    Pain: 0/10  Location: left elbow      Objective     Objective Measures updated at progress report unless specified.   Observation/Appearance: Patient arrives with no brace/sling. The incision is well healed with no SOI, steristrips are in place, there is appropriate p/o stiffness and edema     Edema. Measured in centimeters.    10/18/2024 10/18/2024 12/12/2024        Left Right Left     Elbow 2 in above 35.5 cm 35 cm  35     Elbow 2 in below 29.7 cm 29.8 cm 29.7           Elbow and Wrist ROM. Measured in degrees.   "  10/18/2024 10/18/2024 10/22/2024  11/7/2024  12/12/2024 12/17/2024     Left Right Left   Left Left Left   Elbow Ext/Flex -40/90 WNL -36/120  -25/125 -10/140 -10/145   Supination/Pronation 55/90 WNL 70/90  70/90  WNL WNL   Wrist Ext/Flex 60/35 WNL 60/50   65/65 WNL WNL      Hand ROM. Measured in degrees.    10/18/2024 10/18/24         Left Right       Full fist  WNL WNL           Strength (Dynamometer) and Pinch Strength (Pinch Gauge)  Measured in pounds.    10/18/2024 10/18/2024 10/22/2024  11/7/2024  11/26/2024 12/12/2024 12/17/2024     Left Right  Left  Left Left Left Left   Rung II 5# 35#  15#  20# 25# 30# 35      Sensation: Not formally assessed  Patient experiences paraesthesias in the entire hand                CMS Impairment/Limitation/Restriction for FOTO elbow Survey     Therapist reviewed FOTO scores for Aysha Randolph on 10/18/2024.   FOTO documents entered into [employer] - see Media section.     Limitation Score: 96%  6th:42%  10th:50%      Treatment     Aysha received the treatments listed below:     Aysha received the following manual therapy techniques for 10 minutes:   -I provide gentle scar mob to decrease scar tenderness, adherence, and hypersensitivity.    -I provide gentle STM to L bicep region in order to increase soft tissue extensibility, decrease pain, and tenderness/hypersensitivity in the stated area, and promote scar tissue remodeling (concurrent with LLPS elbow ext)     Aysha received therapeutic exercises for 25 minutes including:  - Scifit UBE forward/reverse Level 3 3 minutes each direction  - Therapist A PROM - elbow and shoulder   - seated dowel chest press 3# 2/15  - forearm pro/sup with 3#   - active elbow flexion 3 ways 3# 2/15  - Green flex bar smiles and frowns 2/15    Aysha received therapeutic activity for 15 minutes including:  - CC eccentric shoulder flexion plate 7  - farmer's/baby carry 15# medball 3 laps  - pom pom  brown gripper 2nd setting 1 container   -  pom pom  green clothespin 1 container  Patient Education and Home Exercises     Education provided:   - on current condition and home exercise program   - Progress towards goals     Written Home Exercises Provided: Patient instructed to cont prior HEP.  Exercises were reviewed and Aysha was able to demonstrate them at the end of the session.  Aysha demonstrated good  understanding of the HEP provided.      See EMR under Patient Instructions for exercises provided prior visit.   Assessment   Patient is currently 10+w p/o. Sensitivity at incision is very minimal. She was able to perform all P/AA/active range of motion/strengthening exercises this date all with good technique and without complaints. Good endurance noted with strengthening exercises well completing all in a pain free range of motion. Fatigue noted after. Patient's demonstrating overall improved range of motion and strength. She continues to lack about 10 degrees of elbow extension however continues to improve and is approaching discharge status.  Aysha is progressing well towards her goals and there are no updates to goals at this time. Pt prognosis is Good.     Pt will continue to benefit from skilled outpatient occupational therapy to address the deficits listed in the problem list on initial evaluation provide pt/family education and to maximize pt's level of independence in the home and community environment.     Anticipated barriers to occupational therapy: none    Pt's spiritual, cultural and educational needs considered and pt agreeable to plan of care and goals.    Goals:  Short term Goals:  1) Initiate home exercise program Met, 12/17/2024  2) Pt will increase AROM of L elbow by 5-10 degrees in order to assist with self grooming tasks by 4 weeks. -Met, 12/17/2024  3) Pt will reduce edema by .5-1 cm in affected elbow by 4 weeks. met  4) Pt will reduce pain to less than 4/10 by 4 weeks. Met, 12/17/2024  5) Pt will increase functional   strength by 5# in order to A in opening containers for med management or home management tasks by 4 weeks.Met, 12/17/2024  6) Patient will be able to achieve less than or equal to 80% on the FOTO, demonstrating overall improved functional ability with upper extremity. (Self-care category) Met     Long Term Goals:  Goals to be met by discharge:  1) Independent with home exercise program met  2) Pt will increase L elbow HURTADO by at least 35 degrees in order to increase functional use for grasp with home management or work related tasks by d/c. Met   3) Pt will decrease edema to trace or none to increase functional ROM by d/c. met  4) Pt will decrease pain to trace or none while completing light home management tasks or work related tasks by d/c.met  5) Patient will be able to achieve less than or equal to 50% on the FOTO, demonstrating overall improved functional ability with upper extremity.  (Self-care category) met    Plan     Continue with OT plan of care   Updates/Grading for next session: progress as able, patient is going to [place] for the holiday, will reassess on 1/6/2025 and anticipate d/c to home exercise program if cleared by MD.     LAURA Rodriugez

## 2024-12-17 ENCOUNTER — CLINICAL SUPPORT (OUTPATIENT)
Dept: REHABILITATION | Facility: HOSPITAL | Age: 64
End: 2024-12-17
Payer: MEDICARE

## 2024-12-17 DIAGNOSIS — M62.81 MUSCLE WEAKNESS: ICD-10-CM

## 2024-12-17 DIAGNOSIS — M25.622 ELBOW STIFFNESS, LEFT: ICD-10-CM

## 2024-12-17 DIAGNOSIS — M25.522 LEFT ELBOW PAIN: Primary | ICD-10-CM

## 2024-12-17 PROCEDURE — 97140 MANUAL THERAPY 1/> REGIONS: CPT | Mod: KX,PN

## 2024-12-17 PROCEDURE — 97110 THERAPEUTIC EXERCISES: CPT | Mod: PN

## 2024-12-17 PROCEDURE — 97530 THERAPEUTIC ACTIVITIES: CPT | Mod: PN

## 2025-01-07 ENCOUNTER — HOSPITAL ENCOUNTER (OUTPATIENT)
Dept: RADIOLOGY | Facility: HOSPITAL | Age: 65
Discharge: HOME | End: 2025-01-07
Attending: ORTHOPAEDIC SURGERY
Payer: MEDICARE

## 2025-01-07 ENCOUNTER — OFFICE VISIT (OUTPATIENT)
Dept: ORTHOPEDICS | Facility: CLINIC | Age: 65
End: 2025-01-07
Payer: MEDICARE

## 2025-01-07 VITALS — WEIGHT: 198.44 LBS | BODY MASS INDEX: 40.07 KG/M2

## 2025-01-07 DIAGNOSIS — S42.402D: ICD-10-CM

## 2025-01-07 DIAGNOSIS — S42.402D: Primary | ICD-10-CM

## 2025-01-07 PROCEDURE — 99999 PR PBB SHADOW E&M-EST. PATIENT-LVL III: CPT | Mod: PBBFAC,,, | Performed by: ORTHOPAEDIC SURGERY

## 2025-01-07 PROCEDURE — 99214 OFFICE O/P EST MOD 30 MIN: CPT | Mod: S$PBB,,, | Performed by: ORTHOPAEDIC SURGERY

## 2025-01-07 PROCEDURE — 73080 X-RAY EXAM OF ELBOW: CPT | Mod: 26,LT,, | Performed by: STUDENT IN AN ORGANIZED HEALTH CARE EDUCATION/TRAINING PROGRAM

## 2025-01-07 PROCEDURE — 73080 X-RAY EXAM OF ELBOW: CPT | Mod: TC,PN,LT

## 2025-01-07 PROCEDURE — 99213 OFFICE O/P EST LOW 20 MIN: CPT | Mod: PBBFAC,25,PN | Performed by: ORTHOPAEDIC SURGERY

## 2025-01-07 NOTE — PROGRESS NOTES
Patient ID:   Aysha Randolph is a 64 y.o. female.    Chief Complaint:   3m 4d s/p ORIF left distal humerus fracture    HPI:   The patient is just over 3 months out from undergoing open reduction internal fixation of her left distal humerus.  Overall, she has been doing well.  She does report some increased discomfort over the posteromedial aspect of her elbow recently when she was holding her granddaughter.  The pain has improved.  Pain level today is 2/10.    Medications:    Current Outpatient Medications:     acetaminophen (TYLENOL) 325 MG tablet, Take 2 tablets (650 mg total) by mouth every 6 (six) hours as needed for Pain., Disp: 40 tablet, Rfl: 0    albuterol (ACCUNEB) 1.25 mg/3 mL Nebu, Take 3 mLs (1.25 mg total) by nebulization every 6 (six) hours as needed (shortness of breath, wheezing)., Disp: 75 mL, Rfl: 1    albuterol (VENTOLIN HFA) 90 mcg/actuation inhaler, Inhale 2 puffs into the lungs every 4 (four) hours as needed for Wheezing. Rescue, Disp: 6.7 g, Rfl: 1    atorvastatin (LIPITOR) 10 MG tablet, Take 1 tablet (10 mg total) by mouth once daily., Disp: 90 tablet, Rfl: 3    cholecalciferol, vitamin D3, 125 mcg (5,000 unit) Tab, Take 5,000 Units by mouth once daily., Disp: , Rfl:     EScitalopram oxalate (LEXAPRO) 20 MG tablet, Take 1 tablet (20 mg total) by mouth once daily., Disp: 90 tablet, Rfl: 3    folic acid (FOLVITE) 1 MG tablet, Take 1 tablet (1 mg total) by mouth once daily., Disp: 90 tablet, Rfl: 3    hydroCHLOROthiazide (HYDRODIURIL) 25 MG tablet, Take 1 tablet (25 mg total) by mouth once daily., Disp: 90 tablet, Rfl: 3    ibuprofen (ADVIL,MOTRIN) 600 MG tablet, Take 1 tablet (600 mg total) by mouth every 6 (six) hours as needed for Pain., Disp: 20 tablet, Rfl: 0    methotrexate 2.5 MG Tab, Take 10 tablets (25 mg total) by mouth every 7 days., Disp: 40 tablet, Rfl: 3    multivitamin-minerals-lutein (MULTIVITAMIN 50 PLUS) Tab, Take 1 tablet by mouth once daily., Disp: , Rfl:     omega  "3-dha-epa-fish oil 120-180-500 mg Cap, , Disp: , Rfl:     ondansetron (ZOFRAN) 4 MG tablet, Take 1 tablet (4 mg total) by mouth 2 (two) times daily., Disp: 15 tablet, Rfl: 0    ondansetron (ZOFRAN-ODT) 4 MG TbDL, Take 1 tablet (4 mg total) by mouth every 8 (eight) hours as needed (nausea)., Disp: 30 tablet, Rfl: 0    oxyCODONE (ROXICODONE) 5 MG immediate release tablet, Take 1 tablet (5 mg total) by mouth every 6 (six) hours as needed for Pain., Disp: 12 tablet, Rfl: 0    pantoprazole (PROTONIX) 40 MG tablet, Take 1 tablet (40 mg total) by mouth once daily., Disp: 90 tablet, Rfl: 3    pen needle, diabetic (BD ULTRA-FINE MINI PEN NEEDLE) 31 gauge x 3/16" Ndle, Inject 1 each into the skin once daily., Disp: 100 each, Rfl: 3    pregabalin (LYRICA) 50 MG capsule, Take 1 capsule (50 mg total) by mouth 3 (three) times daily., Disp: 90 capsule, Rfl: 0    sarilumab (KEVZARA) 200 mg/1.14 mL PnIj, Inject 1.14 mLs (200 mg total) into the skin every 14 (fourteen) days., Disp: 2 pen , Rfl: 11    teriparatide (FORTEO) 20 mcg/dose (600mcg/2.4mL) PnIj, Inject 0.08 mLs (20 mcg total) into the skin once daily., Disp: 2.4 mL, Rfl: 11    aspirin (ECOTRIN) 81 MG EC tablet, Take 1 tablet (81 mg total) by mouth 2 (two) times a day., Disp: 60 tablet, Rfl: 0    Allergies:  Review of patient's allergies indicates:  No Known Allergies    Past Medical History:  Past Medical History:   Diagnosis Date    Abnormal Pap smear of cervix 2017    Arthritis     Hyperlipidemia     Hypertension     Mild intermittent asthma, uncomplicated     Osteoporosis     Rheumatoid arthritis, unspecified 12/17/2020        Past Surgical History:  Past Surgical History:   Procedure Laterality Date    ANKLE SURGERY Left     COLONOSCOPY N/A 05/31/2023    Procedure: COLONOSCOPY;  Surgeon: Robbie Ace MD;  Location: [facility];  Service: Endoscopy;  Laterality: N/A;    ESOPHAGOGASTRODUODENOSCOPY N/A 05/31/2023    Procedure: EGD (ESOPHAGOGASTRODUODENOSCOPY);  Surgeon: " Robbie Ace MD;  Location: [facility];  Service: Endoscopy;  Laterality: N/A;    FRACTURE SURGERY      JOINT REPLACEMENT  2016    Full Left ankle replacement    OPEN REDUCTION AND INTERNAL FIXATION (ORIF) OF FRACTURE OF DISTAL HUMERUS Left 10/3/2024    Procedure: ORIF, FRACTURE, HUMERUS, DISTAL;  Surgeon: Eben Jesus MD;  Location: [facility] OR;  Service: Orthopedics;  Laterality: Left;  Lateral decubitus, bean bag, bone foam, Acumed elbow plates, c-arm    WRIST FRACTURE SURGERY Left        Social History:  Social History     Occupational History    Not on file   Tobacco Use    Smoking status: Never    Smokeless tobacco: Never   Substance and Sexual Activity    Alcohol use: Not Currently     Comment: Very rarely    Drug use: Never    Sexual activity: Yes     Partners: Male     Birth control/protection: None       Family History:  Family History   Problem Relation Name Age of Onset    Arthritis Mother My mom     Heart disease Mother My mom     Hypertension Mother My mom     Stroke Mother My mom         Suffered a stroke and her left side became paralyzed    Cervical cancer Maternal Cousin maternal 2nd     Arthritis Sister Younger sister     Asthma Sister Younger sister     Diabetes Sister Younger sister         Diagnosed at the age of 12    Heart disease Sister Younger sister     Stroke Sister Younger sister         Has suffered several mini strokes    Asthma Sister Older sister          of an asthma attack at the age of 46    Early death Sister Older sister          at the age of 46    Heart disease Sister Older sister     Stroke Sister Older sister     Breast cancer Neg Hx      Colon cancer Neg Hx          ROS:  Review of Systems   Musculoskeletal:  Positive for arthritis, joint pain, myalgias and stiffness.   All other systems reviewed and are negative.      Vitals:  Wt 90 kg (198 lb 6.6 oz)   BMI 40.07 kg/m²     Physical Examination:  Comprehensive Orthopaedic Musculoskeletal  Exam    General      Constitutional: appears stated age, severely obese, well-developed and well-nourished    Scleral icterus: no    Labored breathing: no    Psychiatric: normal mood and affect and no acute distress    Neurological: alert and oriented x3    Skin: intact    Lymphadenopathy: none     Ortho Exam   Left elbow exam:  Well-healed surgical incision.  No cellulitis.    Range of motion is -15 degrees of extension to 140 of flexion.  She has full pronation and supination.      Imaging:  I have ordered and independently reviewed the following imaging studies performed at [facility] today    Left elbow x-ray series demonstrates dual plate fixation across the distal humerus.  The elbow joint looks well-preserved.  The fracture site looks completely healed.  The fracture is healed in a satisfactory position.  No evidence of any hardware failure.      Assessment:  1. Closed fracture of distal end of left humerus with routine healing, subsequent encounter      Plan:  I reviewed today's radiographic findings with the patient in detail.  She may advance her activity as tolerated.  She will follow up as needed.     No follow-ups on file.

## 2025-01-14 ENCOUNTER — PATIENT MESSAGE (OUTPATIENT)
Dept: RHEUMATOLOGY | Facility: CLINIC | Age: 65
End: 2025-01-14
Payer: MEDICARE

## 2025-01-14 DIAGNOSIS — M05.79 RHEUMATOID ARTHRITIS INVOLVING MULTIPLE SITES WITH POSITIVE RHEUMATOID FACTOR: ICD-10-CM

## 2025-01-16 RX ORDER — SARILUMAB 200 MG/1.14ML
200 INJECTION, SOLUTION SUBCUTANEOUS
Qty: 2 PEN | Refills: 11 | Status: CANCELLED | OUTPATIENT
Start: 2025-01-16

## 2025-01-17 DIAGNOSIS — M05.79 RHEUMATOID ARTHRITIS INVOLVING MULTIPLE SITES WITH POSITIVE RHEUMATOID FACTOR: ICD-10-CM

## 2025-01-17 RX ORDER — SARILUMAB 200 MG/1.14ML
200 INJECTION, SOLUTION SUBCUTANEOUS
Qty: 2 PEN | Refills: 11 | Status: ACTIVE | OUTPATIENT
Start: 2025-01-17

## 2025-01-28 ENCOUNTER — TELEPHONE (OUTPATIENT)
Dept: PRIMARY CARE CLINIC | Facility: CLINIC | Age: 65
End: 2025-01-28
Payer: MEDICARE

## 2025-01-28 NOTE — TELEPHONE ENCOUNTER
----- Message from Giovanna sent at 1/28/2025 12:09 PM CST -----  Type:  Appointment Request     Name of Caller:OLIMPIA ZAIDI [[id number]]    When is the first available appointment?No access    Symptoms:left side pain     Would the patient rather a call back or a response via [facility]? Call back     Best Call Back Number:[phone]     Additional Information: Patient would like to establish care with provider due prior provider leaving. Patient would like to ensure she is continuing care. Patient states she is having pain in upper left side that started about 5 days ago. Patient would like to be seen as soon as possible. Patient would like a call back with further information and assistance.

## 2025-02-18 DIAGNOSIS — M05.79 RHEUMATOID ARTHRITIS INVOLVING MULTIPLE SITES WITH POSITIVE RHEUMATOID FACTOR: Primary | ICD-10-CM

## 2025-02-18 DIAGNOSIS — Z79.899 IMMUNOCOMPROMISED STATE DUE TO DRUG THERAPY: ICD-10-CM

## 2025-02-18 DIAGNOSIS — Z79.631 LONG TERM METHOTREXATE USER: ICD-10-CM

## 2025-02-18 DIAGNOSIS — D84.821 IMMUNOCOMPROMISED STATE DUE TO DRUG THERAPY: ICD-10-CM

## 2025-02-19 ENCOUNTER — LAB VISIT (OUTPATIENT)
Dept: LAB | Facility: HOSPITAL | Age: 65
End: 2025-02-19
Attending: STUDENT IN AN ORGANIZED HEALTH CARE EDUCATION/TRAINING PROGRAM
Payer: MEDICARE

## 2025-02-19 DIAGNOSIS — Z79.631 LONG TERM METHOTREXATE USER: ICD-10-CM

## 2025-02-19 DIAGNOSIS — D84.821 IMMUNOCOMPROMISED STATE DUE TO DRUG THERAPY: ICD-10-CM

## 2025-02-19 DIAGNOSIS — Z79.899 IMMUNOCOMPROMISED STATE DUE TO DRUG THERAPY: ICD-10-CM

## 2025-02-19 DIAGNOSIS — M05.79 RHEUMATOID ARTHRITIS INVOLVING MULTIPLE SITES WITH POSITIVE RHEUMATOID FACTOR: ICD-10-CM

## 2025-02-19 LAB
ALBUMIN SERPL BCP-MCNC: 4.1 G/DL (ref 3.5–5.2)
ALP SERPL-CCNC: 74 U/L (ref 40–150)
ALT SERPL W/O P-5'-P-CCNC: 29 U/L (ref 10–44)
ANION GAP SERPL CALC-SCNC: 14 MMOL/L (ref 8–16)
AST SERPL-CCNC: 54 U/L (ref 10–40)
BASOPHILS # BLD AUTO: 0.06 K/UL (ref 0–0.2)
BASOPHILS NFR BLD: 0.7 % (ref 0–1.9)
BILIRUB SERPL-MCNC: 1.5 MG/DL (ref 0.1–1)
BUN SERPL-MCNC: 23 MG/DL (ref 8–23)
CALCIUM SERPL-MCNC: 9.9 MG/DL (ref 8.7–10.5)
CHLORIDE SERPL-SCNC: 101 MMOL/L (ref 95–110)
CHOLEST SERPL-MCNC: 170 MG/DL (ref 120–199)
CHOLEST/HDLC SERPL: 2.4 {RATIO} (ref 2–5)
CO2 SERPL-SCNC: 28 MMOL/L (ref 23–29)
CREAT SERPL-MCNC: 0.9 MG/DL (ref 0.5–1.4)
CRP SERPL-MCNC: 1.4 MG/L (ref 0–8.2)
DIFFERENTIAL METHOD BLD: ABNORMAL
EOSINOPHIL # BLD AUTO: 0.3 K/UL (ref 0–0.5)
EOSINOPHIL NFR BLD: 3.3 % (ref 0–8)
ERYTHROCYTE [DISTWIDTH] IN BLOOD BY AUTOMATED COUNT: 14.6 % (ref 11.5–14.5)
ERYTHROCYTE [SEDIMENTATION RATE] IN BLOOD BY PHOTOMETRIC METHOD: 9 MM/HR (ref 0–36)
EST. GFR  (NO RACE VARIABLE): >60 ML/MIN/1.73 M^2
GLUCOSE SERPL-MCNC: 103 MG/DL (ref 70–110)
HCT VFR BLD AUTO: 39.2 % (ref 37–48.5)
HDLC SERPL-MCNC: 72 MG/DL (ref 40–75)
HDLC SERPL: 42.4 % (ref 20–50)
HGB BLD-MCNC: 13.5 G/DL (ref 12–16)
IMM GRANULOCYTES # BLD AUTO: 0.03 K/UL (ref 0–0.04)
IMM GRANULOCYTES NFR BLD AUTO: 0.4 % (ref 0–0.5)
LDLC SERPL CALC-MCNC: 72.8 MG/DL (ref 63–159)
LYMPHOCYTES # BLD AUTO: 3.4 K/UL (ref 1–4.8)
LYMPHOCYTES NFR BLD: 41.2 % (ref 18–48)
MCH RBC QN AUTO: 34.7 PG (ref 27–31)
MCHC RBC AUTO-ENTMCNC: 34.4 G/DL (ref 32–36)
MCV RBC AUTO: 101 FL (ref 82–98)
MONOCYTES # BLD AUTO: 0.6 K/UL (ref 0.3–1)
MONOCYTES NFR BLD: 6.9 % (ref 4–15)
NEUTROPHILS # BLD AUTO: 3.9 K/UL (ref 1.8–7.7)
NEUTROPHILS NFR BLD: 47.5 % (ref 38–73)
NONHDLC SERPL-MCNC: 98 MG/DL
NRBC BLD-RTO: 0 /100 WBC
PLATELET # BLD AUTO: 242 K/UL (ref 150–450)
PMV BLD AUTO: 11.5 FL (ref 9.2–12.9)
POTASSIUM SERPL-SCNC: 4.3 MMOL/L (ref 3.5–5.1)
PROT SERPL-MCNC: 7.2 G/DL (ref 6–8.4)
RBC # BLD AUTO: 3.89 M/UL (ref 4–5.4)
SODIUM SERPL-SCNC: 143 MMOL/L (ref 136–145)
TRIGL SERPL-MCNC: 126 MG/DL (ref 30–150)
WBC # BLD AUTO: 8.25 K/UL (ref 3.9–12.7)

## 2025-02-19 PROCEDURE — 85652 RBC SED RATE AUTOMATED: CPT | Performed by: STUDENT IN AN ORGANIZED HEALTH CARE EDUCATION/TRAINING PROGRAM

## 2025-02-19 PROCEDURE — 85025 COMPLETE CBC W/AUTO DIFF WBC: CPT | Performed by: STUDENT IN AN ORGANIZED HEALTH CARE EDUCATION/TRAINING PROGRAM

## 2025-02-19 PROCEDURE — 80053 COMPREHEN METABOLIC PANEL: CPT | Performed by: STUDENT IN AN ORGANIZED HEALTH CARE EDUCATION/TRAINING PROGRAM

## 2025-02-19 PROCEDURE — 86140 C-REACTIVE PROTEIN: CPT | Performed by: STUDENT IN AN ORGANIZED HEALTH CARE EDUCATION/TRAINING PROGRAM

## 2025-02-19 PROCEDURE — 36415 COLL VENOUS BLD VENIPUNCTURE: CPT | Mod: PO | Performed by: STUDENT IN AN ORGANIZED HEALTH CARE EDUCATION/TRAINING PROGRAM

## 2025-02-19 PROCEDURE — 80061 LIPID PANEL: CPT | Performed by: STUDENT IN AN ORGANIZED HEALTH CARE EDUCATION/TRAINING PROGRAM

## 2025-02-20 ENCOUNTER — RESULTS FOLLOW-UP (OUTPATIENT)
Dept: RHEUMATOLOGY | Facility: CLINIC | Age: 65
End: 2025-02-20

## 2025-02-20 ENCOUNTER — OFFICE VISIT (OUTPATIENT)
Dept: RHEUMATOLOGY | Facility: CLINIC | Age: 65
End: 2025-02-20
Payer: MEDICARE

## 2025-02-20 ENCOUNTER — HOSPITAL ENCOUNTER (OUTPATIENT)
Dept: RADIOLOGY | Facility: HOSPITAL | Age: 65
Discharge: HOME | End: 2025-02-20
Payer: MEDICARE

## 2025-02-20 ENCOUNTER — HOSPITAL ENCOUNTER (OUTPATIENT)
Dept: RADIOLOGY | Facility: HOSPITAL | Age: 65
Discharge: HOME | End: 2025-02-20
Attending: INTERNAL MEDICINE
Payer: MEDICARE

## 2025-02-20 ENCOUNTER — HOSPITAL ENCOUNTER (OUTPATIENT)
Dept: RADIOLOGY | Facility: HOSPITAL | Age: 65
Discharge: HOME | End: 2025-02-20
Attending: STUDENT IN AN ORGANIZED HEALTH CARE EDUCATION/TRAINING PROGRAM
Payer: MEDICARE

## 2025-02-20 VITALS
HEART RATE: 87 BPM | SYSTOLIC BLOOD PRESSURE: 124 MMHG | DIASTOLIC BLOOD PRESSURE: 80 MMHG | WEIGHT: 196.88 LBS | BODY MASS INDEX: 39.76 KG/M2

## 2025-02-20 VITALS
BODY MASS INDEX: 39.51 KG/M2 | WEIGHT: 196 LBS | HEIGHT: 59 IN | BODY MASS INDEX: 39.51 KG/M2 | HEIGHT: 59 IN | WEIGHT: 196 LBS

## 2025-02-20 DIAGNOSIS — Z12.31 ENCOUNTER FOR SCREENING MAMMOGRAM FOR BREAST CANCER: ICD-10-CM

## 2025-02-20 DIAGNOSIS — M81.8 OTHER OSTEOPOROSIS WITHOUT CURRENT PATHOLOGICAL FRACTURE: ICD-10-CM

## 2025-02-20 DIAGNOSIS — Z12.31 ENCOUNTER FOR SCREENING MAMMOGRAM FOR MALIGNANT NEOPLASM OF BREAST: ICD-10-CM

## 2025-02-20 DIAGNOSIS — Z79.899 IMMUNOCOMPROMISED STATE DUE TO DRUG THERAPY: ICD-10-CM

## 2025-02-20 DIAGNOSIS — M19.90 OSTEOARTHRITIS, UNSPECIFIED OSTEOARTHRITIS TYPE, UNSPECIFIED SITE: ICD-10-CM

## 2025-02-20 DIAGNOSIS — E66.01 SEVERE OBESITY (BMI 35.0-39.9) WITH COMORBIDITY: ICD-10-CM

## 2025-02-20 DIAGNOSIS — M05.79 RHEUMATOID ARTHRITIS INVOLVING MULTIPLE SITES WITH POSITIVE RHEUMATOID FACTOR: Primary | ICD-10-CM

## 2025-02-20 DIAGNOSIS — D84.821 IMMUNOCOMPROMISED STATE DUE TO DRUG THERAPY: ICD-10-CM

## 2025-02-20 DIAGNOSIS — Z79.631 LONG TERM METHOTREXATE USER: ICD-10-CM

## 2025-02-20 DIAGNOSIS — M79.7 FIBROMYALGIA: ICD-10-CM

## 2025-02-20 PROCEDURE — 77063 BREAST TOMOSYNTHESIS BI: CPT | Mod: TC

## 2025-02-20 PROCEDURE — 73560 X-RAY EXAM OF KNEE 1 OR 2: CPT | Mod: TC,50

## 2025-02-20 PROCEDURE — 99213 OFFICE O/P EST LOW 20 MIN: CPT | Mod: PBBFAC,25 | Performed by: STUDENT IN AN ORGANIZED HEALTH CARE EDUCATION/TRAINING PROGRAM

## 2025-02-20 PROCEDURE — 73560 X-RAY EXAM OF KNEE 1 OR 2: CPT | Mod: 26,50,, | Performed by: INTERNAL MEDICINE

## 2025-02-20 RX ORDER — IBUPROFEN 800 MG/1
800 TABLET ORAL EVERY 8 HOURS PRN
Qty: 90 TABLET | Refills: 2 | Status: SHIPPED | OUTPATIENT
Start: 2025-02-20 | End: 2025-05-21

## 2025-02-20 RX ORDER — METHOTREXATE 2.5 MG/1
TABLET ORAL
Qty: 120 TABLET | Refills: 0 | Status: SHIPPED | OUTPATIENT
Start: 2025-02-20

## 2025-02-20 NOTE — PROGRESS NOTES
Subjective:      Patient ID: Aysha Randolph is a 64 y.o. female.    Chief Complaint: Seropositive RA    Initial Presentation  Aysha Randolph is a 64 y.o. F with a past medical history below who presents today for follow-up for seropositive rheumatoid arthritis.      - The patient establish with [facility] in February 2023 after moving from [place].   - She was diagnosed with RA in December 2020, initially had pain and swelling in the hands, knees, shoulders, along with stiffness  - She has difficult to control RA and has been on various regimens; has failed MTX, Humira, Orencia infusions, Actemra, Rinvoq  - Prism RA showed she was a high non-responder to TNFs  - Rinvoq was discontinued in December 2023, attempted to start Xeljanx however denied, was started on Kevzara in March 2024  - She is also on Foreto for osteoporosis (began in May 2024)  - She also has fibromyalgia     Interval History  In October, she had a closed fracture of left distal humerus s/p ORIF left distal humerus fracture (10/3/24) from a fall after tripping over a curb.     Today she reports pain in multiple joints. She does not feel she is doing as well as she was before. She also has pain in her muscles. She has been taking all 10 Methotrexate tablets in the morning.     Rheumatology ROS  (-) fevers, chills (-) weight loss, (-) fatigue, (+) morning stiffness,  (+) arthralgias, (-) arthritis, (-) headaches, (-)  vision changes or loss of vision, (-) hx of red eyes including uveitis, iritis, scleritis or episcleritis, (-) photophobia, (-) dry eyes, (-) dry mouth, (-) rash, (-) photosensitivity, (-) alopecia, (-) mucosal ulcers, (-) Raynaud's  phenomenon, (-) SQ nodules, (-) sense of skin tightening in hands, face or torso, (-)  hx pleurisy, (-) sharp chest pains that increase with deep breath, (-) lung fibrosis, (-) hemoptysis, (-) hx of DVT or PE (-) chest pains, (-) shortness of breath, (-) hx pericarditis (-) abdominal pain, (-) nausea,  (-) vomiting, (-) diarrhea, (-) constipation, (-) melena,  (-) bloody diarrhea, (-) UC/Crohns, (-) dysphagia, (-) GERD/Reflux, (-) hematuria, proteinuria, (-) renal failure, (-) focal weakness, (-) trouble combing hair or (-) getting out of chairs,  (-) hx of low WBC, low platelets, anemia, (-) hx of pregnancy losses/pre term deliveries/pregnancy complications, (-) genital ulcers    History  Medical:  Active Problem List with Overview Notes    Diagnosis Date Noted    Left elbow pain 10/18/2024    Muscle weakness 10/18/2024    Elbow stiffness, left 10/18/2024    Closed fracture of left distal humerus 09/30/2024    Sleep apnea 05/29/2024    Severe obesity (BMI 35.0-39.9) with comorbidity 05/16/2024    Dysplasia of cervix, low grade (LANDEN 1) 05/12/2024    History of vaginal dysplasia 03/06/2024    Pain aggravated by activities of daily living 01/25/2024    Fear of pain 01/24/2024    Decreased range of motion of trunk and back 01/24/2024    Osteoporosis 12/14/2023    H. pylori infection 06/07/2023    Cervical dysplasia 05/18/2023    Vaginal dysplasia 05/18/2023    Hyperlipidemia 02/01/2023    GERD (gastroesophageal reflux disease) 02/01/2023    Rheumatoid arthritis 02/01/2023    Benign essential hypertension 02/01/2023     Surgical:  Past Surgical History:   Procedure Laterality Date    ANKLE SURGERY Left     COLONOSCOPY N/A 05/31/2023    Procedure: COLONOSCOPY;  Surgeon: Robbie Ace MD;  Location: [facility];  Service: Endoscopy;  Laterality: N/A;    ESOPHAGOGASTRODUODENOSCOPY N/A 05/31/2023    Procedure: EGD (ESOPHAGOGASTRODUODENOSCOPY);  Surgeon: Robbie Ace MD;  Location: [facility];  Service: Endoscopy;  Laterality: N/A;    FRACTURE SURGERY      JOINT REPLACEMENT  April 2016    Full Left ankle replacement    OPEN REDUCTION AND INTERNAL FIXATION (ORIF) OF FRACTURE OF DISTAL HUMERUS Left 10/3/2024    Procedure: ORIF, FRACTURE, HUMERUS, DISTAL;  Surgeon: Eben Jesus MD;  Location: [facility] OR;  Service:  "Orthopedics;  Laterality: Left;  Lateral decubitus, bean bag, bone foam, Acumed elbow plates, c-arm    WRIST FRACTURE SURGERY Left      Medication:  Current Outpatient Medications   Medication Sig Dispense Refill    acetaminophen (TYLENOL) 325 MG tablet Take 2 tablets (650 mg total) by mouth every 6 (six) hours as needed for Pain. 40 tablet 0    albuterol (ACCUNEB) 1.25 mg/3 mL Nebu Take 3 mLs (1.25 mg total) by nebulization every 6 (six) hours as needed (shortness of breath, wheezing). 75 mL 1    albuterol (VENTOLIN HFA) 90 mcg/actuation inhaler Inhale 2 puffs into the lungs every 4 (four) hours as needed for Wheezing. Rescue 6.7 g 1    atorvastatin (LIPITOR) 10 MG tablet Take 1 tablet (10 mg total) by mouth once daily. 90 tablet 3    cholecalciferol, vitamin D3, 125 mcg (5,000 unit) Tab Take 5,000 Units by mouth once daily.      EScitalopram oxalate (LEXAPRO) 20 MG tablet Take 1 tablet (20 mg total) by mouth once daily. 90 tablet 3    folic acid (FOLVITE) 1 MG tablet Take 1 tablet (1 mg total) by mouth once daily. 90 tablet 3    hydroCHLOROthiazide (HYDRODIURIL) 25 MG tablet Take 1 tablet (25 mg total) by mouth once daily. 90 tablet 3    multivitamin-minerals-lutein (MULTIVITAMIN 50 PLUS) Tab Take 1 tablet by mouth once daily.      omega 3-dha-epa-fish oil 120-180-500 mg Cap       ondansetron (ZOFRAN) 4 MG tablet Take 1 tablet (4 mg total) by mouth 2 (two) times daily. 15 tablet 0    ondansetron (ZOFRAN-ODT) 4 MG TbDL Take 1 tablet (4 mg total) by mouth every 8 (eight) hours as needed (nausea). 30 tablet 0    pantoprazole (PROTONIX) 40 MG tablet Take 1 tablet (40 mg total) by mouth once daily. 90 tablet 3    pen needle, diabetic (BD ULTRA-FINE MINI PEN NEEDLE) 31 gauge x 3/16" Ndle Inject 1 each into the skin once daily. 100 each 3    pregabalin (LYRICA) 50 MG capsule Take 1 capsule (50 mg total) by mouth 3 (three) times daily. 90 capsule 0    sarilumab (KEVZARA) 200 mg/1.14 mL Antonia Inject 1.14 mLs (200 mg total) " into the skin every 14 (fourteen) days. 2 pen 11    teriparatide (FORTEO) 20 mcg/dose (600mcg/2.4mL) PnIj Inject 0.08 mLs (20 mcg total) into the skin once daily. 2.4 mL 11    aspirin (ECOTRIN) 81 MG EC tablet Take 1 tablet (81 mg total) by mouth 2 (two) times a day. 60 tablet 0    ibuprofen (ADVIL,MOTRIN) 800 MG tablet Take 1 tablet (800 mg total) by mouth every 8 (eight) hours as needed for Pain. 90 tablet 2    methotrexate 2.5 MG Tab Take 5 tablets in the AM and 5 tablets in the PM on Thursdays. 120 tablet 0    oxyCODONE (ROXICODONE) 5 MG immediate release tablet Take 1 tablet (5 mg total) by mouth every 6 (six) hours as needed for Pain. (Patient not taking: Reported on 2/20/2025) 12 tablet 0     No current facility-administered medications for this visit.     Imaging/Procedures  DEXA (2/15/23):  Lumbar spine (L1-L4):               BMD is 0.940 g/cm2, T-score is -1.0, and Z-score is 0.7.     Total hip:                                BMD is 0.881 g/cm2, T-score is -0.5, and Z-score is 0.7.     Femoral neck:                          BMD is 0.645 g/cm2, T-score is -1.8, and Z-score is -0.4.     Distal 1/3 radius:                      Not applicable     FRAX:     27% risk of a major osteoporotic fracture in the next 10 years.     5% risk of hip fracture in the next 10 years.     Impression:     *Osteoporosis based on T-score between -1.0 and -2.5 and elevated risk based on FRAX  *Fracture risk is very high due to calculated 10 year risk of hip fracture >4.5% (FRAX)     RECOMMENDATIONS:  *Daily calcium intake 8569-6318 mg, dietary sources preferred; Vitamin D 6356-7472 IU daily.  *Weight bearing exercise and fall precautions.  *Recommend medical therapy for osteoporosis with high risk for fracture. Consider bisphosphonates (alendronate, risedronate, zoledronic acid), or denosumab.  *Repeat BMD in 2 years    Rheumatologic medications history  MTX 10 tablets (11/2020-present)  Prednisone (last used in June 2023)  Humira  (03/2021-12/2021; discontinued due to lack of efficacy, used genetic testing to see that she was not a responder)    Orencia (05/2022-10/2022; discontinued due to lack of efficacy)  Actemra (06/2023-09/2023)  Rinvoq (09/2023-12/2023; discontinued due to headaches)  Kevzara (03/2024-present)  Forteo (05/2024-present)    Objective:   /80 (Patient Position: Sitting)   Pulse 87   Wt 89.3 kg (196 lb 13.9 oz)   BMI 39.76 kg/m²   Physical Exam   Constitutional: No distress.   HENT:   Mouth/Throat: Mucous membranes are moist.   Eyes: Conjunctivae are normal.   Cardiovascular: Normal rate, regular rhythm, normal heart sounds and normal pulses.   Pulmonary/Chest: Effort normal and breath sounds normal.   Abdominal: Soft. Bowel sounds are normal.   Musculoskeletal:         General: Swelling (soft slight soft tissue swelling of L hand but no synovitis) and tenderness (multiple tender joints) present. Normal range of motion.      Cervical back: Normal range of motion. No rigidity or tenderness.   Neurological: She is alert. She displays no weakness. Gait normal.   Skin: Skin is warm.      12/14/2023 5/16/2024 8/22/2024 2/20/2025   Tender (HIGGINBOTHAM-28) 22 / 28  9 / 28 1 / 28 12 / 28    Swollen (HIGGINBOTHAM-28) 0 / 28  0 / 28  0 / 28  3 / 28    Provider Global 50 / 100 30 / 100 10 / 100 40 / 100   Patient Global 65 / 100 45 / 100 20 / 100 60 / 100   ESR 12 mm/hr 39 mm/hr -- 9 mm/hr   CRP 8.2 mg/L 13.2 mg/L -- 1.4 mg/L   HIGGINBOTHAM-28 (ESR) 5.28 (High disease activity) 4.87 (Moderate disease activity) -- 4.8 (Moderate disease activity)   HIGGINBOTHAM-28 (CRP) 5.3 (High disease activity) 4.23 (Moderate disease activity) -- 4.54 (Moderate disease activity)   CDAI Score 33.5  16.5  4  25      Assessment:     1. Rheumatoid arthritis involving multiple sites with positive rheumatoid factor    2. Osteoarthritis, unspecified osteoarthritis type, unspecified site    3. Long term methotrexate user    4. Immunocompromised state due to drug therapy    5.  Fibromyalgia    6. Osteoporosis without current pathological fracture    7. Severe obesity (BMI 35.0-39.9) with comorbidity      Plan:     Problem List Items Addressed This Visit       Rheumatoid arthritis - Primary    Relevant Medications    methotrexate 2.5 MG Tab    Osteoporosis    Severe obesity (BMI 35.0-39.9) with comorbidity     Other Visit Diagnoses         Osteoarthritis, unspecified osteoarthritis type, unspecified site        Relevant Medications    ibuprofen (ADVIL,MOTRIN) 800 MG tablet    Other Relevant Orders    X-ray AP Standing Knees with Both Lateral (Completed)      Long term methotrexate user          Immunocompromised state due to drug therapy          Fibromyalgia                Aysha Randolph is a 63 y.o. F with a past medical history below who presents today for follow-up for seropositive rheumatoid arthritis.     Seropositive rheumatoid arthritis- currently uncontrolled; has failed MTX, Humira, Orencia, Actemra and now Rinvoq. No synovitis on exam. Recent inflammatory markers normal. CDAI of 16.5 today, decreased from a few months ago just on MTX and with the addition of Lyrica. Fibromyalgia seems to have been playing a role in the patients overall pain. CDAI 25 on MTX 25 mg weekly, folic acid and Kevzara 200 mg q 14 days.     Osteoporosis- DEXA from 2/2023 with lowest T score of -1.8 at the femoral neck, FRAX 27% major osteoporotic fracture 5% of hip fracture in the next 10 years, no falls or fractures, currently on Forteo (05/2024-present), Vit D and calcium through the diet     Fibromyalgia- The patient has widespread pain, on both sides of the body, above and below the waist. The patient has multiple associated symptoms with fibromyalgia that include fatigue, brain fog, waking up tired, irritable bowel syndrome, depression, insomnia, anxiety, headaches.   I spent time counseling the patient on fibromyalgia. I explained to the patient that fibromyalgia is a disorder characterized by  widespread musculoskeletal pain, accompanied by fatigue, sleep, memory and mood issues. In general, it is thought that fibromyalgia symptoms are secondary to overactive nerves, which amplify painful sensations by the way the brain processes the pain signals.     Plan:  - Monitor CBC, CMP, ESR, CRP every 3 months   - Obtain xrays of the knees to evaluate for OA  - Continue MTX 25 mg weekly and folic acid daily; recommended that the patient split the dose (5 tablets in AM, 5 tablets in PM) for better absorption  - Continue Kevzara 200 mg q 14 days  - Start Ibuprofen 800 mg TID PRN  - If patient continues to have pain with above adjustments, can consider the addition of Leflunomide   - Continue Forteo, Vit D (currently taking 5000U daily, recommended decreasing to 1000-2000U daily), and calcium (through the diet)  - Continue Lyrica 50 mg TID for fibromyalgia, now that FAITH is being treated adequately fibromyalgia may be better controlled   - Due for COVID, RSV and pneumonia vaccines    This patient was examined with Dr. Ramos. Plan discussed with the patient. Return to clinic in 3 months.    Kayla Cobb MD  Rheumatology Fellow, PGY5    I have personally taken the history and examined the patient and concur with the resident's note as above.  Clinically she has no evidence of active inflammation.  I think most of her pain is due to osteoarthritis and fibromyalgia.  We are making no change in her immunosuppressive medications.  We will work on pain management at this point.  We will see her in follow-up in 3 months.

## 2025-02-21 ENCOUNTER — LAB VISIT (OUTPATIENT)
Dept: LAB | Facility: HOSPITAL | Age: 65
End: 2025-02-21
Attending: STUDENT IN AN ORGANIZED HEALTH CARE EDUCATION/TRAINING PROGRAM
Payer: MEDICARE

## 2025-02-21 ENCOUNTER — OFFICE VISIT (OUTPATIENT)
Dept: PRIMARY CARE CLINIC | Facility: CLINIC | Age: 65
End: 2025-02-21
Payer: MEDICARE

## 2025-02-21 ENCOUNTER — TELEPHONE (OUTPATIENT)
Dept: PRIMARY CARE CLINIC | Facility: CLINIC | Age: 65
End: 2025-02-21
Payer: MEDICARE

## 2025-02-21 ENCOUNTER — RESULTS FOLLOW-UP (OUTPATIENT)
Dept: PRIMARY CARE CLINIC | Facility: CLINIC | Age: 65
End: 2025-02-21

## 2025-02-21 VITALS
OXYGEN SATURATION: 94 % | DIASTOLIC BLOOD PRESSURE: 60 MMHG | WEIGHT: 199.06 LBS | BODY MASS INDEX: 40.21 KG/M2 | HEART RATE: 90 BPM | SYSTOLIC BLOOD PRESSURE: 118 MMHG

## 2025-02-21 DIAGNOSIS — D50.8 OTHER IRON DEFICIENCY ANEMIAS: ICD-10-CM

## 2025-02-21 DIAGNOSIS — Z79.899 OTHER LONG TERM (CURRENT) DRUG THERAPY: ICD-10-CM

## 2025-02-21 DIAGNOSIS — E83.39 OTHER DISORDERS OF PHOSPHORUS METABOLISM: ICD-10-CM

## 2025-02-21 DIAGNOSIS — D53.8 OTHER SPECIFIED NUTRITIONAL ANEMIAS: ICD-10-CM

## 2025-02-21 DIAGNOSIS — R53.83 FATIGUE, UNSPECIFIED TYPE: Primary | ICD-10-CM

## 2025-02-21 DIAGNOSIS — R53.83 FATIGUE, UNSPECIFIED TYPE: ICD-10-CM

## 2025-02-21 DIAGNOSIS — E66.01 SEVERE OBESITY: ICD-10-CM

## 2025-02-21 DIAGNOSIS — G47.33 OBSTRUCTIVE SLEEP APNEA SYNDROME: ICD-10-CM

## 2025-02-21 LAB
25(OH)D3+25(OH)D2 SERPL-MCNC: 62 NG/ML (ref 30–96)
ALBUMIN SERPL BCP-MCNC: 4 G/DL (ref 3.5–5.2)
ALP SERPL-CCNC: 72 U/L (ref 40–150)
ALT SERPL W/O P-5'-P-CCNC: 30 U/L (ref 10–44)
ANION GAP SERPL CALC-SCNC: 8 MMOL/L (ref 8–16)
AST SERPL-CCNC: 48 U/L (ref 10–40)
BASOPHILS # BLD AUTO: 0.05 K/UL (ref 0–0.2)
BASOPHILS NFR BLD: 0.5 % (ref 0–1.9)
BILIRUB SERPL-MCNC: 1.5 MG/DL (ref 0.1–1)
BUN SERPL-MCNC: 20 MG/DL (ref 8–23)
CALCIUM SERPL-MCNC: 9.9 MG/DL (ref 8.7–10.5)
CHLORIDE SERPL-SCNC: 102 MMOL/L (ref 95–110)
CO2 SERPL-SCNC: 29 MMOL/L (ref 23–29)
CREAT SERPL-MCNC: 0.8 MG/DL (ref 0.5–1.4)
DIFFERENTIAL METHOD BLD: ABNORMAL
EOSINOPHIL # BLD AUTO: 0.3 K/UL (ref 0–0.5)
EOSINOPHIL NFR BLD: 3.5 % (ref 0–8)
ERYTHROCYTE [DISTWIDTH] IN BLOOD BY AUTOMATED COUNT: 14.6 % (ref 11.5–14.5)
EST. GFR  (NO RACE VARIABLE): >60 ML/MIN/1.73 M^2
FERRITIN SERPL-MCNC: 81 NG/ML (ref 20–300)
FOLATE SERPL-MCNC: >40 NG/ML (ref 4–24)
GLUCOSE SERPL-MCNC: 100 MG/DL (ref 70–110)
HCT VFR BLD AUTO: 37.2 % (ref 37–48.5)
HGB BLD-MCNC: 12 G/DL (ref 12–16)
IMM GRANULOCYTES # BLD AUTO: 0.04 K/UL (ref 0–0.04)
IMM GRANULOCYTES NFR BLD AUTO: 0.4 % (ref 0–0.5)
IRON SERPL-MCNC: 134 UG/DL (ref 30–160)
LYMPHOCYTES # BLD AUTO: 2.4 K/UL (ref 1–4.8)
LYMPHOCYTES NFR BLD: 24.5 % (ref 18–48)
MAGNESIUM SERPL-MCNC: 1.4 MG/DL (ref 1.6–2.6)
MCH RBC QN AUTO: 32.4 PG (ref 27–31)
MCHC RBC AUTO-ENTMCNC: 32.3 G/DL (ref 32–36)
MCV RBC AUTO: 101 FL (ref 82–98)
MONOCYTES # BLD AUTO: 1 K/UL (ref 0.3–1)
MONOCYTES NFR BLD: 10.5 % (ref 4–15)
NEUTROPHILS # BLD AUTO: 5.8 K/UL (ref 1.8–7.7)
NEUTROPHILS NFR BLD: 60.6 % (ref 38–73)
NRBC BLD-RTO: 0 /100 WBC
PLATELET # BLD AUTO: 187 K/UL (ref 150–450)
PMV BLD AUTO: 11.5 FL (ref 9.2–12.9)
POTASSIUM SERPL-SCNC: 4.1 MMOL/L (ref 3.5–5.1)
PROT SERPL-MCNC: 7.1 G/DL (ref 6–8.4)
RBC # BLD AUTO: 3.7 M/UL (ref 4–5.4)
SATURATED IRON: 33 % (ref 20–50)
SODIUM SERPL-SCNC: 139 MMOL/L (ref 136–145)
TOTAL IRON BINDING CAPACITY: 410 UG/DL (ref 250–450)
TRANSFERRIN SERPL-MCNC: 277 MG/DL (ref 200–375)
TSH SERPL DL<=0.005 MIU/L-ACNC: 1.62 UIU/ML (ref 0.4–4)
VIT B12 SERPL-MCNC: 502 PG/ML (ref 210–950)
WBC # BLD AUTO: 9.58 K/UL (ref 3.9–12.7)

## 2025-02-21 PROCEDURE — 82728 ASSAY OF FERRITIN: CPT | Performed by: STUDENT IN AN ORGANIZED HEALTH CARE EDUCATION/TRAINING PROGRAM

## 2025-02-21 PROCEDURE — 82607 VITAMIN B-12: CPT | Performed by: STUDENT IN AN ORGANIZED HEALTH CARE EDUCATION/TRAINING PROGRAM

## 2025-02-21 PROCEDURE — 99213 OFFICE O/P EST LOW 20 MIN: CPT | Mod: PBBFAC | Performed by: STUDENT IN AN ORGANIZED HEALTH CARE EDUCATION/TRAINING PROGRAM

## 2025-02-21 PROCEDURE — 99999 PR PBB SHADOW E&M-EST. PATIENT-LVL III: CPT | Mod: PBBFAC,,, | Performed by: STUDENT IN AN ORGANIZED HEALTH CARE EDUCATION/TRAINING PROGRAM

## 2025-02-21 PROCEDURE — 84466 ASSAY OF TRANSFERRIN: CPT | Performed by: STUDENT IN AN ORGANIZED HEALTH CARE EDUCATION/TRAINING PROGRAM

## 2025-02-21 PROCEDURE — 84443 ASSAY THYROID STIM HORMONE: CPT | Performed by: STUDENT IN AN ORGANIZED HEALTH CARE EDUCATION/TRAINING PROGRAM

## 2025-02-21 PROCEDURE — 80053 COMPREHEN METABOLIC PANEL: CPT | Performed by: STUDENT IN AN ORGANIZED HEALTH CARE EDUCATION/TRAINING PROGRAM

## 2025-02-21 PROCEDURE — 85025 COMPLETE CBC W/AUTO DIFF WBC: CPT | Performed by: STUDENT IN AN ORGANIZED HEALTH CARE EDUCATION/TRAINING PROGRAM

## 2025-02-21 PROCEDURE — 83735 ASSAY OF MAGNESIUM: CPT | Performed by: STUDENT IN AN ORGANIZED HEALTH CARE EDUCATION/TRAINING PROGRAM

## 2025-02-21 PROCEDURE — 82746 ASSAY OF FOLIC ACID SERUM: CPT | Performed by: STUDENT IN AN ORGANIZED HEALTH CARE EDUCATION/TRAINING PROGRAM

## 2025-02-21 PROCEDURE — 84207 ASSAY OF VITAMIN B-6: CPT | Performed by: STUDENT IN AN ORGANIZED HEALTH CARE EDUCATION/TRAINING PROGRAM

## 2025-02-21 PROCEDURE — 82306 VITAMIN D 25 HYDROXY: CPT | Performed by: STUDENT IN AN ORGANIZED HEALTH CARE EDUCATION/TRAINING PROGRAM

## 2025-02-21 NOTE — TELEPHONE ENCOUNTER
----- Message from Cally sent at 2/21/2025 10:55 AM CST -----  Type:  Sooner Appointment RequestCaller is requesting a sooner appointment.  Caller declined first available appointment listed below.  Caller will not accept being placed on the waitlist and is requesting a message be sent to doctor.Name of Caller:ptWhen is the first available appointment?n/aSymptoms:Esr CareWould the patient rather a call back or a response via [facility]? callBest Call Back Number: [phone] Additional Information:

## 2025-02-21 NOTE — PROGRESS NOTES
Subjective:       Patient ID: Aysha Randolph is a 64 y.o. female.   Chief Complaint: Fatigue    Fatigue-  Endorses increased fatigue over the past several months. No changes in activities, diet, or sleep. No medication changes. No recent illnesses.  Endorses getting in bed late at night  Dosing off during   Known hx of RA  Known hx of FAITH dx last yr, using CPAP    Obesity:   BMI 40.21 in clinic today  Diet high in refined carbohydrate  No current calorie counting  No regular exercise      Review of Systems   Constitutional:  Positive for fatigue. Negative for fever.   Respiratory:  Negative for cough and shortness of breath.    Cardiovascular:  Negative for chest pain.   Gastrointestinal:  Negative for abdominal pain, diarrhea, nausea and vomiting.   Musculoskeletal:  Negative for back pain.   Neurological:  Negative for weakness.              Objective:        Physical Exam  HENT:      Head: Normocephalic and atraumatic.      Nose: Nose normal.      Mouth/Throat:      Mouth: Mucous membranes are moist.      Pharynx: Oropharynx is clear.   Eyes:      Extraocular Movements: Extraocular movements intact.      Conjunctiva/sclera: Conjunctivae normal.      Pupils: Pupils are equal, round, and reactive to light.   Cardiovascular:      Rate and Rhythm: Normal rate.   Pulmonary:      Effort: Pulmonary effort is normal.   Musculoskeletal:         General: No swelling. Normal range of motion.      Cervical back: Normal range of motion.      Right lower leg: No edema.      Left lower leg: No edema.   Skin:     General: Skin is warm.      Findings: No lesion or rash.   Neurological:      General: No focal deficit present.      Mental Status: She is alert and oriented to person, place, and time.      Motor: No weakness.   Psychiatric:         Mood and Affect: Mood normal.         Thought Content: Thought content normal.         Assessment:         Problem List Items Addressed This Visit          Other    Sleep apnea     Other  Visit Diagnoses         Fatigue, unspecified type    -  Primary    Relevant Orders    Magnesium (Completed)    CBC Auto Differential (Completed)    Comprehensive Metabolic Panel (Completed)    Ferritin (Completed)    Iron and TIBC (Completed)    Vitamin B6    Folate (Completed)    Vitamin B12 (Completed)    Vitamin D (Completed)    TSH (Completed)      Severe obesity          Other long term (current) drug therapy        Relevant Orders    Magnesium (Completed)    CBC Auto Differential (Completed)    Comprehensive Metabolic Panel (Completed)    Ferritin (Completed)    Iron and TIBC (Completed)    Vitamin B6    Folate (Completed)    Vitamin B12 (Completed)    Vitamin D (Completed)    TSH (Completed)      Other specified nutritional anemias        Relevant Orders    Ferritin (Completed)      Other iron deficiency anemias        Relevant Orders    Iron and TIBC (Completed)      Other disorders of phosphorus metabolism        Relevant Orders    Vitamin B6              Plan:         1. Fatigue, unspecified type  -     Magnesium; Future; Expected date: 02/21/2025  -     CBC Auto Differential; Future; Expected date: 02/21/2025  -     Comprehensive Metabolic Panel; Future; Expected date: 02/21/2025  -     Ferritin; Future; Expected date: 02/21/2025  -     Iron and TIBC; Future; Expected date: 02/21/2025  -     Vitamin B6; Future; Expected date: 02/21/2025  -     Folate; Future; Expected date: 02/21/2025  -     Vitamin B12; Future; Expected date: 02/21/2025  -     Vitamin D; Future; Expected date: 02/21/2025  -     TSH; Future; Expected date: 02/21/2025  Follow up lab work. Counseled obtaining 7-8 hours of nightly sleep, adequate hydration with water, well balanced diet. And 150+ minutes of exercise/week.    2. Severe obesity  Weight loss counseling provided, including healthy diet rich in vegetables with lean meats and light on grains/red meat/snack foods. Also discussed staying hydrated with water and avoiding soft drinks,  as well as exercising >5x/week with at least 30 minutes, including heavy walking. Patient verbalized understanding    3. Obstructive sleep apnea syndrome    4. Other long term (current) drug therapy  -     Magnesium; Future; Expected date: 02/21/2025  -     CBC Auto Differential; Future; Expected date: 02/21/2025  -     Comprehensive Metabolic Panel; Future; Expected date: 02/21/2025  -     Ferritin; Future; Expected date: 02/21/2025  -     Iron and TIBC; Future; Expected date: 02/21/2025  -     Vitamin B6; Future; Expected date: 02/21/2025  -     Folate; Future; Expected date: 02/21/2025  -     Vitamin B12; Future; Expected date: 02/21/2025  -     Vitamin D; Future; Expected date: 02/21/2025  -     TSH; Future; Expected date: 02/21/2025    5. Other specified nutritional anemias  -     Ferritin; Future; Expected date: 02/21/2025    6. Other iron deficiency anemias  -     Iron and TIBC; Future; Expected date: 02/21/2025    7. Other disorders of phosphorus metabolism  -     Vitamin B6; Future; Expected date: 02/21/2025          Marissa Vick MD

## 2025-02-22 ENCOUNTER — PATIENT MESSAGE (OUTPATIENT)
Dept: ADMINISTRATIVE | Facility: OTHER | Age: 65
End: 2025-02-22
Payer: MEDICARE

## 2025-02-22 ENCOUNTER — PATIENT MESSAGE (OUTPATIENT)
Dept: RHEUMATOLOGY | Facility: CLINIC | Age: 65
End: 2025-02-22
Payer: MEDICARE

## 2025-02-24 DIAGNOSIS — M79.7 FIBROMYALGIA: ICD-10-CM

## 2025-02-25 RX ORDER — PREGABALIN 50 MG/1
50 CAPSULE ORAL 3 TIMES DAILY
Qty: 90 CAPSULE | Refills: 2 | Status: SHIPPED | OUTPATIENT
Start: 2025-02-25

## 2025-02-26 LAB — PYRIDOXAL SERPL-MCNC: 73 UG/L (ref 5–50)

## 2025-03-05 ENCOUNTER — RESULTS FOLLOW-UP (OUTPATIENT)
Dept: PRIMARY CARE CLINIC | Facility: CLINIC | Age: 65
End: 2025-03-05

## 2025-03-24 ENCOUNTER — PATIENT MESSAGE (OUTPATIENT)
Dept: ADMINISTRATIVE | Facility: OTHER | Age: 65
End: 2025-03-24
Payer: MEDICARE

## 2025-03-26 DIAGNOSIS — M05.79 RHEUMATOID ARTHRITIS INVOLVING MULTIPLE SITES WITH POSITIVE RHEUMATOID FACTOR: ICD-10-CM

## 2025-03-26 RX ORDER — FOLIC ACID 1 MG/1
1 TABLET ORAL DAILY
Qty: 90 TABLET | Refills: 3 | Status: SHIPPED | OUTPATIENT
Start: 2025-03-26

## 2025-04-07 ENCOUNTER — OFFICE VISIT (OUTPATIENT)
Dept: OBSTETRICS AND GYNECOLOGY | Facility: CLINIC | Age: 65
End: 2025-04-07
Payer: MEDICARE

## 2025-04-07 VITALS
SYSTOLIC BLOOD PRESSURE: 120 MMHG | DIASTOLIC BLOOD PRESSURE: 70 MMHG | WEIGHT: 193.81 LBS | BODY MASS INDEX: 39.07 KG/M2 | HEIGHT: 59 IN

## 2025-04-07 DIAGNOSIS — Z01.419 WELL FEMALE EXAM WITH ROUTINE GYNECOLOGICAL EXAM: Primary | ICD-10-CM

## 2025-04-07 PROCEDURE — 99999 PR PBB SHADOW E&M-EST. PATIENT-LVL IV: CPT | Mod: PBBFAC,,, | Performed by: OBSTETRICS & GYNECOLOGY

## 2025-04-07 PROCEDURE — G0101 CA SCREEN;PELVIC/BREAST EXAM: HCPCS | Mod: PBBFAC,PN | Performed by: OBSTETRICS & GYNECOLOGY

## 2025-04-07 PROCEDURE — 99214 OFFICE O/P EST MOD 30 MIN: CPT | Mod: PBBFAC,PN,25 | Performed by: OBSTETRICS & GYNECOLOGY

## 2025-04-07 PROCEDURE — G0101 CA SCREEN;PELVIC/BREAST EXAM: HCPCS | Mod: S$PBB,,, | Performed by: OBSTETRICS & GYNECOLOGY

## 2025-04-07 PROCEDURE — 87624 HPV HI-RISK TYP POOLED RSLT: CPT | Performed by: OBSTETRICS & GYNECOLOGY

## 2025-04-07 NOTE — PROGRESS NOTES
SUBJECTIVE:   64 y.o. female   for routine gyn exam. No LMP recorded. Patient is postmenopausal..  She has no unusual complaints. No PMB. No HRT. HAd LANDEN 1 on ECC           Past Medical History:   Diagnosis Date    Abnormal Pap smear of cervix     Anemia     Anxiety     Arthritis     Hyperlipidemia     Hypertension     Mild intermittent asthma, uncomplicated     Osteoporosis     Rheumatoid arthritis, unspecified 2020    STD (sexually transmitted disease)      Past Surgical History:   Procedure Laterality Date    ANKLE SURGERY Left     COLONOSCOPY N/A 2023    Procedure: COLONOSCOPY;  Surgeon: Robbie Ace MD;  Location: [facility] ENDO;  Service: Endoscopy;  Laterality: N/A;    ESOPHAGOGASTRODUODENOSCOPY N/A 2023    Procedure: EGD (ESOPHAGOGASTRODUODENOSCOPY);  Surgeon: Robbie Ace MD;  Location: [facility] ENDO;  Service: Endoscopy;  Laterality: N/A;    FRACTURE SURGERY      JOINT REPLACEMENT  2016    Full Left ankle replacement    OPEN REDUCTION AND INTERNAL FIXATION (ORIF) OF FRACTURE OF DISTAL HUMERUS Left 10/03/2024    Procedure: ORIF, FRACTURE, HUMERUS, DISTAL;  Surgeon: Eben Jesus MD;  Location: [facility] OR;  Service: Orthopedics;  Laterality: Left;  Lateral decubitus, bean bag, bone foam, Acumed elbow plates, c-arm    WRIST FRACTURE SURGERY Left      Social History[1]  Family History   Problem Relation Name Age of Onset    Arthritis Mother My mom     Heart disease Mother My mom     Hypertension Mother My mom     Stroke Mother My mom         Suffered a stroke and her left side became paralyzed    Osteoarthritis Mother My mom     Cervical cancer Maternal Cousin maternal 2nd     Arthritis Sister Younger sister     Asthma Sister Younger sister     Diabetes Sister Younger sister         Diagnosed at the age of 12    Heart disease Sister Younger sister     Stroke Sister Younger sister         Has suffered several mini strokes    Asthma Sister Older sister           of an asthma attack at the age of 46    Early death Sister Older sister          at the age of 46    Heart disease Sister Older sister     Stroke Sister Older sister     Rheum arthritis Sister Younger sister     Osteoarthritis Sister Younger sister     Asthma Sister Younger sister     Diabetes Sister Younger sister         Diagnosed at the age of 12    Stroke Sister Younger sister         Has suffered several mini strokes    Asthma Sister Older sister          of an asthma attack st the age of 46    Breast cancer Neg Hx      Colon cancer Neg Hx       OB History    Para Term  AB Living   1 1 1   1   SAB IAB Ectopic Multiple Live Births       1      # Outcome Date GA Lbr Gabo/2nd Weight Sex Type Anes PTL Lv   1 Term                  Current Medications[2]  Allergies: Patient has no known allergies.     ROS:  Constitutional: no weight loss, weight gain, fever, fatigue  Eyes:  No vision changes, glasses/contacts  ENT/Mouth: No ulcers, sinus problems, ears ringing, headache  Cardiovascular: No inability to lie flat, chest pain, exercise intolerance, swelling, heart palpitations  Respiratory: No wheezing, coughing blood, shortness of breath, or cough  Gastrointestinal: No diarrhea, bloody stool, nausea/vomiting, constipation, gas, hemorrhoids  Genitourinary: No blood in urine, painful urination, urgency of urination, frequency of urination, incomplete emptying, incontinence, abnormal bleeding, painful periods, heavy periods, vaginal discharge, vaginal odor, painful intercourse, sexual problems, bleeding after intercourse.  Musculoskeletal: No muscle weakness  Skin/Breast: No painful breasts, nipple discharge, masses, rash, ulcers  Neurological: No passing out, seizures, numbness, headache  Endocrine: No diabetes, hypothyroid, hyperthyroid, hot flashes, hair loss, abnormal hair growth, acne  Psychiatric: No depression, crying  Hematologic: No bruises, bleeding, swollen lymph nodes,  "anemia.      OBJECTIVE:   The patient appears well, alert, oriented x 3, in no distress.  /70 (BP Location: Left arm, Patient Position: Sitting)   Ht 4' 11" (1.499 m)   Wt 87.9 kg (193 lb 12.6 oz)   BMI 39.14 kg/m²   NECK: no thyromegaly, trachea midline  SKIN: no acne, striae, hirsutism  CHEST: CTAB  CV: RRR  BREAST EXAM: breasts appear normal, no suspicious masses, no skin or nipple changes or axillary nodes  ABDOMEN: no hernias, masses, or hepatosplenomegaly  GENITALIA: normal external genitalia, no erythema, no discharge  URETHRA: normal urethra, normal urethral meatus  VAGINA: Normal  CERVIX: no lesions or cervical motion tenderness  UTERUS: normal size, contour, position, consistency, mobility, non-tender  ADNEXA: no mass, fullness, tenderness      ASSESSMENT:   1. Well female exam with routine gynecological exam  Liquid-Based Pap Smear, Screening          PLAN:   Orders Placed This Encounter    Liquid-Based Pap Smear, Screening     Discussed healthy lifestyle including regular exercise, healthy eating, etc.  Return to clinic in 1 year         [1]   Social History  Socioeconomic History    Marital status:    Tobacco Use    Smoking status: Never     Passive exposure: Never    Smokeless tobacco: Never   Substance and Sexual Activity    Alcohol use: Not Currently     Comment: Very rarely    Drug use: Never    Sexual activity: Yes     Partners: Male     Birth control/protection: None, Post-menopausal   [2]   Current Outpatient Medications   Medication Sig Dispense Refill    acetaminophen (TYLENOL) 325 MG tablet Take 2 tablets (650 mg total) by mouth every 6 (six) hours as needed for Pain. 40 tablet 0    albuterol (ACCUNEB) 1.25 mg/3 mL Nebu Take 3 mLs (1.25 mg total) by nebulization every 6 (six) hours as needed (shortness of breath, wheezing). 75 mL 1    albuterol (VENTOLIN HFA) 90 mcg/actuation inhaler Inhale 2 puffs into the lungs every 4 (four) hours as needed for Wheezing. Rescue 6.7 g 1    " "atorvastatin (LIPITOR) 10 MG tablet Take 1 tablet (10 mg total) by mouth once daily. 90 tablet 3    cholecalciferol, vitamin D3, 125 mcg (5,000 unit) Tab Take 5,000 Units by mouth once daily.      EScitalopram oxalate (LEXAPRO) 20 MG tablet Take 1 tablet (20 mg total) by mouth once daily. 90 tablet 3    folic acid (FOLVITE) 1 MG tablet Take 1 tablet (1 mg total) by mouth once daily. 90 tablet 3    hydroCHLOROthiazide (HYDRODIURIL) 25 MG tablet Take 1 tablet (25 mg total) by mouth once daily. 90 tablet 3    ibuprofen (ADVIL,MOTRIN) 800 MG tablet Take 1 tablet (800 mg total) by mouth every 8 (eight) hours as needed for Pain. 90 tablet 2    methotrexate 2.5 MG Tab Take 5 tablets in the AM and 5 tablets in the PM on Thursdays. 120 tablet 0    multivitamin-minerals-lutein (MULTIVITAMIN 50 PLUS) Tab Take 1 tablet by mouth once daily.      omega 3-dha-epa-fish oil 120-180-500 mg Cap       ondansetron (ZOFRAN) 4 MG tablet Take 1 tablet (4 mg total) by mouth 2 (two) times daily. 15 tablet 0    ondansetron (ZOFRAN-ODT) 4 MG TbDL Take 1 tablet (4 mg total) by mouth every 8 (eight) hours as needed (nausea). 30 tablet 0    oxyCODONE (ROXICODONE) 5 MG immediate release tablet Take 1 tablet (5 mg total) by mouth every 6 (six) hours as needed for Pain. 12 tablet 0    pen needle, diabetic (BD ULTRA-FINE MINI PEN NEEDLE) 31 gauge x 3/16" Ndle Inject 1 each into the skin once daily. 100 each 3    pregabalin (LYRICA) 50 MG capsule Take 1 capsule (50 mg total) by mouth 3 (three) times daily. 90 capsule 2    sarilumab (KEVZARA) 200 mg/1.14 mL PnIj Inject 1.14 mLs (200 mg total) into the skin every 14 (fourteen) days. 2 pen 11    teriparatide (FORTEO) 20 mcg/dose (600mcg/2.4mL) PnIj Inject 0.08 mLs (20 mcg total) into the skin once daily. 2.4 mL 11    aspirin (ECOTRIN) 81 MG EC tablet Take 1 tablet (81 mg total) by mouth 2 (two) times a day. 60 tablet 0    pantoprazole (PROTONIX) 40 MG tablet Take 1 tablet (40 mg total) by mouth once " daily. 90 tablet 3     No current facility-administered medications for this visit.

## 2025-04-15 ENCOUNTER — TELEPHONE (OUTPATIENT)
Dept: PRIMARY CARE CLINIC | Facility: CLINIC | Age: 65
End: 2025-04-15
Payer: MEDICARE

## 2025-04-15 NOTE — TELEPHONE ENCOUNTER
----- Message from Mercedes sent at 4/15/2025 11:45 AM CDT -----  Contact: [phone]@[email or web address]  Requesting an RX refill or new RX.pantoprazole (PROTONIX) 40 MG tabletIs this a refill or new RX: refill RX name and strength (copy/paste from chart):  Is this a 30 day or 90 day RX: 90 dayPharmacy name and phone # [id number] - [place] - [address] AT [facility] [address] Phone: [phone] doctors have asked that we provide their patients with the following 2 reminders -- prescription refills can take up to 72 hours, and a friendly reminder that in the future you can use your [facility] account to request refills:

## 2025-04-16 ENCOUNTER — PATIENT MESSAGE (OUTPATIENT)
Dept: PRIMARY CARE CLINIC | Facility: CLINIC | Age: 65
End: 2025-04-16
Payer: MEDICARE

## 2025-04-16 DIAGNOSIS — R10.13 EPIGASTRIC PAIN: ICD-10-CM

## 2025-04-16 RX ORDER — PANTOPRAZOLE SODIUM 40 MG/1
40 TABLET, DELAYED RELEASE ORAL DAILY
Qty: 90 TABLET | Refills: 0 | Status: SHIPPED | OUTPATIENT
Start: 2025-04-16

## 2025-04-16 NOTE — TELEPHONE ENCOUNTER
Refill sent since she's a former Dr. COTTRELL patient but she does need to get set up with a new PCP

## 2025-05-11 ENCOUNTER — RESULTS FOLLOW-UP (OUTPATIENT)
Dept: OBSTETRICS AND GYNECOLOGY | Facility: CLINIC | Age: 65
End: 2025-05-11

## 2025-05-12 DIAGNOSIS — M79.7 FIBROMYALGIA: ICD-10-CM

## 2025-05-13 ENCOUNTER — TELEPHONE (OUTPATIENT)
Dept: FAMILY MEDICINE | Facility: CLINIC | Age: 65
End: 2025-05-13
Payer: MEDICARE

## 2025-05-13 NOTE — TELEPHONE ENCOUNTER
----- Message from zLense sent at 5/12/2025  4:44 PM CDT -----  Type:  NP Appointment Request Name of Caller:Treva is the first available appointment?No accessSymptoms:annualWould the patient rather a call back or a response via [facility]? Call Gianluca Call Back Number:[phone] Additional Information: Pt would like to be seen by provider Junior as a new patient for her next available.

## 2025-05-15 ENCOUNTER — LAB VISIT (OUTPATIENT)
Dept: LAB | Facility: HOSPITAL | Age: 65
End: 2025-05-15
Attending: STUDENT IN AN ORGANIZED HEALTH CARE EDUCATION/TRAINING PROGRAM
Payer: MEDICARE

## 2025-05-15 DIAGNOSIS — M05.79 RHEUMATOID ARTHRITIS INVOLVING MULTIPLE SITES WITH POSITIVE RHEUMATOID FACTOR: ICD-10-CM

## 2025-05-15 DIAGNOSIS — Z79.631 LONG TERM METHOTREXATE USER: ICD-10-CM

## 2025-05-15 DIAGNOSIS — D84.821 IMMUNOCOMPROMISED STATE DUE TO DRUG THERAPY: ICD-10-CM

## 2025-05-15 DIAGNOSIS — Z79.899 IMMUNOCOMPROMISED STATE DUE TO DRUG THERAPY: ICD-10-CM

## 2025-05-15 LAB
ABSOLUTE EOSINOPHIL (OHS): 0.16 K/UL
ABSOLUTE MONOCYTE (OHS): 0.27 K/UL (ref 0.3–1)
ABSOLUTE NEUTROPHIL COUNT (OHS): 1.72 K/UL (ref 1.8–7.7)
ALBUMIN SERPL BCP-MCNC: 4.1 G/DL (ref 3.5–5.2)
ALP SERPL-CCNC: 69 UNIT/L (ref 40–150)
ALT SERPL W/O P-5'-P-CCNC: 34 UNIT/L (ref 10–44)
ANION GAP (OHS): 10 MMOL/L (ref 8–16)
AST SERPL-CCNC: 27 UNIT/L (ref 11–45)
BASOPHILS # BLD AUTO: 0.07 K/UL
BASOPHILS NFR BLD AUTO: 1.4 %
BILIRUB SERPL-MCNC: 1.3 MG/DL (ref 0.1–1)
BUN SERPL-MCNC: 18 MG/DL (ref 8–23)
CALCIUM SERPL-MCNC: 9.6 MG/DL (ref 8.7–10.5)
CHLORIDE SERPL-SCNC: 103 MMOL/L (ref 95–110)
CO2 SERPL-SCNC: 29 MMOL/L (ref 23–29)
CREAT SERPL-MCNC: 0.8 MG/DL (ref 0.5–1.4)
CRP SERPL-MCNC: 0.5 MG/L
ERYTHROCYTE [DISTWIDTH] IN BLOOD BY AUTOMATED COUNT: 14 % (ref 11.5–14.5)
ERYTHROCYTE [SEDIMENTATION RATE] IN BLOOD BY PHOTOMETRIC METHOD: <2 MM/HR
GFR SERPLBLD CREATININE-BSD FMLA CKD-EPI: >60 ML/MIN/1.73/M2
GLUCOSE SERPL-MCNC: 135 MG/DL (ref 70–110)
HCT VFR BLD AUTO: 37.3 % (ref 37–48.5)
HGB BLD-MCNC: 12.2 GM/DL (ref 12–16)
IMM GRANULOCYTES # BLD AUTO: 0.03 K/UL (ref 0–0.04)
IMM GRANULOCYTES NFR BLD AUTO: 0.6 % (ref 0–0.5)
LYMPHOCYTES # BLD AUTO: 2.59 K/UL (ref 1–4.8)
MCH RBC QN AUTO: 33.5 PG (ref 27–31)
MCHC RBC AUTO-ENTMCNC: 32.7 G/DL (ref 32–36)
MCV RBC AUTO: 103 FL (ref 82–98)
NUCLEATED RBC (/100WBC) (OHS): 0 /100 WBC
PLATELET # BLD AUTO: 220 K/UL (ref 150–450)
PMV BLD AUTO: 11.3 FL (ref 9.2–12.9)
POTASSIUM SERPL-SCNC: 4.5 MMOL/L (ref 3.5–5.1)
PROT SERPL-MCNC: 7.2 GM/DL (ref 6–8.4)
RBC # BLD AUTO: 3.64 M/UL (ref 4–5.4)
RELATIVE EOSINOPHIL (OHS): 3.3 %
RELATIVE LYMPHOCYTE (OHS): 53.5 % (ref 18–48)
RELATIVE MONOCYTE (OHS): 5.6 % (ref 4–15)
RELATIVE NEUTROPHIL (OHS): 35.6 % (ref 38–73)
SODIUM SERPL-SCNC: 142 MMOL/L (ref 136–145)
WBC # BLD AUTO: 4.84 K/UL (ref 3.9–12.7)

## 2025-05-15 PROCEDURE — 85652 RBC SED RATE AUTOMATED: CPT

## 2025-05-15 PROCEDURE — 86140 C-REACTIVE PROTEIN: CPT

## 2025-05-15 PROCEDURE — 85025 COMPLETE CBC W/AUTO DIFF WBC: CPT

## 2025-05-15 PROCEDURE — 36415 COLL VENOUS BLD VENIPUNCTURE: CPT | Mod: PO

## 2025-05-15 PROCEDURE — 80053 COMPREHEN METABOLIC PANEL: CPT

## 2025-05-15 RX ORDER — PREGABALIN 50 MG/1
50 CAPSULE ORAL 3 TIMES DAILY
Qty: 270 CAPSULE | Refills: 0 | Status: SHIPPED | OUTPATIENT
Start: 2025-05-15

## 2025-05-16 ENCOUNTER — RESULTS FOLLOW-UP (OUTPATIENT)
Dept: RHEUMATOLOGY | Facility: CLINIC | Age: 65
End: 2025-05-16

## 2025-05-22 ENCOUNTER — OFFICE VISIT (OUTPATIENT)
Dept: RHEUMATOLOGY | Facility: CLINIC | Age: 65
End: 2025-05-22
Payer: MEDICARE

## 2025-05-22 VITALS
BODY MASS INDEX: 39.81 KG/M2 | SYSTOLIC BLOOD PRESSURE: 150 MMHG | HEART RATE: 76 BPM | DIASTOLIC BLOOD PRESSURE: 85 MMHG | WEIGHT: 197.06 LBS

## 2025-05-22 DIAGNOSIS — M05.79 RHEUMATOID ARTHRITIS INVOLVING MULTIPLE SITES WITH POSITIVE RHEUMATOID FACTOR: Primary | ICD-10-CM

## 2025-05-22 DIAGNOSIS — M79.7 FIBROMYALGIA: ICD-10-CM

## 2025-05-22 DIAGNOSIS — M81.8 OTHER OSTEOPOROSIS WITHOUT CURRENT PATHOLOGICAL FRACTURE: ICD-10-CM

## 2025-05-22 DIAGNOSIS — M19.90 OSTEOARTHRITIS, UNSPECIFIED OSTEOARTHRITIS TYPE, UNSPECIFIED SITE: ICD-10-CM

## 2025-05-22 PROCEDURE — 99214 OFFICE O/P EST MOD 30 MIN: CPT | Mod: PBBFAC | Performed by: STUDENT IN AN ORGANIZED HEALTH CARE EDUCATION/TRAINING PROGRAM

## 2025-05-22 PROCEDURE — 99999 PR PBB SHADOW E&M-EST. PATIENT-LVL IV: CPT | Mod: PBBFAC,GC,, | Performed by: STUDENT IN AN ORGANIZED HEALTH CARE EDUCATION/TRAINING PROGRAM

## 2025-05-22 RX ORDER — DICLOFENAC SODIUM 10 MG/G
4 GEL TOPICAL 4 TIMES DAILY PRN
Qty: 200 G | Refills: 3 | Status: SHIPPED | OUTPATIENT
Start: 2025-05-22

## 2025-05-22 NOTE — PROGRESS NOTES
Subjective:      Patient ID: Aysha Randolph is a 64 y.o. female.    Chief Complaint: Seropositive RA    Initial Presentation  Aysha Randolph is a 64 y.o. F with a past medical history below who presents today for follow-up for seropositive rheumatoid arthritis.      - The patient establish with [facility] in February 2023 after moving from [place].   - She was diagnosed with RA in December 2020, initially had pain and swelling in the hands, knees, shoulders, along with stiffness  - She has difficult to control RA and has been on various regimens  - Current treatment: Kevzara (03/24-present), MTX 10 mg weekly, folic acid daily   - Prior treatments: failed MTX, Humira (ineffective), Orencia infusions, Actemra, Rinvoq (headaches)   - Prism RA showed she was a high non-responder to TNFs  - She is also on Foreto for osteoporosis (began in May 2024)  - She also has fibromyalgia     Interval History  Today she reports pain in multiple joints including the hands, wrists, R shoulder, knees. She also has pain in her muscles.    Rheumatology ROS  (-) fevers, chills (-) weight loss, (-) fatigue, (+) morning stiffness,  (+) arthralgias, (-) arthritis, (-) headaches, (-)  vision changes or loss of vision, (-) hx of red eyes including uveitis, iritis, scleritis or episcleritis, (-) photophobia, (-) dry eyes, (-) dry mouth, (-) rash, (-) photosensitivity, (-) alopecia, (-) mucosal ulcers, (-) Raynaud's  phenomenon, (-) SQ nodules, (-) sense of skin tightening in hands, face or torso, (-)  hx pleurisy, (-) sharp chest pains that increase with deep breath, (-) lung fibrosis, (-) hemoptysis, (-) hx of DVT or PE (-) chest pains, (-) shortness of breath, (-) hx pericarditis (-) abdominal pain, (-) nausea, (-) vomiting, (-) diarrhea, (-) constipation, (-) melena,  (-) bloody diarrhea, (-) UC/Crohns, (-) dysphagia, (-) GERD/Reflux, (-) hematuria, proteinuria, (-) renal failure, (-) focal weakness, (-) trouble combing hair or (-)  getting out of chairs,  (-) hx of low WBC, low platelets, anemia, (-) hx of pregnancy losses/pre term deliveries/pregnancy complications, (-) genital ulcers    History  Medical:  Active Problem List with Overview Notes    Diagnosis Date Noted    Left elbow pain 10/18/2024    Muscle weakness 10/18/2024    Elbow stiffness, left 10/18/2024    Closed fracture of left distal humerus 09/30/2024    Sleep apnea 05/29/2024    Severe obesity (BMI 35.0-39.9) with comorbidity 05/16/2024    Dysplasia of cervix, low grade (LANDEN 1) 05/12/2024    History of vaginal dysplasia 03/06/2024    Pain aggravated by activities of daily living 01/25/2024    Fear of pain 01/24/2024    Decreased range of motion of trunk and back 01/24/2024    Osteoporosis 12/14/2023    H. pylori infection 06/07/2023    Cervical dysplasia 05/18/2023    Vaginal dysplasia 05/18/2023    Hyperlipidemia 02/01/2023    GERD (gastroesophageal reflux disease) 02/01/2023    Rheumatoid arthritis 02/01/2023    Benign essential hypertension 02/01/2023     Surgical:  Past Surgical History:   Procedure Laterality Date    ANKLE SURGERY Left     COLONOSCOPY N/A 05/31/2023    Procedure: COLONOSCOPY;  Surgeon: Robbie Ace MD;  Location: [facility];  Service: Endoscopy;  Laterality: N/A;    ESOPHAGOGASTRODUODENOSCOPY N/A 05/31/2023    Procedure: EGD (ESOPHAGOGASTRODUODENOSCOPY);  Surgeon: Robbie Ace MD;  Location: [facility];  Service: Endoscopy;  Laterality: N/A;    FRACTURE SURGERY      JOINT REPLACEMENT  April 2016    Full Left ankle replacement    OPEN REDUCTION AND INTERNAL FIXATION (ORIF) OF FRACTURE OF DISTAL HUMERUS Left 10/03/2024    Procedure: ORIF, FRACTURE, HUMERUS, DISTAL;  Surgeon: Eben Jesus MD;  Location: [facility];  Service: Orthopedics;  Laterality: Left;  Lateral decubitus, bean bag, bone foam, Acumed elbow plates, c-arm    WRIST FRACTURE SURGERY Left      Medication:  Current Outpatient Medications   Medication Sig Dispense Refill    albuterol  "(ACCUNEB) 1.25 mg/3 mL Nebu Take 3 mLs (1.25 mg total) by nebulization every 6 (six) hours as needed (shortness of breath, wheezing). 75 mL 1    albuterol (VENTOLIN HFA) 90 mcg/actuation inhaler Inhale 2 puffs into the lungs every 4 (four) hours as needed for Wheezing. Rescue 6.7 g 1    atorvastatin (LIPITOR) 10 MG tablet Take 1 tablet (10 mg total) by mouth once daily. 90 tablet 3    cholecalciferol, vitamin D3, 125 mcg (5,000 unit) Tab Take 5,000 Units by mouth once daily.      EScitalopram oxalate (LEXAPRO) 20 MG tablet Take 1 tablet (20 mg total) by mouth once daily. 90 tablet 3    folic acid (FOLVITE) 1 MG tablet Take 1 tablet (1 mg total) by mouth once daily. 90 tablet 3    hydroCHLOROthiazide (HYDRODIURIL) 25 MG tablet Take 1 tablet (25 mg total) by mouth once daily. 90 tablet 3    methotrexate 2.5 MG Tab Take 5 tablets in the AM and 5 tablets in the PM on Thursdays. 120 tablet 0    multivitamin-minerals-lutein (MULTIVITAMIN 50 PLUS) Tab Take 1 tablet by mouth once daily.      omega 3-dha-epa-fish oil 120-180-500 mg Cap       ondansetron (ZOFRAN-ODT) 4 MG TbDL Take 1 tablet (4 mg total) by mouth every 8 (eight) hours as needed (nausea). 30 tablet 0    pantoprazole (PROTONIX) 40 MG tablet Take 1 tablet (40 mg total) by mouth once daily. 90 tablet 0    pen needle, diabetic (BD ULTRA-FINE MINI PEN NEEDLE) 31 gauge x 3/16" Ndle Inject 1 each into the skin once daily. 100 each 3    pregabalin (LYRICA) 50 MG capsule Take 1 capsule (50 mg total) by mouth 3 (three) times daily. 270 capsule 0    sarilumab (KEVZARA) 200 mg/1.14 mL PnIj Inject 1.14 mLs (200 mg total) into the skin every 14 (fourteen) days. 2 pen 11    acetaminophen (TYLENOL) 325 MG tablet Take 2 tablets (650 mg total) by mouth every 6 (six) hours as needed for Pain. (Patient not taking: Reported on 5/22/2025) 40 tablet 0    aspirin (ECOTRIN) 81 MG EC tablet Take 1 tablet (81 mg total) by mouth 2 (two) times a day. 60 tablet 0    diclofenac sodium " (VOLTAREN) 1 % Gel Apply 4 g topically 4 (four) times daily as needed (joint pain). 200 g 3    teriparatide (FORTEO) 20 mcg/dose (600mcg/2.4mL) PnIj Inject 0.08 mLs (20 mcg total) into the skin once daily. 2.4 mL 11     No current facility-administered medications for this visit.     Imaging/Procedures  DEXA (2/15/23):  Lumbar spine (L1-L4):               BMD is 0.940 g/cm2, T-score is -1.0, and Z-score is 0.7.     Total hip:                                BMD is 0.881 g/cm2, T-score is -0.5, and Z-score is 0.7.     Femoral neck:                          BMD is 0.645 g/cm2, T-score is -1.8, and Z-score is -0.4.     Distal 1/3 radius:                      Not applicable     FRAX:     27% risk of a major osteoporotic fracture in the next 10 years.     5% risk of hip fracture in the next 10 years.     Impression:     *Osteoporosis based on T-score between -1.0 and -2.5 and elevated risk based on FRAX  *Fracture risk is very high due to calculated 10 year risk of hip fracture >4.5% (FRAX)     RECOMMENDATIONS:  *Daily calcium intake 6382-7213 mg, dietary sources preferred; Vitamin D 1303-8368 IU daily.  *Weight bearing exercise and fall precautions.  *Recommend medical therapy for osteoporosis with high risk for fracture. Consider bisphosphonates (alendronate, risedronate, zoledronic acid), or denosumab.  *Repeat BMD in 2 years    Rheumatologic medications history  MTX 10 tablets (11/2020-present)  Prednisone (last used in June 2023)  Humira (03/2021-12/2021; discontinued due to lack of efficacy, used genetic testing to see that she was not a responder)    Orencia (05/2022-10/2022; discontinued due to lack of efficacy)  Actemra (06/2023-09/2023)  Rinvoq (09/2023-12/2023; discontinued due to headaches)  Kevzara (03/2024-present)  Forteo (05/2024-present)    Objective:   BP (!) 150/85 (Patient Position: Sitting)   Pulse 76   Wt 89.4 kg (197 lb 1.5 oz)   BMI 39.81 kg/m²   Physical Exam   Constitutional: No distress.    HENT:   Mouth/Throat: Mucous membranes are moist.   Eyes: Conjunctivae are normal.   Cardiovascular: Normal rate, regular rhythm, normal heart sounds and normal pulses.   Pulmonary/Chest: Effort normal and breath sounds normal.   Abdominal: Soft. Bowel sounds are normal.   Musculoskeletal:         General: Swelling (soft slight soft tissue swelling of L hand; possible synovitis in the wrists bilaterally) and tenderness (multiple tender joints) present. Normal range of motion.      Cervical back: Normal range of motion. No rigidity or tenderness.   Neurological: She is alert. She displays no weakness. Gait normal.   Skin: Skin is warm.      5/16/2024 8/22/2024 2/20/2025 5/22/2025   Tender (HIGGINBOTHAM-28) 9 / 28 1 / 28 12 / 28 12 / 28    Swollen (HIGGINBOTHAM-28) 0 / 28  0 / 28  3 / 28  4 / 28    Provider Global 30 / 100 10 / 100 40 / 100 50 / 100   Patient Global 45 / 100 20 / 100 60 / 100 50 / 100   ESR 39 mm/hr -- 9 mm/hr --   CRP 13.2 mg/L -- 1.4 mg/L 0.5 mg/L   HIGGINBOTHAM-28 (ESR) 4.87 (Moderate disease activity) -- 4.8 (Moderate disease activity) --   HIGGINBOTHAM-28 (CRP) 4.23 (Moderate disease activity) -- 4.54 (Moderate disease activity) 4.31 (Moderate disease activity)   CDAI Score 16.5  4  25  26      Assessment:     1. Rheumatoid arthritis involving multiple sites with positive rheumatoid factor    2. Other osteoporosis without current pathological fracture    3. Fibromyalgia    4. Osteoarthritis, unspecified osteoarthritis type, unspecified site      Plan:     Problem List Items Addressed This Visit       Rheumatoid arthritis - Primary    Relevant Medications    diclofenac sodium (VOLTAREN) 1 % Gel    Other Relevant Orders    NM Joint Scan Whole Body    Osteoporosis    Relevant Orders    DXA Bone Density Axial Skeleton 1 or more sites     Other Visit Diagnoses         Fibromyalgia        Relevant Orders    NM Joint Scan Whole Body      Osteoarthritis, unspecified osteoarthritis type, unspecified site        Relevant Medications     diclofenac sodium (VOLTAREN) 1 % Gel    Other Relevant Orders    NM Joint Scan Whole Body          Aysha Randolph is a 64 y.o. F with a past medical history below who presents today for follow-up for seropositive rheumatoid arthritis.     Seropositive rheumatoid arthritis- currently uncontrolled; has failed MTX, Humira, Orencia, Actemra and now Rinvoq. No synovitis on exam. Recent inflammatory markers normal. CDAI of 26 today. Difficult exam. Fibromyalgia seems to have been playing a role in the patients overall pain. Current treatment: MTX 25 mg weekly, folic acid and Kevzara 200 mg q 14 days.     Osteoporosis- DEXA from 2/2023 with lowest T score of -1.8 at the femoral neck, FRAX 27% major osteoporotic fracture 5% of hip fracture in the next 10 years, no falls or fractures, currently on Forteo (05/2024-present), Vit D and calcium through the diet     Fibromyalgia- The patient has widespread pain, on both sides of the body, above and below the waist. The patient has multiple associated symptoms with fibromyalgia that include fatigue, brain fog, waking up tired, irritable bowel syndrome, depression, insomnia, anxiety, headaches.   I spent time counseling the patient on fibromyalgia. I explained to the patient that fibromyalgia is a disorder characterized by widespread musculoskeletal pain, accompanied by fatigue, sleep, memory and mood issues. In general, it is thought that fibromyalgia symptoms are secondary to overactive nerves, which amplify painful sensations by the way the brain processes the pain signals.     Plan:  - Monitor CBC, CMP, ESR, CRP every 3 months   - Continue MTX 25 mg weekly and folic acid daily; recommended that the patient split the dose (5 tablets in AM, 5 tablets in PM) for better absorption  - Continue Kevzara 200 mg q 14 days  - Continue Ibuprofen 800 mg TID PRN  - Increase Lyrica to 100 mg TID for fibromyalgia, now that FAITH is being treated adequately fibromyalgia may be better controlled    - Voltaren gel for the knees and hands  - If patient continues to have pain with above adjustments, can consider the addition of Leflunomide   - Obtain NM joint scan to better evaluate the joints since exam is difficult   - Continue Forteo, Vit D (currently taking 5000U daily, recommended decreasing to 1000-2000U daily), and calcium (through the diet)  - Due for repeat DEXA    - Due for COVID, RSV and pneumonia vaccines  - Return to clinic in 3 months    This patient was examined with Dr. Ramos. Plan discussed with the patient.     Kayla Cobb MD  Rheumatology Fellow, PGY5    I have personally taken the history and examined the patient and concur with the resident's note as above.  She has some mild swelling in the wrists and MCP but no erythema or increased warmth.  It is hard to tell whether this is residual swelling or active synovitis.  I have ordered a joint scan and may have to get MRI of the wrists and hands to see if she does have active inflammatory arthritis.  I suspect, however, that the pain is mostly due to osteoarthritis and fibromyalgia.  We increased her Lyrica but continued other medications as before.

## 2025-05-26 DIAGNOSIS — M19.90 OSTEOARTHRITIS, UNSPECIFIED OSTEOARTHRITIS TYPE, UNSPECIFIED SITE: ICD-10-CM

## 2025-05-27 RX ORDER — IBUPROFEN 800 MG/1
800 TABLET, FILM COATED ORAL EVERY 8 HOURS PRN
Qty: 90 TABLET | Refills: 2 | Status: SHIPPED | OUTPATIENT
Start: 2025-05-27 | End: 2025-08-25

## 2025-05-28 ENCOUNTER — HOSPITAL ENCOUNTER (OUTPATIENT)
Dept: RADIOLOGY | Facility: HOSPITAL | Age: 65
Discharge: HOME | End: 2025-05-28
Attending: STUDENT IN AN ORGANIZED HEALTH CARE EDUCATION/TRAINING PROGRAM
Payer: MEDICARE

## 2025-05-28 DIAGNOSIS — M81.8 OTHER OSTEOPOROSIS WITHOUT CURRENT PATHOLOGICAL FRACTURE: ICD-10-CM

## 2025-05-28 PROCEDURE — 77080 DXA BONE DENSITY AXIAL: CPT | Mod: TC

## 2025-05-29 DIAGNOSIS — M81.8 OTHER OSTEOPOROSIS WITHOUT CURRENT PATHOLOGICAL FRACTURE: ICD-10-CM

## 2025-05-29 RX ORDER — TERIPARATIDE 250 UG/ML
20 INJECTION, SOLUTION SUBCUTANEOUS DAILY
Qty: 2.24 ML | Refills: 11 | Status: ACTIVE | OUTPATIENT
Start: 2025-05-29 | End: 2026-05-29

## 2025-06-02 DIAGNOSIS — M81.8 OTHER OSTEOPOROSIS WITHOUT CURRENT PATHOLOGICAL FRACTURE: Primary | ICD-10-CM

## 2025-06-02 RX ORDER — TERIPARATIDE 250 UG/ML
20 INJECTION, SOLUTION SUBCUTANEOUS DAILY
Qty: 2.48 ML | Refills: 11 | Status: ACTIVE | OUTPATIENT
Start: 2025-06-02

## 2025-06-05 ENCOUNTER — RESULTS FOLLOW-UP (OUTPATIENT)
Dept: RHEUMATOLOGY | Facility: HOSPITAL | Age: 65
End: 2025-06-05

## 2025-06-11 ENCOUNTER — OFFICE VISIT (OUTPATIENT)
Dept: FAMILY MEDICINE | Facility: CLINIC | Age: 65
End: 2025-06-11
Payer: MEDICARE

## 2025-06-11 VITALS
HEIGHT: 59 IN | DIASTOLIC BLOOD PRESSURE: 70 MMHG | WEIGHT: 196.19 LBS | SYSTOLIC BLOOD PRESSURE: 145 MMHG | BODY MASS INDEX: 39.55 KG/M2

## 2025-06-11 DIAGNOSIS — E66.01 SEVERE OBESITY (BMI 35.0-39.9) WITH COMORBIDITY: ICD-10-CM

## 2025-06-11 DIAGNOSIS — M05.79 RHEUMATOID ARTHRITIS INVOLVING MULTIPLE SITES WITH POSITIVE RHEUMATOID FACTOR: ICD-10-CM

## 2025-06-11 DIAGNOSIS — E27.8 OTHER SPECIFIED DISORDERS OF ADRENAL GLAND: ICD-10-CM

## 2025-06-11 DIAGNOSIS — N87.0 DYSPLASIA OF CERVIX, LOW GRADE (CIN 1): ICD-10-CM

## 2025-06-11 DIAGNOSIS — I10 BENIGN ESSENTIAL HYPERTENSION: Primary | ICD-10-CM

## 2025-06-11 DIAGNOSIS — E27.9 ADRENAL NODULE: ICD-10-CM

## 2025-06-11 DIAGNOSIS — E78.2 MIXED HYPERLIPIDEMIA: ICD-10-CM

## 2025-06-11 DIAGNOSIS — M79.7 FIBROMYALGIA: ICD-10-CM

## 2025-06-11 DIAGNOSIS — J45.901 MILD ASTHMA EXACERBATION: ICD-10-CM

## 2025-06-11 DIAGNOSIS — R10.13 EPIGASTRIC PAIN: ICD-10-CM

## 2025-06-11 DIAGNOSIS — R11.0 NAUSEA: ICD-10-CM

## 2025-06-11 DIAGNOSIS — G47.33 OBSTRUCTIVE SLEEP APNEA SYNDROME: ICD-10-CM

## 2025-06-11 DIAGNOSIS — M81.8 OTHER OSTEOPOROSIS WITHOUT CURRENT PATHOLOGICAL FRACTURE: ICD-10-CM

## 2025-06-11 DIAGNOSIS — S42.495D OTHER CLOSED NONDISPLACED FRACTURE OF DISTAL END OF LEFT HUMERUS WITH ROUTINE HEALING, SUBSEQUENT ENCOUNTER: ICD-10-CM

## 2025-06-11 DIAGNOSIS — E78.5 HYPERLIPIDEMIA, UNSPECIFIED HYPERLIPIDEMIA TYPE: ICD-10-CM

## 2025-06-11 DIAGNOSIS — Z23 IMMUNIZATION DUE: ICD-10-CM

## 2025-06-11 DIAGNOSIS — A04.8 H. PYLORI INFECTION: ICD-10-CM

## 2025-06-11 DIAGNOSIS — D12.6 TUBULAR ADENOMA OF COLON: ICD-10-CM

## 2025-06-11 DIAGNOSIS — R73.01 IFG (IMPAIRED FASTING GLUCOSE): ICD-10-CM

## 2025-06-11 DIAGNOSIS — R53.83 FATIGUE, UNSPECIFIED TYPE: ICD-10-CM

## 2025-06-11 PROBLEM — M25.522 LEFT ELBOW PAIN: Status: RESOLVED | Noted: 2024-10-18 | Resolved: 2025-06-11

## 2025-06-11 PROBLEM — M25.69 DECREASED RANGE OF MOTION OF TRUNK AND BACK: Status: RESOLVED | Noted: 2024-01-24 | Resolved: 2025-06-11

## 2025-06-11 PROBLEM — M25.622 ELBOW STIFFNESS, LEFT: Status: RESOLVED | Noted: 2024-10-18 | Resolved: 2025-06-11

## 2025-06-11 PROCEDURE — 90677 PCV20 VACCINE IM: CPT | Mod: PBBFAC,PO

## 2025-06-11 PROCEDURE — 99999PBSHW PR PBB SHADOW TECHNICAL ONLY FILED TO HB: Mod: PBBFAC,,,

## 2025-06-11 PROCEDURE — 99215 OFFICE O/P EST HI 40 MIN: CPT | Mod: S$PBB,,, | Performed by: INTERNAL MEDICINE

## 2025-06-11 PROCEDURE — 99999 PR PBB SHADOW E&M-EST. PATIENT-LVL IV: CPT | Mod: PBBFAC,,, | Performed by: INTERNAL MEDICINE

## 2025-06-11 PROCEDURE — 99214 OFFICE O/P EST MOD 30 MIN: CPT | Mod: PBBFAC,PO | Performed by: INTERNAL MEDICINE

## 2025-06-11 PROCEDURE — G0009 ADMIN PNEUMOCOCCAL VACCINE: HCPCS | Mod: PBBFAC,PO

## 2025-06-11 RX ORDER — OLMESARTAN MEDOXOMIL AND HYDROCHLOROTHIAZIDE 20/12.5 20; 12.5 MG/1; MG/1
1 TABLET ORAL DAILY
Qty: 90 TABLET | Refills: 3 | Status: SHIPPED | OUTPATIENT
Start: 2025-06-11 | End: 2026-06-11

## 2025-06-11 RX ORDER — HYDROCHLOROTHIAZIDE 25 MG/1
25 TABLET ORAL DAILY
Qty: 90 TABLET | Refills: 3 | Status: SHIPPED | OUTPATIENT
Start: 2025-06-11 | End: 2025-06-11

## 2025-06-11 RX ORDER — ALBUTEROL SULFATE 90 UG/1
2 INHALANT RESPIRATORY (INHALATION) EVERY 6 HOURS PRN
Qty: 6.7 G | Refills: 1 | Status: SHIPPED | OUTPATIENT
Start: 2025-06-11

## 2025-06-11 RX ORDER — ATORVASTATIN CALCIUM 10 MG/1
10 TABLET, FILM COATED ORAL DAILY
Qty: 90 TABLET | Refills: 3 | Status: SHIPPED | OUTPATIENT
Start: 2025-06-11

## 2025-06-11 RX ORDER — ONDANSETRON 4 MG/1
4 TABLET, ORALLY DISINTEGRATING ORAL EVERY 8 HOURS PRN
Qty: 30 TABLET | Refills: 0 | Status: CANCELLED | OUTPATIENT
Start: 2025-06-11

## 2025-06-11 RX ORDER — ONDANSETRON 4 MG/1
4 TABLET, ORALLY DISINTEGRATING ORAL EVERY 8 HOURS PRN
Qty: 30 TABLET | Refills: 0 | Status: SHIPPED | OUTPATIENT
Start: 2025-06-11

## 2025-06-11 RX ORDER — METHOTREXATE 2.5 MG/1
TABLET ORAL
Qty: 120 TABLET | Refills: 0 | Status: CANCELLED | OUTPATIENT
Start: 2025-06-11

## 2025-06-11 RX ORDER — PREGABALIN 50 MG/1
50 CAPSULE ORAL 3 TIMES DAILY
Qty: 270 CAPSULE | Refills: 0 | Status: CANCELLED | OUTPATIENT
Start: 2025-06-11

## 2025-06-11 RX ORDER — DEXAMETHASONE 1 MG/1
TABLET ORAL
Qty: 1 TABLET | Refills: 0 | Status: SHIPPED | OUTPATIENT
Start: 2025-06-11

## 2025-06-11 RX ORDER — PANTOPRAZOLE SODIUM 40 MG/1
40 TABLET, DELAYED RELEASE ORAL DAILY
Qty: 90 TABLET | Refills: 0 | Status: CANCELLED | OUTPATIENT
Start: 2025-06-11

## 2025-06-11 RX ORDER — ESCITALOPRAM OXALATE 20 MG/1
20 TABLET ORAL DAILY
Qty: 90 TABLET | Refills: 3 | Status: CANCELLED | OUTPATIENT
Start: 2025-06-11

## 2025-06-11 RX ADMIN — PNEUMOCOCCAL 20-VALENT CONJUGATE VACCINE 0.5 ML
2.2; 2.2; 2.2; 2.2; 2.2; 2.2; 2.2; 2.2; 2.2; 2.2; 2.2; 2.2; 2.2; 2.2; 2.2; 2.2; 4.4; 2.2; 2.2; 2.2 INJECTION, SUSPENSION INTRAMUSCULAR at 09:06

## 2025-06-13 DIAGNOSIS — M81.8 OTHER OSTEOPOROSIS WITHOUT CURRENT PATHOLOGICAL FRACTURE: ICD-10-CM

## 2025-06-13 RX ORDER — PEN NEEDLE, DIABETIC 30 GX3/16"
NEEDLE, DISPOSABLE MISCELLANEOUS
Refills: 0 | OUTPATIENT
Start: 2025-06-13

## 2025-06-13 RX ORDER — ESCITALOPRAM OXALATE 20 MG/1
TABLET ORAL
Refills: 0 | OUTPATIENT
Start: 2025-06-13

## 2025-06-13 NOTE — TELEPHONE ENCOUNTER
No care due was identified.  [facility] Embedded Care Due Messages. Reference number: [id number].   6/13/2025 9:59:47 AM CDT

## 2025-06-13 NOTE — TELEPHONE ENCOUNTER
Refill Decision Note   Aysha Randolph  is requesting a refill authorization.  Brief Assessment and Rationale for Refill:  Quick Discontinue     Medication Therapy Plan:  Pharmacy is requesting new scripts for the following medications without required information, (sig/ frequency/qty/etc)      Comments: Pharmacies have been requesting medications for patients without required information, (sig, frequency, qty, etc.). In addition, requests are sent for medication(s) pt. are currently not taking, and medications patients have never taken.    We have spoken to the pharmacies about these request types and advised their teams previously that we are unable to assess these New Script requests and require all details for these requests. This is a known issue and has been reported.    Note composed:1:13 PM 06/13/2025

## 2025-06-14 NOTE — PROGRESS NOTES
Patient ID: Aysha Randolph is a 64 y.o. female.    Chief Complaint: Follow-up      Assessment:       1. Benign essential hypertension    2. Mild asthma exacerbation    3. Hyperlipidemia, unspecified hyperlipidemia type    4. Rheumatoid arthritis involving multiple sites with positive rheumatoid factor    5. Nausea    6. Epigastric pain    7. Fibromyalgia    8. Mixed hyperlipidemia    9. Severe obesity (BMI 35.0-39.9) with comorbidity    10. Osteoporosis- on Forteo since 2024; improved 5/25    11. Other closed nondisplaced fracture of distal end of left humerus with routine healing, subsequent encounter    12. H. pylori infection: 4/23- NOT seen on EGD 5/23    13. Tubular adenoma of colon: 2023- repeat 5-7 years    14. Obstructive sleep apnea syndrome    15. IFG (impaired fasting glucose)    16. Fatigue, unspecified type    17. Adrenal nodule: 2 cm on L see CT 2024    18. Other specified disorders of adrenal gland    19. Immunization due    20. Dysplasia of cervix, low grade (LANDEN 1)       Plan:           1. Benign essential hypertension:  Not well controlled on diuretic alone.  Will start Benicar HCT.  Cautions and side effects reviewed.  Low salt diet, exercise, 15-20-# weight loss in next 6-12 months' time.  Call if BP > 130/80 on a regular basis.  Return in 1 month for BP check in re-evaluation  -     Discontinue: hydroCHLOROthiazide (HYDRODIURIL) 25 MG tablet; Take 1 tablet (25 mg total) by mouth once daily.  Dispense: 90 tablet; Refill: 3  -     CBC Without Differential; Future; Expected date: 06/11/2025    2. Mild asthma:  Chronic, stable  -     albuterol (VENTOLIN HFA) 90 mcg/actuation inhaler; Inhale 2 puffs into the lungs every 6 (six) hours as needed for Wheezing. Rescue  Dispense: 6.7 g; Refill: 1    3. Hyperlipidemia, unspecified hyperlipidemia type:  Will need labs and review  -     atorvastatin (LIPITOR) 10 MG tablet; Take 1 tablet (10 mg total) by mouth once daily.  Dispense: 90 tablet; Refill:  3    4. Rheumatoid arthritis involving multiple sites with positive rheumatoid factor:  Keep rheumatology follow-up.  On high risk meds    5. Nausea  -     ondansetron (ZOFRAN-ODT) 4 MG TbDL; Take 1 tablet (4 mg total) by mouth every 8 (eight) hours as needed (nausea).  Dispense: 30 tablet; Refill: 0    6. Epigastric pain:  Unclear etiology.  May need repeat EGD and Gastro follow-up.  Alarm symptoms reviewed, she is not having any currently.  Will do follow-up abdominal CT given adrenal issue and proceed from there.  Low threshold for repeat EGD, she would prefer to wait if possible    7. Fibromyalgia:  Gentle exercise, sleep hygiene reviewed    8. Mixed hyperlipidemia:  Labs and review    9. Severe obesity (BMI 35.0-39.9) with comorbidity:  Importance of sleep hygiene reviewed; keep Sleep Medicine follow-up.  Portion control.  Exercise and slow and steady dietary modification    10. Osteoporosis- on Forteo since 2024; improved 5/25- being monitored in Rheumatology    11. Other closed nondisplaced fracture of distal end of left humerus with routine healing, subsequent encounter    12. H. pylori infection: 4/23- NOT seen on EGD 5/23    13. Tubular adenoma of colon: 2023- repeat 5-7 years    14. Obstructive sleep apnea syndrome:  Importance of sleep hygiene reviewed  -     Ambulatory referral/consult to Sleep Disorders; Future; Expected date: 06/18/2025    15. IFG (impaired fasting glucose)  -     Hemoglobin A1C; Future; Expected date: 06/11/2025  -     Comprehensive Metabolic Panel; Future; Expected date: 06/11/2025    16. Fatigue, unspecified type  -     TSH; Future; Expected date: 06/11/2025    17. Adrenal nodule: 2 cm on L see CT 2024  -     Cortisol, 8AM; Future; Expected date: 06/11/2025    18. Other specified disorders of adrenal gland  -     CT Abdomen With Without Contrast; Future; Expected date: 06/11/2025    19. Immunization due  -     pneumoc 20-kamran conj-dip cr(PF) (PREVNAR-20 (PF)) injection Syrg 0.5  mL    20. Gyn issues, abnormal Pap smear, history of vaginal/cervical dysplasia.  Is due for colposcopy, follows closely in gyn    Other orders  -     olmesartan-hydrochlorothiazide (BENICAR HCT) 20-12.5 mg per tablet; Take 1 tablet by mouth once daily.  Dispense: 90 tablet; Refill: 3  -     dexAMETHasone (DECADRON) 1 MG Tab; Take at 11 pm night before labs  Dispense: 1 tablet; Refill: 0     Return in 1 month for BP check and re-evaluation, sooner with problems  Adrenal issues discussed, may need formal endocrinology follow-up  Prevnar 20 today, would consider RSV vaccine, she wants to defer on this currently.  I will review all studies and determine further tx depending on findings    Patient evaluated for over 50 minutes with this appoinment, including diagnostic testing and treatment.  All questions answered,  chart reviewed extensively- specialty consultations, recommendations and follow up reviewed and documented,  care gaps identified and addressed, health maintenance discussed and care coordinated.    Subjective:   History of Present Illness    CHIEF COMPLAINT:  Patient presents today for follow up of multiple chronic conditions.  She would like me to be her primary care physician.    RHEUMATOID ARTHRITIS AND OSTEOPOROSIS:  She has had rheumatoid arthritis for 4-5 years. While labs show RA is well controlled, she continues to experience joint issues. She has been on daily Forteo injections for the past year for osteoporosis management. She has a significant fracture history including a humeral fracture in fall 2024, an ankle fracture requiring replacement in 2015, and a shattered wrist from a fall requiring surgical intervention.    GASTROINTESTINAL:  She was diagnosed with H. pylori in April 2023 and completed antibiotic treatment.  She had a subsequent EGD done in May of 2023 and no H pylori organisms were identified on pathology.  She reports current abdominal pain that differs in character from previous  H. pylori-related pain. She experiences acid reflux and medication-induced nausea, managed with daily pantoprazole.    SLEEP APNEA:  She was diagnosed with severe sleep apnea in May 2024. She has not used her CPAP machine for over a month due to difficulty tolerating the mask and requiring constant adjustments. She has not seen her sleep specialist in at least six months.  She understands the significance of this and is willing to return.    HAIR LOSS:  She reports worsening hair loss over the past month, describing significant hair loss during washing. This is causing her distress.    ENDOCRINOLOGY:  She is found to have an adrenal nodule and a CT scan done in , she was not aware this.  Significance of this reviewed.    FAMILY HISTORY:  She has significant family history of asthma with sister and niece deaths due to asthma attacks. Two brothers  from heart problems and dementia. Niece with thyroid disease.    SOCIAL HISTORY:  She has never smoked and reports minimal alcohol consumption. She recently started walking for exercise with her  2 weeks ago.      ROS:  General: -fever, -chills, -fatigue, -weight gain, -weight loss  Eyes: -vision changes, -redness, -discharge  ENT: -ear pain, -nasal congestion, -sore throat  Cardiovascular: -chest pain, -palpitations, -lower extremity edema  Respiratory: -cough, -shortness of breath  Gastrointestinal: +abdominal pain, +nausea, -vomiting, -diarrhea, -constipation, -blood in stool  Genitourinary: -dysuria, -hematuria, -frequency  Musculoskeletal: -joint pain, -muscle pain, +pain with movement  Skin: -rash, -lesion, +abnormal hair loss  Neurological: -headache, -dizziness, -numbness, -tingling  Psychiatric: -anxiety, -depression, -sleep difficulty  Allergic: +allergic reactions          Patient Active Problem List  Diagnosis    Mixed hyperlipidemia    GERD (gastroesophageal reflux disease)    Rheumatoid arthritis    Benign essential hypertension    Cervical  dysplasia    Vaginal dysplasia    H. pylori infection: 4/23- NOT seen on EGD 5/23    Osteoporosis- on Forteo since 2024; improved 5/25    Fear of pain    Pain aggravated by activities of daily living    History of vaginal dysplasia    Dysplasia of cervix, low grade (LANDEN 1)    Severe obesity (BMI 35.0-39.9) with comorbidity    Sleep apnea: SEVERE 5/24 on CPAP    Closed fracture of left distal humerus: 9/24    Muscle weakness    Tubular adenoma of colon: 2023- repeat 5-7 years    Adrenal nodule: 2 cm on L see CT 2024    IFG (impaired fasting glucose)          Objective:      Physical Exam        Health Maintenance Due   Topic Date Due    Hemoglobin A1c (Prediabetes)  Never done    COVID-19 Vaccine (1) Never done    RSV Vaccine (Age 60+ and Pregnant patients) (1 - Risk 60-74 years 1-dose series) Never done        This note was generated with the assistance of ambient listening technology. Verbal consent was obtained by the patient and accompanying visitor(s) for the recording of patient appointment to facilitate this note. I attest to having reviewed and edited the generated note for accuracy, though some syntax or spelling errors may persist. Please contact the author of this note for any clarification.

## 2025-06-16 DIAGNOSIS — M81.8 OTHER OSTEOPOROSIS WITHOUT CURRENT PATHOLOGICAL FRACTURE: ICD-10-CM

## 2025-06-16 DIAGNOSIS — M05.79 RHEUMATOID ARTHRITIS INVOLVING MULTIPLE SITES WITH POSITIVE RHEUMATOID FACTOR: ICD-10-CM

## 2025-06-16 RX ORDER — PEN NEEDLE, DIABETIC 30 GX3/16"
1 NEEDLE, DISPOSABLE MISCELLANEOUS DAILY
Qty: 100 EACH | Refills: 3 | Status: SHIPPED | OUTPATIENT
Start: 2025-06-16 | End: 2025-06-16 | Stop reason: SDUPTHER

## 2025-06-17 RX ORDER — METHOTREXATE 2.5 MG/1
TABLET ORAL
Qty: 120 TABLET | Refills: 0 | Status: SHIPPED | OUTPATIENT
Start: 2025-06-17

## 2025-06-17 RX ORDER — ESCITALOPRAM OXALATE 20 MG/1
20 TABLET ORAL DAILY
Qty: 90 TABLET | Refills: 3 | Status: SHIPPED | OUTPATIENT
Start: 2025-06-17

## 2025-06-17 RX ORDER — PEN NEEDLE, DIABETIC 30 GX3/16"
1 NEEDLE, DISPOSABLE MISCELLANEOUS DAILY
Qty: 100 EACH | Refills: 3 | Status: SHIPPED | OUTPATIENT
Start: 2025-06-17

## 2025-06-17 NOTE — TELEPHONE ENCOUNTER
"Copied from CRM #[id number]. Topic: Medications - Medication Refill  >> Jun 17, 2025 11:10 AM Brissa wrote:  Type:  RX Refill Request    Who Called: express script   Refill or New Rx:refill  RX Name and Strength:EScitalopram oxalate (LEXAPRO) 20 MG tablet  How is the patient currently taking it? (ex. 1XDay):Route: Take 1 tablet (20 mg total) by mouth once daily. - Oral  Is this a 30 day or 90 day RX:90  Preferred Pharmacy with phone number:[facility] - [place], [place] - [address] [place], [place] [address] [facility] [address]  Phone: [phone] Fax: [phone]  Hours: Not open 24 hours      Local or Mail Order:mail  Ordering Provider:krista   Would the patient rather a call back or a response via [facility]? Call   Best Call Back Number:[phone]  Additional Information:   Type:  RX Refill Request    Who Called: pt   Refill or New Rx:refill  RX Name and Strength:pen needle, diabetic (BD ULTRA-FINE MINI PEN NEEDLE) 31 gauge x 3/16" Ndle  How is the patient currently taking it? (ex. 1XDay):Route: Inject 1 each into the skin once daily. - Subcutaneous  Is this a 30 day or 90 day RX:100  " Oriented - self; Oriented - place; Oriented - time

## 2025-06-17 NOTE — TELEPHONE ENCOUNTER
No care due was identified.  [facility] Embedded Care Due Messages. Reference number: [id number].   6/17/2025 11:19:55 AM CDT

## 2025-06-17 NOTE — TELEPHONE ENCOUNTER
Please see the attached refill request. I dont know how to remove the needles since its a linked request

## 2025-06-18 ENCOUNTER — HOSPITAL ENCOUNTER (OUTPATIENT)
Dept: RADIOLOGY | Facility: HOSPITAL | Age: 65
Discharge: HOME | End: 2025-06-18
Attending: INTERNAL MEDICINE
Payer: MEDICARE

## 2025-06-18 ENCOUNTER — RESULTS FOLLOW-UP (OUTPATIENT)
Dept: FAMILY MEDICINE | Facility: CLINIC | Age: 65
End: 2025-06-18

## 2025-06-18 DIAGNOSIS — E27.8 OTHER SPECIFIED DISORDERS OF ADRENAL GLAND: ICD-10-CM

## 2025-06-18 PROCEDURE — 25500020 PHARM REV CODE 255: Performed by: INTERNAL MEDICINE

## 2025-06-18 PROCEDURE — 74170 CT ABD WO CNTRST FLWD CNTRST: CPT | Mod: 26,,, | Performed by: RADIOLOGY

## 2025-06-18 PROCEDURE — 74170 CT ABD WO CNTRST FLWD CNTRST: CPT | Mod: TC

## 2025-06-18 RX ADMIN — IOHEXOL 100 ML: 350 INJECTION, SOLUTION INTRAVENOUS at 10:06

## 2025-06-20 ENCOUNTER — PROCEDURE VISIT (OUTPATIENT)
Dept: OBSTETRICS AND GYNECOLOGY | Facility: CLINIC | Age: 65
End: 2025-06-20
Payer: MEDICARE

## 2025-06-20 VITALS
HEIGHT: 59 IN | BODY MASS INDEX: 39.72 KG/M2 | WEIGHT: 197.06 LBS | SYSTOLIC BLOOD PRESSURE: 140 MMHG | DIASTOLIC BLOOD PRESSURE: 80 MMHG

## 2025-06-20 DIAGNOSIS — R87.612 LGSIL ON PAP SMEAR OF CERVIX: Primary | ICD-10-CM

## 2025-06-20 DIAGNOSIS — Z01.812 PRE-PROCEDURE LAB EXAM: ICD-10-CM

## 2025-06-20 LAB
B-HCG UR QL: NEGATIVE
CTP QC/QA: YES

## 2025-06-20 NOTE — PROCEDURES
Colposcopy    Date/Time: 6/20/2025 10:30 AM    Performed by: Micheline Kiran MD  Authorized by: Micheline Kiran MD    Assistants?: Yes      Colposcopy Site:  Cervix  Position:  Supine  Acrowhite Lesion: No    Atypical Vessels: No    Transformation Zone Adequate?: Yes    Biopsy?: No    ECC Performed?: Yes    LEEP Performed?: No     Patient tolerated the procedure well with no immediate complications.   Post-operative instructions were provided for the patient.   Patient was discharged and will follow up if any complications occur

## 2025-06-23 DIAGNOSIS — Z00.00 ENCOUNTER FOR MEDICARE ANNUAL WELLNESS EXAM: ICD-10-CM

## 2025-06-26 ENCOUNTER — LAB VISIT (OUTPATIENT)
Dept: LAB | Facility: HOSPITAL | Age: 65
End: 2025-06-26
Attending: INTERNAL MEDICINE
Payer: MEDICARE

## 2025-06-26 DIAGNOSIS — I10 BENIGN ESSENTIAL HYPERTENSION: ICD-10-CM

## 2025-06-26 DIAGNOSIS — E27.9 ADRENAL NODULE: ICD-10-CM

## 2025-06-26 DIAGNOSIS — R73.01 IFG (IMPAIRED FASTING GLUCOSE): ICD-10-CM

## 2025-06-26 DIAGNOSIS — R53.83 FATIGUE, UNSPECIFIED TYPE: ICD-10-CM

## 2025-06-26 LAB
ALBUMIN SERPL BCP-MCNC: 4.4 G/DL (ref 3.5–5.2)
ALP SERPL-CCNC: 66 UNIT/L (ref 40–150)
ALT SERPL W/O P-5'-P-CCNC: 33 UNIT/L (ref 10–44)
ANION GAP (OHS): 12 MMOL/L (ref 8–16)
AST SERPL-CCNC: 29 UNIT/L (ref 11–45)
BILIRUB SERPL-MCNC: 1.2 MG/DL (ref 0.1–1)
BUN SERPL-MCNC: 20 MG/DL (ref 8–23)
CALCIUM SERPL-MCNC: 9.7 MG/DL (ref 8.7–10.5)
CHLORIDE SERPL-SCNC: 103 MMOL/L (ref 95–110)
CO2 SERPL-SCNC: 26 MMOL/L (ref 23–29)
CORTIS SERPL-MCNC: 1.4 UG/DL (ref 4.3–22.4)
CREAT SERPL-MCNC: 0.8 MG/DL (ref 0.5–1.4)
EAG (OHS): 108 MG/DL (ref 68–131)
ERYTHROCYTE [DISTWIDTH] IN BLOOD BY AUTOMATED COUNT: 14.3 % (ref 11.5–14.5)
GFR SERPLBLD CREATININE-BSD FMLA CKD-EPI: >60 ML/MIN/1.73/M2
GLUCOSE SERPL-MCNC: 123 MG/DL (ref 70–110)
HBA1C MFR BLD: 5.4 % (ref 4–5.6)
HCT VFR BLD AUTO: 36.3 % (ref 37–48.5)
HGB BLD-MCNC: 12.1 GM/DL (ref 12–16)
MCH RBC QN AUTO: 34.2 PG (ref 27–31)
MCHC RBC AUTO-ENTMCNC: 33.3 G/DL (ref 32–36)
MCV RBC AUTO: 103 FL (ref 82–98)
PLATELET # BLD AUTO: 155 K/UL (ref 150–450)
PMV BLD AUTO: 11.8 FL (ref 9.2–12.9)
POTASSIUM SERPL-SCNC: 4.8 MMOL/L (ref 3.5–5.1)
PROT SERPL-MCNC: 7.5 GM/DL (ref 6–8.4)
RBC # BLD AUTO: 3.54 M/UL (ref 4–5.4)
SODIUM SERPL-SCNC: 141 MMOL/L (ref 136–145)
TSH SERPL-ACNC: 1.4 UIU/ML (ref 0.4–4)
WBC # BLD AUTO: 6.55 K/UL (ref 3.9–12.7)

## 2025-06-26 PROCEDURE — 82533 TOTAL CORTISOL: CPT

## 2025-06-26 PROCEDURE — 83036 HEMOGLOBIN GLYCOSYLATED A1C: CPT

## 2025-06-26 PROCEDURE — 82040 ASSAY OF SERUM ALBUMIN: CPT

## 2025-06-26 PROCEDURE — 85027 COMPLETE CBC AUTOMATED: CPT

## 2025-06-26 PROCEDURE — 84443 ASSAY THYROID STIM HORMONE: CPT

## 2025-06-26 PROCEDURE — 36415 COLL VENOUS BLD VENIPUNCTURE: CPT | Mod: PO

## 2025-07-07 ENCOUNTER — HOSPITAL ENCOUNTER (OUTPATIENT)
Dept: RADIOLOGY | Facility: HOSPITAL | Age: 65
Discharge: HOME | End: 2025-07-07
Attending: STUDENT IN AN ORGANIZED HEALTH CARE EDUCATION/TRAINING PROGRAM
Payer: MEDICARE

## 2025-07-07 DIAGNOSIS — M05.79 RHEUMATOID ARTHRITIS INVOLVING MULTIPLE SITES WITH POSITIVE RHEUMATOID FACTOR: ICD-10-CM

## 2025-07-07 DIAGNOSIS — M19.90 OSTEOARTHRITIS, UNSPECIFIED OSTEOARTHRITIS TYPE, UNSPECIFIED SITE: ICD-10-CM

## 2025-07-07 DIAGNOSIS — M79.7 FIBROMYALGIA: ICD-10-CM

## 2025-07-07 PROCEDURE — 78306 BONE IMAGING WHOLE BODY: CPT | Mod: 26,,, | Performed by: NUCLEAR MEDICINE

## 2025-07-07 PROCEDURE — A9512 TC99M PERTECHNETATE: HCPCS | Performed by: STUDENT IN AN ORGANIZED HEALTH CARE EDUCATION/TRAINING PROGRAM

## 2025-07-07 PROCEDURE — 78306 BONE IMAGING WHOLE BODY: CPT | Mod: TC

## 2025-07-07 RX ADMIN — TECHNETIUM TC-99M 15 MILLICURIE: 7.5 INJECTION, SOLUTION INTRAVENOUS at 02:07

## 2025-07-14 ENCOUNTER — OFFICE VISIT (OUTPATIENT)
Dept: FAMILY MEDICINE | Facility: CLINIC | Age: 65
End: 2025-07-14
Payer: MEDICARE

## 2025-07-14 VITALS — DIASTOLIC BLOOD PRESSURE: 60 MMHG | SYSTOLIC BLOOD PRESSURE: 125 MMHG

## 2025-07-14 DIAGNOSIS — K76.0 FATTY LIVER: ICD-10-CM

## 2025-07-14 DIAGNOSIS — R73.01 IFG (IMPAIRED FASTING GLUCOSE): ICD-10-CM

## 2025-07-14 DIAGNOSIS — M05.79 RHEUMATOID ARTHRITIS INVOLVING MULTIPLE SITES WITH POSITIVE RHEUMATOID FACTOR: ICD-10-CM

## 2025-07-14 DIAGNOSIS — I10 BENIGN ESSENTIAL HYPERTENSION: ICD-10-CM

## 2025-07-14 DIAGNOSIS — R91.1 LUNG NODULE: ICD-10-CM

## 2025-07-14 DIAGNOSIS — E66.01 SEVERE OBESITY (BMI 35.0-39.9) WITH COMORBIDITY: ICD-10-CM

## 2025-07-14 DIAGNOSIS — G47.33 OBSTRUCTIVE SLEEP APNEA SYNDROME: ICD-10-CM

## 2025-07-14 DIAGNOSIS — R53.83 FATIGUE, UNSPECIFIED TYPE: Primary | ICD-10-CM

## 2025-07-14 DIAGNOSIS — E27.9 ADRENAL NODULE: ICD-10-CM

## 2025-07-14 DIAGNOSIS — E78.2 MIXED HYPERLIPIDEMIA: ICD-10-CM

## 2025-07-14 PROCEDURE — 99214 OFFICE O/P EST MOD 30 MIN: CPT | Mod: PBBFAC,PO | Performed by: INTERNAL MEDICINE

## 2025-07-14 PROCEDURE — 99999 PR PBB SHADOW E&M-EST. PATIENT-LVL IV: CPT | Mod: PBBFAC,,, | Performed by: INTERNAL MEDICINE

## 2025-07-14 RX ORDER — TIRZEPATIDE 5 MG/.5ML
5 INJECTION, SOLUTION SUBCUTANEOUS
Qty: 2 ML | Refills: 0 | Status: SHIPPED | OUTPATIENT
Start: 2025-08-12 | End: 2025-09-09

## 2025-07-14 RX ORDER — TIRZEPATIDE 7.5 MG/.5ML
7.5 INJECTION, SOLUTION SUBCUTANEOUS
Qty: 2 ML | Refills: 0 | Status: SHIPPED | OUTPATIENT
Start: 2025-09-10 | End: 2025-10-08

## 2025-07-14 RX ORDER — TIRZEPATIDE 2.5 MG/.5ML
2.5 INJECTION, SOLUTION SUBCUTANEOUS
Qty: 2 ML | Refills: 0 | Status: SHIPPED | OUTPATIENT
Start: 2025-07-14 | End: 2025-08-11

## 2025-07-14 NOTE — PROGRESS NOTES
Patient ID: Aysha Randoplh is a 64 y.o. female.    Chief Complaint: Follow-up      Assessment:       1. Fatigue, unspecified type    2. Benign essential hypertension    3. Rheumatoid arthritis involving multiple sites with positive rheumatoid factor    4. Lung nodule: on CT 2025; stable    5. Adrenal nodule: 2 cm on L see CT 2024; stable on CT 6/25 (1.8 cm)    6. Fatty liver: seen on CT 6/25    7. Obstructive sleep apnea syndrome    8. IFG (impaired fasting glucose)    9. Mixed hyperlipidemia    10. Severe obesity (BMI 35.0-39.9) with comorbidity       Plan:           1. Fatigue, unspecified type:  FAITH issues discussed.  Compliance with sleep apnea treatment reviewed.  Other labs acceptable.  Anemia is not significant enough to be the reason for her fatigue.  Sleep hygiene, exercise, stress reduction and compliance with apnea discussed.    2. Benign essential hypertension: Low salt diet, exercise, 15-20-# weight loss in next 6-12 months' time.  Call if BP > 130/80 on a regular basis.    3. Rheumatoid arthritis involving multiple sites with positive rheumatoid factor:  Stable on regimen, Keep rheumatology follow-up    4. Lung nodule: on CT 2025; stable    5. Adrenal nodule: 2 cm on L see CT 2024; stable on CT 6/25 (1.8 cm)    6. Fatty liver: seen on CT 6/25:  Labs acceptable.  Low to intermediate risk, see below, will continue to monitor.  Weight loss, portion control  FIB-4 Calculation: 2.15 at 6/26/2025  9:29 AM  Calculated from:  SGOT/AST: 29 unit/L at 6/26/2025  9:29 AM  SGPT/ALT: 33 unit/L at 6/26/2025  9:29 AM  Platelets: 155 K/uL at 6/26/2025  9:29 AM  Age: 64 years     FIB-4 below 1.30 is considered as low-risk for advanced fibrosis  FIB-4 over 2.67 is considered as high-risk for advanced fibrosis  FIB-4 values between 1.30 and 2.67 are considered as intermediate-risk of advanced fibrosis for ages 36-64.     For ages > 64 the cut-off for low-risk goes to < 2.  This is a screening tool and clinical judgement  Pt lying in bed resting at this time. Respirations even and unlabored. No s/sx of pain or distress. Report to be given to oncoming nurse. should be used in the interpretation of these results.       7. Obstructive sleep apnea syndrome  -     Ambulatory referral/consult to Sleep Disorders; Future; Expected date: 07/21/2025  -     tirzepatide, weight loss, (ZEPBOUND) 2.5 mg/0.5 mL PnIj; Inject 2.5 mg into the skin every 7 days.  Dispense: 2 mL; Refill: 0  -     tirzepatide, weight loss, (ZEPBOUND) 5 mg/0.5 mL PnIj; Inject 5 mg into the skin every 7 days.  Dispense: 2 mL; Refill: 0  -     tirzepatide, weight loss, (ZEPBOUND) 7.5 mg/0.5 mL PnIj; Inject 7.5 mg into the skin every 7 days.  Dispense: 2 mL; Refill: 0    8. IFG (impaired fasting glucose):  Portion control, diet and weight loss reviewed    9. Mixed hyperlipidemia:  Continue regimen    10. Severe obesity (BMI 35.0-39.9) with comorbidity:  Medication cautions and side effects reviewed at length with patient and .  Follow up in 1 month, sooner with problems in the interim  -     tirzepatide, weight loss, (ZEPBOUND) 2.5 mg/0.5 mL PnIj; Inject 2.5 mg into the skin every 7 days.  Dispense: 2 mL; Refill: 0  -     tirzepatide, weight loss, (ZEPBOUND) 5 mg/0.5 mL PnIj; Inject 5 mg into the skin every 7 days.  Dispense: 2 mL; Refill: 0  -     tirzepatide, weight loss, (ZEPBOUND) 7.5 mg/0.5 mL PnIj; Inject 7.5 mg into the skin every 7 days.  Dispense: 2 mL; Refill: 0     I will review all studies and determine further tx depending on findings  Visit today manifests increased complexity associated with the care of the multiple chronic and episodic problems I addressed.  I am managing a longitudinal care plan of the patient due to the serious and complex problems listed above.    Subjective:   History of Present Illness    CHIEF COMPLAINT:  Patient presents today for follow up of test results.   accompanies her.    LABS / TEST RESULTS:  Lab results from June 26th show adrenal gland levels normal with expected low cortisol after DMT, normal thyroid function, and potassium, liver, and kidney  labs within normal limits. Slight anemia is present, consistent with prior year's findings and potentially medication-related. No evidence of diabetes. Cholesterol results from February were favorable with total cholesterol 170 and LDL 72.    IMAGING:  CT shows a stable lung nodule unchanged from prior imaging with no associated symptoms such as cough, hemoptysis, or night sweats. Abdominal organs appear normal with unremarkable stomach, spleen, and pancreas. Liver shows fatty infiltration with a benign cyst. Left adrenal gland growth is stable and potentially slightly smaller compared to previous year. Mild calcification noted around the aorta, interpreted as minimal cholesterol deposition.    SLEEP:  She reports ongoing sleep apnea with inconsistent sleep patterns and is currently not using her CPAP machine. She describes variable sleep quality with some nights sleeping through and other nights experiencing multiple awakenings. She finds the CPAP mask challenging, specifically difficulty falling asleep when rolling to her side. She acknowledges the need to restart CPAP treatment but has not yet done so.    FATIGUE/MALAISE:  She reports ongoing fatigue with intermittent sleep disruptions and daytime tiredness. Periods of sleeping through the night alternate with nights of frequent awakening.    INTEGUMENTARY (SKIN):  She expresses concern about hair loss with no clear familial pattern. She has a dermatology appointment scheduled in September for further evaluation and denies significant family history of similar hair loss issues.    MEDICATIONS:  She takes daily baby aspirin 81 mg, ibuprofen as needed, and Tylenol intermittently as needed. She takes Lexapro in late morning, with provider suggesting potentially switching to late afternoon or evening to manage potential drowsiness.    IMMUNIZATIONS:  She received the first two COVID-19 vaccine shots in [place] and is not planning to obtain a third dose at this  time.      ROS:  General: -fever, -chills, +fatigue, -weight gain, -weight loss, +sleep disturbances, +difficulty staying asleep  Eyes: -vision changes, -redness, -discharge  ENT: -ear pain, -nasal congestion, -sore throat  Cardiovascular: -chest pain, -palpitations, -lower extremity edema  Respiratory: -cough, -shortness of breath  Gastrointestinal: -abdominal pain, -nausea, -vomiting, -diarrhea, -constipation, -blood in stool  Genitourinary: -dysuria, -hematuria, -frequency, +nocturia  Musculoskeletal: -joint pain, -muscle pain  Skin: -rash, -lesion, +abnormal hair loss  Neurological: -headache, -dizziness, -numbness, -tingling  Psychiatric: -anxiety, -depression, -sleep difficulty          Patient Active Problem List  Diagnosis    Mixed hyperlipidemia    GERD (gastroesophageal reflux disease)    Rheumatoid arthritis    Benign essential hypertension    Cervical dysplasia    Vaginal dysplasia    H. pylori infection: 4/23- NOT seen on EGD 5/23    Osteoporosis- on Forteo since 2024; improved 5/25    Fear of pain    Pain aggravated by activities of daily living    History of vaginal dysplasia    Dysplasia of cervix, low grade (LANDEN 1)    Severe obesity (BMI 35.0-39.9) with comorbidity    Sleep apnea: SEVERE 5/24 on CPAP    Closed fracture of left distal humerus: 9/24    Muscle weakness    Tubular adenoma of colon: 2023- repeat 5-7 years    Adrenal nodule: 2 cm on L see CT 2024; stable on CT 6/25 (1.8 cm)    IFG (impaired fasting glucose)    Lung nodule: on CT 6/25; stable from 2024, never smoked    Fatty liver: seen on CT 6/25          Objective:      Physical Exam  Constitutional:       Appearance: She is well-developed.   HENT:      Head: Normocephalic and atraumatic.   Eyes:      Extraocular Movements: Extraocular movements intact.      Conjunctiva/sclera: Conjunctivae normal.   Neck:      Thyroid: No thyromegaly.   Pulmonary:      Effort: No respiratory distress.   Neurological:      General: No focal deficit  present.      Mental Status: She is alert and oriented to person, place, and time.   Psychiatric:         Mood and Affect: Mood normal.         Behavior: Behavior normal.         Thought Content: Thought content normal.         Judgment: Judgment normal.             Health Maintenance Due   Topic Date Due    COVID-19 Vaccine (1) Never done    RSV Vaccine (Age 60+ and Pregnant patients) (1 - Risk 60-74 years 1-dose series) Never done        This note was generated with the assistance of ambient listening technology. Verbal consent was obtained by the patient and accompanying visitor(s) for the recording of patient appointment to facilitate this note. I attest to having reviewed and edited the generated note for accuracy, though some syntax or spelling errors may persist. Please contact the author of this note for any clarification.

## 2025-07-21 ENCOUNTER — TELEPHONE (OUTPATIENT)
Dept: FAMILY MEDICINE | Facility: CLINIC | Age: 65
End: 2025-07-21
Payer: MEDICARE

## 2025-07-21 DIAGNOSIS — M05.79 RHEUMATOID ARTHRITIS INVOLVING MULTIPLE SITES WITH POSITIVE RHEUMATOID FACTOR: ICD-10-CM

## 2025-07-21 DIAGNOSIS — M19.90 OSTEOARTHRITIS, UNSPECIFIED OSTEOARTHRITIS TYPE, UNSPECIFIED SITE: ICD-10-CM

## 2025-07-21 NOTE — TELEPHONE ENCOUNTER
Copied from CRM #[id number]. Topic: Medications - Pharmacy  >> Jul 21, 2025  4:25 PM Bibiana wrote:  Type:  Pharmacy Calling to Clarify an RX    Name of Caller:pt prior auth dept   Pharmacy Name:[facility] - [place], [place] - [address] [place] [id number]  Phone: [phone] Fax: [phone]  Hours: Not open 24 hours      Prescription Name:tirzepatide, weight loss, (ZEPBOUND) 5 mg/0.5 mL PnIj   What do they need to clarify?:needs prior auth and need diagnoses code   Best Call Back Number:[phone] fax [phone]  Additional Information:

## 2025-07-24 RX ORDER — DICLOFENAC SODIUM 10 MG/G
4 GEL TOPICAL 4 TIMES DAILY PRN
Qty: 200 G | Refills: 3 | Status: SHIPPED | OUTPATIENT
Start: 2025-07-24

## 2025-07-29 DIAGNOSIS — M81.8 OTHER OSTEOPOROSIS WITHOUT CURRENT PATHOLOGICAL FRACTURE: Primary | ICD-10-CM

## 2025-07-29 RX ORDER — TERIPARATIDE 250 UG/ML
20 INJECTION, SOLUTION SUBCUTANEOUS DAILY
Qty: 2.24 ML | Refills: 11 | Status: CANCELLED | OUTPATIENT
Start: 2025-07-29

## 2025-07-29 RX ORDER — TERIPARATIDE 250 UG/ML
20 INJECTION, SOLUTION SUBCUTANEOUS DAILY
Qty: 2.4 ML | Refills: 2 | Status: ACTIVE | OUTPATIENT
Start: 2025-07-29

## 2025-07-31 DIAGNOSIS — M79.7 FIBROMYALGIA: ICD-10-CM

## 2025-08-01 ENCOUNTER — PATIENT MESSAGE (OUTPATIENT)
Dept: OBSTETRICS AND GYNECOLOGY | Facility: CLINIC | Age: 65
End: 2025-08-01
Payer: MEDICARE

## 2025-08-01 RX ORDER — PREGABALIN 50 MG/1
50 CAPSULE ORAL 3 TIMES DAILY
Qty: 270 CAPSULE | Refills: 0 | Status: SHIPPED | OUTPATIENT
Start: 2025-08-01

## 2025-08-05 DIAGNOSIS — J45.901 MILD ASTHMA EXACERBATION: ICD-10-CM

## 2025-08-05 NOTE — TELEPHONE ENCOUNTER
No care due was identified.  [facility] Embedded Care Due Messages. Reference number: [id number].   8/05/2025 9:39:38 AM CDT   Zygomaticofacial Flap Text: Given the location of the defect, shape of the defect and the proximity to free margins a zygomaticofacial flap was deemed most appropriate for repair.  Using a sterile surgical marker, the appropriate flap was drawn incorporating the defect and placing the expected incisions within the relaxed skin tension lines where possible. The area thus outlined was incised deep to adipose tissue with a #15 scalpel blade with preservation of a vascular pedicle.  The skin margins were undermined to an appropriate distance in all directions utilizing iris scissors.  The flap was then placed into the defect and anchored with interrupted buried subcutaneous sutures.

## 2025-08-06 RX ORDER — ALBUTEROL SULFATE 90 UG/1
AEROSOL, METERED RESPIRATORY (INHALATION)
Qty: 18 G | Refills: 10 | Status: SHIPPED | OUTPATIENT
Start: 2025-08-06

## 2025-08-06 NOTE — TELEPHONE ENCOUNTER
Refill Routing Note   Medication(s) are not appropriate for processing by [facility] for the following reason(s):        New or recently adjusted medication:Less than 90 days under signature of responsible physician    [facility] action(s):  Defer             Appointments  past 12m or future 3m with PCP    Date Provider   Last Visit   7/14/2025 Beronica Pacheco MD   Next Visit   9/16/2025 Beronica Pacheco MD   ED visits in past 90 days: 0        Note composed:7:56 PM 08/05/2025

## 2025-08-19 DIAGNOSIS — M19.90 OSTEOARTHRITIS, UNSPECIFIED OSTEOARTHRITIS TYPE, UNSPECIFIED SITE: ICD-10-CM

## 2025-08-20 RX ORDER — IBUPROFEN 800 MG/1
800 TABLET, FILM COATED ORAL EVERY 8 HOURS PRN
Qty: 90 TABLET | Refills: 2 | Status: SHIPPED | OUTPATIENT
Start: 2025-08-20 | End: 2025-11-18

## 2025-08-21 ENCOUNTER — LAB VISIT (OUTPATIENT)
Dept: LAB | Facility: HOSPITAL | Age: 65
End: 2025-08-21
Payer: MEDICARE

## 2025-08-21 ENCOUNTER — OFFICE VISIT (OUTPATIENT)
Dept: RHEUMATOLOGY | Facility: CLINIC | Age: 65
End: 2025-08-21
Payer: MEDICARE

## 2025-08-21 ENCOUNTER — TELEPHONE (OUTPATIENT)
Dept: RHEUMATOLOGY | Facility: CLINIC | Age: 65
End: 2025-08-21

## 2025-08-21 VITALS
WEIGHT: 199.06 LBS | BODY MASS INDEX: 40.21 KG/M2 | SYSTOLIC BLOOD PRESSURE: 118 MMHG | DIASTOLIC BLOOD PRESSURE: 80 MMHG | HEART RATE: 84 BPM

## 2025-08-21 DIAGNOSIS — E78.2 MIXED HYPERLIPIDEMIA: ICD-10-CM

## 2025-08-21 DIAGNOSIS — D84.821 IMMUNOCOMPROMISED STATE DUE TO DRUG THERAPY: ICD-10-CM

## 2025-08-21 DIAGNOSIS — Z79.631 LONG TERM METHOTREXATE USER: Primary | ICD-10-CM

## 2025-08-21 DIAGNOSIS — Z79.899 IMMUNOCOMPROMISED STATE DUE TO DRUG THERAPY: ICD-10-CM

## 2025-08-21 DIAGNOSIS — M05.79 RHEUMATOID ARTHRITIS INVOLVING MULTIPLE SITES WITH POSITIVE RHEUMATOID FACTOR: ICD-10-CM

## 2025-08-21 PROCEDURE — 99214 OFFICE O/P EST MOD 30 MIN: CPT | Mod: PBBFAC

## 2025-08-21 PROCEDURE — 99999 PR PBB SHADOW E&M-EST. PATIENT-LVL IV: CPT | Mod: PBBFAC,GC,,

## 2025-08-21 RX ORDER — LEFLUNOMIDE 10 MG/1
10 TABLET ORAL DAILY
Qty: 30 TABLET | Refills: 1 | Status: SHIPPED | OUTPATIENT
Start: 2025-08-21 | End: 2026-08-21

## 2025-08-21 ASSESSMENT — CLINICAL DISEASE ACTIVITY INDEX (CDAI)
TENDER_JOINTS_COUNT: 6
TOTAL_SCORE: 14
PATIENT_ASSESSMENT: 4
SWOLLEN_JOINTS_COUNT: 0
PHYSICIAN_ASSESSMENT: 4

## 2025-08-22 ENCOUNTER — RESULTS FOLLOW-UP (OUTPATIENT)
Dept: RHEUMATOLOGY | Facility: CLINIC | Age: 65
End: 2025-08-22
Payer: MEDICARE

## 2025-08-22 DIAGNOSIS — E87.5 HYPERKALEMIA: Primary | ICD-10-CM

## 2025-09-02 ENCOUNTER — TELEPHONE (OUTPATIENT)
Dept: RHEUMATOLOGY | Facility: CLINIC | Age: 65
End: 2025-09-02
Payer: MEDICARE

## 2025-09-02 DIAGNOSIS — M79.7 FIBROMYALGIA: ICD-10-CM

## 2025-09-02 DIAGNOSIS — M81.8 OTHER OSTEOPOROSIS WITHOUT CURRENT PATHOLOGICAL FRACTURE: ICD-10-CM

## 2025-09-02 RX ORDER — PREGABALIN 50 MG/1
CAPSULE ORAL
Qty: 270 CAPSULE | Refills: 0 | Status: SHIPPED | OUTPATIENT
Start: 2025-09-02

## 2025-09-03 RX ORDER — PEN NEEDLE, DIABETIC 30 GX3/16"
1 NEEDLE, DISPOSABLE MISCELLANEOUS DAILY
Qty: 100 EACH | Refills: 3 | Status: SHIPPED | OUTPATIENT
Start: 2025-09-03

## (undated) DEVICE — DRAPE INCISE IOBAN 2 23X23IN

## (undated) DEVICE — K-WIRE SNGL TRCR .045 D 6L N/S
Type: IMPLANTABLE DEVICE | Site: HUMERUS | Status: NON-FUNCTIONAL
Removed: 2024-10-03

## (undated) DEVICE — STOCKINETTE IMPERVIOUS 16X54IN

## (undated) DEVICE — COVER OVERHEAD SURG LT BLUE

## (undated) DEVICE — PAD ABDOMINAL STERILE 5X9IN

## (undated) DEVICE — DRAPE STERI U-SHAPED 47X51IN

## (undated) DEVICE — TOWEL OR XRAY BLUE 17X26IN

## (undated) DEVICE — DRAPE THREE-QTR REINF 53X77IN

## (undated) DEVICE — APPLICATOR CHLORAPREP ORN 26ML

## (undated) DEVICE — TOURNIQUET 2 PORT RED 18X4IN

## (undated) DEVICE — GLOVE BIOGEL PI MICRO INDIC 8

## (undated) DEVICE — GOWN POLY REINF BRTH SLV LG

## (undated) DEVICE — NDL MAYO CAT 1/2 CIR #6

## (undated) DEVICE — BIT DRILL 2MM W/DEPTH MARKING

## (undated) DEVICE — ELECTRODE REM PLYHSV RETURN 9

## (undated) DEVICE — SOCKINETTE IMPERVIOUS 12X48IN

## (undated) DEVICE — GAUZE CNFRM STRL 4INX4.1YD

## (undated) DEVICE — SPLINT PLASTER FAST SET 5X30IN

## (undated) DEVICE — DRAPE EXTREMITY STD 89X128IN

## (undated) DEVICE — DRESSING XEROFORM NONADH 1X8IN

## (undated) DEVICE — BLADE SURG #15 CARBON STEEL

## (undated) DEVICE — GOWN POLY REINF X-LONG 2XL

## (undated) DEVICE — DRILL QUICK RELEASE 2.8MM 5IN

## (undated) DEVICE — GLOVE BIOGEL ORTHOPEDIC 8

## (undated) DEVICE — SPONGE LAP 18X18 PREWASHED

## (undated) DEVICE — PADDING CAST 4IN SPECIALIST

## (undated) DEVICE — SPONGE COTTON TRAY 4X4IN

## (undated) DEVICE — DRAPE STERI INSTRUMENT 1018

## (undated) DEVICE — BIT DRILL QUICK RELEASE 2.8MM

## (undated) DEVICE — PAD PREP CUFFED NS 24X48IN

## (undated) DEVICE — SUT CTD VICRYL 2.0

## (undated) DEVICE — DRAPE C-ARM 41X125IN

## (undated) DEVICE — PACK SURGERY START

## (undated) DEVICE — SUT VICRYL PLUS 0 CT1 18IN

## (undated) DEVICE — GUIDE WIRE INSTRUMENT .062 X 6
Type: IMPLANTABLE DEVICE | Site: HUMERUS | Status: NON-FUNCTIONAL
Removed: 2024-10-03

## (undated) DEVICE — COVER PROXIMA MAYO STAND

## (undated) DEVICE — PACK ECLIPSE BASIC III SURG

## (undated) DEVICE — INSTRUMENT SUCTION FRAZIER 12F

## (undated) DEVICE — TUBE SUCTION FRAZIER VENT 12FR

## (undated) DEVICE — BNDG COFLEX FOAM LF2 ST 4X5YD

## (undated) DEVICE — IRRIGATOR SUCTION W/TIP

## (undated) DEVICE — DRAPE U SPLIT SHEET 54X76IN

## (undated) DEVICE — BIT DRILL QUICK RELEASE 2.0MM

## (undated) DEVICE — COVER SNAP KAP 30

## (undated) DEVICE — GOWN POLY REINF BRTH SLV XL

## (undated) DEVICE — SUT MONOCRYL 3-0 PS-1